# Patient Record
Sex: FEMALE | Race: WHITE | Employment: OTHER | URBAN - METROPOLITAN AREA
[De-identification: names, ages, dates, MRNs, and addresses within clinical notes are randomized per-mention and may not be internally consistent; named-entity substitution may affect disease eponyms.]

---

## 2017-10-10 RX ORDER — AMLODIPINE BESYLATE 10 MG/1
10 TABLET ORAL DAILY
COMMUNITY
End: 2018-03-28 | Stop reason: ALTCHOICE

## 2018-02-09 ENCOUNTER — OFFICE VISIT (OUTPATIENT)
Dept: SURGERY | Age: 68
End: 2018-02-09

## 2018-02-09 ENCOUNTER — DOCUMENTATION ONLY (OUTPATIENT)
Dept: SURGERY | Age: 68
End: 2018-02-09

## 2018-02-09 VITALS
WEIGHT: 232 LBS | SYSTOLIC BLOOD PRESSURE: 145 MMHG | BODY MASS INDEX: 42.69 KG/M2 | DIASTOLIC BLOOD PRESSURE: 53 MMHG | HEIGHT: 62 IN | HEART RATE: 58 BPM

## 2018-02-09 DIAGNOSIS — Z17.0 MALIGNANT NEOPLASM OF RIGHT BREAST IN FEMALE, ESTROGEN RECEPTOR POSITIVE, UNSPECIFIED SITE OF BREAST (HCC): Primary | ICD-10-CM

## 2018-02-09 DIAGNOSIS — C50.911 MALIGNANT NEOPLASM OF RIGHT BREAST IN FEMALE, ESTROGEN RECEPTOR POSITIVE, UNSPECIFIED SITE OF BREAST (HCC): Primary | ICD-10-CM

## 2018-02-09 PROBLEM — E66.01 OBESITY, MORBID (HCC): Status: ACTIVE | Noted: 2018-02-09

## 2018-02-09 RX ORDER — OXYCODONE HYDROCHLORIDE 5 MG/1
5 TABLET ORAL
COMMUNITY
Start: 2013-06-19 | End: 2018-02-09

## 2018-02-09 RX ORDER — LISINOPRIL AND HYDROCHLOROTHIAZIDE 20; 25 MG/1; MG/1
1 TABLET ORAL DAILY
COMMUNITY
End: 2018-06-19

## 2018-02-09 RX ORDER — ESCITALOPRAM OXALATE 10 MG/1
10 TABLET ORAL
COMMUNITY
End: 2018-02-09 | Stop reason: SDUPTHER

## 2018-02-09 RX ORDER — AMOXICILLIN 250 MG
1 CAPSULE ORAL
COMMUNITY
Start: 2013-06-19 | End: 2018-03-01

## 2018-02-09 RX ORDER — AMLODIPINE BESYLATE 10 MG/1
TABLET ORAL
COMMUNITY
End: 2018-02-09 | Stop reason: SDUPTHER

## 2018-02-09 RX ORDER — ATENOLOL 50 MG/1
50 TABLET ORAL
COMMUNITY
End: 2018-02-09 | Stop reason: SDUPTHER

## 2018-02-09 RX ORDER — GLUCOSAMINE SULFATE 1500 MG
1 POWDER IN PACKET (EA) ORAL DAILY
COMMUNITY
End: 2018-03-05

## 2018-02-09 RX ORDER — ETODOLAC 400 MG/1
400 TABLET, FILM COATED ORAL
COMMUNITY
End: 2018-02-09 | Stop reason: SDUPTHER

## 2018-02-09 NOTE — PROGRESS NOTES
Type of Film: [x] CD [] FILMS  Type of Test: [] MRI [x] MAMMO  From: [facility]  Given to: will bring to [facility] on Monday 2/12/18.   LOCATION  To be Downloaded into PACS:  YES

## 2018-02-09 NOTE — PROGRESS NOTES
The patient's BRCA testing was done in the office and Sydnie Pop will drop off at [facility].

## 2018-02-09 NOTE — PATIENT INSTRUCTIONS
Breast Cancer: Care Instructions  Your Care Instructions    Breast cancer occurs when abnormal cells grow out of control in the breast. These cancer cells can spread within the breast, to nearby lymph nodes and other tissues, and to other parts of the body. Being treated for cancer can weaken your body, and you may feel very tired. Get the rest your body needs so you can feel better. Finding out that you have cancer is scary. You may feel many emotions and may need some help coping. Seek out family, friends, and counselors for support. You also can do things at home to make yourself feel better while you go through treatment. Call the [employer] (7-920.656.6562) or visit its website at [employer] for more information. Follow-up care is a key part of your treatment and safety. Be sure to make and go to all appointments, and call your doctor if you are having problems. It's also a good idea to know your test results and keep a list of the medicines you take. How can you care for yourself at home? · Take your medicines exactly as prescribed. Call your doctor if you think you are having a problem with your medicine. You may get medicine for nausea and vomiting if you have these side effects. · Follow your doctor's instructions to relieve pain. Pain from cancer and surgery can almost always be controlled. Use pain medicine when you first notice pain, before it becomes severe. · Eat healthy food. If you do not feel like eating, try to eat food that has protein and extra calories to keep up your strength and prevent weight loss. Drink liquid meal replacements for extra calories and protein. Try to eat your main meal early. · Get some physical activity every day, but do not get too tired. Keep doing the hobbies you enjoy as your energy allows. · Do not smoke. Smoking can make your cancer worse. If you need help quitting, talk to your doctor about stop-smoking programs and medicines.  These can increase your chances of quitting for good. · Take steps to control your stress and workload. Learn relaxation techniques. ¨ Share your feelings. Stress and tension affect our emotions. By expressing your feelings to others, you may be able to understand and cope with them. ¨ Consider joining a support group. Talking about a problem with your spouse, a good friend, or other people with similar problems is a good way to reduce tension and stress. ¨ Express yourself through art. Try writing, crafts, dance, or art to relieve stress. Some dance, writing, or art groups may be available just for people who have cancer. ¨ Be kind to your body and mind. Getting enough sleep, eating a healthy diet, and taking time to do things you enjoy can contribute to an overall feeling of balance in your life and can help reduce stress. ¨ Get help if you need it. Discuss your concerns with your doctor or counselor. · If you are vomiting or have diarrhea:  ¨ Drink plenty of fluids (enough so that your urine is light yellow or clear like water) to prevent dehydration. Choose water and other caffeine-free clear liquids. If you have kidney, heart, or liver disease and have to limit fluids, talk with your doctor before you increase the amount of fluids you drink. ¨ When you are able to eat, try clear soups, mild foods, and liquids until all symptoms are gone for 12 to 48 hours. Other good choices include dry toast, crackers, cooked cereal, and gelatin dessert, such as Jell-O.  · If you have not already done so, prepare a list of advance directives. Advance directives are instructions to your doctor and family members about what kind of care you want if you become unable to speak or express yourself. When should you call for help? Call 911 anytime you think you may need emergency care. For example, call if:  ? · You passed out (lost consciousness). ?Call your doctor now or seek immediate medical care if:  ? · You have a fever. ? · You have abnormal bleeding. ? · You think you have an infection. ? · You have new or worse pain. ? · You have new symptoms, such as a cough, belly pain, vomiting, diarrhea, or a rash. ? Watch closely for changes in your health, and be sure to contact your doctor if:  ? · You are much more tired than usual.   ? · You have swollen glands in your armpits, groin, or neck. ? · You do not get better as expected. Where can you learn more? Go to http://sowmya-colin.info/. Enter V321 in the search box to learn more about \"Breast Cancer: Care Instructions. \"  Current as of: May 12, 2017  Content Version: 11.4  © 4554-0336 [employer]. Care instructions adapted under license by [employer] (which disclaims liability or warranty for this information). If you have questions about a medical condition or this instruction, always ask your healthcare professional. [address] any warranty or liability for your use of this information.

## 2018-02-09 NOTE — PROGRESS NOTES
HISTORY OF PRESENT ILLNESS  Nam Serrano is a 79 y.o. female. HPI NEW patient referral for consultation by Dr.P Radha Carlisle for a new RIGHT breast cancer diagnosis. The patient is here to discuss her breast surgical options. Her breast cancer was seen on an annual mammogram that led to a bilateral biopsy on both the RIGHT and LEFT breast. The LEFT breast was benign and the RIGHT breast is positive for IDC. The patient denies any nipple inversion or discharge. Obstetric History     No data available      Obstetric Comments   Menarche:  12. LMP: . # of Children:  3. Age at Delivery of First Child:  34.   Hysterectomy/oophorectomy: no/no. Breast Bx: no.  Hx of Breast Feeding:  no. BCP:  [address]. Hormone therapy:  yes    Family history - Mother - breast cancer unsure of age and survived. Maternal grandmother - liver/ovarian cancer age unknown and is . 5 Maternal Aunts with breast cancer, unsure of ages and all survived the breast cancer. 2 Maternal Aunts that had breast cancer also had ovarian and colon cancer  2 Paternal aunts had breast cancer, unsure of age and survived   Mammogram 3 D and ultrasound - BI RADS 5      That led to a bilateral breast biopsy. LEFT breast benign and RIGHT breast invasive ductal.  Pathology - Invasive ductal carcinoma RIGHT breast 1.2 cm ER/NH + HER 2 -   Past Medical History:   Diagnosis Date    Arthritis     Hypercholesterolemia     Hypertension     Other ill-defined conditions(799.89)     dog bite dec 2012 needeing blood transfusion    Psychiatric disorder     depression     Past Surgical History:   Procedure Laterality Date    HX ORTHOPAEDIC  2011    RIGHT TOTAL KNEE ARTHROPLASTY     Social History     Social History    Marital status:      Spouse name: N/A    Number of children: N/A    Years of education: N/A     Occupational History    Not on file.      Social History Main Topics    Smoking status: Former Smoker     Packs/day: 0.25     Years: 2.00 Quit date: 6/4/1972    Smokeless tobacco: Never Used    Alcohol use No    Drug use: No    Sexual activity: Not on file     Other Topics Concern    Not on file     Social History Narrative     OB History     Obstetric Comments    Menarche:  12. LMP: 1990. # of Children:  3. Age at Delivery of First Child:  34.   Hysterectomy/oophorectomy: no/no. Breast Bx: no.  Hx of Breast Feeding:  no. BCP:  [address]. Hormone therapy:  yes           Current Outpatient Prescriptions:     cholecalciferol (VITAMIN D3) 1,000 unit cap, Take 1 Tab by mouth., Disp: , Rfl:     lisinopril-hydroCHLOROthiazide (PRINZIDE, ZESTORETIC) 20-25 mg per tablet, Take 1 Tab by mouth., Disp: , Rfl:     senna-docusate (PERICOLACE) 8.6-50 mg per tablet, Take 1 Tab by mouth., Disp: , Rfl:     amLODIPine (NORVASC) 10 mg tablet, Take  by mouth daily. , Disp: , Rfl:     calcium-cholecalciferol, d3, (CALCIUM 600 + D) 600-125 mg-unit Tab, Take 1 Tab by mouth two (2) times a day., Disp: , Rfl:     glucosamine-chondroit-vit c-mn (GLUCOSAMINE 1500 COMPLEX) 500-400 mg capsule, Take 1 Cap by mouth two (2) times a day., Disp: , Rfl:     etodolac (LODINE) 400 mg tablet, Take 400 mg by mouth two (2) times a day., Disp: , Rfl:     Cholecalciferol, Vitamin D3, (VITAMIN D3) 1,000 unit cap, Take 1 Tab by mouth daily. , Disp: , Rfl:     escitalopram (LEXAPRO) 10 mg tablet, Take 10 mg by mouth daily. , Disp: , Rfl:     atenolol (TENORMIN) 50 mg tablet, Take 50 mg by mouth daily. , Disp: , Rfl:   Allergies   Allergen Reactions    Nasacort [Triamcinolone Acetonide] Other (comments)     headache    Sulfa (Sulfonamide Antibiotics) Rash     Review of Systems   Constitutional: Negative. HENT: Negative. Eyes: Negative. Respiratory: Negative. Cardiovascular: Positive for claudication. Gastrointestinal: Negative. Genitourinary: Positive for frequency. Musculoskeletal: Positive for joint pain and myalgias. Skin: Negative.     Neurological: Negative. Endo/Heme/Allergies: Negative. Psychiatric/Behavioral: Negative. Physical Exam   Constitutional: She is oriented to person, place, and time. She appears well-developed and well-nourished. HENT:   Head: Normocephalic. Eyes: EOM are normal.   Neck: Neck supple. No thyromegaly present. Cardiovascular: Normal rate, regular rhythm, normal heart sounds and intact distal pulses. Pulmonary/Chest: Effort normal and breath sounds normal. Right breast exhibits no inverted nipple, no mass, no nipple discharge, no skin change and no tenderness. Left breast exhibits no inverted nipple, no mass, no nipple discharge, no skin change and no tenderness. Breasts are symmetrical.   Right breast us 12:00 shows mass and clip    Abdominal: Soft. Bowel sounds are normal.   Musculoskeletal: Normal range of motion. Lymphadenopathy:     She has no cervical adenopathy. She has no axillary adenopathy. Neurological: She is alert and oriented to person, place, and time. Skin: Skin is warm and dry. Psychiatric: She has a normal mood and affect. Nursing note and vitals reviewed. ASSESSMENT and PLAN    ICD-10-CM ICD-9-CM    1. Malignant neoplasm of right breast in female, estrogen receptor positive, unspecified site of breast ([facility] C50.911 174.9 BRAC-ANALYSIS    Z17.0 V86.0      78 yo female with right breast T1 (1.7 cm) N0 IDC grade 2 Er/Pr+ her 2 vicki negative. She is here with her . I have reviewed the imaging and pathology with her and she was given copies of these reports. 90 minutes were spent face-to-face with the patient during this encounter and 90% of that time was spent on counseling and coordination of care. 1. Discussed lumpectomy and radiation vs mastectomy. Discussed reconstruction. Patient claustrophobic will not do mri. 2. Discussed sentinel lymph node biopsy. 3. Discussed external beam radiation. 4. Discussed hormone therapy.   5. Discussed the possibility of chemotherapy. She is a good candidate for right breast us guided lumpectomy, sln biopsy. The procedure and risks were discussed. Risks include bleeding, bruising, scar, infection, need for more surgery and lymphedema. She understood and wished to proceed . Will also send gene panel today.

## 2018-02-09 NOTE — COMMUNICATION BODY
HISTORY OF PRESENT ILLNESS  Polina Valentino is a 79 y.o. female. HPI NEW patient referral for consultation by Dr.P Len Rosado for a new RIGHT breast cancer diagnosis. The patient is here to discuss her breast surgical options. Her breast cancer was seen on an annual mammogram that led to a bilateral biopsy on both the RIGHT and LEFT breast. The LEFT breast was benign and the RIGHT breast is positive for IDC. The patient denies any nipple inversion or discharge. Obstetric History     No data available      Obstetric Comments   Menarche:  12. LMP: . # of Children:  3. Age at Delivery of First Child:  34.   Hysterectomy/oophorectomy: no/no. Breast Bx: no.  Hx of Breast Feeding:  no. BCP:  [address]. Hormone therapy:  yes    Family history - Mother - breast cancer unsure of age and survived. Maternal grandmother - liver/ovarian cancer age unknown and is . 5 Maternal Aunts with breast cancer, unsure of ages and all survived the breast cancer. 2 Maternal Aunts that had breast cancer also had ovarian and colon cancer  2 Paternal aunts had breast cancer, unsure of age and survived   Mammogram 3 D and ultrasound - BI RADS 5      That led to a bilateral breast biopsy. LEFT breast benign and RIGHT breast invasive ductal.  Pathology - Invasive ductal carcinoma RIGHT breast 1.2 cm ER/IA + HER 2 -   Past Medical History:   Diagnosis Date    Arthritis     Hypercholesterolemia     Hypertension     Other ill-defined conditions(799.89)     dog bite dec 2012 needeing blood transfusion    Psychiatric disorder     depression     Past Surgical History:   Procedure Laterality Date    HX ORTHOPAEDIC  2011    RIGHT TOTAL KNEE ARTHROPLASTY     Social History     Social History    Marital status:      Spouse name: N/A    Number of children: N/A    Years of education: N/A     Occupational History    Not on file.      Social History Main Topics    Smoking status: Former Smoker     Packs/day: 0.25     Years: 2.00 Quit date: 6/4/1972    Smokeless tobacco: Never Used    Alcohol use No    Drug use: No    Sexual activity: Not on file     Other Topics Concern    Not on file     Social History Narrative     OB History     Obstetric Comments    Menarche:  12. LMP: 1990. # of Children:  3. Age at Delivery of First Child:  34.   Hysterectomy/oophorectomy: no/no. Breast Bx: no.  Hx of Breast Feeding:  no. BCP:  [address]. Hormone therapy:  yes           Current Outpatient Prescriptions:     cholecalciferol (VITAMIN D3) 1,000 unit cap, Take 1 Tab by mouth., Disp: , Rfl:     lisinopril-hydroCHLOROthiazide (PRINZIDE, ZESTORETIC) 20-25 mg per tablet, Take 1 Tab by mouth., Disp: , Rfl:     senna-docusate (PERICOLACE) 8.6-50 mg per tablet, Take 1 Tab by mouth., Disp: , Rfl:     amLODIPine (NORVASC) 10 mg tablet, Take  by mouth daily. , Disp: , Rfl:     calcium-cholecalciferol, d3, (CALCIUM 600 + D) 600-125 mg-unit Tab, Take 1 Tab by mouth two (2) times a day., Disp: , Rfl:     glucosamine-chondroit-vit c-mn (GLUCOSAMINE 1500 COMPLEX) 500-400 mg capsule, Take 1 Cap by mouth two (2) times a day., Disp: , Rfl:     etodolac (LODINE) 400 mg tablet, Take 400 mg by mouth two (2) times a day., Disp: , Rfl:     Cholecalciferol, Vitamin D3, (VITAMIN D3) 1,000 unit cap, Take 1 Tab by mouth daily. , Disp: , Rfl:     escitalopram (LEXAPRO) 10 mg tablet, Take 10 mg by mouth daily. , Disp: , Rfl:     atenolol (TENORMIN) 50 mg tablet, Take 50 mg by mouth daily. , Disp: , Rfl:   Allergies   Allergen Reactions    Nasacort [Triamcinolone Acetonide] Other (comments)     headache    Sulfa (Sulfonamide Antibiotics) Rash     Review of Systems   Constitutional: Negative. HENT: Negative. Eyes: Negative. Respiratory: Negative. Cardiovascular: Positive for claudication. Gastrointestinal: Negative. Genitourinary: Positive for frequency. Musculoskeletal: Positive for joint pain and myalgias. Skin: Negative.     Neurological: Negative. Endo/Heme/Allergies: Negative. Psychiatric/Behavioral: Negative. Physical Exam   Constitutional: She is oriented to person, place, and time. She appears well-developed and well-nourished. HENT:   Head: Normocephalic. Eyes: EOM are normal.   Neck: Neck supple. No thyromegaly present. Cardiovascular: Normal rate, regular rhythm, normal heart sounds and intact distal pulses. Pulmonary/Chest: Effort normal and breath sounds normal. Right breast exhibits no inverted nipple, no mass, no nipple discharge, no skin change and no tenderness. Left breast exhibits no inverted nipple, no mass, no nipple discharge, no skin change and no tenderness. Breasts are symmetrical.   Right breast us 12:00 shows mass and clip    Abdominal: Soft. Bowel sounds are normal.   Musculoskeletal: Normal range of motion. Lymphadenopathy:     She has no cervical adenopathy. She has no axillary adenopathy. Neurological: She is alert and oriented to person, place, and time. Skin: Skin is warm and dry. Psychiatric: She has a normal mood and affect. Nursing note and vitals reviewed. ASSESSMENT and PLAN    ICD-10-CM ICD-9-CM    1. Malignant neoplasm of right breast in female, estrogen receptor positive, unspecified site of breast ([facility] C50.911 174.9 BRAC-ANALYSIS    Z17.0 V86.0      80 yo female with right breast T1 (1.7 cm) N0 IDC grade 2 Er/Pr+ her 2 vicki negative. She is here with her . I have reviewed the imaging and pathology with her and she was given copies of these reports. 90 minutes were spent face-to-face with the patient during this encounter and 90% of that time was spent on counseling and coordination of care. 1. Discussed lumpectomy and radiation vs mastectomy. Discussed reconstruction. Patient claustrophobic will not do mri. 2. Discussed sentinel lymph node biopsy. 3. Discussed external beam radiation. 4. Discussed hormone therapy.   5. Discussed the possibility of chemotherapy. She is a good candidate for right breast us guided lumpectomy, sln biopsy. The procedure and risks were discussed. Risks include bleeding, bruising, scar, infection, need for more surgery and lymphedema. She understood and wished to proceed . Will also send gene panel today.

## 2018-02-09 NOTE — MR AVS SNAPSHOT
Jessica Pelayo Riaz [address] [facility] [address] [facility] 
[phone] Patient: Shae Vegas MRN: [id number] WWB:[id number] Visit Information Date & Time Provider Department Dept. Phone Encounter #  
 2/9/2018  1:00  Natalio Purvis  [facility] [id number] Upcoming Health Maintenance Date Due Hepatitis C Screening 1950 DTaP/Tdap/Td series (1 - Tdap) 2/5/1973 FOBT Q 1 YEAR AGE 50-75 2/5/2002 ZOSTER VACCINE AGE 60> 12/5/2011 GLAUCOMA SCREENING Q2Y 2/5/2017 OSTEOPOROSIS SCREENING (DEXA) 2/5/2017 Pneumococcal 65+ Low/Medium Risk (1 of 2 - PCV13) 2/5/2017 MEDICARE YEARLY EXAM 2/5/2017 Influenza Age 5 to Adult 8/1/2017 BREAST CANCER SCRN MAMMOGRAM 1/25/2020 Allergies as of 2/9/2018  Review Complete On: 2/9/2018 By: Silvio Dill RN Severity Noted Reaction Type Reactions Nasacort [Triamcinolone Acetonide]  06/04/2013    Other (comments)  
 headache  
 Sulfa (Sulfonamide Antibiotics)  06/04/2013    Rash Current Immunizations  Reviewed on 6/17/2013 No immunizations on file. Not reviewed this visit Vitals BP Pulse Height(growth percentile) Weight(growth percentile) BMI OB Status 145/53 (!) 58 5' 2\" (1.575 m) 232 lb (105.2 kg) 42.43 kg/m2 Postmenopausal  
 Smoking Status Former Smoker Vitals History BMI and BSA Data Body Mass Index Body Surface Area  
 42.43 kg/m 2 2.15 m 2 Your Updated Medication List  
  
   
This list is accurate as of: 2/9/18  1:45 PM.  Always use your most recent med list. amLODIPine 10 mg tablet Commonly known as:  Bianca Prow Take  by mouth daily. atenolol 50 mg tablet Commonly known as:  TENORMIN Take 50 mg by mouth daily. CALCIUM 600 + D 600-125 mg-unit Tab Generic drug:  calcium-cholecalciferol (d3) Take 1 Tab by mouth two (2) times a day. etodolac 400 mg tablet Commonly known as:  LODINE Take 400 mg by mouth two (2) times a day. GLUCOSAMINE 1500 COMPLEX 500-400 mg capsule Generic drug:  glucosamine-chondroit-vit c-mn Take 1 Cap by mouth two (2) times a day. LEXAPRO 10 mg tablet Generic drug:  escitalopram oxalate Take 10 mg by mouth daily. lisinopril-hydroCHLOROthiazide 20-25 mg per tablet Commonly known as:  Gurney Rival Take 1 Tab by mouth. senna-docusate 8.6-50 mg per tablet Commonly known as:  Sharon Lyn Take 1 Tab by mouth. * VITAMIN D3 1,000 unit Cap Generic drug:  cholecalciferol Take 1 Tab by mouth daily. * cholecalciferol 1,000 unit Cap Commonly known as:  VITAMIN D3 Take 1 Tab by mouth. * Notice: This list has 2 medication(s) that are the same as other medications prescribed for you. Read the directions carefully, and ask your doctor or other care provider to review them with you. Patient Instructions Breast Cancer: Care Instructions Your Care Instructions Breast cancer occurs when abnormal cells grow out of control in the breast. These cancer cells can spread within the breast, to nearby lymph nodes and other tissues, and to other parts of the body. Being treated for cancer can weaken your body, and you may feel very tired. Get the rest your body needs so you can feel better. Finding out that you have cancer is scary. You may feel many emotions and may need some help coping. Seek out family, friends, and counselors for support. You also can do things at home to make yourself feel better while you go through treatment. Call the [employer] (0-100.807.2571) or visit its website at [employer] [employer]. org for more information. Follow-up care is a key part of your treatment and safety.  Be sure to make and go to all appointments, and call your doctor if you are having problems. It's also a good idea to know your test results and keep a list of the medicines you take. How can you care for yourself at home? · Take your medicines exactly as prescribed. Call your doctor if you think you are having a problem with your medicine. You may get medicine for nausea and vomiting if you have these side effects. · Follow your doctor's instructions to relieve pain. Pain from cancer and surgery can almost always be controlled. Use pain medicine when you first notice pain, before it becomes severe. · Eat healthy food. If you do not feel like eating, try to eat food that has protein and extra calories to keep up your strength and prevent weight loss. Drink liquid meal replacements for extra calories and protein. Try to eat your main meal early. · Get some physical activity every day, but do not get too tired. Keep doing the hobbies you enjoy as your energy allows. · Do not smoke. Smoking can make your cancer worse. If you need help quitting, talk to your doctor about stop-smoking programs and medicines. These can increase your chances of quitting for good. · Take steps to control your stress and workload. Learn relaxation techniques. ¨ Share your feelings. Stress and tension affect our emotions. By expressing your feelings to others, you may be able to understand and cope with them. ¨ Consider joining a support group. Talking about a problem with your spouse, a good friend, or other people with similar problems is a good way to reduce tension and stress. ¨ Express yourself through art. Try writing, crafts, dance, or art to relieve stress. Some dance, writing, or art groups may be available just for people who have cancer. ¨ Be kind to your body and mind. Getting enough sleep, eating a healthy diet, and taking time to do things you enjoy can contribute to an overall feeling of balance in your life and can help reduce stress. ¨ Get help if you need it. Discuss your concerns with your doctor or counselor. · If you are vomiting or have diarrhea: ¨ Drink plenty of fluids (enough so that your urine is light yellow or clear like water) to prevent dehydration. Choose water and other caffeine-free clear liquids. If you have kidney, heart, or liver disease and have to limit fluids, talk with your doctor before you increase the amount of fluids you drink. ¨ When you are able to eat, try clear soups, mild foods, and liquids until all symptoms are gone for 12 to 48 hours. Other good choices include dry toast, crackers, cooked cereal, and gelatin dessert, such as Jell-O. · If you have not already done so, prepare a list of advance directives. Advance directives are instructions to your doctor and family members about what kind of care you want if you become unable to speak or express yourself. When should you call for help? Call 911 anytime you think you may need emergency care. For example, call if: 
? · You passed out (lost consciousness). ?Call your doctor now or seek immediate medical care if: 
? · You have a fever. ? · You have abnormal bleeding. ? · You think you have an infection. ? · You have new or worse pain. ? · You have new symptoms, such as a cough, belly pain, vomiting, diarrhea, or a rash. ? Watch closely for changes in your health, and be sure to contact your doctor if: 
? · You are much more tired than usual.  
? · You have swollen glands in your armpits, groin, or neck. ? · You do not get better as expected. Where can you learn more? Go to http://sowmya-colin.info/. Enter V321 in the search box to learn more about \"Breast Cancer: Care Instructions. \" Current as of: May 12, 2017 Content Version: 11.4 © 6555-3736 Healthwise, [employer].  Care instructions adapted under license by [employer] (which disclaims liability or warranty for this information). If you have questions about a medical condition or this instruction, always ask your healthcare professional. [address] any warranty or liability for your use of this information. Introducing [facility] SERVICES! [employer] introduces [employer] patient portal. Now you can access parts of your medical record, email your doctor's office, and request medication refills online. 1. In your internet browser, go to [email or web address] 2. Click on the First Time User? Click Here link in the Sign In box. You will see the New Member Sign Up page. 3. Enter your [employer] Access Code exactly as it appears below. You will not need to use this code after youve completed the sign-up process. If you do not sign up before the expiration date, you must request a new code. · [employer] Access Code: [email or web address] Expires: 5/10/2018  1:04 PM 
 
4. Enter the last four digits of your Social Security Number (xxxx) and Date of Birth (mm/dd/yyyy) as indicated and click Submit. You will be taken to the next sign-up page. 5. Create a [employer] ID. This will be your [employer] login ID and cannot be changed, so think of one that is secure and easy to remember. 6. Create a [employer] password. You can change your password at any time. 7. Enter your Password Reset Question and Answer. This can be used at a later time if you forget your password. 8. Enter your e-mail address. You will receive e-mail notification when new information is available in [address] 9. Click Sign Up. You can now view and download portions of your medical record. 10. Click the Download Summary menu link to download a portable copy of your medical information. If you have questions, please visit the Frequently Asked Questions section of the [employer] website. Remember, [employer] is NOT to be used for urgent needs. For medical emergencies, dial 911. Now available from your iPhone and Android! Please provide this summary of care documentation to your next provider. Your primary care clinician is listed as Eric Castillo. If you have any questions after today's visit, please call [phone].

## 2018-02-12 ENCOUNTER — PATIENT OUTREACH (OUTPATIENT)
Dept: ONCOLOGY | Age: 68
End: 2018-02-12

## 2018-02-16 ENCOUNTER — TELEPHONE (OUTPATIENT)
Dept: SURGERY | Age: 68
End: 2018-02-16

## 2018-02-16 DIAGNOSIS — C50.911 MALIGNANT NEOPLASM OF RIGHT FEMALE BREAST, UNSPECIFIED ESTROGEN RECEPTOR STATUS, UNSPECIFIED SITE OF BREAST (HCC): Primary | ICD-10-CM

## 2018-02-16 NOTE — TELEPHONE ENCOUNTER
Placed order for breast MRI, okay per Dr. Danice Sandhoff. Sent this to Gabriela Dean. Called patient and left her a message letting her know that we have ordered this and she should be getting a call next week to schedule it. Also, I wanted to ask her which pharmacy she uses so that I could call in xanax for her. Patient requesting xanax for breast MRI. Per Dr. Danice Sandhoff, okay to call in Xanax 0.5 mg per tablet, take 1 tablet PO Q8hrs prn anxiety, fill #60 pills, no refills.

## 2018-02-19 ENCOUNTER — TELEPHONE (OUTPATIENT)
Dept: SURGERY | Age: 68
End: 2018-02-19

## 2018-02-19 NOTE — PROGRESS NOTES
Appointments for this Order   2/23/2018 0830 - 45 min [facility] MRI 2 (Resource) [facility] [employer]

## 2018-02-19 NOTE — TELEPHONE ENCOUNTER
Patient returned my call from Friday. Per Dr. Robert Hood, I called Xanax in to her [facility] pharamcy at [facility]. Xanax 0.5 mg per tablet, take 1 tablet PO Q8hrs prn anxiety, fill #60 pills, no refills. Patient's breast mri is scheduled for 2/23/18.

## 2018-02-21 DIAGNOSIS — C50.911 MALIGNANT NEOPLASM OF RIGHT BREAST IN FEMALE, ESTROGEN RECEPTOR POSITIVE, UNSPECIFIED SITE OF BREAST (HCC): Primary | ICD-10-CM

## 2018-02-21 DIAGNOSIS — Z17.0 MALIGNANT NEOPLASM OF RIGHT BREAST IN FEMALE, ESTROGEN RECEPTOR POSITIVE, UNSPECIFIED SITE OF BREAST (HCC): Primary | ICD-10-CM

## 2018-02-22 ENCOUNTER — TELEPHONE (OUTPATIENT)
Dept: SURGERY | Age: 68
End: 2018-02-22

## 2018-02-22 NOTE — TELEPHONE ENCOUNTER
Returned pt's call. She has questions about xanax, to take in preparation for breast mri. She picked up Rx the other day and took 1/2 pill Monday night - says it did nothing. Forgot to take it Tuesday night. Wednesday night, she took a full 1 pill - says it did nothing. I told her to take 2 pills tonight. Her breast mri is tomorrow and she is afraid that the xanax will not work. I told instructed her to take 2 pills 1 hour prior to procedure. Her  is driving her. I instructed her to bring the bottle of xanax to mri with her. She understands and will do this.

## 2018-02-23 ENCOUNTER — HOSPITAL ENCOUNTER (OUTPATIENT)
Dept: MRI IMAGING | Age: 68
Discharge: HOME | End: 2018-02-23
Attending: SURGERY
Payer: MEDICARE

## 2018-02-23 DIAGNOSIS — C50.911 MALIGNANT NEOPLASM OF RIGHT FEMALE BREAST, UNSPECIFIED ESTROGEN RECEPTOR STATUS, UNSPECIFIED SITE OF BREAST (HCC): ICD-10-CM

## 2018-02-23 PROCEDURE — 77059 MRI BREAST BI W WO CONT: CPT

## 2018-02-23 PROCEDURE — 74011250636 HC RX REV CODE- 250/636: Performed by: SURGERY

## 2018-02-23 PROCEDURE — A9585 GADOBUTROL INJECTION: HCPCS | Performed by: SURGERY

## 2018-02-23 RX ADMIN — GADOBUTROL 10 ML: 604.72 INJECTION INTRAVENOUS at 09:04

## 2018-02-26 ENCOUNTER — DOCUMENTATION ONLY (OUTPATIENT)
Dept: SURGERY | Age: 68
End: 2018-02-26

## 2018-02-26 NOTE — PROGRESS NOTES
Outside breast imaging CDs [facility]) have been uploaded into pacs and will be placed into shredder.

## 2018-02-27 ENCOUNTER — TELEPHONE (OUTPATIENT)
Dept: MAMMOGRAPHY | Age: 68
End: 2018-02-27

## 2018-02-27 NOTE — TELEPHONE ENCOUNTER
Arranged MRI Breast Biopsy ordered by Dr. Davis Alexis for Thursday, 3/1/18 @ 1 pm, arriving at [phone]. I spoke w/Ms. Kaykay Kendrick to confirm arrival time, expectations of MRI biopsy, and discharge care - Ms. Kaykay Kendrick asked questions and has my contact number if any questions or concerns arise.

## 2018-03-01 ENCOUNTER — HOSPITAL ENCOUNTER (OUTPATIENT)
Dept: MAMMOGRAPHY | Age: 68
Discharge: HOME | End: 2018-03-01
Attending: SURGERY
Payer: MEDICARE

## 2018-03-01 ENCOUNTER — HOSPITAL ENCOUNTER (OUTPATIENT)
Dept: MRI IMAGING | Age: 68
Discharge: HOME | End: 2018-03-01
Attending: SURGERY
Payer: MEDICARE

## 2018-03-01 VITALS
DIASTOLIC BLOOD PRESSURE: 59 MMHG | WEIGHT: 230 LBS | BODY MASS INDEX: 42.33 KG/M2 | RESPIRATION RATE: 16 BRPM | TEMPERATURE: 97.7 F | SYSTOLIC BLOOD PRESSURE: 122 MMHG | HEIGHT: 62 IN | HEART RATE: 50 BPM | OXYGEN SATURATION: 100 %

## 2018-03-01 DIAGNOSIS — N63.10 BREAST MASS, RIGHT: ICD-10-CM

## 2018-03-01 DIAGNOSIS — R92.8 ABNORMAL MAMMOGRAM OF RIGHT BREAST: ICD-10-CM

## 2018-03-01 PROCEDURE — 88305 TISSUE EXAM BY PATHOLOGIST: CPT | Performed by: RADIOLOGY

## 2018-03-01 PROCEDURE — 74011250636 HC RX REV CODE- 250/636: Performed by: SURGERY

## 2018-03-01 PROCEDURE — 19085 BX BREAST 1ST LESION MR IMAG: CPT

## 2018-03-01 PROCEDURE — A9585 GADOBUTROL INJECTION: HCPCS | Performed by: SURGERY

## 2018-03-01 PROCEDURE — 77065 DX MAMMO INCL CAD UNI: CPT

## 2018-03-01 RX ADMIN — GADOBUTROL 10 ML: 604.72 INJECTION INTRAVENOUS at 15:00

## 2018-03-01 NOTE — PROGRESS NOTES
Pt ambulated to MRI without difficulty accomp. By nurse. Pt states she has taken 2 pills for the procedure per her doctor. Pt awake, alert and oriented x 3. Pt without c/o pain at this time.

## 2018-03-01 NOTE — PROGRESS NOTES
Right breast biopsy sample to lab. Pt left prior to signing discharge instructions. Pt given verbal discharge instructions via nurse prior to discharge and procedure and pt. Voicing she understood (I have had 2 others of these per pt). I have reviewed discharge instructions with the patient. The patient verbalized understanding. Dressing right lower, outer breast noted clean/dry/intact with ice pack applied to site at this time. Pt. Dressed self and ambulated to waiting room for discharge to home via private vehicle with her  driving her home. Pt accomp. By nurse to waiting room. Pt without c/o pain at this time.

## 2018-03-02 NOTE — PROGRESS NOTES
Path report faxed due to lab and connect care computers not transferring between. Dr. Maria Luisa Torrez was unable to view pathology report either - I will follow up on Monday.

## 2018-03-05 ENCOUNTER — TELEPHONE (OUTPATIENT)
Dept: MAMMOGRAPHY | Age: 68
End: 2018-03-05

## 2018-03-05 RX ORDER — LANOLIN ALCOHOL/MO/W.PET/CERES
325 CREAM (GRAM) TOPICAL
Status: ON HOLD | COMMUNITY
End: 2019-07-27

## 2018-03-05 RX ORDER — ALPRAZOLAM 0.5 MG/1
0.5 TABLET ORAL
COMMUNITY
End: 2018-03-08

## 2018-03-05 NOTE — TELEPHONE ENCOUNTER
Dr. Althea Birmingham reviewed pathology report. Routed Addendum report to Dr. Aguirre Sea Bright through [address] [facility]. The Dr. Aguirre Sea Bright will notifying patient of results and advise of treatment plan.

## 2018-03-05 NOTE — PERIOP NOTES
[facility]  Ambulatory Surgery Unit  Pre-operative Instructions    Surgery/Procedure Date  3/8            Tentative Arrival Time 730am      1. On the day of your surgery/procedure, please report to the Ambulatory Surgery Unit Registration Desk and sign in at your designated time. The Ambulatory Surgery Unit is located in [facility] on the [facility] side of the [facility] across from the [address] [place] [address]. Please have all of your health insurance cards and a photo ID. 2. You must have someone with you to drive you home, as you should not drive a car for 24 hours following anesthesia. Please make arrangements for a responsible adult friend or family member to stay with you for at least the first 24 hours after your surgery. 3. Do not have anything to eat or drink (including water, gum, mints, coffee, juice) after midnight   3/7. This may not apply to medications prescribed by your physician. (Please note below the special instructions with medications to take the morning of surgery, if applicable.)    4. We recommend you do not drink any alcoholic beverages for 24 hours before and after your surgery. 5. Contact your surgeons office for instructions on the following medications: non-steroidal anti-inflammatory drugs (i.e. Advil, Aleve), vitamins, and supplements. (Some surgeons will want you to stop these medications prior to surgery and others may allow you to take them)   **If you are currently taking Plavix, Coumadin, Aspirin and/or other blood-thinning agents, contact your surgeon for instructions. ** Your surgeon will partner with the physician prescribing these medications to determine if it is safe to stop or if you need to continue taking. Please do not stop taking these medications without instructions from your surgeon.     6. In an effort to help prevent surgical site infection, we ask that you shower with an anti-bacterial soap (i.e. Dial or Safeguard) for 3 days prior to and on the morning of surgery, using a fresh towel after each shower. (Please begin this process with fresh bed linens.) Do not apply any lotions, powders, or deodorants after the shower on the day of your procedure. If applicable, please do not shave the operative site for 48 hours prior to surgery. 7. Wear comfortable clothes. Wear glasses instead of contacts. Do not bring any jewelry or money (other than copays or fees as instructed). Do not wear make-up, particularly mascara, the morning of your surgery. Do not wear nail polish, particularly if you are having foot /hand surgery. Wear your hair loose or down, no ponytails, buns, corina pins or clips. All body piercings must be removed. 8. You should understand that if you do not follow these instructions your surgery may be cancelled. If your physical condition changes (i.e. fever, cold or flu) please contact your surgeon as soon as possible. 9. It is important that you be on time. If a situation occurs where you may be late, or if you have any questions or problems, please call [phone].    10. Your surgery time may be subject to change. You will receive a phone call the day prior to surgery to confirm your arrival time. Special Instructions: Take all medications and inhalers, as prescribed, on the morning of surgery with a sip of water EXCEPT: none      I understand a pre-operative phone call will be made to verify my surgery time. In the event that I am not available, I give permission for a message to be left on my answering service and/or with another person?       yes         ___________________      ___________________      ________________  (Signature of Patient)          (Witness)                   (Date and Time)

## 2018-03-05 NOTE — PERIOP NOTES
Call to Cici Keith in Dr. China Portillo office. Pt reports that she is unsure if her procedure will change, as she is awaiting results of additional testing. Pt is on NSAID, Lodine. LM on  for Cici Keith.

## 2018-03-06 ENCOUNTER — HOSPITAL ENCOUNTER (OUTPATIENT)
Dept: SURGERY | Age: 68
Setting detail: OUTPATIENT SURGERY
Discharge: HOME | End: 2018-03-06
Payer: MEDICARE

## 2018-03-06 ENCOUNTER — DOCUMENTATION ONLY (OUTPATIENT)
Dept: SURGERY | Age: 68
End: 2018-03-06

## 2018-03-06 VITALS
HEART RATE: 51 BPM | DIASTOLIC BLOOD PRESSURE: 70 MMHG | TEMPERATURE: 98.5 F | OXYGEN SATURATION: 98 % | RESPIRATION RATE: 18 BRPM | SYSTOLIC BLOOD PRESSURE: 158 MMHG

## 2018-03-06 PROCEDURE — 93005 ELECTROCARDIOGRAM TRACING: CPT

## 2018-03-06 NOTE — PROGRESS NOTES
CARRILLO SANTIAGO RN FROM [facility] CALLED AND STATED PATIENT WAS WITH HER AND PATIENT HAD LAST TAKEN LODINE TODAY AND SURGERY IS 3-8-18 AND NEEDED TO KNOW HOW TO INSTRUCT. LUCIO VILLALOBOS RN STATED IT WAS OK, DO NOT TAKE ANYMORE DOSES BEFORE SURGERY.

## 2018-03-06 NOTE — PERIOP NOTES
Spoke with Messi Almonte in Dr. Melanie Amador office. Notified that pt states that she has not been instructed on Lodine. Messi Almonte spoke with nurse, pt to not take further lodine. Pt vebalized understanding.

## 2018-03-07 ENCOUNTER — ANESTHESIA EVENT (OUTPATIENT)
Dept: SURGERY | Age: 68
End: 2018-03-07
Payer: MEDICARE

## 2018-03-07 DIAGNOSIS — Z17.0 MALIGNANT NEOPLASM OF RIGHT BREAST IN FEMALE, ESTROGEN RECEPTOR POSITIVE, UNSPECIFIED SITE OF BREAST (HCC): Primary | ICD-10-CM

## 2018-03-07 DIAGNOSIS — C50.911 MALIGNANT NEOPLASM OF RIGHT BREAST IN FEMALE, ESTROGEN RECEPTOR POSITIVE, UNSPECIFIED SITE OF BREAST (HCC): Primary | ICD-10-CM

## 2018-03-07 LAB
ATRIAL RATE: 51 BPM
CALCULATED P AXIS, ECG09: 17 DEGREES
CALCULATED R AXIS, ECG10: 26 DEGREES
CALCULATED T AXIS, ECG11: 7 DEGREES
DIAGNOSIS, 93000: NORMAL
P-R INTERVAL, ECG05: 128 MS
Q-T INTERVAL, ECG07: 438 MS
QRS DURATION, ECG06: 92 MS
QTC CALCULATION (BEZET), ECG08: 403 MS
VENTRICULAR RATE, ECG03: 51 BPM

## 2018-03-08 ENCOUNTER — HOSPITAL ENCOUNTER (OUTPATIENT)
Age: 68
Setting detail: OUTPATIENT SURGERY
Discharge: HOME | End: 2018-03-08
Attending: SURGERY | Admitting: SURGERY
Payer: MEDICARE

## 2018-03-08 ENCOUNTER — ANESTHESIA (OUTPATIENT)
Dept: SURGERY | Age: 68
End: 2018-03-08
Payer: MEDICARE

## 2018-03-08 ENCOUNTER — HOSPITAL ENCOUNTER (OUTPATIENT)
Dept: NUCLEAR MEDICINE | Age: 68
Discharge: HOME | End: 2018-03-08
Attending: SURGERY
Payer: MEDICARE

## 2018-03-08 VITALS
OXYGEN SATURATION: 93 % | RESPIRATION RATE: 16 BRPM | SYSTOLIC BLOOD PRESSURE: 160 MMHG | TEMPERATURE: 98.2 F | BODY MASS INDEX: 41.96 KG/M2 | HEIGHT: 62 IN | HEART RATE: 68 BPM | DIASTOLIC BLOOD PRESSURE: 74 MMHG | WEIGHT: 228 LBS

## 2018-03-08 DIAGNOSIS — Z17.0 MALIGNANT NEOPLASM OF RIGHT BREAST IN FEMALE, ESTROGEN RECEPTOR POSITIVE, UNSPECIFIED SITE OF BREAST (HCC): ICD-10-CM

## 2018-03-08 DIAGNOSIS — C50.911 MALIGNANT NEOPLASM OF RIGHT BREAST IN FEMALE, ESTROGEN RECEPTOR POSITIVE, UNSPECIFIED SITE OF BREAST (HCC): ICD-10-CM

## 2018-03-08 DIAGNOSIS — C50.911 BREAST CANCER, RIGHT (HCC): ICD-10-CM

## 2018-03-08 PROCEDURE — 74011000250 HC RX REV CODE- 250: Performed by: SURGERY

## 2018-03-08 PROCEDURE — 77030011267 HC ELECTRD BLD COVD -A: Performed by: SURGERY

## 2018-03-08 PROCEDURE — 77030010507 HC ADH SKN DERMBND J&J -B: Performed by: SURGERY

## 2018-03-08 PROCEDURE — 88342 IMHCHEM/IMCYTCHM 1ST ANTB: CPT | Performed by: SURGERY

## 2018-03-08 PROCEDURE — 77030002933 HC SUT MCRYL J&J -A: Performed by: SURGERY

## 2018-03-08 PROCEDURE — A9541 TC99M SULFUR COLLOID: HCPCS

## 2018-03-08 PROCEDURE — 76030000001 HC AMB SURG OR TIME 1 TO 1.5: Performed by: SURGERY

## 2018-03-08 PROCEDURE — 77030034626 HC LIGASURE SM JAW SEAL OPN SURG COVD -E: Performed by: SURGERY

## 2018-03-08 PROCEDURE — 74011250636 HC RX REV CODE- 250/636: Performed by: ANESTHESIOLOGY

## 2018-03-08 PROCEDURE — 77030002996 HC SUT SLK J&J -A: Performed by: SURGERY

## 2018-03-08 PROCEDURE — 76060000062 HC AMB SURG ANES 1 TO 1.5 HR: Performed by: SURGERY

## 2018-03-08 PROCEDURE — 88307 TISSUE EXAM BY PATHOLOGIST: CPT | Performed by: SURGERY

## 2018-03-08 PROCEDURE — 77030013079 HC BLNKT BAIR HGGR 3M -A: Performed by: NURSE ANESTHETIST, CERTIFIED REGISTERED

## 2018-03-08 PROCEDURE — 77030011640 HC PAD GRND REM COVD -A: Performed by: SURGERY

## 2018-03-08 PROCEDURE — 74011000250 HC RX REV CODE- 250

## 2018-03-08 PROCEDURE — 76210000035 HC AMBSU PH I REC 1 TO 1.5 HR: Performed by: SURGERY

## 2018-03-08 PROCEDURE — 77030031139 HC SUT VCRL2 J&J -A: Performed by: SURGERY

## 2018-03-08 PROCEDURE — 77030034556 HC PRB MARGN DETECT LUMPECTMY DISP DUNE -G: Performed by: SURGERY

## 2018-03-08 PROCEDURE — 76210000046 HC AMBSU PH II REC FIRST 0.5 HR: Performed by: SURGERY

## 2018-03-08 PROCEDURE — 74011250636 HC RX REV CODE- 250/636

## 2018-03-08 PROCEDURE — 74011250637 HC RX REV CODE- 250/637: Performed by: SURGERY

## 2018-03-08 PROCEDURE — 77030010509 HC AIRWY LMA MSK TELE -A: Performed by: NURSE ANESTHETIST, CERTIFIED REGISTERED

## 2018-03-08 RX ORDER — SODIUM CHLORIDE 0.9 % (FLUSH) 0.9 %
5-10 SYRINGE (ML) INJECTION AS NEEDED
Status: DISCONTINUED | OUTPATIENT
Start: 2018-03-08 | End: 2018-03-08 | Stop reason: HOSPADM

## 2018-03-08 RX ORDER — LIDOCAINE HYDROCHLORIDE 20 MG/ML
INJECTION, SOLUTION EPIDURAL; INFILTRATION; INTRACAUDAL; PERINEURAL AS NEEDED
Status: DISCONTINUED | OUTPATIENT
Start: 2018-03-08 | End: 2018-03-08 | Stop reason: HOSPADM

## 2018-03-08 RX ORDER — OXYCODONE HYDROCHLORIDE 5 MG/1
5 TABLET ORAL
Status: COMPLETED | OUTPATIENT
Start: 2018-03-08 | End: 2018-03-08

## 2018-03-08 RX ORDER — MIDAZOLAM HYDROCHLORIDE 1 MG/ML
INJECTION, SOLUTION INTRAMUSCULAR; INTRAVENOUS AS NEEDED
Status: DISCONTINUED | OUTPATIENT
Start: 2018-03-08 | End: 2018-03-08 | Stop reason: HOSPADM

## 2018-03-08 RX ORDER — OXYCODONE AND ACETAMINOPHEN 5; 325 MG/1; MG/1
1 TABLET ORAL
Qty: 40 TAB | Refills: 0 | Status: ON HOLD | OUTPATIENT
Start: 2018-03-08 | End: 2019-07-27

## 2018-03-08 RX ORDER — SODIUM CHLORIDE, SODIUM LACTATE, POTASSIUM CHLORIDE, CALCIUM CHLORIDE 600; 310; 30; 20 MG/100ML; MG/100ML; MG/100ML; MG/100ML
25 INJECTION, SOLUTION INTRAVENOUS CONTINUOUS
Status: DISCONTINUED | OUTPATIENT
Start: 2018-03-08 | End: 2018-03-08 | Stop reason: HOSPADM

## 2018-03-08 RX ORDER — PROPOFOL 10 MG/ML
INJECTION, EMULSION INTRAVENOUS AS NEEDED
Status: DISCONTINUED | OUTPATIENT
Start: 2018-03-08 | End: 2018-03-08 | Stop reason: HOSPADM

## 2018-03-08 RX ORDER — CEFAZOLIN SODIUM/WATER 2 G/20 ML
SYRINGE (ML) INTRAVENOUS
Status: DISCONTINUED
Start: 2018-03-08 | End: 2018-03-08 | Stop reason: HOSPADM

## 2018-03-08 RX ORDER — LIDOCAINE HYDROCHLORIDE AND EPINEPHRINE 10; 10 MG/ML; UG/ML
20 INJECTION, SOLUTION INFILTRATION; PERINEURAL ONCE
Status: COMPLETED | OUTPATIENT
Start: 2018-03-08 | End: 2018-03-08

## 2018-03-08 RX ORDER — BUPIVACAINE HYDROCHLORIDE 2.5 MG/ML
20 INJECTION, SOLUTION EPIDURAL; INFILTRATION; INTRACAUDAL ONCE
Status: COMPLETED | OUTPATIENT
Start: 2018-03-08 | End: 2018-03-08

## 2018-03-08 RX ORDER — ACETAMINOPHEN 10 MG/ML
INJECTION, SOLUTION INTRAVENOUS AS NEEDED
Status: DISCONTINUED | OUTPATIENT
Start: 2018-03-08 | End: 2018-03-08 | Stop reason: HOSPADM

## 2018-03-08 RX ORDER — OXYCODONE AND ACETAMINOPHEN 5; 325 MG/1; MG/1
1 TABLET ORAL
Status: DISCONTINUED | OUTPATIENT
Start: 2018-03-08 | End: 2018-03-08 | Stop reason: HOSPADM

## 2018-03-08 RX ORDER — DEXAMETHASONE SODIUM PHOSPHATE 4 MG/ML
INJECTION, SOLUTION INTRA-ARTICULAR; INTRALESIONAL; INTRAMUSCULAR; INTRAVENOUS; SOFT TISSUE AS NEEDED
Status: DISCONTINUED | OUTPATIENT
Start: 2018-03-08 | End: 2018-03-08 | Stop reason: HOSPADM

## 2018-03-08 RX ORDER — ONDANSETRON 2 MG/ML
INJECTION INTRAMUSCULAR; INTRAVENOUS AS NEEDED
Status: DISCONTINUED | OUTPATIENT
Start: 2018-03-08 | End: 2018-03-08 | Stop reason: HOSPADM

## 2018-03-08 RX ORDER — MORPHINE SULFATE 10 MG/ML
2 INJECTION, SOLUTION INTRAMUSCULAR; INTRAVENOUS
Status: DISCONTINUED | OUTPATIENT
Start: 2018-03-08 | End: 2018-03-08 | Stop reason: HOSPADM

## 2018-03-08 RX ORDER — HYDROMORPHONE HYDROCHLORIDE 1 MG/ML
.2-.5 INJECTION, SOLUTION INTRAMUSCULAR; INTRAVENOUS; SUBCUTANEOUS ONCE
Status: DISCONTINUED | OUTPATIENT
Start: 2018-03-08 | End: 2018-03-08 | Stop reason: HOSPADM

## 2018-03-08 RX ORDER — EPHEDRINE SULFATE 50 MG/ML
INJECTION, SOLUTION INTRAVENOUS AS NEEDED
Status: DISCONTINUED | OUTPATIENT
Start: 2018-03-08 | End: 2018-03-08 | Stop reason: HOSPADM

## 2018-03-08 RX ORDER — DIPHENHYDRAMINE HYDROCHLORIDE 50 MG/ML
12.5 INJECTION, SOLUTION INTRAMUSCULAR; INTRAVENOUS AS NEEDED
Status: DISCONTINUED | OUTPATIENT
Start: 2018-03-08 | End: 2018-03-08 | Stop reason: HOSPADM

## 2018-03-08 RX ORDER — CEFAZOLIN SODIUM 1 G/3ML
INJECTION, POWDER, FOR SOLUTION INTRAMUSCULAR; INTRAVENOUS AS NEEDED
Status: DISCONTINUED | OUTPATIENT
Start: 2018-03-08 | End: 2018-03-08 | Stop reason: HOSPADM

## 2018-03-08 RX ORDER — SODIUM CHLORIDE 0.9 % (FLUSH) 0.9 %
5-10 SYRINGE (ML) INJECTION EVERY 8 HOURS
Status: DISCONTINUED | OUTPATIENT
Start: 2018-03-08 | End: 2018-03-08 | Stop reason: HOSPADM

## 2018-03-08 RX ORDER — LIDOCAINE HYDROCHLORIDE 10 MG/ML
0.1 INJECTION, SOLUTION EPIDURAL; INFILTRATION; INTRACAUDAL; PERINEURAL AS NEEDED
Status: DISCONTINUED | OUTPATIENT
Start: 2018-03-08 | End: 2018-03-08 | Stop reason: HOSPADM

## 2018-03-08 RX ORDER — FENTANYL CITRATE 50 UG/ML
25 INJECTION, SOLUTION INTRAMUSCULAR; INTRAVENOUS
Status: DISCONTINUED | OUTPATIENT
Start: 2018-03-08 | End: 2018-03-08 | Stop reason: HOSPADM

## 2018-03-08 RX ORDER — FENTANYL CITRATE 50 UG/ML
INJECTION, SOLUTION INTRAMUSCULAR; INTRAVENOUS AS NEEDED
Status: DISCONTINUED | OUTPATIENT
Start: 2018-03-08 | End: 2018-03-08 | Stop reason: HOSPADM

## 2018-03-08 RX ORDER — ONDANSETRON 4 MG/1
4 TABLET, ORALLY DISINTEGRATING ORAL
Qty: 5 TAB | Refills: 1 | Status: SHIPPED | OUTPATIENT
Start: 2018-03-08 | End: 2018-06-19

## 2018-03-08 RX ADMIN — EPHEDRINE SULFATE 10 MG: 50 INJECTION, SOLUTION INTRAVENOUS at 11:52

## 2018-03-08 RX ADMIN — LIDOCAINE HYDROCHLORIDE 80 MG: 20 INJECTION, SOLUTION EPIDURAL; INFILTRATION; INTRACAUDAL; PERINEURAL at 11:24

## 2018-03-08 RX ADMIN — DEXAMETHASONE SODIUM PHOSPHATE 8 MG: 4 INJECTION, SOLUTION INTRA-ARTICULAR; INTRALESIONAL; INTRAMUSCULAR; INTRAVENOUS; SOFT TISSUE at 11:56

## 2018-03-08 RX ADMIN — PROPOFOL 50 MG: 10 INJECTION, EMULSION INTRAVENOUS at 12:02

## 2018-03-08 RX ADMIN — EPHEDRINE SULFATE 5 MG: 50 INJECTION, SOLUTION INTRAVENOUS at 11:37

## 2018-03-08 RX ADMIN — PROPOFOL 160 MG: 10 INJECTION, EMULSION INTRAVENOUS at 11:24

## 2018-03-08 RX ADMIN — OXYCODONE HYDROCHLORIDE 5 MG: 5 TABLET ORAL at 14:14

## 2018-03-08 RX ADMIN — ONDANSETRON 4 MG: 2 INJECTION INTRAMUSCULAR; INTRAVENOUS at 12:12

## 2018-03-08 RX ADMIN — CEFAZOLIN SODIUM 2 G: 1 INJECTION, POWDER, FOR SOLUTION INTRAMUSCULAR; INTRAVENOUS at 11:38

## 2018-03-08 RX ADMIN — MIDAZOLAM HYDROCHLORIDE 2 MG: 1 INJECTION, SOLUTION INTRAMUSCULAR; INTRAVENOUS at 11:15

## 2018-03-08 RX ADMIN — ACETAMINOPHEN 1000 MG: 10 INJECTION, SOLUTION INTRAVENOUS at 11:47

## 2018-03-08 RX ADMIN — FENTANYL CITRATE 50 MCG: 50 INJECTION, SOLUTION INTRAMUSCULAR; INTRAVENOUS at 11:24

## 2018-03-08 RX ADMIN — FENTANYL CITRATE 50 MCG: 50 INJECTION, SOLUTION INTRAMUSCULAR; INTRAVENOUS at 12:02

## 2018-03-08 RX ADMIN — SODIUM CHLORIDE, SODIUM LACTATE, POTASSIUM CHLORIDE, AND CALCIUM CHLORIDE 25 ML/HR: 600; 310; 30; 20 INJECTION, SOLUTION INTRAVENOUS at 09:16

## 2018-03-08 RX ADMIN — PROPOFOL 40 MG: 10 INJECTION, EMULSION INTRAVENOUS at 11:26

## 2018-03-08 NOTE — ANESTHESIA PREPROCEDURE EVALUATION
Anesthetic History   No history of anesthetic complications            Review of Systems / Medical History  Patient summary reviewed, nursing notes reviewed and pertinent labs reviewed    Pulmonary  Within defined limits                 Neuro/Psych         Psychiatric history (depression)     Cardiovascular    Hypertension          Hyperlipidemia    Exercise tolerance: >4 METS     GI/Hepatic/Renal     GERD (occasionally)           Endo/Other        Morbid obesity, arthritis and cancer (right breast)     Other Findings            Physical Exam    Airway  Mallampati: II  TM Distance: < 4 cm  Neck ROM: normal range of motion   Mouth opening: Normal     Cardiovascular    Rhythm: regular  Rate: normal      Pertinent negatives: No murmur  Comments: [facility]  No notable dental hx       Pulmonary  Breath sounds clear to auscultation               Abdominal  GI exam deferred       Other Findings            Anesthetic Plan    ASA: 2  Anesthesia type: general    Monitoring Plan: BIS      Induction: Intravenous  Anesthetic plan and risks discussed with: Patient and Family      Took BB yesterday

## 2018-03-08 NOTE — ANESTHESIA POSTPROCEDURE EVALUATION
Post-Anesthesia Evaluation and Assessment    Patient: Kimberly Youssef MRN: [id number]  SSN: [id number]    YOB: 1950  Age: 79 y.o. Sex: female       Cardiovascular Function/Vital Signs  Visit Vitals    /66    Pulse 64    Temp 36.8 °C (98.2 °F)    Resp 15    Ht 5' 2\" (1.575 m)    Wt 103.4 kg (228 lb)    SpO2 95%    BMI 41.7 kg/m2       Patient is status post general anesthesia for Procedure(s):  RIGHT BREAST LUMPECTOMY WITH ULTRASOUND  RIGHT BREAST SENTINEL NODE BIOPSY. Nausea/Vomiting: None    Postoperative hydration reviewed and adequate. Pain:  Pain Scale 1: Numeric (0 - 10) (03/08/18 1330)  Pain Intensity 1: 0 (03/08/18 1300)   Managed    Neurological Status:   Neuro (WDL): Exceptions to WDL (03/08/18 1238)  Neuro  Neurologic State: Drowsy; Eyes open to voice (03/08/18 1238)  LUE Motor Response: Spontaneous  (03/08/18 1238)  LLE Motor Response: Spontaneous  (03/08/18 1238)  RUE Motor Response: Spontaneous  (03/08/18 1238)  RLE Motor Response: Spontaneous  (03/08/18 1238)   At baseline    Mental Status and Level of Consciousness: Arousable    Pulmonary Status:   O2 Device: Room air (03/08/18 1330)   Adequate oxygenation and airway patent    Complications related to anesthesia: None    Post-anesthesia assessment completed.  No concerns    Signed By: Elenita Angela MD     March 8, 2018

## 2018-03-08 NOTE — PERIOP NOTES
Dex [employer]ees  1950  [id number]    Situation:  Verbal report given from: JAMES Anand RN and Holley Potter CRNA  Procedure: Procedure(s):  RIGHT BREAST LUMPECTOMY WITH ULTRASOUND  RIGHT BREAST SENTINEL NODE BIOPSY    Background:    Preoperative diagnosis: BREAST CANCER    Postoperative diagnosis: BREAST CANCER    :  Dr. Helena Woodard    Assistant(s): Circ-1: Jose Dior RN  Scrub Tech-1: Zohra Fernandez  Surg Asst-1: Quoc Blanton    Specimens:   ID Type Source Tests Collected by Time Destination   1 : RIGHT AXILLARY SENTINEL NODE #1 Frozen Section Axillary  Eliza Lesches, MD 3/8/2018 1148 Pathology   2 : RIGHT BREAST LUMPECTOMY Fresh Breast  Eliza Lesches, MD 3/8/2018 1205 Pathology       Assessment:  Intra-procedure medications         Anesthesia gave intra-procedure sedation and medications, see anesthesia flow sheet     Intravenous fluids: LR@ KVO     Vital signs stable. Pt denies pain or chill.

## 2018-03-08 NOTE — DISCHARGE INSTRUCTIONS
Discharge Instructions from Dr. Mechelle Garza    >>>You received an IV form of Tylenol 1000mg during your surgery, you may take tylenol (or pain medication containing Tylenol or Acetaminophen) in 6 hours at 05:45pm    · I will call you with the pathology results, typically within 1 week from today. · You may shower, but no hot tubs, swimming pools, or baths until your incision is healed. · No heavy lifting with the affected extremity (nothing greater than 5 pounds), and limit its use for the next 4-5 days. · You may use an ice pack for comfort for the next couple of days, but do not place ice directly on the skin. Rather, use a towel or clothing to serve as a barrier between skin and ice to prevent injury. · If I placed a drain, follow the drain instructions provided, especially as you keep a record of the drain output. · Follow medication instructions carefully. No aspirin, ibuprofen or aleve. · Wear surgical bra and dressing for 24 hours, then remove. Wear supportive bra at all times. · You will have bruising and swelling  · Watch for signs of infection as listed below. · Redness  · Swelling  · Drainage from the incision or from your nipple that appears infected  · Fever over 101.5 degrees for consecutive readings, or over 99.5 if you are currently undergoing chemotherapy. · Call our office (number is below) for a follow-up appointment. If you have any problems, our phone number is [phone] [facility] THROMBOSIS AND PULMONARY EMBOLUS    SURGICAL PATIENTS  Surgical patients are the #1 risk for DVT and PE. WHAT IS DVT? WHAT IS PE?  DVT is a serious condition where blood clots develop deep in the veins of the legs. PE occurs when a blood clot breaks loose from the wall of a vein and travels to the lungs blocking the pulmonary artery or one of its branches impairing blood flow from the heart, which could result in death.   RISK FACTORS   Surgery lasting longer than 45 minutes   History of inflammatory bowel disease   Oral contraceptive or hormone replacement therapy   Immobilization   Varicose veins / swollen legs   Smoking    CHF / Acute MI / Irregular heart beat   Family history of thrombosis   General anesthesia greater than 2 hours   Obesity   Infection of less than one month   Less than 1 month postpartum   COPD / Pneumonia   Arthroscopic surgery   Malignancy / cancer   Spine surgery   Blood abnormalities   Stroke / Paralysis / Coma    SIGNS AND SYMPTOMS OF DEEP VEIN TROMBOSIS  Usually occurs in one leg, above or below the knee   Swelling - one calf or thigh may be larger than the other   Feeling increased warmth in the area of the leg that is swollen or painful   Leg pain, which may increase when standing or walking   Swelling along the vein of the leg   When swollen areas is pressed with a finger, a depression may remain   Tenderness of the leg that may be confined to one area   Change in leg color (bluish or red)    SIGNS AND SYMPTOMS OF PULMONARY EMBOLUS   Chest pain that gets worse with deep breath, coughing or chest movement   Coughing up blood   Sweating   Shortness of breath or difficulty breathing   Rapid heart beat   Lightheadedness    HOW TO REDUCE THE POSSIBLE RISK OF DVT   Exercise - simple activities as rotating ankles and wrists, wiggling toes and fingers, tightening and relaxing muscles in calves and thighs promotes circulation while recovering from surgery. Please do these exercises every hour during waking hours      Take mediation as prescribed by your physician (Lovenox, Coumadin, Aspirin)   Resume your normal activities as soon as your doctor advises you to do so.  Remember, when traveling, to [place] your legs frequently.         PATIENTS WHO BELIEVE THEY MAY BE EXPERIENCING SIGNS AND SYMPTOMS OF DVT OR PE SHOULD SEEK MEDICAL HELP IMMEDIATELY    DO NOT TAKE TYLENOL/ACETAMINOPHEN WITH PERCOCET, LORTAB, NORCO OR VICODEN. TAKE NARCOTIC PAIN MEDICATIONS WITH FOOD     If given 2 pain narcotics do NOT take together! Narcotics tend to be constipating, we suggest taking a stool softener such as Colace or Miralax (follow package instructions). DO NOT DRIVE WHILE TAKING NARCOTIC PAIN MEDICATIONS. DO NOT TAKE SLEEPING MEDICATIONS OR ANTIANXIETY MEDICATIONS WHILE TAKING NARCOTIC PAIN MEDICATIONS,  ESPECIALLY THE NIGHT OF ANESTHESIA. CPAP PATIENTS BE SURE TO WEAR MACHINE WHENEVER NAPPING OR SLEEPING. DISCHARGE SUMMARY from Nurse    The following personal items collected during your admission are returned to you:   Dental Appliance: Dental Appliances: None  Vision: Visual Aid: Glasses, At home  Hearing Aid:    Jewelry: Jewelry: None  Clothing: Clothing: With patient  Other Valuables: Other Valuables: None  Valuables sent to safe:        PATIENT INSTRUCTIONS:    After General Anesthesia or Intravenous Sedation, for 24 hours or while taking prescription Narcotics:        Someone should be with you for the next 24 hours. For your own safety, a responsible adult must drive you home. · Limit your activities  · Recommended activity: Rest today, up with assistance today. Do not climb stairs or shower unattended for the next 24 hours. · Please start with a soft bland diet and advance as tolerated (no nausea) to regular diet. · If you have a sore throat you should try the following: fluids, warm salt water gargles, or throat lozenges. If it does not improve after several days please follow up with your primary physician. · Do not drive and operate hazardous machinery  · Do not make important personal or business decisions  · Do  not drink alcoholic beverages  · If you have not urinated within 8 hours after discharge, please contact your surgeon on call.     Report the following to your surgeon:  · Excessive pain, swelling, redness or odor of or around the surgical area  · Temperature over 100.5  · Nausea and vomiting lasting longer than 4 hours or if unable to take medications  · Any signs of decreased circulation or nerve impairment to extremity: change in color, persistent  numbness, tingling, coldness or increase pain      · You will receive a Post Operative Call from one of the Recovery Room Nurses on the day after your surgery to check on you. It is very important for us to know how you are recovering after your surgery. If you have an issue or need to speak with someone, please call your surgeon, do not wait for the post operative call. · You may receive an e-mail or letter in the mail from [employer] regarding your experience with us in the Ambulatory Surgery Unit. Your feedback is valuable to us and we appreciate your participation in the survey. · If the above instructions are not adequate, please contact Addie Atkinson RN, [facility] anesthesia Nurse Manager or our Anesthesiologist, at [phone]. If you are having problems after your surgery, call the physician at their office number. · We wish you a speedy recovery ? What to do at Home:      *  Please give a list of your current medications to your Primary Care Provider. *  Please update this list whenever your medications are discontinued, doses are      changed, or new medications (including over-the-counter products) are added. *  Please carry medication information at all times in case of emergency situations. These are general instructions for a healthy lifestyle:    No smoking/ No tobacco products/ Avoid exposure to second hand smoke    Surgeon General's Warning:  Quitting smoking now greatly reduces serious risk to your health.     Obesity, smoking, and sedentary lifestyle greatly increases your risk for illness    A healthy diet, regular physical exercise & weight monitoring are important for maintaining a healthy lifestyle    You may be retaining fluid if you have a history of heart failure or if you experience any of the following symptoms:  Weight gain of 3 pounds or more overnight or 5 pounds in a week, increased swelling in our hands or feet or shortness of breath while lying flat in bed. Please call your doctor as soon as you notice any of these symptoms; do not wait until your next office visit. Recognize signs and symptoms of STROKE:    B - Balance  E - Eyes    F-  Face looks uneven    A-  Arms unable to move or move even    S-  Speech slurred or non-existent    T-  Time-call 911 as soon as signs and symptoms begin-DO NOT go       Back to bed or wait to see if you get better-TIME IS BRAIN. If you have not received your influenza and/or pneumococcal vaccine, please follow up with your primary care physician. The discharge information has been reviewed with the patient and caregiver. The patient and caregiver verbalized understanding.

## 2018-03-08 NOTE — PERIOP NOTES
Patient: Ramesh Dhaliwal MRN: [id number]  SSN: [id number]   YOB: 1950  Age: 79 y.o. Sex: female     Patient is status post Procedure(s):  RIGHT BREAST LUMPECTOMY WITH ULTRASOUND  RIGHT BREAST SENTINEL NODE BIOPSY. Surgeon(s) and Role:     * Venkat Mayo MD - Primary    Local/Dose/Irrigation:  SEE MAR                  Peripheral IV 03/08/18 Left Antecubital (Active)   Site Assessment Clean, dry, & intact 3/8/2018  9:14 AM   Phlebitis Assessment 0 3/8/2018  9:14 AM   Infiltration Assessment 0 3/8/2018  9:14 AM   Dressing Status New 3/8/2018  9:14 AM   Dressing Type Transparent;Tape 3/8/2018  9:14 AM   Hub Color/Line Status Pink; Infusing 3/8/2018  9:14 AM            Airway - Endotracheal Tube 03/08/18 Oral (Active)                   Dressing/Packing:  Wound Breast Right-DRESSING TYPE: 4 x 4;Bra;Fluffs; Topical skin adhesive/glue (03/08/18 1100)  Wound Axilla Right-DRESSING TYPE: 4 x 4;Fluffs; Topical skin adhesive/glue (03/08/18 1100)

## 2018-03-08 NOTE — H&P (VIEW-ONLY)
HISTORY OF PRESENT ILLNESS  Jenny Tan is a 79 y.o. female. HPI NEW patient referral for consultation by Dr.P Cresencio Carmen for a new RIGHT breast cancer diagnosis. The patient is here to discuss her breast surgical options. Her breast cancer was seen on an annual mammogram that led to a bilateral biopsy on both the RIGHT and LEFT breast. The LEFT breast was benign and the RIGHT breast is positive for IDC. The patient denies any nipple inversion or discharge. Obstetric History     No data available      Obstetric Comments   Menarche:  12. LMP: . # of Children:  3. Age at Delivery of First Child:  34.   Hysterectomy/oophorectomy: no/no. Breast Bx: no.  Hx of Breast Feeding:  no. BCP:  [address]. Hormone therapy:  yes    Family history - Mother - breast cancer unsure of age and survived. Maternal grandmother - liver/ovarian cancer age unknown and is . 5 Maternal Aunts with breast cancer, unsure of ages and all survived the breast cancer. 2 Maternal Aunts that had breast cancer also had ovarian and colon cancer  2 Paternal aunts had breast cancer, unsure of age and survived   Mammogram 3 D and ultrasound - BI RADS 5      That led to a bilateral breast biopsy. LEFT breast benign and RIGHT breast invasive ductal.  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      Social History Main Topics    Smoking status: Former Smoker     Packs/day: 0.25     Years: 2.00 Quit date: 6/4/1972    Smokeless tobacco: Never Used    Alcohol use No    Drug use: No    Sexual activity: Not on file     Other Topics Concern    Not on file     Social History Narrative     OB History     Obstetric Comments    Menarche:  12. LMP: 1990. # of Children:  3. Age at Delivery of First Child:  34.   Hysterectomy/oophorectomy: no/no. Breast Bx: no.  Hx of Breast Feeding:  no. BCP:  [address]. Hormone therapy:  yes           Current Outpatient Prescriptions:     cholecalciferol (VITAMIN D3) 1,000 unit cap, Take 1 Tab by mouth., Disp: , Rfl:     lisinopril-hydroCHLOROthiazide (PRINZIDE, ZESTORETIC) 20-25 mg per tablet, Take 1 Tab by mouth., Disp: , Rfl:     senna-docusate (PERICOLACE) 8.6-50 mg per tablet, Take 1 Tab by mouth., Disp: , Rfl:     amLODIPine (NORVASC) 10 mg tablet, Take  by mouth daily. , Disp: , Rfl:     calcium-cholecalciferol, d3, (CALCIUM 600 + D) 600-125 mg-unit Tab, Take 1 Tab by mouth two (2) times a day., Disp: , Rfl:     glucosamine-chondroit-vit c-mn (GLUCOSAMINE 1500 COMPLEX) 500-400 mg capsule, Take 1 Cap by mouth two (2) times a day., Disp: , Rfl:     etodolac (LODINE) 400 mg tablet, Take 400 mg by mouth two (2) times a day., Disp: , Rfl:     Cholecalciferol, Vitamin D3, (VITAMIN D3) 1,000 unit cap, Take 1 Tab by mouth daily. , Disp: , Rfl:     escitalopram (LEXAPRO) 10 mg tablet, Take 10 mg by mouth daily. , Disp: , Rfl:     atenolol (TENORMIN) 50 mg tablet, Take 50 mg by mouth daily. , Disp: , Rfl:   Allergies   Allergen Reactions    Nasacort [Triamcinolone Acetonide] Other (comments)     headache    Sulfa (Sulfonamide Antibiotics) Rash     Review of Systems   Constitutional: Negative. HENT: Negative. Eyes: Negative. Respiratory: Negative. Cardiovascular: Positive for claudication. Gastrointestinal: Negative. Genitourinary: Positive for frequency. Musculoskeletal: Positive for joint pain and myalgias. Skin: Negative.     Neurological: Negative. Endo/Heme/Allergies: Negative. Psychiatric/Behavioral: Negative. Physical Exam   Constitutional: She is oriented to person, place, and time. She appears well-developed and well-nourished. HENT:   Head: Normocephalic. Eyes: EOM are normal.   Neck: Neck supple. No thyromegaly present. Cardiovascular: Normal rate, regular rhythm, normal heart sounds and intact distal pulses. Pulmonary/Chest: Effort normal and breath sounds normal. Right breast exhibits no inverted nipple, no mass, no nipple discharge, no skin change and no tenderness. Left breast exhibits no inverted nipple, no mass, no nipple discharge, no skin change and no tenderness. Breasts are symmetrical.   Right breast us 12:00 shows mass and clip    Abdominal: Soft. Bowel sounds are normal.   Musculoskeletal: Normal range of motion. Lymphadenopathy:     She has no cervical adenopathy. She has no axillary adenopathy. Neurological: She is alert and oriented to person, place, and time. Skin: Skin is warm and dry. Psychiatric: She has a normal mood and affect. Nursing note and vitals reviewed. ASSESSMENT and PLAN    ICD-10-CM ICD-9-CM    1. Malignant neoplasm of right breast in female, estrogen receptor positive, unspecified site of breast ([facility] C50.911 174.9 BRAC-ANALYSIS    Z17.0 V86.0      80 yo female with right breast T1 (1.7 cm) N0 IDC grade 2 Er/Pr+ her 2 vicki negative. She is here with her . I have reviewed the imaging and pathology with her and she was given copies of these reports. 90 minutes were spent face-to-face with the patient during this encounter and 90% of that time was spent on counseling and coordination of care. 1. Discussed lumpectomy and radiation vs mastectomy. Discussed reconstruction. Patient claustrophobic will not do mri. 2. Discussed sentinel lymph node biopsy. 3. Discussed external beam radiation. 4. Discussed hormone therapy.   5. Discussed the possibility of chemotherapy. She is a good candidate for right breast us guided lumpectomy, sln biopsy. The procedure and risks were discussed. Risks include bleeding, bruising, scar, infection, need for more surgery and lymphedema. She understood and wished to proceed . Will also send gene panel today.

## 2018-03-08 NOTE — IP AVS SNAPSHOT
[address] [facility] 
[phone] Patient: Carolann Becker MRN: [id number] DRX:5/0/3850 About your hospitalization You were admitted on:  March 8, 2018 You last received care in the:  [facility] PACU You were discharged on:  March 8, 2018 Why you were hospitalized Your primary diagnosis was:  Not on File Follow-up Information Follow up With Details Comments Contact Info Barron Mulligan MD   [facility] [address] [facility] 
[phone] Your Scheduled Appointments Wednesday March 28, 2018 10:20 AM EDT [facility] [address] [place] [address] [address] [facility]  
[phone] Discharge Orders None A check joo indicates which time of day the medication should be taken. My Medications START taking these medications Instructions Each Dose to Equal  
 Morning Noon Evening Bedtime  
 ondansetron 4 mg disintegrating tablet Commonly known as:  ZOFRAN ODT Your last dose was: Your next dose is: Take 1 Tab by mouth every six (6) hours as needed for Nausea. 4 mg  
    
   
   
   
  
 oxyCODONE-acetaminophen 5-325 mg per tablet Commonly known as:  PERCOCET Your last dose was: Your next dose is: Take 1 Tab by mouth every four (4) hours as needed for Pain. Max Daily Amount: 6 Tabs. 1 Tab CONTINUE taking these medications Instructions Each Dose to Equal  
 Morning Noon Evening Bedtime  
 amLODIPine 10 mg tablet Commonly known as:  Meghann Greeley Your last dose was: Your next dose is: Take 10 mg by mouth daily. Indications: hypertension 10 mg  
    
   
   
   
  
 atenolol 50 mg tablet Commonly known as:  TENORMIN Your last dose was: Your next dose is: Take 50 mg by mouth daily. 50 mg CALCIUM 600 + D 600-125 mg-unit Tab Generic drug:  calcium-cholecalciferol (d3) Your last dose was: Your next dose is: Take 1 Tab by mouth two (2) times a day. 1 Tab  
    
   
   
   
  
 etodolac 400 mg tablet Commonly known as:  LODINE Your last dose was: Your next dose is: Take 400 mg by mouth daily. 400 mg GLUCOSAMINE 1500 COMPLEX 500-400 mg capsule Generic drug:  glucosamine-chondroit-vit c-mn Your last dose was: Your next dose is: Take 1 Cap by mouth two (2) times a day. 1 Cap Iron 325 mg (65 mg iron) tablet Generic drug:  ferrous sulfate Your last dose was: Your next dose is: Take 325 mg by mouth Daily (before breakfast). 325 mg  
    
   
   
   
  
 lisinopril-hydroCHLOROthiazide 20-25 mg per tablet Commonly known as:  Rigo Duenas Your last dose was: Your next dose is: Take 1 Tab by mouth daily. Indications: hypertension 1 Tab VITAMIN D3 1,000 unit Cap Generic drug:  cholecalciferol Your last dose was: Your next dose is: Take 1 Tab by mouth daily. 1 Tab STOP taking these medications XANAX 0.5 mg tablet Generic drug:  ALPRAZolam  
   
  
  
  
Where to Get Your Medications Information on where to get these meds will be given to you by the nurse or doctor. ! Ask your nurse or doctor about these medications  
  ondansetron 4 mg disintegrating tablet  
 oxyCODONE-acetaminophen 5-325 mg per tablet Discharge Instructions Discharge Instructions from Dr. Pauline Davis 
 
>>>You received an IV form of Tylenol 1000mg during your surgery, you may take tylenol (or pain medication containing Tylenol or Acetaminophen) in 6 hours at 05:45pm 
 
· I will call you with the pathology results, typically within 1 week from today. · You may shower, but no hot tubs, swimming pools, or baths until your incision is healed. · No heavy lifting with the affected extremity (nothing greater than 5 pounds), and limit its use for the next 4-5 days. · You may use an ice pack for comfort for the next couple of days, but do not place ice directly on the skin. Rather, use a towel or clothing to serve as a barrier between skin and ice to prevent injury. · If I placed a drain, follow the drain instructions provided, especially as you keep a record of the drain output. · Follow medication instructions carefully. No aspirin, ibuprofen or aleve. · Wear surgical bra and dressing for 24 hours, then remove. Wear supportive bra at all times. · You will have bruising and swelling · Watch for signs of infection as listed below. · Redness · Swelling · Drainage from the incision or from your nipple that appears infected · Fever over 101.5 degrees for consecutive readings, or over 99.5 if you are currently undergoing chemotherapy. · Call our office (number is below) for a follow-up appointment. If you have any problems, our phone number is [phone] [place] [address]. THROMBOSIS AND PULMONARY EMBOLUS 
 
SURGICAL PATIENTS Surgical patients are the #1 risk for DVT and PE. WHAT IS DVT? WHAT IS PE? 
DVT is a serious condition where blood clots develop deep in the veins of the legs. PE occurs when a blood clot breaks loose from the wall of a vein and travels to the lungs blocking the pulmonary artery or one of its branches impairing blood flow from the heart, which could result in death. RISK FACTORS 
? Surgery lasting longer than 45 minutes ? History of inflammatory bowel disease 
? Oral contraceptive or hormone replacement therapy ? Immobilization ? Varicose veins / swollen legs ? Smoking  
? CHF / Acute MI / Irregular heart beat ? Family history of thrombosis ? General anesthesia greater than 2 hours ? Obesity ? Infection of less than one month ? Less than 1 month postpartum 
? COPD / Pneumonia ? Arthroscopic surgery ? Malignancy / cancer ? Spine surgery ? Blood abnormalities ? Stroke / Paralysis / Coma SIGNS AND SYMPTOMS OF DEEP VEIN TROMBOSIS Usually occurs in one leg, above or below the knee ? Swelling  one calf or thigh may be larger than the other ? Feeling increased warmth in the area of the leg that is swollen or painful ? Leg pain, which may increase when standing or walking ? Swelling along the vein of the leg ? When swollen areas is pressed with a finger, a depression may remain ? Tenderness of the leg that may be confined to one area ? Change in leg color (bluish or red) SIGNS AND SYMPTOMS OF PULMONARY EMBOLUS 
? Chest pain that gets worse with deep breath, coughing or chest movement ? Coughing up blood ? Sweating ? Shortness of breath or difficulty breathing ? Rapid heart beat ? Lightheadedness HOW TO REDUCE THE POSSIBLE RISK OF DVT ? Exercise  simple activities as rotating ankles and wrists, wiggling toes and fingers, tightening and relaxing muscles in calves and thighs promotes circulation while recovering from surgery. Please do these exercises every hour during waking hours ? ? Take mediation as prescribed by your physician (Lovenox, Coumadin, Aspirin) ? Resume your normal activities as soon as your doctor advises you to do so. 
? Remember, when traveling, to [place] your legs frequently. PATIENTS WHO BELIEVE THEY MAY BE EXPERIENCING SIGNS AND SYMPTOMS OF DVT OR PE SHOULD SEEK MEDICAL HELP IMMEDIATELY 
 
DO NOT TAKE TYLENOL/ACETAMINOPHEN WITH PERCOCET, [address], Beau Fraction OR VICODEN. TAKE NARCOTIC PAIN MEDICATIONS WITH FOOD If given 2 pain narcotics do NOT take together! Narcotics tend to be constipating, we suggest taking a stool softener such as Colace or Miralax (follow package instructions). DO NOT DRIVE WHILE TAKING NARCOTIC PAIN MEDICATIONS. DO NOT TAKE SLEEPING MEDICATIONS OR ANTIANXIETY MEDICATIONS WHILE TAKING NARCOTIC PAIN MEDICATIONS,  ESPECIALLY THE NIGHT OF ANESTHESIA. CPAP PATIENTS BE SURE TO WEAR MACHINE WHENEVER NAPPING OR SLEEPING. DISCHARGE SUMMARY from Nurse The following personal items collected during your admission are returned to you:  
Dental Appliance: Dental Appliances: None Vision: Visual Aid: Glasses, At home Hearing Aid:   
Jewelry: Jewelry: None Clothing: Clothing: With patient Other Valuables: Other Valuables: None Valuables sent to safe:   
 
 
PATIENT INSTRUCTIONS: 
 
 
B - Balance E - Eyes F-  Face looks uneven A-  Arms unable to move or move even S-  Speech slurred or non-existent T-  Time-call 911 as soon as signs and symptoms begin-DO NOT go Back to bed or wait to see if you get better-TIME IS BRAIN. If you have not received your influenza and/or pneumococcal vaccine, please follow up with your primary care physician. The discharge information has been reviewed with the patient and caregiver. The patient and caregiver verbalized understanding. Introducing [facility]! Eitan Servin introduces [employer] patient portal. Now you can access parts of your medical record, email your doctor's office, and request medication refills online. 1. In your internet browser, go to [email or web address]. [email or web address] 2. Click on the First Time User? Click Here link in the Sign In box. You will see the New Member Sign Up page. 3. Enter your [employer] Access Code exactly as it appears below. You will not need to use this code after youve completed the sign-up process. If you do not sign up before the expiration date, you must request a new code. · [employer] Access Code: [email or web address] Expires: 5/10/2018  1:04 PM 
 
4. Enter the last four digits of your Social Security Number (xxxx) and Date of Birth (mm/dd/yyyy) as indicated and click Submit. You will be taken to the next sign-up page. 5. Create a [employer] ID. This will be your [employer] login ID and cannot be changed, so think of one that is secure and easy to remember. 6. Create a [employer] password. You can change your password at any time. 7. Enter your Password Reset Question and Answer. This can be used at a later time if you forget your password. 8. Enter your e-mail address. You will receive e-mail notification when new information is available in [address] 9. Click Sign Up. You can now view and download portions of your medical record. 10. Click the Download Summary menu link to download a portable copy of your medical information. If you have questions, please visit the Frequently Asked Questions section of the [employer] website. Remember, [employer] is NOT to be used for urgent needs. For medical emergencies, dial 911. Now available from your iPhone and Android! Providers Seen During Your Hospitalization Provider Specialty Primary office phone Mabel Gamez MD Breast Surgery [phone] Your Primary Care Physician (PCP) Primary Care Physician Office Phone Office Fax Niles Danyel [phone] You are allergic to the following Allergen Reactions Nasacort (Triamcinolone Acetonide) Other (comments)  
 headache  
    
 Sulfa (Sulfonamide Antibiotics) Rash Recent Documentation Height Weight BMI OB Status Smoking Status 1.575 m 103.4 kg 41.7 kg/m2 Postmenopausal Former Smoker Emergency Contacts Name Discharge Info Relation Home Work Mobile [place],E.G DISCHARGE CAREGIVER [3] Spouse [3] [phone] Patient Belongings The following personal items are in your possession at time of discharge: 
  Dental Appliances: None  Visual Aid: Glasses, At home      Home Medications: None   Jewelry: None  Clothing: With patient    Other Valuables: None Discharge Instructions Attachments/References MEFS - ONDANSETRON (ZOFRAN, ZOFRAN ODT, ZUPLENZ) - (BY MOUTH, INTO THE MOUTH) (ENGLISH) MEFS - OXYCODONE/ACETAMINOPHEN (PERCOCET, ROXICET) - (BY MOUTH) (ENGLISH) Patient Handouts Ondansetron (Zofran, Zofran ODT, Zuplenz) - (By mouth, Into the mouth) Why this medicine is used:  
Prevents nausea and vomiting. Contact a nurse or doctor right away if you have: 
· Fast, pounding, or uneven heartbeat · Lightheadedness or fainting · Trouble breathing Common side effects: 
· Headache, tiredness · Constipation, diarrhea © 2017 [facility] Information is for End User's use only and may not be sold, redistributed or otherwise used for commercial purposes. Oxycodone/Acetaminophen (Percocet, Roxicet) - (By mouth) Why this medicine is used:  
Treats pain. This medicine contains a narcotic pain reliever. Contact a nurse or doctor right away if you have: 
· Extreme weakness, shallow breathing, slow heartbeat · Sweating or cold, clammy skin · Skin blisters, rash, or peeling Common side effects: 
· Constipation · Nausea, vomiting · Tiredness © 2017 [facility] Information is for End User's use only and may not be sold, redistributed or otherwise used for commercial purposes. Please provide this summary of care documentation to your next provider. Signatures-by signing, you are acknowledging that this After Visit Summary has been reviewed with you and you have received a copy. Patient Signature:  ____________________________________________________________ Date:  ____________________________________________________________  
  
John Payor Provider Signature:  ____________________________________________________________ Date:  ____________________________________________________________

## 2018-03-08 NOTE — INTERVAL H&P NOTE
H&P Update:  Kaleb Martin was seen and examined. History and physical has been reviewed. The patient has been examined.  There have been no significant clinical changes since the completion of the originally dated History and Physical.    Signed By: Dariusz Khan MD     March 8, 2018 11:18 AM

## 2018-03-08 NOTE — OP NOTES
[facility] OP NOTE    Teena Liu  MR#: [id number]  : 1950  ACCOUNT #: [de-identified]   DATE OF SERVICE: 2018    AMENDED REPORT:  Revised CSN 2017/bjs    PREOPERATIVE DIAGNOSIS:  Right breast cancer, upper outer quadrant. POSTOPERATIVE DIAGNOSIS:  Right breast cancer, upper outer quadrant. PROCEDURES PERFORMED:  Right breast ultrasound-guided lumpectomy, right breast sentinel lymph node biopsy, intraoperative margin assessment using RF spectroscopy. SURGEON:  Bong Caldwell MD.    ASSISTANTGabe Kia    ANESTHESIA:  General.    ESTIMATED BLOOD LOSS:  10 mL. SPECIMENS REMOVED:  1. Right axillary sentinel node. 2.  Right breast lumpectomy. FINDINGS:  [place] lymph nodes negative. COMPLICATIONS:  None. IMPLANTS:  None. INDICATIONS FOR PROCEDURE:  This is a 19-year-old female with a right breast mass. She had a core needle biopsy that was shown to be invasive ductal carcinoma. She had a breast MRI with a second area of enhancement. She had a biopsy of this and it was shown to be benign. Therefore, she was scheduled for a lumpectomy and sentinel lymph node biopsy. PROCEDURE IN DETAIL:  The patient initially went to nuclear medicine where technetium 99 was injected in the right breast.  She tolerated this well. She came to the preop holding area where surgical site was marked by surgeon, and informed consent was obtained. She was taken to the operating room and laid in supine position, where LMA anesthesia was induced. Right breast was prepped and draped in the usual fashion. A timeout was performed. Attention was turned to the right axilla. An inferior axillary hairline incision was made with a 10 blade. Bovie cautery was used to dissect down through the axillary fascia. The sentinel lymph node was identified using a Neoprobe device and excised using a LigaSure.   This was sent for frozen pathology and found to be negative. The cavity was irrigated with normal saline. Twenty mL of local anesthetic was injected at the axillary incision site. The axillary fascia was closed with interrupted 3-0 Vicryl and the skin with 4-0 subcuticular Monocryl. Attention was turned to the right breast at 12 o'clock, and the lesion and clip were identified with ultrasound guidance. An incision was made with a 10 blade. Bovie cautery was used to dissect down and around the lesion all the way to the chest wall. This was excised and marked short superior and long stitch lateral.  The margin probe device was plugged in and calibrated. All 6 margins were assessed. No additional margins needed to be taken. Total intraoperative time was 2 minutes. The cavity was irrigated. Twenty mL of local anesthetic was injected in the tissues. Deeper tissues were closed with running 2-0 Vicryl, the skin with running 3-0 Vicryl and 4-0 subcuticular Monocryl. Dermabond was placed on both incisions as well as surgical dressings and a bra. All sponge and instrument counts were correct. The patient went to recovery in stable condition.       MD EVELIA Cee / [place]  D: 03/08/2018 12:32     T: 03/08/2018 12:54  JOB #: [id number]

## 2018-03-08 NOTE — PERIOP NOTES
Pt still very sleepy-stated  to hear discharge instructions and Ok'd me to bring back for instructions. 1330 Pt still very groggy-sent  to RX to  RXs. 1345 Pt responsive-when checking incisions complaining of pain in underarm incision-ice pack applied and snack for oxycodone PO. 1357 Complaining of sore throat-Dr. Fela Holbrook at bedside and explained about ET Tube. 1400 Waiting on pharmacy to get Oxycodone 5 mg out of [facility]IXIS-drawer malfunction. 1417- has written instructions that given Oxycodone 5 mg at 2:15pm and may have Percocet at 6:15pm Pt. Alert. Denies pain or chill. Discharge instructions reviewed with caregiver and patient. Allowed and answered questions. Tolerating PO fluids. Both state ready for discharge.  1438 Discharged to car without incident

## 2018-03-08 NOTE — BRIEF OP NOTE
BRIEF OPERATIVE NOTE    Date of Procedure: 3/8/2018   Preoperative Diagnosis: BREAST CANCER  Postoperative Diagnosis: BREAST CANCER    Procedure(s):  RIGHT BREAST LUMPECTOMY WITH ULTRASOUND  RIGHT BREAST SENTINEL NODE BIOPSY  INTRAOP MARGIN ASSESSMENT USING RF SPECTROSCOPY  Surgeon(s) and Role:     * Leonora Hester MD - Primary         Assistant Staff: None      Surgical Staff:  Circ-1: Dian Rdz, RN  Scrub Tech-1: Gucci Dredge  Surg Asst-1: Benjamen Done  Event Time In   Incision Start 1141   Incision Close 1227     Anesthesia: General   Estimated Blood Loss: 10 ML  Specimens:   ID Type Source Tests Collected by Time Destination   1 : RIGHT AXILLARY SENTINEL NODE #1 Frozen Section Axillary  Rut Forman MD 3/8/2018 1148 Pathology   2 : RIGHT BREAST LUMPECTOMY Fresh Breast  Rut Forman MD 3/8/2018 1205 Pathology      Findings: sln negative  Complications: none  Implants: * No implants in log *    Dictated [id number]

## 2018-03-09 ENCOUNTER — TELEPHONE (OUTPATIENT)
Dept: SURGERY | Age: 68
End: 2018-03-09

## 2018-03-09 NOTE — TELEPHONE ENCOUNTER
Called patient to see how she is doing after surgery. No answer. LM with office call back number if she has questions.

## 2018-03-09 NOTE — OP NOTES
[address] [facility] OP NOTE    Rodger Espinoza  MR#: [id number]  : 1950  ACCOUNT #: [de-identified]   DATE OF SERVICE: 2018    AMENDED REPORT:  Revised CSN 2017/bjs    PREOPERATIVE DIAGNOSIS:  Right breast cancer, upper outer quadrant. POSTOPERATIVE DIAGNOSIS:  Right breast cancer, upper outer quadrant. PROCEDURES PERFORMED:  Right breast ultrasound-guided lumpectomy, right breast sentinel lymph node biopsy, intraoperative margin assessment using RF spectroscopy. SURGEON:  Aristides Gaston MD.    ASSISTANTEinariel Toure    ANESTHESIA:  General.    ESTIMATED BLOOD LOSS:  10 mL. SPECIMENS REMOVED:  1. Right axillary sentinel node. 2.  Right breast lumpectomy. FINDINGS:  [place] lymph nodes negative. COMPLICATIONS:  None. IMPLANTS:  None. INDICATIONS FOR PROCEDURE:  This is a 16-year-old female with a right breast mass. She had a core needle biopsy that was shown to be invasive ductal carcinoma. She had a breast MRI with a second area of enhancement. She had a biopsy of this and it was shown to be benign. Therefore, she was scheduled for a lumpectomy and sentinel lymph node biopsy. PROCEDURE IN DETAIL:  The patient initially went to nuclear medicine where technetium 99 was injected in the right breast.  She tolerated this well. She came to the preop holding area where surgical site was marked by surgeon, and informed consent was obtained. She was taken to the operating room and laid in supine position, where LMA anesthesia was induced. Right breast was prepped and draped in the usual fashion. A timeout was performed. Attention was turned to the right axilla. An inferior axillary hairline incision was made with a 10 blade. Bovie cautery was used to dissect down through the axillary fascia. The sentinel lymph node was identified using a Neoprobe device and excised using a LigaSure.   This was sent for frozen pathology and found to be negative. The cavity was irrigated with normal saline. Twenty mL of local anesthetic was injected at the axillary incision site. The axillary fascia was closed with interrupted 3-0 Vicryl and the skin with 4-0 subcuticular Monocryl. Attention was turned to the right breast at 12 o'clock, and the lesion and clip were identified with ultrasound guidance. An incision was made with a 10 blade. Bovie cautery was used to dissect down and around the lesion all the way to the chest wall. This was excised and marked short superior and long stitch lateral.  The margin probe device was plugged in and calibrated. All 6 margins were assessed. No additional margins needed to be taken. Total intraoperative time was 2 minutes. The cavity was irrigated. Twenty mL of local anesthetic was injected in the tissues. Deeper tissues were closed with running 2-0 Vicryl, the skin with running 3-0 Vicryl and 4-0 subcuticular Monocryl. Dermabond was placed on both incisions as well as surgical dressings and a bra. All sponge and instrument counts were correct. The patient went to recovery in stable condition.       MD EVELIA Acevedo / [place]  D: 03/08/2018 12:32     T: 03/08/2018 12:54  JOB #: [id number]

## 2018-03-28 ENCOUNTER — OFFICE VISIT (OUTPATIENT)
Dept: SURGERY | Age: 68
End: 2018-03-28

## 2018-03-28 VITALS
HEART RATE: 52 BPM | HEIGHT: 62 IN | DIASTOLIC BLOOD PRESSURE: 64 MMHG | WEIGHT: 228 LBS | SYSTOLIC BLOOD PRESSURE: 132 MMHG | BODY MASS INDEX: 41.96 KG/M2

## 2018-03-28 DIAGNOSIS — Z17.0 MALIGNANT NEOPLASM OF UPPER-OUTER QUADRANT OF RIGHT BREAST IN FEMALE, ESTROGEN RECEPTOR POSITIVE (HCC): Primary | ICD-10-CM

## 2018-03-28 DIAGNOSIS — Z98.890 S/P LUMPECTOMY, RIGHT BREAST: ICD-10-CM

## 2018-03-28 DIAGNOSIS — C50.411 MALIGNANT NEOPLASM OF UPPER-OUTER QUADRANT OF RIGHT BREAST IN FEMALE, ESTROGEN RECEPTOR POSITIVE (HCC): Primary | ICD-10-CM

## 2018-03-28 NOTE — PROGRESS NOTES
HISTORY OF PRESENT ILLNESS  Eva Haney is a 79 y.o. female. HPI  ESTABLISHED patient here for post op follow RIGHT breast cancer, s/p lumpectomy and SNBx. Patient is having some pain, a 3/10 to her breast and axilla. Has some swelling to the incision. 80 yo female with right breast T1 (3.8  cm) N0 IDC grade 2 Er/Pr+ her 2 vicki negative. 3/8/18, RIGHT breast lumpectomy, SNBx   VUS- BARD1  High risk mammaprint - luminal B    Review of Systems   All other systems reviewed and are negative. Physical Exam   Pulmonary/Chest:       Right breast and axillary incision c/d/i. Nursing note and vitals reviewed. ASSESSMENT and PLAN    ICD-10-CM ICD-9-CM    1. Malignant neoplasm of upper-outer quadrant of right breast in female, estrogen receptor positive (HCC) C50.411 174.4     Z17.0 V86.0    2. S/P lumpectomy, right breast Z98.890 V36.80      80 yo female with right breast T1 (3.8 cm) N0 IDC grade 2 Er/Pr+ her 2 vicki negative. 3/8/18, RIGHT breast lumpectomy, SNBx   VUS- BARD1  High risk mammaprint - luminal B    -refer to med onc, rad onc  - f/u in 3 months. If chemo then place port.

## 2018-03-28 NOTE — MR AVS SNAPSHOT
Jessica Pelayo [address] [facility] 83. 
[phone] Patient: Trinity Rouse MRN: [id number] Visit Information Date & Time Provider Department Dept. Phone Encounter #  
 3/28/2018 10:20 AM Re Trevino  [address]  [id number] Upcoming Health Maintenance Date Due Hepatitis C Screening 1950 DTaP/Tdap/Td series (1 - Tdap) 2/5/1973 FOBT Q 1 YEAR AGE 50-75 2/5/2002 ZOSTER VACCINE AGE 60> 12/5/2011 GLAUCOMA SCREENING Q2Y 2/5/2017 Bone Densitometry (Dexa) Screening 2/5/2017 Pneumococcal 65+ High/Highest Risk (1 of 2 - PCV13) 2/5/2017 Influenza Age 5 to Adult 8/1/2017 MEDICARE YEARLY EXAM 3/14/2018 BREAST CANCER SCRN MAMMOGRAM 1/25/2020 Allergies as of 3/28/2018  Review Complete On: 3/28/2018 By: Re Trevino MD  
  
 Severity Noted Reaction Type Reactions Nasacort [Triamcinolone Acetonide]  06/04/2013    Other (comments)  
 headache  
 Sulfa (Sulfonamide Antibiotics)  06/04/2013    Rash Current Immunizations  Reviewed on 6/17/2013 No immunizations on file. Not reviewed this visit Vitals BP Pulse Height(growth percentile) Weight(growth percentile) BMI OB Status 132/64 (!) 52 5' 2\" (1.575 m) 228 lb (103.4 kg) 41.7 kg/m2 Postmenopausal  
 Smoking Status Former Smoker Vitals History BMI and BSA Data Body Mass Index Body Surface Area 41.7 kg/m 2 2.13 m 2 Preferred Pharmacy Pharmacy Name Phone [facility] [place] [address], [address] [place] [address] Avenue [phone] Your Updated Medication List  
  
   
This list is accurate as of 3/28/18  3:50 PM.  Always use your most recent med list.  
  
  
  
  
 atenolol 50 mg tablet Commonly known as:  TENORMIN Take 50 mg by mouth daily. CALCIUM 600 + D 600-125 mg-unit Tab Generic drug:  calcium-cholecalciferol (d3) Take 1 Tab by mouth two (2) times a day. etodolac 400 mg tablet Commonly known as:  LODINE Take 400 mg by mouth daily. garlic 1 mg Cap Take  by mouth daily. Iron 325 mg (65 mg iron) tablet Generic drug:  ferrous sulfate Take 325 mg by mouth Daily (before breakfast). lisinopril-hydroCHLOROthiazide 20-25 mg per tablet Commonly known as:  Brendon Moulder Take 1 Tab by mouth daily. Indications: hypertension  
  
 ondansetron 4 mg disintegrating tablet Commonly known as:  ZOFRAN ODT Take 1 Tab by mouth every six (6) hours as needed for Nausea. oxyCODONE-acetaminophen 5-325 mg per tablet Commonly known as:  PERCOCET Take 1 Tab by mouth every four (4) hours as needed for Pain. Max Daily Amount: 6 Tabs. VITAMIN D3 1,000 unit Cap Generic drug:  cholecalciferol Take 1 Tab by mouth daily. Patient Instructions Breast Cancer: Care Instructions Your Care Instructions Breast cancer occurs when abnormal cells grow out of control in the breast. These cancer cells can spread within the breast, to nearby lymph nodes and other tissues, and to other parts of the body. Being treated for cancer can weaken your body, and you may feel very tired. Get the rest your body needs so you can feel better. Finding out that you have cancer is scary. You may feel many emotions and may need some help coping. Seek out family, friends, and counselors for support. You also can do things at home to make yourself feel better while you go through treatment. Call the [address] [place] [address] [employer] (5-126.185.2312) or visit its website at [address]. SportStylist for more information. Follow-up care is a key part of your treatment and safety. Be sure to make and go to all appointments, and call your doctor if you are having problems. It's also a good idea to know your test results and keep a list of the medicines you take. How can you care for yourself at home? · Take your medicines exactly as prescribed. Call your doctor if you think you are having a problem with your medicine. You may get medicine for nausea and vomiting if you have these side effects. · Follow your doctor's instructions to relieve pain. Pain from cancer and surgery can almost always be controlled. Use pain medicine when you first notice pain, before it becomes severe. · Eat healthy food. If you do not feel like eating, try to eat food that has protein and extra calories to keep up your strength and prevent weight loss. Drink liquid meal replacements for extra calories and protein. Try to eat your main meal early. · Get some physical activity every day, but do not get too tired. Keep doing the hobbies you enjoy as your energy allows. · Do not smoke. Smoking can make your cancer worse. If you need help quitting, talk to your doctor about stop-smoking programs and medicines. These can increase your chances of quitting for good. · Take steps to control your stress and workload. Learn relaxation techniques. ¨ Share your feelings. Stress and tension affect our emotions. By expressing your feelings to others, you may be able to understand and cope with them. ¨ Consider joining a support group. Talking about a problem with your spouse, a good friend, or other people with similar problems is a good way to reduce tension and stress. ¨ Express yourself through art. Try writing, crafts, dance, or art to relieve stress. Some dance, writing, or art groups may be available just for people who have cancer. ¨ Be kind to your body and mind. Getting enough sleep, eating a healthy diet, and taking time to do things you enjoy can contribute to an overall feeling of balance in your life and can help reduce stress. ¨ Get help if you need it. Discuss your concerns with your doctor or counselor. · If you are vomiting or have diarrhea: ¨ Drink plenty of fluids (enough so that your urine is light yellow or clear like water) to prevent dehydration. Choose water and other caffeine-free clear liquids. If you have kidney, heart, or liver disease and have to limit fluids, talk with your doctor before you increase the amount of fluids you drink. ¨ When you are able to eat, try clear soups, mild foods, and liquids until all symptoms are gone for 12 to 48 hours. Other good choices include dry toast, crackers, cooked cereal, and gelatin dessert, such as Jell-O. · If you have not already done so, prepare a list of advance directives. Advance directives are instructions to your doctor and family members about what kind of care you want if you become unable to speak or express yourself. When should you call for help? Call 911 anytime you think you may need emergency care. For example, call if: 
? · You passed out (lost consciousness). ?Call your doctor now or seek immediate medical care if: 
? · You have a fever. ? · You have abnormal bleeding. ? · You think you have an infection. ? · You have new or worse pain. ? · You have new symptoms, such as a cough, belly pain, vomiting, diarrhea, or a rash. ? Watch closely for changes in your health, and be sure to contact your doctor if: 
? · You are much more tired than usual.  
? · You have swollen glands in your armpits, groin, or neck. ? · You do not get better as expected. Where can you learn more? Go to http://sowmya-colin.info/. Enter V321 in the search box to learn more about \"Breast Cancer: Care Instructions. \" Current as of: May 12, 2017 Content Version: 11.4 © 6861-2162 M.dot. Care instructions adapted under license by [employer] (which disclaims liability or warranty for this information).  If you have questions about a medical condition or this instruction, always ask your healthcare professional. [employer] disclaims any warranty or liability for your use of this information. Introducing [facility]! Brooke Raya introduces Jobzippers patient portal. Now you can access parts of your medical record, email your doctor's office, and request medication refills online. 1. In your internet browser, go to [email or web address] 2. Click on the First Time User? Click Here link in the Sign In box. You will see the New Member Sign Up page. 3. Enter your Jobzippers Access Code exactly as it appears below. You will not need to use this code after youve completed the sign-up process. If you do not sign up before the expiration date, you must request a new code. · Jobzippers Access Code: [email or web address] Expires: 5/10/2018  2:04 PM 
 
4. Enter the last four digits of your Social Security Number (xxxx) and Date of Birth (mm/dd/yyyy) as indicated and click Submit. You will be taken to the next sign-up page. 5. Create a Jobzippers ID. This will be your Jobzippers login ID and cannot be changed, so think of one that is secure and easy to remember. 6. Create a Jobzippers password. You can change your password at any time. 7. Enter your Password Reset Question and Answer. This can be used at a later time if you forget your password. 8. Enter your e-mail address. You will receive e-mail notification when new information is available in [address] 9. Click Sign Up. You can now view and download portions of your medical record. 10. Click the Download Summary menu link to download a portable copy of your medical information. If you have questions, please visit the Frequently Asked Questions section of the Jobzippers website. Remember, Jobzippers is NOT to be used for urgent needs. For medical emergencies, dial 911. Now available from your iPhone and Android! Please provide this summary of care documentation to your next provider. Your primary care clinician is listed as Noam Woody. If you have any questions after today's visit, please call [phone].

## 2018-03-28 NOTE — PATIENT INSTRUCTIONS
Breast Cancer: Care Instructions  Your Care Instructions    Breast cancer occurs when abnormal cells grow out of control in the breast. These cancer cells can spread within the breast, to nearby lymph nodes and other tissues, and to other parts of the body. Being treated for cancer can weaken your body, and you may feel very tired. Get the rest your body needs so you can feel better. Finding out that you have cancer is scary. You may feel many emotions and may need some help coping. Seek out family, friends, and counselors for support. You also can do things at home to make yourself feel better while you go through treatment. Call the [employer] (1-559.681.8557) or visit its website at Freshplum for more information. Follow-up care is a key part of your treatment and safety. Be sure to make and go to all appointments, and call your doctor if you are having problems. It's also a good idea to know your test results and keep a list of the medicines you take. How can you care for yourself at home? · Take your medicines exactly as prescribed. Call your doctor if you think you are having a problem with your medicine. You may get medicine for nausea and vomiting if you have these side effects. · Follow your doctor's instructions to relieve pain. Pain from cancer and surgery can almost always be controlled. Use pain medicine when you first notice pain, before it becomes severe. · Eat healthy food. If you do not feel like eating, try to eat food that has protein and extra calories to keep up your strength and prevent weight loss. Drink liquid meal replacements for extra calories and protein. Try to eat your main meal early. · Get some physical activity every day, but do not get too tired. Keep doing the hobbies you enjoy as your energy allows. · Do not smoke. Smoking can make your cancer worse. If you need help quitting, talk to your doctor about stop-smoking programs and medicines.  These can increase your chances of quitting for good. · Take steps to control your stress and workload. Learn relaxation techniques. ¨ Share your feelings. Stress and tension affect our emotions. By expressing your feelings to others, you may be able to understand and cope with them. ¨ Consider joining a support group. Talking about a problem with your spouse, a good friend, or other people with similar problems is a good way to reduce tension and stress. ¨ Express yourself through art. Try writing, crafts, dance, or art to relieve stress. Some dance, writing, or art groups may be available just for people who have cancer. ¨ Be kind to your body and mind. Getting enough sleep, eating a healthy diet, and taking time to do things you enjoy can contribute to an overall feeling of balance in your life and can help reduce stress. ¨ Get help if you need it. Discuss your concerns with your doctor or counselor. · If you are vomiting or have diarrhea:  ¨ Drink plenty of fluids (enough so that your urine is light yellow or clear like water) to prevent dehydration. Choose water and other caffeine-free clear liquids. If you have kidney, heart, or liver disease and have to limit fluids, talk with your doctor before you increase the amount of fluids you drink. ¨ When you are able to eat, try clear soups, mild foods, and liquids until all symptoms are gone for 12 to 48 hours. Other good choices include dry toast, crackers, cooked cereal, and gelatin dessert, such as Jell-O.  · If you have not already done so, prepare a list of advance directives. Advance directives are instructions to your doctor and family members about what kind of care you want if you become unable to speak or express yourself. When should you call for help? Call 911 anytime you think you may need emergency care. For example, call if:  ? · You passed out (lost consciousness). ?Call your doctor now or seek immediate medical care if:  ? · You have a fever. ? · You have abnormal bleeding. ? · You think you have an infection. ? · You have new or worse pain. ? · You have new symptoms, such as a cough, belly pain, vomiting, diarrhea, or a rash. ? Watch closely for changes in your health, and be sure to contact your doctor if:  ? · You are much more tired than usual.   ? · You have swollen glands in your armpits, groin, or neck. ? · You do not get better as expected. Where can you learn more? Go to http://sowmya-colin.info/. Enter V321 in the search box to learn more about \"Breast Cancer: Care Instructions. \"  Current as of: May 12, 2017  Content Version: 11.4  © 6902-8843 [address]. Care instructions adapted under license by [employer] (which disclaims liability or warranty for this information). If you have questions about a medical condition or this instruction, always ask your healthcare professional. [address] any warranty or liability for your use of this information.

## 2018-04-03 ENCOUNTER — TELEPHONE (OUTPATIENT)
Dept: SURGERY | Age: 68
End: 2018-04-03

## 2018-04-03 NOTE — TELEPHONE ENCOUNTER
Patient L/M with our answering service that she had some questions. I called the patient back, but she was not available. I L/M for her to call the office back at her convenience.

## 2018-04-03 NOTE — TELEPHONE ENCOUNTER
Returned patient's second call. She had questions about whether or not she could have something for her arthritis at the same time she has treatment for her breast cancer. Does not know yet if she will have chemotherapy. Sees Dr. Marcin Gale on 4/20/18. The medication that she wants to get is depomedrol, an injection into the knee joint since she can't have a knee replacement right now with her recent diagnosis of breast cancer. I told her that I did not think there was any contraindication to having a depomedrol injection when having radiation. I told her she could check with Dr. Marcin Gale when she sees him to make sure that it is okay for her to get the depomedrol shot if she has to have chemotherapy. She asked about how long the chemotherapy would be, and I told her it would probably be from 8 to 16 weeks. I let her know that Dr. Marcin Gale would go over everything pertaining to chemotherapy when she sees him later this month. She was very appreciative of the return phone call and answers to her questions.

## 2018-04-04 DIAGNOSIS — C50.411 MALIGNANT NEOPLASM OF UPPER-OUTER QUADRANT OF RIGHT FEMALE BREAST, UNSPECIFIED ESTROGEN RECEPTOR STATUS (HCC): Primary | ICD-10-CM

## 2018-04-20 ENCOUNTER — OFFICE VISIT (OUTPATIENT)
Dept: ONCOLOGY | Age: 68
End: 2018-04-20

## 2018-04-20 VITALS
HEART RATE: 60 BPM | WEIGHT: 232.2 LBS | RESPIRATION RATE: 18 BRPM | SYSTOLIC BLOOD PRESSURE: 114 MMHG | BODY MASS INDEX: 42.73 KG/M2 | DIASTOLIC BLOOD PRESSURE: 58 MMHG | TEMPERATURE: 98.1 F | HEIGHT: 62 IN | OXYGEN SATURATION: 97 %

## 2018-04-20 DIAGNOSIS — C50.411 MALIGNANT NEOPLASM OF UPPER-OUTER QUADRANT OF RIGHT BREAST IN FEMALE, ESTROGEN RECEPTOR POSITIVE (HCC): Primary | ICD-10-CM

## 2018-04-20 DIAGNOSIS — R11.2 CINV (CHEMOTHERAPY-INDUCED NAUSEA AND VOMITING): ICD-10-CM

## 2018-04-20 DIAGNOSIS — T45.1X5A CINV (CHEMOTHERAPY-INDUCED NAUSEA AND VOMITING): ICD-10-CM

## 2018-04-20 DIAGNOSIS — E66.01 OBESITY, MORBID (HCC): ICD-10-CM

## 2018-04-20 DIAGNOSIS — E55.9 VITAMIN D DEFICIENCY: ICD-10-CM

## 2018-04-20 DIAGNOSIS — Z17.0 MALIGNANT NEOPLASM OF UPPER-OUTER QUADRANT OF RIGHT BREAST IN FEMALE, ESTROGEN RECEPTOR POSITIVE (HCC): Primary | ICD-10-CM

## 2018-04-20 RX ORDER — PROCHLORPERAZINE MALEATE 10 MG
10 TABLET ORAL
Qty: 40 TAB | Refills: 1 | Status: SHIPPED | OUTPATIENT
Start: 2018-04-20 | End: 2018-05-30

## 2018-04-20 RX ORDER — LIDOCAINE AND PRILOCAINE 25; 25 MG/G; MG/G
CREAM TOPICAL AS NEEDED
Qty: 30 G | Refills: 0 | Status: ON HOLD | OUTPATIENT
Start: 2018-04-20 | End: 2019-07-27

## 2018-04-20 RX ORDER — ONDANSETRON 4 MG/1
4 TABLET, ORALLY DISINTEGRATING ORAL
Qty: 40 TAB | Refills: 1 | Status: SHIPPED | OUTPATIENT
Start: 2018-04-20 | End: 2018-06-19

## 2018-04-20 RX ORDER — DEXAMETHASONE 4 MG/1
8 TABLET ORAL DAILY
Qty: 50 TAB | Refills: 0 | Status: SHIPPED | OUTPATIENT
Start: 2018-04-20 | End: 2018-06-19

## 2018-04-20 NOTE — PROGRESS NOTES
Addis Parr is a 79 y.o. female    Chief Complaint   Patient presents with    Breast Cancer     right breast - S/P lumpectomy and SLNBX. ER/ [facility]+ Anayeli neg. High risk mammo print     1. Have you been to the ER, urgent care clinic since your last visit? Hospitalized since your last visit? No    2. Have you seen or consulted any other health care providers outside of the [facility] since your last visit? Include any pap smears or colon screening.  No    Health Maintenance Due   Topic Date Due    Hepatitis C Screening  1950    DTaP/Tdap/Td series (1 - Tdap) 02/05/1973    FOBT Q 1 YEAR AGE 50-75  02/05/2002    ZOSTER VACCINE AGE 60>  12/05/2011    GLAUCOMA SCREENING Q2Y  02/05/2017    Bone Densitometry (Dexa) Screening  02/05/2017    Pneumococcal 65+ High/Highest Risk (1 of 2 - PCV13) 02/05/2017    Influenza Age 5 to Adult  08/01/2017    MEDICARE YEARLY EXAM  03/14/2018     Visit Vitals    /58 (BP 1 Location: Right arm, BP Patient Position: Sitting)    Pulse 60    Temp 98.1 °F (36.7 °C) (Oral)    Resp 18    Ht 5' 2\" (1.575 m)    Wt 232 lb 3.2 oz (105.3 kg)    SpO2 97%    BMI 42.47 kg/m2     States no complains of D,V,N or pain  Former Smoker

## 2018-04-20 NOTE — PATIENT INSTRUCTIONS
You have invasive ductal carcinoma. Estrogen receptor positive and progesterone receptor positive. HER 2 unamplified. Grade 2. There are three Modalities for treatment of Breast Cancer    1. Surgery - you have already had a surgery      2. Chemotherapy and/or hormonal therapy-  A) Due to your mammoprint score, you will benefit from chemotherapy    B) You would benefit from hormonal therapy because your cancer was fueled by estrogen produced in the body. Even though you are post menopausal estrogen is still produced in the body. It is made by adrenal glands and under the skin (fat). Side Effects of the hormonal therapy are: fatigue, bone, joint aches and hot flashes      3. Radiation Therapy - this will be done after your chemotherapy and surgery are completed if needed    You will be receiving Taxotere + Cytoxan -     You will receive treatment one day every 3 weeks for 4 treatments. Side effects: fatigue, hair loss, lowering of the blood counts. You will need:    >port - Dr. Micha Lomeli  >labs    If you have a fever of 100.5 or above while receiving chemotherapy please call our office immediately. If it is after hours please call our on call MD at [phone]. Dexamethasone has been sent to your pharmacy. Dexamethasone has been sent to your pharmacy. Please take 8mg by mouth the day before chemo, the day of chemo, and the day after chemo. (You will need to make sure you read the bottle to know how many tablets to take,depending on what the pharmacy dispenses.)    You will do this every chemotherapy cycle. You will do this every cycle. (Please make sure you read the bottle to know how many tablets to take)    The day after your chemotherapy you will receive an injection to boost your white blood cells. This injection is known to cause bone aches/pains. We recommend you start ibuprofen(if not contraindicated) 400mg every 6 hours the morning of your injection and continue for 3 days.  You may also try OTC claritin 3 days prior to your injection and 3 days after your injection. Two prescriptions of anti-nausea medications will be sent to your pharmacy. If for any reason any of the prescriptions are extremely expensive please notify our office. You will be given long acting anti-nausea medications through your port prior to your treatments. We prescribe at home medications incase you are to experience nausea. Please call our office if not effective. Please make sure you have imodium OTC at home prior to starting treatments. This is incase you are to experience diarrhea. If this is not effective please call our office. [phone]. On the day of your chemo you will report to the infusion center, [address], the nurses will access your port and draw labs, they will then send you over to our office, [address]. We will look at the labs, discuss any symptoms/ potential side effects, and answer any questions. We will then send you back to the infusion center to complete your infusion. Please take all of your medications as prescribed and eat breakfast prior to your arrival.    Tessie Montana is not provided, but there is limited snacks/drinks available for the patient only. Please be mindful that space is limited therefore please allow 1 person to accompany you to your treatments. You will need a  for your infusions, however, it is not mandatory someone stays with your during your treatment. You have invasive ductal carcinoma. Estrogen receptor positive and progesterone receptor positive. HER 2 unamplified. Grade 2.

## 2018-04-20 NOTE — MR AVS SNAPSHOT
[address] [facility] [address] [place] [address] 
[phone] Patient: Oanh Meyers MRN: [id number] Visit Information Date & Time Provider Department Dept. Phone Encounter #  
 4/20/2018  2:00 PM Estrellita Devlin MD [address] [facility] at [facility] [phone] [id number] Upcoming Health Maintenance Date Due Hepatitis C Screening 1950 DTaP/Tdap/Td series (1 - Tdap) 2/5/1973 FOBT Q 1 YEAR AGE 50-75 2/5/2002 ZOSTER VACCINE AGE 60> 12/5/2011 GLAUCOMA SCREENING Q2Y 2/5/2017 Bone Densitometry (Dexa) Screening 2/5/2017 Pneumococcal 65+ High/Highest Risk (1 of 2 - PCV13) 2/5/2017 Influenza Age 5 to Adult 8/1/2017 MEDICARE YEARLY EXAM 3/14/2018 BREAST CANCER SCRN MAMMOGRAM 1/25/2020 Allergies as of 4/20/2018  Review Complete On: 4/20/2018 By: Chago Enriquez NP Severity Noted Reaction Type Reactions Nasacort [Triamcinolone Acetonide]  06/04/2013    Other (comments)  
 headache  
 Sulfa (Sulfonamide Antibiotics)  06/04/2013    Rash Current Immunizations  Reviewed on 6/17/2013 No immunizations on file. Not reviewed this visit You Were Diagnosed With   
  
 Codes Comments Malignant neoplasm of upper-outer quadrant of right breast in female, estrogen receptor positive ([facility] [address]    -  Primary ICD-10-CM: C50.411, Z17.0 ICD-9-CM: 174.4, V86.0   
 CINV (chemotherapy-induced nausea and vomiting)     ICD-10-CM: R11.2, T45.1X5A 
ICD-9-CM: 787.01, E933.1 Vitamin D deficiency     ICD-10-CM: E55.9 ICD-9-CM: 268.9 Vitals BP Pulse Temp Resp Height(growth percentile) Weight(growth percentile) 114/58 (BP 1 Location: Right arm, BP Patient Position: Sitting) 60 98.1 °F (36.7 °C) (Oral) 18 5' 2\" (1.575 m) 232 lb 3.2 oz (105.3 kg) SpO2 BMI OB Status Smoking Status 97% 42.47 kg/m2 Postmenopausal Former Smoker BMI and BSA Data Body Mass Index Body Surface Area  
 42.47 kg/m 2 2.15 m 2 Preferred Pharmacy Pharmacy Name Phone [facility] [address], [address] [place] [address] Avenue [phone] Your Updated Medication List  
  
   
This list is accurate as of 4/20/18  2:51 PM.  Always use your most recent med list.  
  
  
  
  
 atenolol 50 mg tablet Commonly known as:  TENORMIN Take 50 mg by mouth daily. CALCIUM 600 + D 600-125 mg-unit Tab Generic drug:  calcium-cholecalciferol (d3) Take 1 Tab by mouth two (2) times a day. etodolac 400 mg tablet Commonly known as:  LODINE Take 400 mg by mouth daily. garlic 1 mg Cap Take  by mouth daily. Iron 325 mg (65 mg iron) tablet Generic drug:  ferrous sulfate Take 325 mg by mouth Daily (before breakfast). lisinopril-hydroCHLOROthiazide 20-25 mg per tablet Commonly known as:  Buddy Roman Take 1 Tab by mouth daily. Indications: hypertension * ondansetron 4 mg disintegrating tablet Commonly known as:  ZOFRAN ODT Take 1 Tab by mouth every six (6) hours as needed for Nausea. * ondansetron 4 mg disintegrating tablet Commonly known as:  ZOFRAN ODT Take 1 Tab by mouth every eight (8) hours as needed for Nausea. oxyCODONE-acetaminophen 5-325 mg per tablet Commonly known as:  PERCOCET Take 1 Tab by mouth every four (4) hours as needed for Pain. Max Daily Amount: 6 Tabs. prochlorperazine 10 mg tablet Commonly known as:  COMPAZINE Take 1 Tab by mouth every six (6) hours as needed for up to 40 days. VITAMIN D3 1,000 unit Cap Generic drug:  cholecalciferol Take 1 Tab by mouth daily. * Notice: This list has 2 medication(s) that are the same as other medications prescribed for you. Read the directions carefully, and ask your doctor or other care provider to review them with you. Prescriptions Sent to Pharmacy Refills ondansetron (ZOFRAN ODT) 4 mg disintegrating tablet 1 Sig: Take 1 Tab by mouth every eight (8) hours as needed for Nausea. Class: Normal  
 Pharmacy: [address] Ph #: [phone] Route: Oral  
 prochlorperazine (COMPAZINE) 10 mg tablet 1 Sig: Take 1 Tab by mouth every six (6) hours as needed for up to 40 days. Class: Normal  
 Pharmacy: [address] Ph #: [phone] Route: Oral  
  
We Performed the Following CBC WITH AUTOMATED DIFF [19970 CPT(R)] METABOLIC PANEL, COMPREHENSIVE [25043 CPT(R)] VITAMIN D, 25 HYDROXY [id number] CPT(R)] Patient Instructions You have invasive ductal carcinoma. Estrogen receptor positive and progesterone receptor positive. HER 2 unamplified. Grade 2. There are three Modalities for treatment of Breast Cancer 1. Surgery - you have already had a surgery 2. Chemotherapy and/or hormonal therapy- 
A) Due to your mammoprint score, you will benefit from chemotherapy B) You would benefit from hormonal therapy because your cancer was fueled by estrogen produced in the body. Even though you are post menopausal estrogen is still produced in the body. It is made by adrenal glands and under the skin (fat). Side Effects of the hormonal therapy are: fatigue, bone, joint aches and hot flashes 3. Radiation Therapy - this will be done after your chemotherapy and surgery are completed if needed You will be receiving Taxotere + Cytoxan - You will receive treatment one day every 3 weeks for 4 treatments. Side effects: fatigue, hair loss, lowering of the blood counts. You will need: 
 
>uzma - Dr. Lulú López 
>labs If you have a fever of 100.5 or above while receiving chemotherapy please call our office immediately. If it is after hours please call our on call MD at [phone]. Dexamethasone has been sent to your pharmacy. Dexamethasone has been sent to your pharmacy. Please take 8mg by mouth the day before chemo, the day of chemo, and the day after chemo. (You will need to make sure you read the bottle to know how many tablets to take,depending on what the pharmacy dispenses.) You will do this every chemotherapy cycle. You will do this every cycle. (Please make sure you read the bottle to know how many tablets to take) The day after your chemotherapy you will receive an injection to boost your white blood cells. This injection is known to cause bone aches/pains. We recommend you start ibuprofen(if not contraindicated) 400mg every 6 hours the morning of your injection and continue for 3 days. You may also try OTC claritin 3 days prior to your injection and 3 days after your injection. Two prescriptions of anti-nausea medications will be sent to your pharmacy. If for any reason any of the prescriptions are extremely expensive please notify our office. You will be given long acting anti-nausea medications through your port prior to your treatments. We prescribe at home medications incase you are to experience nausea. Please call our office if not effective. Please make sure you have imodium OTC at home prior to starting treatments. This is incase you are to experience diarrhea. If this is not effective please call our office. [phone]. On the day of your chemo you will report to the infusion center, [address], the nurses will access your port and draw labs, they will then send you over to our office, [address]. We will look at the labs, discuss any symptoms/ potential side effects, and answer any questions. We will then send you back to the infusion center to complete your infusion. Please take all of your medications as prescribed and eat breakfast prior to your arrival. 
 
Rosslyn Nip is not provided, but there is limited snacks/drinks available for the patient only. Please be mindful that space is limited therefore please allow 1 person to accompany you to your treatments. You will need a  for your infusions, however, it is not mandatory someone stays with your during your treatment. You have invasive ductal carcinoma. Estrogen receptor positive and progesterone receptor positive. HER 2 unamplified. Grade 2. Introducing [facility]! [employer] introduces [employer] patient portal. Now you can access parts of your medical record, email your doctor's office, and request medication refills online. 1. In your internet browser, go to [email or web address] 2. Click on the First Time User? Click Here link in the Sign In box. You will see the New Member Sign Up page. 3. Enter your [employer] Access Code exactly as it appears below. You will not need to use this code after youve completed the sign-up process. If you do not sign up before the expiration date, you must request a new code. · [employer] Access Code: [email or web address] Expires: 5/10/2018  2:04 PM 
 
4. Enter the last four digits of your Social Security Number (xxxx) and Date of Birth (mm/dd/yyyy) as indicated and click Submit. You will be taken to the next sign-up page. 5. Create a [employer] ID. This will be your [employer] login ID and cannot be changed, so think of one that is secure and easy to remember. 6. Create a [employer] password. You can change your password at any time. 7. Enter your Password Reset Question and Answer. This can be used at a later time if you forget your password. 8. Enter your e-mail address. You will receive e-mail notification when new information is available in [address] 9. Click Sign Up. You can now view and download portions of your medical record. 10. Click the Download Summary menu link to download a portable copy of your medical information.  
 
If you have questions, please visit the Frequently Asked Questions section of the [employer]. Remember, [employer] is NOT to be used for urgent needs. For medical emergencies, dial 911. Now available from your iPhone and Android! Please provide this summary of care documentation to your next provider. Your primary care clinician is listed as Diego Cazares. If you have any questions after today's visit, please call [phone].

## 2018-04-23 NOTE — PROGRESS NOTES
Oncology Consultation Note        Patient: Arnulfo Ulrich MRN: [id number]  SSN: [id number]    YOB: 1950  Age: 79 y.o. Sex: female      Subjective:      Arnulfo Ulrich is a 79 y.o. female who I am seeing in consultation for right sided breast cancer. Her breast cancer was seen on an annual mammogram that led to a bilateral biopsy on both the RIGHT and LEFT breast. The LEFT breast was benign and the RIGHT breast is positive for IDC. The patient denies any nipple inversion or discharge. She was seen by Dr. Micha Lomeli. She underwent right sided lumpectomy on 03/08/2018. The tumor is 3.8 cm in size, LN -ve, %, HI 95%, Her 2 -ve. Genomic profile by mammaprint reveals a high risk luminal B gene signature in the tumor. She is feeling well and comes in to discuss the next steps. Review of Systems:    Constitutional: negative  Eyes: negative  Ears, Nose, Mouth, Throat, and Face: negative  Respiratory: negative  Cardiovascular: negative  Gastrointestinal: negative  Genitourinary:negative  Integument/Breast: negative  Hematologic/Lymphatic: negative  Musculoskeletal:negative  Neurological: negative        Past Medical History:   Diagnosis Date    Arthritis     Breast cancer, right breast ([facility] [address]     Depression     GERD (gastroesophageal reflux disease)     Hypercholesterolemia     Hypertension     Other ill-defined conditions(799.89) 2012    dog bite dec 2012 needing blood transfusion     Past Surgical History:   Procedure Laterality Date    HX BREAST LUMPECTOMY Right 3/8/2018    RIGHT BREAST LUMPECTOMY WITH ULTRASOUND performed by Suha Nj MD at \[facility]\"" AMBULATORY OR    HX COLONOSCOPY      HX KNEE REPLACEMENT Right 06/2013    HX TONSILLECTOMY        Family History   Problem Relation Age of Onset    Adopted:  Yes    Breast Cancer Mother     Stroke Mother     Hypertension Mother     Cancer Mother      skin    Cancer Father      skin    Pulmonary Fibrosis Father     Cancer Sister 79 colon    Breast Cancer Maternal Aunt     Breast Cancer Paternal Aunt     Breast Cancer Maternal Aunt      ovarian and colon cancer    Breast Cancer Maternal Aunt      ovarian and colon cancer    Breast Cancer Maternal Aunt     Breast Cancer Paternal Aunt     Breast Cancer Maternal Aunt     Cancer Maternal Grandmother      liver/ovarian     Social History   Substance Use Topics    Smoking status: Former Smoker     Packs/day: 0.25     Years: 2.00     Quit date: 6/4/1972    Smokeless tobacco: Never Used    Alcohol use No      Prior to Admission medications    Medication Sig Start Date End Date Taking? Authorizing Provider   ondansetron (ZOFRAN ODT) 4 mg disintegrating tablet Take 1 Tab by mouth every eight (8) hours as needed for Nausea. 4/20/18  Yes Erasto Hennessy NP   prochlorperazine (COMPAZINE) 10 mg tablet Take 1 Tab by mouth every six (6) hours as needed for up to 40 days. 4/20/18 5/30/18 Yes Erasto Hennessy NP   lidocaine-prilocaine (EMLA) topical cream Apply  to affected area as needed for Pain. 4/20/18  Yes Erasto Hennessy NP   dexamethasone (DECADRON) 4 mg tablet Take 2 Tabs by mouth daily. The day before, the day of and the day after chemotherapy 4/20/18  Yes Erasto Hennessy NP   garlic 1 mg cap Take  by mouth daily. Yes Historical Provider   ferrous sulfate (IRON) 325 mg (65 mg iron) tablet Take 325 mg by mouth Daily (before breakfast). Yes Historical Provider   lisinopril-hydroCHLOROthiazide (PRINZIDE, ZESTORETIC) 20-25 mg per tablet Take 1 Tab by mouth daily. Indications: hypertension   Yes Historical Provider   calcium-cholecalciferol, d3, (CALCIUM 600 + D) 600-125 mg-unit Tab Take 1 Tab by mouth two (2) times a day. Yes Historical Provider   etodolac (LODINE) 400 mg tablet Take 400 mg by mouth daily. Yes Historical Provider   Cholecalciferol, Vitamin D3, (VITAMIN D3) 1,000 unit cap Take 1 Tab by mouth daily.    Yes Historical Provider   atenolol (TENORMIN) 50 mg tablet Take 50 mg by mouth daily. Yes Historical Provider   oxyCODONE-acetaminophen (PERCOCET) 5-325 mg per tablet Take 1 Tab by mouth every four (4) hours as needed for Pain. Max Daily Amount: 6 Tabs. 3/8/18   Leonora Jeffrey MD   ondansetron (ZOFRAN ODT) 4 mg disintegrating tablet Take 1 Tab by mouth every six (6) hours as needed for Nausea. 3/8/18   Leonora Jeffrey MD              Allergies   Allergen Reactions    Nasacort [Triamcinolone Acetonide] Other (comments)     headache    Sulfa (Sulfonamide Antibiotics) Rash           Objective:     Vitals:    04/20/18 1405   BP: 114/58   Pulse: 60   Resp: 18   Temp: 98.1 °F (36.7 °C)   TempSrc: Oral   SpO2: 97%   Weight: 232 lb 3.2 oz (105.3 kg)   Height: 5' 2\" (1.575 m)            Physical Exam:    GENERAL: alert, cooperative, no distress, appears stated age  EYE: negative  LYMPHATIC: Cervical, supraclavicular, and axillary nodes normal.   THROAT & NECK: normal and no erythema or exudates noted. LUNG: clear to auscultation bilaterally  HEART: regular rate and rhythm  ABDOMEN: soft, non-tender  EXTREMITIES:  no edema  SKIN: Normal.  NEUROLOGIC: negative            Assessment:     1. Right breast carcinoma:  T2 N0 M0 (Stage IIA) infiltrating ductal carcinoma, Tumor size 3.8 cm, LN -ve, grade 2, %, NM 95%, Her 2 -ve. Mammaprint shows a luminal B gene signature. ECOG PS 0  Intent of Treatment - curative       S/P right breast lumpectomy and sentinel LN excision. Patient sent for consideration of adjuvant therapy. I spent significant time in explaining the rationale of adjuvant therapy is to reduce the risk of recurrence of the cancer and thus cure the disease. Since the Mammaprint shows a high risk luminal B genetic signature, I would recommend adjuvant chemotherapy to reduce the risk of recurrence. I discussed various treatment options for adjuvant chemotherapy including anthracycline and non-anthracycline containing regimens.   I believe a combination of Taxotere and Cyclophosphamide (TC) is one of the best options to use for adjuvant chemotherapy. TC beat AC in Briseno study [facility], ASCO 2007). I have offered her standard adjuvant chemotherapy    Taxotere 60 mg/m2 q2w X 4  Cyclophosphamide 600 mg/m2 q2w X 4    I counseled the patient regarding the chemotherapy. Discussions included side-effect, toxicity, benefit and risks of chemotherapy. She understood the expected side-effect which includes alopecia, nausea, peripheral neuropathy, neutropenic fever, anemia, decreased in the LVEF, need for transfusion among other things. After weighing the benefit and risks, she agreed to proceed with chemotherapy. Patient sent for consideration of adjuvant therapy. I spent significant time in explaining the rationale of adjuvant therapy is to reduce the risk of recurrence and improve the chances of cure. The risk of recurrence in the next 5 years is around 10-15%. Since the tumor is small, LN -ve and highly ER+ve, she would not benefit from adjuvant chemotherapy and thus I did not offer her this therapy. She will however benefit from adjuvant hormonal therapy. Adjuvant Aromatase inhibitor would reduce the risk of recurrence by 50% on an average. I counseled the patient regarding the hormonal therapy. Discussions included side-effect and benefit of hormonal therapy. She understood the expected side-effect which includes hot flashes, myalgias/arthralgias, osteopenia/osteoporosis with aromatase inhibitor. She agrees to take Letrozole. She understands the alternative to this is observation alone. I spent 90 minutes with the patient in a face-to-face encounter. I explained her the stage of the disease, pathophysiology of the disease and the treatment approaches. I answered all her questions. More than 50% of the time was utilized in education, counseling and co-ordination of care.      The patient's emotional well being was addressed during this office visit and patient seems to be coping well with the diagnosis and the treatment. After completion of adjuvant chemotherapy, she will receive adjuvant radiation to the right breast and axilla. Subsequently I will treat her with adjuvant hormonal therapy. Aromatase inhibitor will potentially reduce the risk of recurrence by 30-40%. I have recommended Letrozole as the AI of choice. I counseled the patient regarding the hormonal therapy. Discussions included side-effect and benefit of hormonal therapy. She understood the expected side-effect which includes hot flashes, myalgias/arthralgias, osteopenia/osteoporosis with aromatase inhibitor. She agrees to take Letrozole. She understands the alternative to this is observation alone. Patient will be meeting with navigation services to discuss any financial barriers to care/estimated cost of care. 2. Obesity    Managed by PCP. Plan:       > Port-a-cath placement - by Dr. Radha Alexis  > Fax a prescription of Compazine and Dexamethasone (pre-med) to pharmacy. > Refer the patient to [facility] to start chemotherapy.  > Start TC chemotherapy tentatively in 2 weeks for a total of 4 cycles. > Vit D level, CBC with diff and CMP      Signed By: Rajinder Goodman MD     April 22, 2018         CC. Paola Rodriguez MD  CC. Krishna Abraham MD  CC.  Sharyn Clay MD

## 2018-04-27 DIAGNOSIS — C50.411 MALIGNANT NEOPLASM OF UPPER-OUTER QUADRANT OF RIGHT FEMALE BREAST, UNSPECIFIED ESTROGEN RECEPTOR STATUS (HCC): Primary | ICD-10-CM

## 2018-05-08 ENCOUNTER — HOSPITAL ENCOUNTER (OUTPATIENT)
Age: 68
Setting detail: OUTPATIENT SURGERY
Discharge: HOME | End: 2018-05-08
Attending: SURGERY | Admitting: SURGERY

## 2018-05-08 DIAGNOSIS — Z17.0 MALIGNANT NEOPLASM OF UPPER-OUTER QUADRANT OF RIGHT BREAST IN FEMALE, ESTROGEN RECEPTOR POSITIVE (HCC): Primary | ICD-10-CM

## 2018-05-08 DIAGNOSIS — C50.411 MALIGNANT NEOPLASM OF UPPER-OUTER QUADRANT OF RIGHT BREAST IN FEMALE, ESTROGEN RECEPTOR POSITIVE (HCC): Primary | ICD-10-CM

## 2018-05-08 RX ORDER — SODIUM CHLORIDE 0.9 % (FLUSH) 0.9 %
5-10 SYRINGE (ML) INJECTION EVERY 8 HOURS
Status: CANCELLED | OUTPATIENT
Start: 2018-05-08

## 2018-05-08 RX ORDER — FENTANYL CITRATE 50 UG/ML
25 INJECTION, SOLUTION INTRAMUSCULAR; INTRAVENOUS
Status: CANCELLED | OUTPATIENT
Start: 2018-05-08

## 2018-05-08 RX ORDER — SODIUM CHLORIDE, SODIUM LACTATE, POTASSIUM CHLORIDE, CALCIUM CHLORIDE 600; 310; 30; 20 MG/100ML; MG/100ML; MG/100ML; MG/100ML
125 INJECTION, SOLUTION INTRAVENOUS CONTINUOUS
Status: CANCELLED | OUTPATIENT
Start: 2018-05-08

## 2018-05-08 RX ORDER — HYDROMORPHONE HYDROCHLORIDE 1 MG/ML
0.2 INJECTION, SOLUTION INTRAMUSCULAR; INTRAVENOUS; SUBCUTANEOUS
Status: CANCELLED | OUTPATIENT
Start: 2018-05-08

## 2018-05-08 RX ORDER — MORPHINE SULFATE 10 MG/ML
2 INJECTION, SOLUTION INTRAMUSCULAR; INTRAVENOUS
Status: CANCELLED | OUTPATIENT
Start: 2018-05-08

## 2018-05-08 RX ORDER — FENTANYL CITRATE 50 UG/ML
50 INJECTION, SOLUTION INTRAMUSCULAR; INTRAVENOUS AS NEEDED
Status: CANCELLED | OUTPATIENT
Start: 2018-05-08

## 2018-05-08 RX ORDER — OXYCODONE AND ACETAMINOPHEN 5; 325 MG/1; MG/1
1 TABLET ORAL AS NEEDED
Status: CANCELLED | OUTPATIENT
Start: 2018-05-08

## 2018-05-08 RX ORDER — SODIUM CHLORIDE, SODIUM LACTATE, POTASSIUM CHLORIDE, CALCIUM CHLORIDE 600; 310; 30; 20 MG/100ML; MG/100ML; MG/100ML; MG/100ML
125 INJECTION, SOLUTION INTRAVENOUS CONTINUOUS
Status: CANCELLED | OUTPATIENT
Start: 2018-05-08 | End: 2018-05-09

## 2018-05-08 RX ORDER — MIDAZOLAM HYDROCHLORIDE 1 MG/ML
0.5 INJECTION, SOLUTION INTRAMUSCULAR; INTRAVENOUS
Status: CANCELLED | OUTPATIENT
Start: 2018-05-08

## 2018-05-08 RX ORDER — LIDOCAINE HYDROCHLORIDE 10 MG/ML
0.1 INJECTION, SOLUTION EPIDURAL; INFILTRATION; INTRACAUDAL; PERINEURAL AS NEEDED
Status: CANCELLED | OUTPATIENT
Start: 2018-05-08

## 2018-05-08 RX ORDER — ONDANSETRON 2 MG/ML
4 INJECTION INTRAMUSCULAR; INTRAVENOUS AS NEEDED
Status: CANCELLED | OUTPATIENT
Start: 2018-05-08

## 2018-05-08 RX ORDER — SODIUM CHLORIDE 0.9 % (FLUSH) 0.9 %
5-10 SYRINGE (ML) INJECTION AS NEEDED
Status: CANCELLED | OUTPATIENT
Start: 2018-05-08

## 2018-05-08 RX ORDER — MIDAZOLAM HYDROCHLORIDE 1 MG/ML
1 INJECTION, SOLUTION INTRAMUSCULAR; INTRAVENOUS AS NEEDED
Status: CANCELLED | OUTPATIENT
Start: 2018-05-08

## 2018-05-08 RX ORDER — SODIUM CHLORIDE 9 MG/ML
25 INJECTION, SOLUTION INTRAVENOUS CONTINUOUS
Status: CANCELLED | OUTPATIENT
Start: 2018-05-08 | End: 2018-05-09

## 2018-05-08 RX ORDER — DIPHENHYDRAMINE HYDROCHLORIDE 50 MG/ML
12.5 INJECTION, SOLUTION INTRAMUSCULAR; INTRAVENOUS AS NEEDED
Status: CANCELLED | OUTPATIENT
Start: 2018-05-08 | End: 2018-05-08

## 2018-05-08 NOTE — H&P
HISTORY OF PRESENT ILLNESS  Merlin Cox is a 79 y.o. female. HPI  ESTABLISHED patient here for post op follow RIGHT breast cancer, s/p lumpectomy and SNBx. Patient is having some pain, a 3/10 to her breast and axilla. Has some swelling to the incision.      80 yo female with right breast T1 (3.8  cm) N0 IDC grade 2 Er/Pr+ her 2 vicki negative. 3/8/18, RIGHT breast lumpectomy, SNBx   VUS- BARD1  High risk mammaprint - luminal B     Review of Systems   All other systems reviewed and are negative.           Physical Exam   Pulmonary/Chest:       Right breast and axillary incision c/d/i. Nursing note and vitals reviewed.        ASSESSMENT and PLAN      ICD-10-CM ICD-9-CM     1. Malignant neoplasm of upper-outer quadrant of right breast in female, estrogen receptor positive ([facility] C50.411 174. 4       Z17.0 V86.0     2. S/P lumpectomy, right breast Z98.890 V36.80        80 yo female with right breast T1 (3.8 cm) N0 IDC grade 2 Er/Pr+ her 2 vicki negative. 3/8/18, RIGHT breast lumpectomy, SNBx   VUS- BARD1  High risk mammaprint - luminal B     -refer to med onc, rad onc  - f/u in 3 months. If chemo then place port.

## 2018-05-09 ENCOUNTER — HOSPITAL ENCOUNTER (OUTPATIENT)
Dept: INTERVENTIONAL RADIOLOGY/VASCULAR | Age: 68
Discharge: HOME | End: 2018-05-09
Attending: INTERNAL MEDICINE | Admitting: INTERNAL MEDICINE
Payer: MEDICARE

## 2018-05-09 VITALS
TEMPERATURE: 98.8 F | WEIGHT: 230 LBS | DIASTOLIC BLOOD PRESSURE: 66 MMHG | HEART RATE: 63 BPM | SYSTOLIC BLOOD PRESSURE: 100 MMHG | RESPIRATION RATE: 17 BRPM | BODY MASS INDEX: 42.33 KG/M2 | OXYGEN SATURATION: 100 % | HEIGHT: 62 IN

## 2018-05-09 DIAGNOSIS — Z17.0 MALIGNANT NEOPLASM OF UPPER-OUTER QUADRANT OF RIGHT BREAST IN FEMALE, ESTROGEN RECEPTOR POSITIVE (HCC): ICD-10-CM

## 2018-05-09 DIAGNOSIS — C50.411 MALIGNANT NEOPLASM OF UPPER-OUTER QUADRANT OF RIGHT BREAST IN FEMALE, ESTROGEN RECEPTOR POSITIVE (HCC): ICD-10-CM

## 2018-05-09 PROCEDURE — 99153 MOD SED SAME PHYS/QHP EA: CPT

## 2018-05-09 PROCEDURE — 74011000250 HC RX REV CODE- 250

## 2018-05-09 PROCEDURE — 77030031139 HC SUT VCRL2 J&J -A

## 2018-05-09 PROCEDURE — 99152 MOD SED SAME PHYS/QHP 5/>YRS: CPT

## 2018-05-09 PROCEDURE — 77030011893 HC TY CUT DN TRIS -B

## 2018-05-09 PROCEDURE — C1788 PORT, INDWELLING, IMP: HCPCS

## 2018-05-09 PROCEDURE — 77030039266 HC ADH SKN EXOFIN S2SG -A

## 2018-05-09 PROCEDURE — C1892 INTRO/SHEATH,FIXED,PEEL-AWAY: HCPCS

## 2018-05-09 PROCEDURE — 74011250636 HC RX REV CODE- 250/636: Performed by: RADIOLOGY

## 2018-05-09 PROCEDURE — 74011000250 HC RX REV CODE- 250: Performed by: RADIOLOGY

## 2018-05-09 PROCEDURE — 36561 INSERT TUNNELED CV CATH: CPT

## 2018-05-09 PROCEDURE — 74011250636 HC RX REV CODE- 250/636

## 2018-05-09 RX ORDER — SODIUM CHLORIDE 9 MG/ML
25 INJECTION, SOLUTION INTRAVENOUS CONTINUOUS
Status: DISPENSED | OUTPATIENT
Start: 2018-05-10 | End: 2018-05-10

## 2018-05-09 RX ORDER — SODIUM BICARBONATE 42 MG/ML
INJECTION, SOLUTION INTRAVENOUS
Status: COMPLETED
Start: 2018-05-09 | End: 2018-05-09

## 2018-05-09 RX ORDER — MIDAZOLAM HYDROCHLORIDE 1 MG/ML
5 INJECTION, SOLUTION INTRAMUSCULAR; INTRAVENOUS
Status: DISCONTINUED | OUTPATIENT
Start: 2018-05-09 | End: 2018-05-09 | Stop reason: HOSPADM

## 2018-05-09 RX ORDER — LIDOCAINE HYDROCHLORIDE AND EPINEPHRINE 10; 10 MG/ML; UG/ML
1.5 INJECTION, SOLUTION INFILTRATION; PERINEURAL ONCE
Status: COMPLETED | OUTPATIENT
Start: 2018-05-09 | End: 2018-05-09

## 2018-05-09 RX ORDER — SODIUM CHLORIDE 9 MG/ML
500 INJECTION, SOLUTION INTRAVENOUS CONTINUOUS
Status: DISCONTINUED | OUTPATIENT
Start: 2018-05-09 | End: 2018-05-09 | Stop reason: HOSPADM

## 2018-05-09 RX ORDER — LIDOCAINE HYDROCHLORIDE 10 MG/ML
10 INJECTION INFILTRATION; PERINEURAL
Status: COMPLETED | OUTPATIENT
Start: 2018-05-09 | End: 2018-05-09

## 2018-05-09 RX ORDER — LIDOCAINE HYDROCHLORIDE 20 MG/ML
5 INJECTION, SOLUTION INFILTRATION; PERINEURAL ONCE
Status: DISCONTINUED | OUTPATIENT
Start: 2018-05-09 | End: 2018-05-09

## 2018-05-09 RX ORDER — PALONOSETRON 0.05 MG/ML
0.25 INJECTION, SOLUTION INTRAVENOUS ONCE
Status: COMPLETED | OUTPATIENT
Start: 2018-05-10 | End: 2018-05-10

## 2018-05-09 RX ORDER — SODIUM CHLORIDE 0.9 % (FLUSH) 0.9 %
10 SYRINGE (ML) INJECTION AS NEEDED
Status: DISCONTINUED | OUTPATIENT
Start: 2018-05-09 | End: 2018-05-09 | Stop reason: HOSPADM

## 2018-05-09 RX ORDER — FENTANYL CITRATE 50 UG/ML
200 INJECTION, SOLUTION INTRAMUSCULAR; INTRAVENOUS
Status: DISCONTINUED | OUTPATIENT
Start: 2018-05-09 | End: 2018-05-09 | Stop reason: HOSPADM

## 2018-05-09 RX ORDER — LIDOCAINE HYDROCHLORIDE 10 MG/ML
10 INJECTION INFILTRATION; PERINEURAL
Status: DISCONTINUED | OUTPATIENT
Start: 2018-05-09 | End: 2018-05-09 | Stop reason: HOSPADM

## 2018-05-09 RX ORDER — CEFAZOLIN SODIUM/WATER 2 G/20 ML
2 SYRINGE (ML) INTRAVENOUS ONCE
Status: COMPLETED | OUTPATIENT
Start: 2018-05-09 | End: 2018-05-09

## 2018-05-09 RX ORDER — HEPARIN SODIUM (PORCINE) LOCK FLUSH IV SOLN 100 UNIT/ML 100 UNIT/ML
500 SOLUTION INTRAVENOUS
Status: DISCONTINUED | OUTPATIENT
Start: 2018-05-09 | End: 2018-05-09 | Stop reason: HOSPADM

## 2018-05-09 RX ORDER — HEPARIN 100 UNIT/ML
SYRINGE INTRAVENOUS
Status: COMPLETED
Start: 2018-05-09 | End: 2018-05-09

## 2018-05-09 RX ADMIN — MIDAZOLAM HYDROCHLORIDE 1 MG: 1 INJECTION, SOLUTION INTRAMUSCULAR; INTRAVENOUS at 09:47

## 2018-05-09 RX ADMIN — SODIUM BICARBONATE 1 ML: 42 INJECTION, SOLUTION INTRAVENOUS at 09:28

## 2018-05-09 RX ADMIN — MIDAZOLAM HYDROCHLORIDE 1 MG: 1 INJECTION, SOLUTION INTRAMUSCULAR; INTRAVENOUS at 09:20

## 2018-05-09 RX ADMIN — Medication 10 ML: at 09:43

## 2018-05-09 RX ADMIN — MIDAZOLAM HYDROCHLORIDE 1 MG: 1 INJECTION, SOLUTION INTRAMUSCULAR; INTRAVENOUS at 09:28

## 2018-05-09 RX ADMIN — FENTANYL CITRATE 50 MCG: 50 INJECTION, SOLUTION INTRAMUSCULAR; INTRAVENOUS at 09:19

## 2018-05-09 RX ADMIN — FENTANYL CITRATE 25 MCG: 50 INJECTION, SOLUTION INTRAMUSCULAR; INTRAVENOUS at 09:40

## 2018-05-09 RX ADMIN — Medication 2 G: at 08:42

## 2018-05-09 RX ADMIN — FENTANYL CITRATE 25 MCG: 50 INJECTION, SOLUTION INTRAMUSCULAR; INTRAVENOUS at 09:55

## 2018-05-09 RX ADMIN — FENTANYL CITRATE 50 MCG: 50 INJECTION, SOLUTION INTRAMUSCULAR; INTRAVENOUS at 09:26

## 2018-05-09 RX ADMIN — LIDOCAINE HYDROCHLORIDE 10 ML: 10 INJECTION, SOLUTION INFILTRATION; PERINEURAL at 09:47

## 2018-05-09 RX ADMIN — FENTANYL CITRATE 25 MCG: 50 INJECTION, SOLUTION INTRAMUSCULAR; INTRAVENOUS at 09:46

## 2018-05-09 RX ADMIN — HEPARIN 500 UNITS: 100 SYRINGE at 09:28

## 2018-05-09 RX ADMIN — LIDOCAINE HYDROCHLORIDE,EPINEPHRINE BITARTRATE 15 MG: 10; .01 INJECTION, SOLUTION INFILTRATION; PERINEURAL at 09:42

## 2018-05-09 RX ADMIN — MIDAZOLAM HYDROCHLORIDE 1 MG: 1 INJECTION, SOLUTION INTRAMUSCULAR; INTRAVENOUS at 09:38

## 2018-05-09 RX ADMIN — SODIUM CHLORIDE 500 ML: 900 INJECTION, SOLUTION INTRAVENOUS at 08:43

## 2018-05-09 RX ADMIN — MIDAZOLAM HYDROCHLORIDE 1 MG: 1 INJECTION, SOLUTION INTRAMUSCULAR; INTRAVENOUS at 08:48

## 2018-05-09 NOTE — H&P
Interventional Radiology History and Physical (Outpatient)    5/9/2018    Patient: Silverio Alexis 79 y.o. female     Referring Physician:  Rodney Marcus MD    Chief Complaint: need port    History of Present Illness: breast CA    History:  Past Medical History:   Diagnosis Date    Arthritis     Breast cancer, right breast ([address]     Depression     GERD (gastroesophageal reflux disease)     Hypercholesterolemia     Hypertension     Other ill-defined conditions(799.89) 2012    dog bite dec 2012 needing blood transfusion     Family History   Problem Relation Age of Onset    Adopted: Yes    Breast Cancer Mother     Stroke Mother     Hypertension Mother     Cancer Mother      skin    Cancer Father      skin    Pulmonary Fibrosis Father     Cancer Sister 79     colon    Breast Cancer Maternal Aunt     Breast Cancer Paternal Aunt     Breast Cancer Maternal Aunt      ovarian and colon cancer    Breast Cancer Maternal Aunt      ovarian and colon cancer    Breast Cancer Maternal Aunt     Breast Cancer Paternal Aunt     Breast Cancer Maternal Aunt     Cancer Maternal Grandmother      liver/ovarian     Social History     Social History    Marital status:      Spouse name: N/A    Number of children: N/A    Years of education: N/A     Occupational History    Not on file. Social History Main Topics    Smoking status: Former Smoker     Packs/day: 0.25     Years: 2.00     Quit date: 6/4/1972    Smokeless tobacco: Never Used    Alcohol use No    Drug use: No    Sexual activity: Not on file     Other Topics Concern    Not on file     Social History Narrative       Allergies: Allergies   Allergen Reactions    Nasacort [Triamcinolone Acetonide] Other (comments)     headache    Sulfa (Sulfonamide Antibiotics) Rash       Prior to Admission Medications:  Prior to Admission medications    Medication Sig Start Date End Date Taking? Authorizing Provider   garlic 1 mg cap Take  by mouth daily. Yes Historical Provider   ferrous sulfate (IRON) 325 mg (65 mg iron) tablet Take 325 mg by mouth Daily (before breakfast). Yes Historical Provider   lisinopril-hydroCHLOROthiazide (PRINZIDE, ZESTORETIC) 20-25 mg per tablet Take 1 Tab by mouth daily. Indications: hypertension   Yes Historical Provider   calcium-cholecalciferol, d3, (CALCIUM 600 + D) 600-125 mg-unit Tab Take 1 Tab by mouth two (2) times a day. Yes Historical Provider   Cholecalciferol, Vitamin D3, (VITAMIN D3) 1,000 unit cap Take 1 Tab by mouth daily. Yes Historical Provider   atenolol (TENORMIN) 50 mg tablet Take 50 mg by mouth daily. Yes Historical Provider   ondansetron (ZOFRAN ODT) 4 mg disintegrating tablet Take 1 Tab by mouth every eight (8) hours as needed for Nausea. 4/20/18   Pranay Alex NP   prochlorperazine (COMPAZINE) 10 mg tablet Take 1 Tab by mouth every six (6) hours as needed for up to 40 days. 4/20/18 5/30/18  Pranay Alex NP   lidocaine-prilocaine (EMLA) topical cream Apply  to affected area as needed for Pain. 4/20/18   Pranay Alex NP   dexamethasone (DECADRON) 4 mg tablet Take 2 Tabs by mouth daily. The day before, the day of and the day after chemotherapy 4/20/18   Pranay Alex NP   oxyCODONE-acetaminophen (PERCOCET) 5-325 mg per tablet Take 1 Tab by mouth every four (4) hours as needed for Pain. Max Daily Amount: 6 Tabs. 3/8/18   Misti Monico Cogan, MD   ondansetron (ZOFRAN ODT) 4 mg disintegrating tablet Take 1 Tab by mouth every six (6) hours as needed for Nausea. 3/8/18   Misti Monico Cogan, MD   etodolac (LODINE) 400 mg tablet Take 400 mg by mouth daily. Historical Provider       Physical Exam:    Blood pressure 129/45, pulse (!) 52, resp. rate 10, height 5' 2\" (1.575 m), weight 104.3 kg (230 lb), SpO2 99 %, not currently breastfeeding.   General: alert, cooperative, no distress, appears stated age  Heart: rrr  Lungs: clear to auscultation bilaterally  Abdomen: soft  Neuro: grossly intact  Extremities: extremities wnl    Plan of Care/Planned Procedure:  Risks, benefits, and alternatives reviewed with patient and she agrees to proceed with the procedure.      Shital Gerardo MD

## 2018-05-09 NOTE — DISCHARGE INSTRUCTIONS
[address] [facility]  Department of Interventional Radiology  [facility]    [facility] Discharge Instructions      General Instructions:   A port is like an implanted IV. They are usually ordered for patients who will be getting chemotherapy, but can also be used as an IV for long term antibiotics, large amounts of fluids, and/or blood products. Your blood can be drawn from your port for labs also. Those patients who do not have good veins find the ports convenient as they can get the IV they need with one stick. The port can be used long term, and the care is easy. The device is under the skin, and once the skin heals, care is minimal. All that is required is the nurse who accesses the port will need to flush it with heparinized saline after each use. Ports are usually placed in the chest wall, usually on the right side. But they can be place in the arms and in the abdomen. Home Care Instructions:    Do not allow bra straps or any clothing to rub the skin over the port. Do not bathe or swim until the skin has healed and if the port is accessed. Once it is healed, and when the port is not accessed, it is okay to bathe and swim. Restrict yourself to light activity for the first 5 days after getting the port put in, after that, resume normal activity slowly. You may resume your normal diet and medications. Follow-Up Instructions: Please see your oncologist, or whatever physician ordered the port as he/she has requested of you. Call If: You should call your Physician and/or the Radiology Nurse if you notice redness, pus, swelling, or pain from the area of your incision. Call if you should develop a fever. The nurses who access your port will know to call your doctor if the port does not seem to be working properly. You need to tell the nurses who use the port if you should have any pain or swelling at the site during an infusion.     To Reach Us:   Side effects of sedation medications and other medications used today have been reviewed. Notify us of nausea, itching, hives, dizziness, or anything else out of the ordinary. Should you experience any of these significant changes, please call [phone] between the hours of 7:30 am and 10 pm or [phone] after hours.  After hours, ask the  to page the [address] Technologist, and describe the problem to the technologist.           Patient Signature:  Date: 5/9/2018  Discharging Nurse: Ling Loza RN

## 2018-05-10 ENCOUNTER — OFFICE VISIT (OUTPATIENT)
Dept: ONCOLOGY | Age: 68
End: 2018-05-10

## 2018-05-10 ENCOUNTER — HOSPITAL ENCOUNTER (OUTPATIENT)
Dept: INFUSION THERAPY | Age: 68
Discharge: HOME | End: 2018-05-10
Payer: MEDICARE

## 2018-05-10 VITALS
SYSTOLIC BLOOD PRESSURE: 135 MMHG | HEART RATE: 43 BPM | HEIGHT: 62 IN | OXYGEN SATURATION: 96 % | RESPIRATION RATE: 18 BRPM | WEIGHT: 231.8 LBS | TEMPERATURE: 98.6 F | BODY MASS INDEX: 42.65 KG/M2 | DIASTOLIC BLOOD PRESSURE: 63 MMHG

## 2018-05-10 VITALS
BODY MASS INDEX: 42.51 KG/M2 | RESPIRATION RATE: 18 BRPM | HEART RATE: 46 BPM | DIASTOLIC BLOOD PRESSURE: 85 MMHG | WEIGHT: 231 LBS | TEMPERATURE: 98.7 F | HEIGHT: 62 IN | SYSTOLIC BLOOD PRESSURE: 177 MMHG

## 2018-05-10 DIAGNOSIS — C50.411 MALIGNANT NEOPLASM OF UPPER-OUTER QUADRANT OF RIGHT BREAST IN FEMALE, ESTROGEN RECEPTOR POSITIVE (HCC): Primary | ICD-10-CM

## 2018-05-10 DIAGNOSIS — Z17.0 MALIGNANT NEOPLASM OF UPPER-OUTER QUADRANT OF RIGHT BREAST IN FEMALE, ESTROGEN RECEPTOR POSITIVE (HCC): Primary | ICD-10-CM

## 2018-05-10 LAB
ALBUMIN SERPL-MCNC: 3.4 G/DL (ref 3.5–5)
ALBUMIN/GLOB SERPL: 0.8 {RATIO} (ref 1.1–2.2)
ALP SERPL-CCNC: 91 U/L (ref 45–117)
ALT SERPL-CCNC: 21 U/L (ref 12–78)
ANION GAP SERPL CALC-SCNC: 8 MMOL/L (ref 5–15)
AST SERPL-CCNC: 8 U/L (ref 15–37)
BASOPHILS # BLD: 0 K/UL (ref 0–0.1)
BASOPHILS NFR BLD: 0 % (ref 0–1)
BILIRUB SERPL-MCNC: 0.6 MG/DL (ref 0.2–1)
BUN SERPL-MCNC: 29 MG/DL (ref 6–20)
BUN/CREAT SERPL: 28 (ref 12–20)
CALCIUM SERPL-MCNC: 9.7 MG/DL (ref 8.5–10.1)
CHLORIDE SERPL-SCNC: 107 MMOL/L (ref 97–108)
CO2 SERPL-SCNC: 24 MMOL/L (ref 21–32)
CREAT SERPL-MCNC: 1.04 MG/DL (ref 0.55–1.02)
DIFFERENTIAL METHOD BLD: ABNORMAL
EOSINOPHIL # BLD: 0 K/UL (ref 0–0.4)
EOSINOPHIL NFR BLD: 0 % (ref 0–7)
ERYTHROCYTE [DISTWIDTH] IN BLOOD BY AUTOMATED COUNT: 14.4 % (ref 11.5–14.5)
GLOBULIN SER CALC-MCNC: 4.5 G/DL (ref 2–4)
GLUCOSE SERPL-MCNC: 133 MG/DL (ref 65–100)
HCT VFR BLD AUTO: 35.5 % (ref 35–47)
HGB BLD-MCNC: 11.3 G/DL (ref 11.5–16)
IMM GRANULOCYTES # BLD: 0.1 K/UL (ref 0–0.04)
IMM GRANULOCYTES NFR BLD AUTO: 1 % (ref 0–0.5)
LYMPHOCYTES # BLD: 0.8 K/UL (ref 0.8–3.5)
LYMPHOCYTES NFR BLD: 8 % (ref 12–49)
MCH RBC QN AUTO: 28.8 PG (ref 26–34)
MCHC RBC AUTO-ENTMCNC: 31.8 G/DL (ref 30–36.5)
MCV RBC AUTO: 90.6 FL (ref 80–99)
MONOCYTES # BLD: 0.3 K/UL (ref 0–1)
MONOCYTES NFR BLD: 3 % (ref 5–13)
NEUTS SEG # BLD: 8.9 K/UL (ref 1.8–8)
NEUTS SEG NFR BLD: 88 % (ref 32–75)
NRBC # BLD: 0 K/UL (ref 0–0.01)
NRBC BLD-RTO: 0 PER 100 WBC
PLATELET # BLD AUTO: 227 K/UL (ref 150–400)
PMV BLD AUTO: 11.1 FL (ref 8.9–12.9)
POTASSIUM SERPL-SCNC: 4 MMOL/L (ref 3.5–5.1)
PROT SERPL-MCNC: 7.9 G/DL (ref 6.4–8.2)
RBC # BLD AUTO: 3.92 M/UL (ref 3.8–5.2)
SODIUM SERPL-SCNC: 139 MMOL/L (ref 136–145)
WBC # BLD AUTO: 10.1 K/UL (ref 3.6–11)

## 2018-05-10 PROCEDURE — 74011250636 HC RX REV CODE- 250/636: Performed by: INTERNAL MEDICINE

## 2018-05-10 PROCEDURE — 77030012965 HC NDL HUBR BBMI -A

## 2018-05-10 PROCEDURE — 74011250636 HC RX REV CODE- 250/636

## 2018-05-10 PROCEDURE — 96413 CHEMO IV INFUSION 1 HR: CPT

## 2018-05-10 PROCEDURE — 96375 TX/PRO/DX INJ NEW DRUG ADDON: CPT

## 2018-05-10 PROCEDURE — 80053 COMPREHEN METABOLIC PANEL: CPT | Performed by: INTERNAL MEDICINE

## 2018-05-10 PROCEDURE — 36415 COLL VENOUS BLD VENIPUNCTURE: CPT | Performed by: INTERNAL MEDICINE

## 2018-05-10 PROCEDURE — 85025 COMPLETE CBC W/AUTO DIFF WBC: CPT | Performed by: INTERNAL MEDICINE

## 2018-05-10 PROCEDURE — 96417 CHEMO IV INFUS EACH ADDL SEQ: CPT

## 2018-05-10 PROCEDURE — 74011000258 HC RX REV CODE- 258: Performed by: INTERNAL MEDICINE

## 2018-05-10 RX ORDER — HEPARIN 100 UNIT/ML
500 SYRINGE INTRAVENOUS AS NEEDED
Status: ACTIVE | OUTPATIENT
Start: 2018-05-10 | End: 2018-05-11

## 2018-05-10 RX ORDER — SODIUM CHLORIDE 0.9 % (FLUSH) 0.9 %
10-40 SYRINGE (ML) INJECTION AS NEEDED
Status: ACTIVE | OUTPATIENT
Start: 2018-05-10 | End: 2018-05-11

## 2018-05-10 RX ADMIN — Medication 500 UNITS: at 12:43

## 2018-05-10 RX ADMIN — SODIUM CHLORIDE 25 ML/HR: 900 INJECTION, SOLUTION INTRAVENOUS at 09:15

## 2018-05-10 RX ADMIN — PALONOSETRON 0.25 MG: 0.05 INJECTION, SOLUTION INTRAVENOUS at 09:26

## 2018-05-10 RX ADMIN — DOCETAXEL 160 MG: 10 INJECTION, SOLUTION INTRAVENOUS at 10:55

## 2018-05-10 RX ADMIN — Medication 20 ML: at 08:15

## 2018-05-10 RX ADMIN — Medication 10 ML: at 12:43

## 2018-05-10 RX ADMIN — DEXAMETHASONE SODIUM PHOSPHATE 12 MG: 4 INJECTION, SOLUTION INTRAMUSCULAR; INTRAVENOUS at 09:30

## 2018-05-10 RX ADMIN — CYCLOPHOSPHAMIDE 1290 MG: 1 INJECTION, POWDER, FOR SOLUTION INTRAVENOUS; ORAL at 12:00

## 2018-05-10 NOTE — PROGRESS NOTES
Pt arrived to [facility] ambulatory in no acute distress at 0800. Pt accompanied by . Unremarkable assessment. Chest port accessed with ricketts, blood return noted, labs drawn and sent. Flushed without issue.  Reviewed signs and symptoms of chemotherapy regiment to report to RN. Visit Vitals    /67 (BP 1 Location: Left arm, BP Patient Position: At rest)    Pulse (!) 43    Temp 98.7 °F (37.1 °C)    Resp 18    Ht 5' 2\" (1.575 m)    Wt 104.8 kg (231 lb)    BMI 42.25 kg/m2           0915 labs resulted. Pharmacy aware. The following medications administered:  Aloxi 0.25 mg IV given  Decadron 12 mg IV given  Docetaxel 180 mg IV given over 1 hour  Cytoxan 1290 mg IV given over 30 min. Visit Vitals    /85    Pulse (!) 46    Temp 98.7 °F (37.1 °C)    Resp 18    Ht 5' 2\" (1.575 m)    Wt 104.8 kg (231 lb)    BMI 42.25 kg/m2       1300 Port flushed with saline and heparin without difficulty. Pt tolerated treatment well. No reaction noted. [place] removed, 2x2 and tape placed. Discharge instructions reviewed and questions answered.   Pt discharged ambulatory in no acute distress accompanied by .   Next appointment 5/.11/18

## 2018-05-10 NOTE — PROGRESS NOTES
Follow-up Note        Patient: Richard Wang MRN: [id number]  SSN: [id number]    YOB: 1950  Age: 79 y.o. Sex: female        Diagnosis:     1. Right breast carcinoma:  T2 N0 M0 (Stage IIA) infiltrating ductal carcinoma, Tumor size 3.8 cm, LN -ve, grade 2, %, UT 95%, Her 2 -ve. Treatment:     1. Adjuvant chemotherapy   Taxotere, Cyclophosphamide - Cycle 1 Day 1  2. Right sided lumpectomy on 03/08/2018. Subjective:      Richard Wang is a 79 y.o. female with a diagnosis of right sided breast cancer. Her breast cancer was seen on an annual mammogram that led to a bilateral biopsy on both the RIGHT and LEFT breast. The LEFT breast was benign and the RIGHT breast is positive for IDC. The patient denies any nipple inversion or discharge. She was seen by Dr. Josias Avalos. She underwent right sided lumpectomy on 03/08/2018. The tumor is 3.8 cm in size, LN -ve, %, UT 95%, Her 2 -ve. Genomic profile by mammaprint reveals a high risk luminal B gene signature in the tumor. She is starting adjuvant chemotherapy today.       Review of Systems:    Constitutional: negative  Eyes: negative  Ears, Nose, Mouth, Throat, and Face: negative  Respiratory: negative  Cardiovascular: negative  Gastrointestinal: negative  Genitourinary:negative  Integument/Breast: negative  Hematologic/Lymphatic: negative  Musculoskeletal:negative  Neurological: negative        Past Medical History:   Diagnosis Date    Arthritis     Breast cancer, right breast ([address]     Depression     GERD (gastroesophageal reflux disease)     Hypercholesterolemia     Hypertension     Other ill-defined conditions(799.89) 2012    dog bite dec 2012 needing blood transfusion     Past Surgical History:   Procedure Laterality Date    HX BREAST LUMPECTOMY Right 3/8/2018    RIGHT BREAST LUMPECTOMY WITH ULTRASOUND performed by Maricarmen Santiago MD [facility]    HX COLONOSCOPY      HX KNEE REPLACEMENT Right 06/2013    HX TONSILLECTOMY Family History   Problem Relation Age of Onset    Adopted: Yes    Breast Cancer Mother     Stroke Mother     Hypertension Mother     Cancer Mother      skin    Cancer Father      skin    Pulmonary Fibrosis Father     Cancer Sister 79     colon    Breast Cancer Maternal Aunt     Breast Cancer Paternal Aunt     Breast Cancer Maternal Aunt      ovarian and colon cancer    Breast Cancer Maternal Aunt      ovarian and colon cancer    Breast Cancer Maternal Aunt     Breast Cancer Paternal Aunt     Breast Cancer Maternal Aunt     Cancer Maternal Grandmother      liver/ovarian     Social History   Substance Use Topics    Smoking status: Former Smoker     Packs/day: 0.25     Years: 2.00     Quit date: 6/4/1972    Smokeless tobacco: Never Used    Alcohol use No      Prior to Admission medications    Medication Sig Start Date End Date Taking? Authorizing Provider   ondansetron (ZOFRAN ODT) 4 mg disintegrating tablet Take 1 Tab by mouth every eight (8) hours as needed for Nausea. 4/20/18  Yes Pranay Alex NP   prochlorperazine (COMPAZINE) 10 mg tablet Take 1 Tab by mouth every six (6) hours as needed for up to 40 days. 4/20/18 5/30/18 Yes Pranay Alex NP   lidocaine-prilocaine (EMLA) topical cream Apply  to affected area as needed for Pain. 4/20/18  Yes Pranay Alex NP   dexamethasone (DECADRON) 4 mg tablet Take 2 Tabs by mouth daily. The day before, the day of and the day after chemotherapy 4/20/18  Yes Pranay Alex NP   garlic 1 mg cap Take  by mouth daily. Yes Historical Provider   ondansetron (ZOFRAN ODT) 4 mg disintegrating tablet Take 1 Tab by mouth every six (6) hours as needed for Nausea. 3/8/18  Yes Misti Monico Cogan, MD   ferrous sulfate (IRON) 325 mg (65 mg iron) tablet Take 325 mg by mouth Daily (before breakfast). Yes Historical Provider   lisinopril-hydroCHLOROthiazide (PRINZIDE, ZESTORETIC) 20-25 mg per tablet Take 1 Tab by mouth daily.  Indications: hypertension   Yes Historical Provider   calcium-cholecalciferol, d3, (CALCIUM 600 + D) 600-125 mg-unit Tab Take 1 Tab by mouth two (2) times a day. Yes Historical Provider   Cholecalciferol, Vitamin D3, (VITAMIN D3) 1,000 unit cap Take 1 Tab by mouth daily. Yes Historical Provider   atenolol (TENORMIN) 50 mg tablet Take 50 mg by mouth daily. Yes Historical Provider   oxyCODONE-acetaminophen (PERCOCET) 5-325 mg per tablet Take 1 Tab by mouth every four (4) hours as needed for Pain. Max Daily Amount: 6 Tabs. 3/8/18   Leonora Knapp MD   etodolac (LODINE) 400 mg tablet Take 400 mg by mouth daily. Historical Provider              Allergies   Allergen Reactions    Nasacort [Triamcinolone Acetonide] Other (comments)     headache    Sulfa (Sulfonamide Antibiotics) Rash           Objective:     Vitals:    05/10/18 1316   BP: 135/63   Pulse: (!) 43   Resp: 18   Temp: 98.6 °F (37 °C)   TempSrc: Oral   SpO2: 96%   Weight: 231 lb 12.8 oz (105.1 kg)   Height: 5' 2\" (1.575 m)            Physical Exam:    GENERAL: alert, cooperative, no distress, appears stated age  EYE: negative  LYMPHATIC: Cervical, supraclavicular, and axillary nodes normal.   THROAT & NECK: normal and no erythema or exudates noted.    LUNG: clear to auscultation bilaterally  HEART: regular rate and rhythm  ABDOMEN: soft, non-tender  EXTREMITIES:  no edema  SKIN: Normal.  NEUROLOGIC: negative        Lab Results   Component Value Date/Time    WBC 10.1 05/10/2018 08:17 AM    HGB 11.3 (L) 05/10/2018 08:17 AM    HCT 35.5 05/10/2018 08:17 AM    PLATELET 299 36/58/7561 08:17 AM    MCV 90.6 05/10/2018 08:17 AM         Lab Results   Component Value Date/Time    Sodium 139 05/10/2018 08:17 AM    Potassium 4.0 05/10/2018 08:17 AM    Chloride 107 05/10/2018 08:17 AM    CO2 24 05/10/2018 08:17 AM    Anion gap 8 05/10/2018 08:17 AM    Glucose 133 (H) 05/10/2018 08:17 AM    BUN 29 (H) 05/10/2018 08:17 AM    Creatinine 1.04 (H) 05/10/2018 08:17 AM    BUN/Creatinine ratio 28 (H) 05/10/2018 08:17 AM    GFR est AA >60 05/10/2018 08:17 AM    GFR est non-AA 53 (L) 05/10/2018 08:17 AM    Calcium 9.7 05/10/2018 08:17 AM    Bilirubin, total 0.6 05/10/2018 08:17 AM    AST (SGOT) 8 (L) 05/10/2018 08:17 AM    Alk. phosphatase 91 05/10/2018 08:17 AM    Protein, total 7.9 05/10/2018 08:17 AM    Albumin 3.4 (L) 05/10/2018 08:17 AM    Globulin 4.5 (H) 05/10/2018 08:17 AM    A-G Ratio 0.8 (L) 05/10/2018 08:17 AM    ALT (SGPT) 21 05/10/2018 08:17 AM           Assessment:     1. Right breast carcinoma:  T2 N0 M0 (Stage IIA) infiltrating ductal carcinoma, Tumor size 3.8 cm, LN -ve, grade 2, %, ID 95%, Her 2 -ve. Mammaprint shows a luminal B gene signature. ECOG PS 0  Intent of Treatment - curative       S/P right breast lumpectomy and sentinel LN excision. Mammaprint shows a high risk luminal B genetic signature, therefore I would recommend adjuvant chemotherapy to reduce the risk of recurrence. Receiving adjuvant chemotherapy   Taxotere, Cyclophosphamide - Cycle 1 Day 1    Reviewed the expected side-effects which includes alopecia, nausea, peripheral neuropathy, neutropenic fever, anemia, decreased in the LVEF, need for transfusion among other things. Blood counts acceptable. Results reviewed with patien    This chemotherapy regimen has a high risk (>20%)  for febrile neutropenia. Therefore, we will administer Pegfilgrastim 24-72 hours post chemotherapy to prevent chemotherapy-induced neutropenic complications. Symptom management form reviewed with patient. 2. Obesity    Managed by PCP. Plan:       > Start adjuvant chemotherapy with TC  > Neulasta on Day 2  > Follow-up in 2 weeks        Signed by: Lauren Goel MD                     May 10, 2018        CC. Angel West MD  CC. Florencia Miller MD  CC.  Jax Willson MD

## 2018-05-10 NOTE — PROGRESS NOTES
Chema Irene is a 79 y.o. female here today for right sided breast cancer f/u. Port placed 5/9. Starting chemo- T&C x 4; cycle 1 day 1. Patient accompanied by her spouse to today's appointment. Very low pulse noted; other VS stable. Patient denies pain. Good appetite. Patient denies N/V/D and constipation. Patient denies numbness and tingling. Patient denies mouth ulcers. Patient denies cough. Patient denies SOB. Patient denies falls. Visit Vitals    /63 (BP 1 Location: Left arm, BP Patient Position: Sitting)    Pulse (!) 43    Temp 98.6 °F (37 °C) (Oral)    Resp 18    Ht 5' 2\" (1.575 m)    Wt 231 lb 12.8 oz (105.1 kg)    SpO2 96%    BMI 42.4 kg/m2     Health Maintenance Review: Patient reminded of \"due or due soon\" health maintenance. I have asked the patient to contact his/her primary care provider (PCP) for follow-up on his/her health maintenance.

## 2018-05-11 ENCOUNTER — HOSPITAL ENCOUNTER (OUTPATIENT)
Dept: INFUSION THERAPY | Age: 68
Discharge: HOME | End: 2018-05-11
Payer: MEDICARE

## 2018-05-11 VITALS
TEMPERATURE: 98.6 F | OXYGEN SATURATION: 99 % | HEART RATE: 63 BPM | SYSTOLIC BLOOD PRESSURE: 134 MMHG | DIASTOLIC BLOOD PRESSURE: 65 MMHG | RESPIRATION RATE: 18 BRPM

## 2018-05-11 PROCEDURE — 74011250636 HC RX REV CODE- 250/636: Performed by: INTERNAL MEDICINE

## 2018-05-11 PROCEDURE — 96372 THER/PROPH/DIAG INJ SC/IM: CPT

## 2018-05-11 RX ADMIN — PEGFILGRASTIM 6 MG: 6 INJECTION SUBCUTANEOUS at 16:05

## 2018-05-11 NOTE — PROGRESS NOTES
[facility] Stay Note:    1600- Arrived for Neulasta    Assessment- unchanged. Pt denies any N/V/D or pain. Visit Vitals    /65 (BP 1 Location: Left arm, BP Patient Position: Sitting)    Pulse 63    Temp 98.6 °F (37 °C)    Resp 18    SpO2 99%       Neulasta 6 mg SQ injection given slowly in L arm    1610- Tolerated well. No reaction noted. Pt denies any acute problems/changes. Discharged from [facility] ambulatory. No distress.  Next appt: 5/31 at 8 AM.

## 2018-05-11 NOTE — PROGRESS NOTES
Problem: Patient Education:  Go to Education Activity  Goal: Patient/Family Education  Outcome: Progressing Towards Goal  Pt educated on medication and possible side effects.

## 2018-05-28 RX ORDER — SODIUM CHLORIDE 9 MG/ML
25 INJECTION, SOLUTION INTRAVENOUS CONTINUOUS
Status: DISPENSED | OUTPATIENT
Start: 2018-05-31 | End: 2018-05-31

## 2018-05-28 RX ORDER — PALONOSETRON 0.05 MG/ML
0.25 INJECTION, SOLUTION INTRAVENOUS ONCE
Status: COMPLETED | OUTPATIENT
Start: 2018-05-31 | End: 2018-05-31

## 2018-05-31 ENCOUNTER — OFFICE VISIT (OUTPATIENT)
Dept: ONCOLOGY | Age: 68
End: 2018-05-31

## 2018-05-31 ENCOUNTER — HOSPITAL ENCOUNTER (OUTPATIENT)
Dept: INFUSION THERAPY | Age: 68
Discharge: HOME | End: 2018-05-31
Payer: MEDICARE

## 2018-05-31 VITALS
OXYGEN SATURATION: 96 % | HEART RATE: 49 BPM | WEIGHT: 226.2 LBS | HEIGHT: 62 IN | RESPIRATION RATE: 18 BRPM | SYSTOLIC BLOOD PRESSURE: 114 MMHG | TEMPERATURE: 98.5 F | DIASTOLIC BLOOD PRESSURE: 60 MMHG | BODY MASS INDEX: 41.62 KG/M2

## 2018-05-31 VITALS
HEIGHT: 62 IN | BODY MASS INDEX: 41.96 KG/M2 | DIASTOLIC BLOOD PRESSURE: 69 MMHG | HEART RATE: 56 BPM | RESPIRATION RATE: 16 BRPM | TEMPERATURE: 98.6 F | WEIGHT: 228 LBS | SYSTOLIC BLOOD PRESSURE: 133 MMHG | OXYGEN SATURATION: 96 %

## 2018-05-31 DIAGNOSIS — C50.411 MALIGNANT NEOPLASM OF UPPER-OUTER QUADRANT OF RIGHT BREAST IN FEMALE, ESTROGEN RECEPTOR POSITIVE (HCC): Primary | ICD-10-CM

## 2018-05-31 DIAGNOSIS — Z17.0 MALIGNANT NEOPLASM OF UPPER-OUTER QUADRANT OF RIGHT BREAST IN FEMALE, ESTROGEN RECEPTOR POSITIVE (HCC): Primary | ICD-10-CM

## 2018-05-31 LAB
ALBUMIN SERPL-MCNC: 2.8 G/DL (ref 3.5–5)
ALBUMIN/GLOB SERPL: 0.6 {RATIO} (ref 1.1–2.2)
ALP SERPL-CCNC: 64 U/L (ref 45–117)
ALT SERPL-CCNC: 16 U/L (ref 12–78)
ANION GAP SERPL CALC-SCNC: 10 MMOL/L (ref 5–15)
AST SERPL-CCNC: 10 U/L (ref 15–37)
BASO+EOS+MONOS # BLD AUTO: 0.4 K/UL (ref 0.2–1.2)
BASO+EOS+MONOS # BLD AUTO: 3 % (ref 3.2–16.9)
BILIRUB SERPL-MCNC: 0.4 MG/DL (ref 0.2–1)
BUN SERPL-MCNC: 18 MG/DL (ref 6–20)
BUN/CREAT SERPL: 19 (ref 12–20)
CALCIUM SERPL-MCNC: 9.5 MG/DL (ref 8.5–10.1)
CHLORIDE SERPL-SCNC: 106 MMOL/L (ref 97–108)
CO2 SERPL-SCNC: 25 MMOL/L (ref 21–32)
CREAT SERPL-MCNC: 0.93 MG/DL (ref 0.55–1.02)
DIFFERENTIAL METHOD BLD: ABNORMAL
ERYTHROCYTE [DISTWIDTH] IN BLOOD BY AUTOMATED COUNT: 14.5 % (ref 11.8–15.8)
GLOBULIN SER CALC-MCNC: 4.5 G/DL (ref 2–4)
GLUCOSE SERPL-MCNC: 125 MG/DL (ref 65–100)
HCT VFR BLD AUTO: 30.6 % (ref 35–47)
HGB BLD-MCNC: 9.9 G/DL (ref 11.5–16)
LYMPHOCYTES # BLD: 1.2 K/UL (ref 0.8–3.5)
LYMPHOCYTES NFR BLD: 9 % (ref 12–49)
MCH RBC QN AUTO: 29.1 PG (ref 26–34)
MCHC RBC AUTO-ENTMCNC: 32.4 G/DL (ref 30–36.5)
MCV RBC AUTO: 90 FL (ref 80–99)
NEUTS SEG # BLD: 11.2 K/UL (ref 1.8–8)
NEUTS SEG NFR BLD: 88 % (ref 32–75)
PLATELET # BLD AUTO: 469 K/UL (ref 150–400)
POTASSIUM SERPL-SCNC: 3.8 MMOL/L (ref 3.5–5.1)
PROT SERPL-MCNC: 7.3 G/DL (ref 6.4–8.2)
RBC # BLD AUTO: 3.4 M/UL (ref 3.8–5.2)
SODIUM SERPL-SCNC: 141 MMOL/L (ref 136–145)
WBC # BLD AUTO: 12.8 K/UL (ref 3.6–11)

## 2018-05-31 PROCEDURE — 74011250636 HC RX REV CODE- 250/636

## 2018-05-31 PROCEDURE — 96375 TX/PRO/DX INJ NEW DRUG ADDON: CPT

## 2018-05-31 PROCEDURE — 77030012965 HC NDL HUBR BBMI -A

## 2018-05-31 PROCEDURE — 74011250636 HC RX REV CODE- 250/636: Performed by: INTERNAL MEDICINE

## 2018-05-31 PROCEDURE — 96417 CHEMO IV INFUS EACH ADDL SEQ: CPT

## 2018-05-31 PROCEDURE — 36415 COLL VENOUS BLD VENIPUNCTURE: CPT | Performed by: INTERNAL MEDICINE

## 2018-05-31 PROCEDURE — 85025 COMPLETE CBC W/AUTO DIFF WBC: CPT | Performed by: INTERNAL MEDICINE

## 2018-05-31 PROCEDURE — 80053 COMPREHEN METABOLIC PANEL: CPT | Performed by: INTERNAL MEDICINE

## 2018-05-31 PROCEDURE — 74011000250 HC RX REV CODE- 250: Performed by: INTERNAL MEDICINE

## 2018-05-31 PROCEDURE — 96413 CHEMO IV INFUSION 1 HR: CPT

## 2018-05-31 RX ORDER — SODIUM CHLORIDE 0.9 % (FLUSH) 0.9 %
10-40 SYRINGE (ML) INJECTION AS NEEDED
Status: ACTIVE | OUTPATIENT
Start: 2018-05-31 | End: 2018-06-01

## 2018-05-31 RX ORDER — SODIUM CHLORIDE 9 MG/ML
10 INJECTION INTRAMUSCULAR; INTRAVENOUS; SUBCUTANEOUS AS NEEDED
Status: ACTIVE | OUTPATIENT
Start: 2018-05-31 | End: 2018-06-01

## 2018-05-31 RX ORDER — IBUPROFEN 200 MG
TABLET ORAL
COMMUNITY
End: 2018-06-19

## 2018-05-31 RX ORDER — PALONOSETRON 0.05 MG/ML
INJECTION, SOLUTION INTRAVENOUS
Status: COMPLETED
Start: 2018-05-31 | End: 2018-05-31

## 2018-05-31 RX ORDER — HEPARIN 100 UNIT/ML
500 SYRINGE INTRAVENOUS AS NEEDED
Status: ACTIVE | OUTPATIENT
Start: 2018-05-31 | End: 2018-06-01

## 2018-05-31 RX ADMIN — SODIUM CHLORIDE 10 ML: 9 INJECTION INTRAMUSCULAR; INTRAVENOUS; SUBCUTANEOUS at 08:20

## 2018-05-31 RX ADMIN — Medication 500 UNITS: at 11:30

## 2018-05-31 RX ADMIN — PALONOSETRON 0.25 MG: 0.05 INJECTION, SOLUTION INTRAVENOUS at 08:51

## 2018-05-31 RX ADMIN — SODIUM CHLORIDE 25 ML/HR: 900 INJECTION, SOLUTION INTRAVENOUS at 08:51

## 2018-05-31 RX ADMIN — CYCLOPHOSPHAMIDE 1290 MG: 500 INJECTION, POWDER, FOR SOLUTION INTRAVENOUS; ORAL at 10:55

## 2018-05-31 RX ADMIN — SODIUM CHLORIDE 160 MG: 900 INJECTION, SOLUTION INTRAVENOUS at 09:50

## 2018-05-31 RX ADMIN — Medication 10 ML: at 11:30

## 2018-05-31 RX ADMIN — Medication 10 ML: at 08:20

## 2018-05-31 NOTE — PROGRESS NOTES
Liana Leonard is a 79 y.o. female here today for right sided breast cancer f/u. ER/LA positive, HER2 negative. S/P lumpectomy. Mamaprint high risk. T/C; cycle 2. Decreased appetite. Low pulse noted; pt stated her Atenolol was reduced to 25 mg d/t low pulse; other VS stable. Patient denies pain. Good appetite. Patient denies N/V/D and constipation; pt states she's taking OTC medication for the diarrhea and constipation; medication effective; pt states she normally have diarrhea after chemo. Patient denies numbness and tingling. Patient denies mouth ulcers. Patient denies cough. Patient denies SOB. Patient denies falls. Visit Vitals    /69 (BP 1 Location: Left arm, BP Patient Position: Sitting)    Pulse (!) 56    Temp 98.6 °F (37 °C) (Oral)    Resp 16    Ht 5' 2\" (1.575 m)    Wt 228 lb (103.4 kg)    SpO2 96%    BMI 41.7 kg/m2     Health Maintenance Review: Patient reminded of \"due or due soon\" health maintenance. I have asked the patient to contact his/her primary care provider (PCP) for follow-up on his/her health maintenance.

## 2018-05-31 NOTE — PROGRESS NOTES
[facility] - Chemotherapy Progress Note    9628- Pt admit to [facility] for C2 ambulatory in stable condition. Assessment completed. No new concerns voiced other than fatigue. R chest port accessed with positive blood return. Labs drawn per order and sent. Line flushed, clamped, Curos Cap applied to end clave. Pt reports taking prescribed dose of Decadron prior to coming to appt today. Visit Vitals    /63 (BP 1 Location: Left arm, BP Patient Position: Sitting)    Pulse (!) 55    Temp 98.5 °F (36.9 °C)    Resp 18    Ht 5' 2\" (1.575 m)    Wt 102.6 kg (226 lb 3.2 oz)    SpO2 96%    BMI 41.37 kg/m2     Recent Results (from the past 12 hour(s))   CBC WITH 3 PART DIFF    Collection Time: 05/31/18  8:10 AM   Result Value Ref Range    WBC 12.8 (H) 3.6 - 11.0 K/uL    RBC 3.40 (L) 3.80 - 5.20 M/uL    HGB 9.9 (L) 11.5 - 16.0 g/dL    HCT 30.6 (L) 35.0 - 47.0 %    MCV 90.0 80.0 - 99.0 FL    MCH 29.1 26.0 - 34.0 PG    MCHC 32.4 30.0 - 36.5 g/dL    RDW 14.5 11.8 - 15.8 %    PLATELET 770 (H) 921 - 400 K/uL    NEUTROPHILS 88 (H) 32 - 75 %    MIXED CELLS 3 (L) 3.2 - 16.9 %    LYMPHOCYTES 9 (L) 12 - 49 %    ABS. NEUTROPHILS 11.2 (H) 1.8 - 8.0 K/UL    ABS. MIXED CELLS 0.4 0.2 - 1.2 K/uL    ABS. LYMPHOCYTES 1.2 0.8 - 3.5 K/UL    DF AUTOMATED     METABOLIC PANEL, COMPREHENSIVE    Collection Time: 05/31/18  8:10 AM   Result Value Ref Range    Sodium 141 136 - 145 mmol/L    Potassium 3.8 3.5 - 5.1 mmol/L    Chloride 106 97 - 108 mmol/L    CO2 25 21 - 32 mmol/L    Anion gap 10 5 - 15 mmol/L    Glucose 125 (H) 65 - 100 mg/dL    BUN 18 6 - 20 MG/DL    Creatinine 0.93 0.55 - 1.02 MG/DL    BUN/Creatinine ratio 19 12 - 20      GFR est AA >60 >60 ml/min/1.73m2    GFR est non-AA >60 >60 ml/min/1.73m2    Calcium 9.5 8.5 - 10.1 MG/DL    Bilirubin, total 0.4 0.2 - 1.0 MG/DL    ALT (SGPT) 16 12 - 78 U/L    AST (SGOT) 10 (L) 15 - 37 U/L    Alk.  phosphatase 64 45 - 117 U/L    Protein, total 7.3 6.4 - 8.2 g/dL    Albumin 2.8 (L) 3.5 - 5.0 g/dL    Globulin 4.5 (H) 2.0 - 4.0 g/dL    A-G Ratio 0.6 (L) 1.1 - 2.2       Medications:  NS KVO  Aloxi IVP  Taxotere IV  Cytoxan IV    Visit Vitals    /60    Pulse (!) 49    Temp 98.5 °F (36.9 °C)    Resp 18    Ht 5' 2\" (1.575 m)    Wt 102.6 kg (226 lb 3.2 oz)    SpO2 96%    BMI 41.37 kg/m2     1130- Pt tolerated treatment well. Port maintained positive blood return throughout treatment, flushed with positive blood return at conclusion, and de-accessed. D/c home ambulatory in no distress. Pt aware of next [facility] appointment scheduled for 6/1 at 4 PM for Neulasta.

## 2018-05-31 NOTE — PROGRESS NOTES
Follow-up Note        Patient: Annette Martinez MRN: [id number]  SSN: [id number]    YOB: 1950  Age: 79 y.o. Sex: female        Diagnosis:     1. Right breast carcinoma:  T2 N0 M0 (Stage IIA) infiltrating ductal carcinoma, Tumor size 3.8 cm, LN -ve, grade 2, %, HI 95%, Her 2 -ve. Treatment:     1. Adjuvant chemotherapy   Taxotere, Cyclophosphamide - Cycle 2 Day 1  2. Right sided lumpectomy on 03/08/2018. Subjective:      Annette Martinez is a 79 y.o. female with a diagnosis of right sided breast cancer. Her breast cancer was seen on an annual mammogram that led to a bilateral biopsy on both the RIGHT and LEFT breast. The LEFT breast was benign and the RIGHT breast is positive for IDC. The patient denies any nipple inversion or discharge. She was seen by Dr. Noman Gonzalez. She underwent right sided lumpectomy on 03/08/2018. The tumor is 3.8 cm in size, LN -ve, %, HI 95%, Her 2 -ve. Genomic profile by mammaprint reveals a high risk luminal B gene signature in the tumor. She is receiving adjuvant chemotherapy and tolerating it well. She notes some diarrhea following treatment that is managed with medication.       Review of Systems:    Constitutional: negative  Eyes: negative  Ears, Nose, Mouth, Throat, and Face: negative  Respiratory: negative  Cardiovascular: negative  Gastrointestinal: diarrhea  Genitourinary:negative  Integument/Breast: negative  Hematologic/Lymphatic: negative  Musculoskeletal:negative  Neurological: negative        Past Medical History:   Diagnosis Date    Arthritis     Breast cancer, right breast ([facility] [address]     Depression     GERD (gastroesophageal reflux disease)     Hypercholesterolemia     Hypertension     Other ill-defined conditions(799.89) 2012    dog bite dec 2012 needing blood transfusion     Past Surgical History:   Procedure Laterality Date    HX BREAST LUMPECTOMY Right 3/8/2018    RIGHT BREAST LUMPECTOMY WITH ULTRASOUND performed by Yair Reddy MD at [facility] OR    HX COLONOSCOPY      HX KNEE REPLACEMENT Right 06/2013    HX TONSILLECTOMY        Family History   Problem Relation Age of Onset    Adopted: Yes    Breast Cancer Mother     Stroke Mother     Hypertension Mother     Cancer Mother      skin    Cancer Father      skin    Pulmonary Fibrosis Father     Cancer Sister 79     colon    Breast Cancer Maternal Aunt     Breast Cancer Paternal Aunt     Breast Cancer Maternal Aunt      ovarian and colon cancer    Breast Cancer Maternal Aunt      ovarian and colon cancer    Breast Cancer Maternal Aunt     Breast Cancer Paternal Aunt     Breast Cancer Maternal Aunt     Cancer Maternal Grandmother      liver/ovarian     Social History   Substance Use Topics    Smoking status: Former Smoker     Packs/day: 0.25     Years: 2.00     Quit date: 6/4/1972    Smokeless tobacco: Never Used    Alcohol use No      Prior to Admission medications    Medication Sig Start Date End Date Taking? Authorizing Provider   ibuprofen (MOTRIN) 200 mg tablet Take  by mouth. Yes Historical Provider   lidocaine-prilocaine (EMLA) topical cream Apply  to affected area as needed for Pain. 4/20/18  Yes Aylin Patient, NP   dexamethasone (DECADRON) 4 mg tablet Take 2 Tabs by mouth daily. The day before, the day of and the day after chemotherapy 4/20/18  Yes Aylin Patient, NP   garlic 1 mg cap Take  by mouth daily. Yes Historical Provider   ferrous sulfate (IRON) 325 mg (65 mg iron) tablet Take 325 mg by mouth Daily (before breakfast). Yes Historical Provider   lisinopril-hydroCHLOROthiazide (PRINZIDE, ZESTORETIC) 20-25 mg per tablet Take 1 Tab by mouth daily. Indications: hypertension   Yes Historical Provider   calcium-cholecalciferol, d3, (CALCIUM 600 + D) 600-125 mg-unit Tab Take 1 Tab by mouth two (2) times a day. Yes Historical Provider   etodolac (LODINE) 400 mg tablet Take 400 mg by mouth daily.    Yes Historical Provider   Cholecalciferol, Vitamin D3, (VITAMIN D3) 1,000 unit cap Take 1 Tab by mouth daily. Yes Historical Provider   atenolol (TENORMIN) 50 mg tablet Take 25 mg by mouth daily. Yes Historical Provider   ondansetron (ZOFRAN ODT) 4 mg disintegrating tablet Take 1 Tab by mouth every eight (8) hours as needed for Nausea. 4/20/18   Mark Hobbs NP   prochlorperazine (COMPAZINE) 10 mg tablet Take 1 Tab by mouth every six (6) hours as needed for up to 40 days. 4/20/18 5/30/18  Mark Hobbs NP   oxyCODONE-acetaminophen (PERCOCET) 5-325 mg per tablet Take 1 Tab by mouth every four (4) hours as needed for Pain. Max Daily Amount: 6 Tabs. 3/8/18   Misti Darylene Coss, MD   ondansetron (ZOFRAN ODT) 4 mg disintegrating tablet Take 1 Tab by mouth every six (6) hours as needed for Nausea. 3/8/18   Misti Darylene Coss, MD              Allergies   Allergen Reactions    Nasacort [Triamcinolone Acetonide] Other (comments)     headache    Sulfa (Sulfonamide Antibiotics) Rash           Objective:     Vitals:    05/31/18 1328   BP: 133/69   Pulse: (!) 56   Resp: 16   Temp: 98.6 °F (37 °C)   TempSrc: Oral   SpO2: 96%   Weight: 228 lb (103.4 kg)   Height: 5' 2\" (1.575 m)            Physical Exam:    GENERAL: alert, cooperative, no distress, appears stated age  EYE: negative  LYMPHATIC: Cervical, supraclavicular, and axillary nodes normal.   THROAT & NECK: normal and no erythema or exudates noted.    LUNG: clear to auscultation bilaterally  HEART: regular rate and rhythm  ABDOMEN: soft, non-tender  EXTREMITIES:  no edema  SKIN: Normal.  NEUROLOGIC: negative        Lab Results   Component Value Date/Time    WBC 12.8 (H) 05/31/2018 08:10 AM    HGB 9.9 (L) 05/31/2018 08:10 AM    HCT 30.6 (L) 05/31/2018 08:10 AM    PLATELET 306 (H) 69/69/0519 08:10 AM    MCV 90.0 05/31/2018 08:10 AM         Lab Results   Component Value Date/Time    Sodium 141 05/31/2018 08:10 AM    Potassium 3.8 05/31/2018 08:10 AM    Chloride 106 05/31/2018 08:10 AM    CO2 25 05/31/2018 08:10 AM    Anion gap 10 05/31/2018 08:10 AM    Glucose 125 (H) 05/31/2018 08:10 AM    BUN 18 05/31/2018 08:10 AM    Creatinine 0.93 05/31/2018 08:10 AM    BUN/Creatinine ratio 19 05/31/2018 08:10 AM    GFR est AA >60 05/31/2018 08:10 AM    GFR est non-AA >60 05/31/2018 08:10 AM    Calcium 9.5 05/31/2018 08:10 AM    Bilirubin, total 0.4 05/31/2018 08:10 AM    AST (SGOT) 10 (L) 05/31/2018 08:10 AM    Alk. phosphatase 64 05/31/2018 08:10 AM    Protein, total 7.3 05/31/2018 08:10 AM    Albumin 2.8 (L) 05/31/2018 08:10 AM    Globulin 4.5 (H) 05/31/2018 08:10 AM    A-G Ratio 0.6 (L) 05/31/2018 08:10 AM    ALT (SGPT) 16 05/31/2018 08:10 AM           Assessment:     1. Right breast carcinoma:  T2 N0 M0 (Stage IIA) infiltrating ductal carcinoma, Tumor size 3.8 cm, LN -ve, grade 2, %, MI 95%, Her 2 -ve. Mammaprint shows a luminal B gene signature. ECOG PS 0  Intent of Treatment - curative       S/P right breast lumpectomy and sentinel LN excision. Mammaprint shows a high risk luminal B genetic signature, therefore I would recommend adjuvant chemotherapy to reduce the risk of recurrence. Receiving adjuvant chemotherapy   Taxotere, Cyclophosphamide - Cycle 2 Day 1    Experienced fatigue, diarrhea for approximately 5 days then symptoms improved  She feels well today  A detailed system by system evaluation of side effect was performed to assess chemotherapy related toxicity. Blood counts are acceptable. Results reviewed with the patient. Symptom management form reviewed with patient. 2. Obesity    Managed by PCP. Plan:       > Continue adjuvant chemotherapy with TC  > Neulasta on Day 2  > Follow-up in 2 weeks      Signed by: Elder Le MD                     May 31, 2018        CC. Stefani Sheets MD  CC. Deborah Gorman MD  CC.  Mendel Mais, MD

## 2018-06-01 ENCOUNTER — HOSPITAL ENCOUNTER (OUTPATIENT)
Dept: INFUSION THERAPY | Age: 68
Discharge: HOME | End: 2018-06-01
Payer: MEDICARE

## 2018-06-01 ENCOUNTER — DOCUMENTATION ONLY (OUTPATIENT)
Dept: ONCOLOGY | Age: 68
End: 2018-06-01

## 2018-06-01 ENCOUNTER — TELEPHONE (OUTPATIENT)
Dept: ONCOLOGY | Age: 68
End: 2018-06-01

## 2018-06-01 VITALS
TEMPERATURE: 99 F | SYSTOLIC BLOOD PRESSURE: 114 MMHG | HEART RATE: 67 BPM | DIASTOLIC BLOOD PRESSURE: 67 MMHG | RESPIRATION RATE: 18 BRPM | OXYGEN SATURATION: 97 %

## 2018-06-01 DIAGNOSIS — C50.411 MALIGNANT NEOPLASM OF UPPER-OUTER QUADRANT OF RIGHT BREAST IN FEMALE, ESTROGEN RECEPTOR POSITIVE (HCC): Primary | ICD-10-CM

## 2018-06-01 DIAGNOSIS — Z17.0 MALIGNANT NEOPLASM OF UPPER-OUTER QUADRANT OF RIGHT BREAST IN FEMALE, ESTROGEN RECEPTOR POSITIVE (HCC): Primary | ICD-10-CM

## 2018-06-01 DIAGNOSIS — L08.9 SKIN INFECTION: ICD-10-CM

## 2018-06-01 PROCEDURE — 74011250636 HC RX REV CODE- 250/636: Performed by: INTERNAL MEDICINE

## 2018-06-01 PROCEDURE — 96372 THER/PROPH/DIAG INJ SC/IM: CPT

## 2018-06-01 RX ORDER — AMOXICILLIN AND CLAVULANATE POTASSIUM 500; 125 MG/1; MG/1
1 TABLET, FILM COATED ORAL EVERY 12 HOURS
Qty: 20 TAB | Refills: 0 | OUTPATIENT
Start: 2018-06-01 | End: 2018-06-19

## 2018-06-01 RX ADMIN — PEGFILGRASTIM 6 MG: 6 INJECTION SUBCUTANEOUS at 16:14

## 2018-06-01 NOTE — TELEPHONE ENCOUNTER
[facility] at [facility]  [phone]    Patient's  called. They went to [facility], but they didn't have record of the abx prescription that was sent in earlier. I called the pharmacy and gave a verbal order for the prescription.

## 2018-06-01 NOTE — PROGRESS NOTES
Examined patient in [facility] after nursing called to report patient's port site was red, warm, and tender. She received her first infusion of Taxotere and Cyclophosphamide yesterday. At that time, port was without erythema or tenderness. Ms. Nissa Quinn stated that it started to hurt last night while she was sleeping. On examination, site is warm, erythematous, and tender to touch. Called IR and spoke with Hope. Patient will be seen in IR on Monday at 2:00 pm. Prescription for Augmentin was called in to pharmacy and patient will start taking. She was instructed to call the on-call doctor if port site worsened or she became febrile. Patient verbalized understanding.    Belkis Peralta NP

## 2018-06-07 ENCOUNTER — TELEPHONE (OUTPATIENT)
Dept: ONCOLOGY | Age: 68
End: 2018-06-07

## 2018-06-07 NOTE — TELEPHONE ENCOUNTER
Spoke to Pt's , per Candance Luria NP, stop antibiotics, continue BRAT diet, continue imodium. Will call and check on Pt tomorrow.

## 2018-06-07 NOTE — TELEPHONE ENCOUNTER
Patients  called and stated that they can not get the patients diarrhea under control. Patients  reported that this has been happening since Saturday, and imodium only helps for short periods of time.  # [phone]

## 2018-06-08 ENCOUNTER — APPOINTMENT (OUTPATIENT)
Dept: CT IMAGING | Age: 68
DRG: 871 | End: 2018-06-08
Attending: EMERGENCY MEDICINE
Payer: MEDICARE

## 2018-06-08 ENCOUNTER — HOSPITAL ENCOUNTER (INPATIENT)
Age: 68
LOS: 10 days | Discharge: HOME HEALTH | DRG: 871 | End: 2018-06-19
Attending: EMERGENCY MEDICINE | Admitting: INTERNAL MEDICINE
Payer: MEDICARE

## 2018-06-08 ENCOUNTER — TELEPHONE (OUTPATIENT)
Dept: ONCOLOGY | Age: 68
End: 2018-06-08

## 2018-06-08 ENCOUNTER — APPOINTMENT (OUTPATIENT)
Dept: GENERAL RADIOLOGY | Age: 68
DRG: 871 | End: 2018-06-08
Attending: EMERGENCY MEDICINE
Payer: MEDICARE

## 2018-06-08 DIAGNOSIS — T80.219A INFECTION DUE TO PORT-A-CATH, INITIAL ENCOUNTER: ICD-10-CM

## 2018-06-08 DIAGNOSIS — E83.42 HYPOMAGNESEMIA: ICD-10-CM

## 2018-06-08 DIAGNOSIS — R11.2 NON-INTRACTABLE VOMITING WITH NAUSEA, UNSPECIFIED VOMITING TYPE: ICD-10-CM

## 2018-06-08 DIAGNOSIS — A04.72 C. DIFFICILE DIARRHEA: ICD-10-CM

## 2018-06-08 DIAGNOSIS — Z17.0 MALIGNANT NEOPLASM OF UPPER-OUTER QUADRANT OF RIGHT BREAST IN FEMALE, ESTROGEN RECEPTOR POSITIVE (HCC): ICD-10-CM

## 2018-06-08 DIAGNOSIS — R19.7 DIARRHEA, UNSPECIFIED TYPE: ICD-10-CM

## 2018-06-08 DIAGNOSIS — A41.9 SEPTIC SHOCK (HCC): Primary | ICD-10-CM

## 2018-06-08 DIAGNOSIS — E87.6 ACUTE HYPOKALEMIA: ICD-10-CM

## 2018-06-08 DIAGNOSIS — R65.21 SEPTIC SHOCK (HCC): Primary | ICD-10-CM

## 2018-06-08 DIAGNOSIS — I95.9 HYPOTENSION, UNSPECIFIED HYPOTENSION TYPE: ICD-10-CM

## 2018-06-08 DIAGNOSIS — N17.9 ACUTE RENAL FAILURE, UNSPECIFIED ACUTE RENAL FAILURE TYPE (HCC): ICD-10-CM

## 2018-06-08 DIAGNOSIS — E87.20 METABOLIC ACIDOSIS: ICD-10-CM

## 2018-06-08 DIAGNOSIS — K57.20 DIVERTICULITIS OF LARGE INTESTINE WITH PERFORATION AND ABSCESS WITHOUT BLEEDING: ICD-10-CM

## 2018-06-08 DIAGNOSIS — C50.411 MALIGNANT NEOPLASM OF UPPER-OUTER QUADRANT OF RIGHT BREAST IN FEMALE, ESTROGEN RECEPTOR POSITIVE (HCC): ICD-10-CM

## 2018-06-08 DIAGNOSIS — R57.1 HYPOVOLEMIC SHOCK (HCC): ICD-10-CM

## 2018-06-08 DIAGNOSIS — R19.7 DIARRHEA OF PRESUMED INFECTIOUS ORIGIN: ICD-10-CM

## 2018-06-08 LAB
ALBUMIN SERPL-MCNC: 2.1 G/DL (ref 3.5–5)
ALBUMIN/GLOB SERPL: 0.4 {RATIO} (ref 1.1–2.2)
ALP SERPL-CCNC: 79 U/L (ref 45–117)
ALT SERPL-CCNC: 15 U/L (ref 12–78)
ANION GAP SERPL CALC-SCNC: 19 MMOL/L (ref 5–15)
AST SERPL-CCNC: 12 U/L (ref 15–37)
BASOPHILS # BLD: 0 K/UL (ref 0–0.1)
BASOPHILS NFR BLD: 0 % (ref 0–1)
BILIRUB SERPL-MCNC: 0.5 MG/DL (ref 0.2–1)
BUN SERPL-MCNC: 71 MG/DL (ref 6–20)
BUN/CREAT SERPL: 12 (ref 12–20)
CALCIUM SERPL-MCNC: 8.9 MG/DL (ref 8.5–10.1)
CHLORIDE SERPL-SCNC: 94 MMOL/L (ref 97–108)
CO2 SERPL-SCNC: 18 MMOL/L (ref 21–32)
CREAT SERPL-MCNC: 5.69 MG/DL (ref 0.55–1.02)
DIFFERENTIAL METHOD BLD: ABNORMAL
EOSINOPHIL # BLD: 0 K/UL (ref 0–0.4)
EOSINOPHIL NFR BLD: 0 % (ref 0–7)
ERYTHROCYTE [DISTWIDTH] IN BLOOD BY AUTOMATED COUNT: 14.1 % (ref 11.5–14.5)
GLOBULIN SER CALC-MCNC: 4.8 G/DL (ref 2–4)
GLUCOSE SERPL-MCNC: 126 MG/DL (ref 65–100)
HCT VFR BLD AUTO: 26.8 % (ref 35–47)
HGB BLD-MCNC: 8.8 G/DL (ref 11.5–16)
IMM GRANULOCYTES # BLD: 0 K/UL (ref 0–0.04)
IMM GRANULOCYTES NFR BLD AUTO: 0 % (ref 0–0.5)
LACTATE SERPL-SCNC: 1.5 MMOL/L (ref 0.4–2)
LYMPHOCYTES # BLD: 1 K/UL (ref 0.8–3.5)
LYMPHOCYTES NFR BLD: 8 % (ref 12–49)
MCH RBC QN AUTO: 28.5 PG (ref 26–34)
MCHC RBC AUTO-ENTMCNC: 32.8 G/DL (ref 30–36.5)
MCV RBC AUTO: 86.7 FL (ref 80–99)
METAMYELOCYTES NFR BLD MANUAL: 5 %
MONOCYTES # BLD: 0.2 K/UL (ref 0–1)
MONOCYTES NFR BLD: 2 % (ref 5–13)
NEUTS BAND NFR BLD MANUAL: 7 %
NEUTS SEG # BLD: 10.2 K/UL (ref 1.8–8)
NEUTS SEG NFR BLD: 78 % (ref 32–75)
NRBC # BLD: 0.04 K/UL (ref 0–0.01)
NRBC BLD-RTO: 0.3 PER 100 WBC
PLATELET # BLD AUTO: 293 K/UL (ref 150–400)
PLATELET COMMENTS,PCOM: ABNORMAL
PMV BLD AUTO: 11.4 FL (ref 8.9–12.9)
POTASSIUM SERPL-SCNC: 2.8 MMOL/L (ref 3.5–5.1)
PROT SERPL-MCNC: 6.9 G/DL (ref 6.4–8.2)
RBC # BLD AUTO: 3.09 M/UL (ref 3.8–5.2)
RBC MORPH BLD: ABNORMAL
SODIUM SERPL-SCNC: 131 MMOL/L (ref 136–145)
TROPONIN I SERPL-MCNC: <0.04 NG/ML
WBC # BLD AUTO: 12 K/UL (ref 3.6–11)
WBC MORPH BLD: ABNORMAL

## 2018-06-08 PROCEDURE — 84484 ASSAY OF TROPONIN QUANT: CPT | Performed by: EMERGENCY MEDICINE

## 2018-06-08 PROCEDURE — 96361 HYDRATE IV INFUSION ADD-ON: CPT

## 2018-06-08 PROCEDURE — 85025 COMPLETE CBC W/AUTO DIFF WBC: CPT | Performed by: EMERGENCY MEDICINE

## 2018-06-08 PROCEDURE — 87493 C DIFF AMPLIFIED PROBE: CPT | Performed by: EMERGENCY MEDICINE

## 2018-06-08 PROCEDURE — 87040 BLOOD CULTURE FOR BACTERIA: CPT | Performed by: EMERGENCY MEDICINE

## 2018-06-08 PROCEDURE — 83735 ASSAY OF MAGNESIUM: CPT | Performed by: EMERGENCY MEDICINE

## 2018-06-08 PROCEDURE — 70450 CT HEAD/BRAIN W/O DYE: CPT

## 2018-06-08 PROCEDURE — C1751 CATH, INF, PER/CENT/MIDLINE: HCPCS

## 2018-06-08 PROCEDURE — 96375 TX/PRO/DX INJ NEW DRUG ADDON: CPT

## 2018-06-08 PROCEDURE — 71045 X-RAY EXAM CHEST 1 VIEW: CPT

## 2018-06-08 PROCEDURE — 74011250636 HC RX REV CODE- 250/636: Performed by: EMERGENCY MEDICINE

## 2018-06-08 PROCEDURE — 80053 COMPREHEN METABOLIC PANEL: CPT | Performed by: EMERGENCY MEDICINE

## 2018-06-08 PROCEDURE — 96367 TX/PROPH/DG ADDL SEQ IV INF: CPT

## 2018-06-08 PROCEDURE — 96365 THER/PROPH/DIAG IV INF INIT: CPT

## 2018-06-08 PROCEDURE — 87045 FECES CULTURE AEROBIC BACT: CPT | Performed by: EMERGENCY MEDICINE

## 2018-06-08 PROCEDURE — 75810000137 HC PLCMT CENT VENOUS CATH

## 2018-06-08 PROCEDURE — 89055 LEUKOCYTE ASSESSMENT FECAL: CPT | Performed by: INTERNAL MEDICINE

## 2018-06-08 PROCEDURE — 93005 ELECTROCARDIOGRAM TRACING: CPT

## 2018-06-08 PROCEDURE — 36415 COLL VENOUS BLD VENIPUNCTURE: CPT | Performed by: EMERGENCY MEDICINE

## 2018-06-08 PROCEDURE — 99285 EMERGENCY DEPT VISIT HI MDM: CPT

## 2018-06-08 PROCEDURE — 83605 ASSAY OF LACTIC ACID: CPT | Performed by: EMERGENCY MEDICINE

## 2018-06-08 PROCEDURE — 74011000258 HC RX REV CODE- 258: Performed by: EMERGENCY MEDICINE

## 2018-06-08 RX ORDER — LORAZEPAM 2 MG/ML
1 INJECTION INTRAMUSCULAR ONCE
Status: COMPLETED | OUTPATIENT
Start: 2018-06-08 | End: 2018-06-08

## 2018-06-08 RX ORDER — PROCHLORPERAZINE MALEATE 10 MG
10 TABLET ORAL
Status: ON HOLD | COMMUNITY
End: 2019-07-27

## 2018-06-08 RX ORDER — SODIUM CHLORIDE 0.9 % (FLUSH) 0.9 %
5-10 SYRINGE (ML) INJECTION AS NEEDED
Status: DISCONTINUED | OUTPATIENT
Start: 2018-06-08 | End: 2018-06-19 | Stop reason: HOSPADM

## 2018-06-08 RX ORDER — NOREPINEPHRINE BITARTRATE/D5W 8 MG/250ML
2-16 PLASTIC BAG, INJECTION (ML) INTRAVENOUS
Status: DISCONTINUED | OUTPATIENT
Start: 2018-06-08 | End: 2018-06-10

## 2018-06-08 RX ORDER — VANCOMYCIN HYDROCHLORIDE
1250
Status: DISCONTINUED | OUTPATIENT
Start: 2018-06-09 | End: 2018-06-09

## 2018-06-08 RX ORDER — LEVOFLOXACIN 5 MG/ML
750 INJECTION, SOLUTION INTRAVENOUS
Status: DISCONTINUED | OUTPATIENT
Start: 2018-06-08 | End: 2018-06-09

## 2018-06-08 RX ADMIN — PIPERACILLIN SODIUM,TAZOBACTAM SODIUM 3.38 G: 3; .375 INJECTION, POWDER, FOR SOLUTION INTRAVENOUS at 22:16

## 2018-06-08 RX ADMIN — SODIUM CHLORIDE 994 ML: 900 INJECTION, SOLUTION INTRAVENOUS at 23:26

## 2018-06-08 RX ADMIN — SODIUM CHLORIDE 1000 ML: 900 INJECTION, SOLUTION INTRAVENOUS at 21:24

## 2018-06-08 RX ADMIN — SODIUM CHLORIDE 1000 ML: 900 INJECTION, SOLUTION INTRAVENOUS at 21:25

## 2018-06-08 RX ADMIN — LEVOFLOXACIN 750 MG: 5 INJECTION, SOLUTION INTRAVENOUS at 23:27

## 2018-06-08 RX ADMIN — LORAZEPAM 1 MG: 2 INJECTION INTRAMUSCULAR; INTRAVENOUS at 22:54

## 2018-06-08 NOTE — TELEPHONE ENCOUNTER
Pt's  called to report his wife is feeling improved, diarrhea is better, eating ok, no nausea. She does report a sore throat that started today, no fever. Pt's  instructed to try OTC allergy med, and if no improvement  Call PCP to be seen. Also instructed to keep an eye on temp and if runs a fever to call the office back.

## 2018-06-08 NOTE — IP AVS SNAPSHOT
[address] 
[phone] Patient: Neftali Santiago MRN: [id number] RYB:[id number] About your hospitalization You were admitted on:  June 9, 2018 You last received care in the:  [facility] CARDIOPULMONARY CARE You were discharged on:  June 19, 2018 Why you were hospitalized Your primary diagnosis was:  Not on File Your diagnoses also included:  Shock (Hcc), Breast Cancer Of Upper-Outer Quadrant Of Right Female Breast (Hcc), Htn (Hypertension) Follow-up Information Follow up With Details Comments Contact Info Maame Hayes MD Go on 6/26/2018 Hospital follow-up scheduled at 2:00pm( If you have questions or need to reschedule please call [phone]) blood work to recheck your electrolytes and hemoglobin [address] 
[phone] [address] [place] [address]  This is your post-acute home healthcare provider. If you do not hear from them within 24-48 hours, please give them a call. [address] [facility] [address] 
[phone] Waleska Iraheta MD  As needed [address] [facility] [address] 
[phone] Gavin Meza MD Go on 7/5/2018 10:00AM; ID follow-up for you symptoms [address] 
[phone] Your Scheduled Appointments Thursday June 21, 2018  8:00 AM EDT INFUSION 180 RI with CHAIR 2 YAO [place] [address] ([place] [address]) [address]  
[phone] Go to [facility], MOB 3, [address] [facility], [address] Thursday June 21, 2018  1:15 PM EDT  
ESTABLISHED PATIENT with Maris Jamison NP [address] Oncology at [facility]) [address] [facility] [address]  
[phone] Friday June 22, 2018  4:00 PM EDT INFUSION 15 RI with YAO INFUSION NURSE 1  
[place] ([place] [address]) [address]  
[phone] Go to [facility], MOB 3, [address] [facility], [address] Thursday July 12, 2018  8:00 AM EDT INFUSION 180 RI with CHAIR 2 YAO [place] [address] ([place]) [address]  
[phone] Go to [facility], MOB 3, [address] [facility] [place], [address] Friday July 13, 2018  4:00 PM EDT INFUSION 15 RI with YAO INFUSION NURSE 1  
[place] ([place] [address]) [address]  
[phone] Go to [facility], MOB 3, [address] [facility] [place], [address] Discharge Orders None A check joo indicates which time of day the medication should be taken. My Medications START taking these medications Instructions Each Dose to Equal  
 Morning Noon Evening Bedtime  
 apixaban 5 mg (74 tabs) starter pack Commonly known as:  Emmanuel Gentleman Your next dose is: Today Take 10 mg (two 5 mg tablets) by mouth twice a day for 7 days  Followed by 5 mg (one 5 mg tablet) by mouth twice a day  
     
   
   
  
   
  
 ciprofloxacin HCl 250 mg tablet Commonly known as:  CIPRO Your next dose is: Today Take 2 Tabs by mouth two (2) times a day for 4 days. 500 mg  
    
   
   
  
   
  
 metroNIDAZOLE 500 mg tablet Commonly known as:  FLAGYL Your next dose is: Today Take 1 Tab by mouth three (3) times daily for 4 days. 500 mg  
    
   
   
  
   
  
  
 phosphorus 250 mg tablet Commonly known as:  K PHOS NEUTRAL Your next dose is: Today Take 1 Tab by mouth two (2) times a day for 14 days. 1 Tab vancomycin 50 mg/mL oral solution (compounded) Take 125mg po QID for 12 doses, then take 125mg po TID for 9 doses, then take 125mg po BID for 16 doses CHANGE how you take these medications Instructions Each Dose to Equal  
 Morning Noon Evening Bedtime  
 ondansetron 4 mg disintegrating tablet Commonly known as:  ZOFRAN ODT What changed:  Another medication with the same name was removed. Continue taking this medication, and follow the directions you see here. Take 1 Tab by mouth every eight (8) hours as needed for Nausea. 4 mg CONTINUE taking these medications Instructions Each Dose to Equal  
 Morning Noon Evening Bedtime  
 atenolol 50 mg tablet Commonly known as:  TENORMIN Your next dose is:  Tomorrow Take 25 mg by mouth daily. 25 mg  
    
  
   
   
   
  
 garlic 1 mg Cap Your next dose is: Today Take  by mouth daily. Iron 325 mg (65 mg iron) tablet Generic drug:  ferrous sulfate Your next dose is:  Tomorrow Take 325 mg by mouth Daily (before breakfast). 325 mg  
    
  
   
   
   
  
 lidocaine-prilocaine topical cream  
Commonly known as:  EMLA Apply  to affected area as needed for Pain. oxyCODONE-acetaminophen 5-325 mg per tablet Commonly known as:  PERCOCET Take 1 Tab by mouth every four (4) hours as needed for Pain. Max Daily Amount: 6 Tabs. 1 Tab  
    
   
   
   
  
 prochlorperazine 10 mg tablet Commonly known as:  COMPAZINE Take 10 mg by mouth every six (6) hours as needed. 10 mg  
    
   
   
   
  
 VITAMIN D3 1,000 unit Cap Generic drug:  cholecalciferol Your next dose is: Today Take 1 Tab by mouth daily. 1 Tab ZANTAC 150 mg tablet Generic drug:  raNITIdine Your next dose is: Today Take 150 mg by mouth two (2) times a day. Indications: Heartburn, PREVENTION OF STRESS ULCER  
 150 mg  
    
   
   
  
   
  
  
STOP taking these medications   
 amoxicillin-clavulanate 500-125 mg per tablet Commonly known as:  AUGMENTIN  
   
  
 CALCIUM 600 + D 600-125 mg-unit Tab Generic drug:  calcium-cholecalciferol (d3)  
   
  
 dexamethasone 4 mg tablet Commonly known as:  DECADRON  
   
  
 etodolac 400 mg tablet Commonly known as:  LODINE  
   
  
 ibuprofen 200 mg tablet Commonly known as:  MOTRIN  
   
  
 lisinopril-hydroCHLOROthiazide 20-25 mg per tablet Commonly known as:  Allyson Combs Where to Get Your Medications Information on where to get these meds will be given to you by the nurse or doctor. ! Ask your nurse or doctor about these medications  
  apixaban 5 mg (74 tabs) starter pack  
 ciprofloxacin HCl 250 mg tablet  
 metroNIDAZOLE 500 mg tablet  
 ondansetron 4 mg disintegrating tablet  
 phosphorus 250 mg tablet  
 vancomycin 50 mg/mL oral solution (compounded) Opioid Education Prescription Opioids: What You Need to Know: 
 
 
NAME: Brenna Pyle :  1950 MRN:  [id number] Date/Time:  2018 1:18 PM 
 
ADMIT DATE: 2018 DISCHARGE DATE: 2018 Attending Physician: Fabricio Pérez MD 
 
DISCHARGE DIAGNOSIS: 
Acute diverticulitis with contained perforation C. difficile colitis Acute kidney injury, resolved Low potassium, low magnesium Hypertension Breast cancer Anemia of chronic disease MEDICATIONS: 
See above · It is important that you take the medication exactly as they are prescribed. · Keep your medication in the bottles provided by the pharmacist and keep a list of the medication names, dosages, and times to be taken in your wallet. · Do not take other medications without consulting your doctor. Pain Management: per above medications What to do at [facility] Recommended diet:  [place] diet for the next week, then slowly resume your usual diet. Maintain good fluid intake to prevent dehydration. Recommended activity: Activity as tolerated If you have questions regarding the hospital related prescriptions or hospital related issues please call [facility] at . You can always direct your questions to your primary care doctor if you are unable to reach your hospital physician; your PCP works as an extension of your hospital doctor just like your hospital doctor is an extension of your PCP for your time at [facility]. If you experience any of the following symptoms then please call your primary care physician or return to the emergency room if you cannot get hold of your doctor: 
Fever, chills, nausea, vomiting, diarrhea, change in mentation, falling, bleeding, shortness of breath Additional Instructions: 
 
I recommend that you take an over-the-counter probiotic (such as Align, Culturelle, or store generic) while you are on antibiotics. Bring these papers with you to your follow up appointments. The papers will help your doctors be sure to continue the care plan from the hospital. 
 
 
 
 
 
 
Information obtained by : 
I understand that if any problems occur once I am at home I am to contact my physician. I understand and acknowledge receipt of the instructions indicated above. Physician's or R.N.'s Signature                                                                  Date/Time Patient or Representative Signature                                                          Date/Time [place] Diet: Care Instructions Your Care Instructions A bland diet is used when you need food that is easy to chew, swallow, and digest. You will need to choose soft foods that are low in spices and seasonings. You will need to avoid high-fat foods, as well as caffeine and alcohol. Your doctor or dietitian can help you plan a bland diet based on your health and what you prefer to eat. Ask your doctor how long you should stay on this diet. As you get better, you will probably be able to go back to a regular diet. Talk with your doctor or dietitian before you make changes in your diet. Follow-up care is a key part of your treatment and safety. Be sure to make and go to all appointments, and call your doctor if you are having problems. It's also a good idea to know your test results and keep a list of the medicines you take. How can you care for yourself at home? · Choose foods that are easy to chew and swallow. Good choices are mashed potatoes, soft breads and rolls, cream soups, oatmeal, and Cream of Wheat. · Choose soft, well-cooked vegetables and soft or canned fruits. Good choices are applesauce, ripe bananas, and non-citrus fruit juice. · Try milk, yogurt, or other milk products, if you can digest dairy without too many problems. Your doctor may limit milk and milk products for a while. If so, he or she may recommend a calcium and vitamin D supplement. · Choose soft protein foods such as eggs, tofu, steamed fish, chicken, and turkey. Slow-cooking methods, such as stewing, will help soften meat. Chopping meat in a  or  also will make it easier to eat. · Avoid nuts, raw vegetables, hard crackers, tough meats, and prunes and prune juice. · Avoid foods that are very spicy, such as foods seasoned with black pepper, chili peppers, horseradish, or hot sauce. · Avoid highly acidic foods such as citrus fruits, citrus fruit juices, and tomato-based foods. · Avoid high-fat foods such as fried meat, chips, and rich desserts. · Check with your doctor before you drink alcohol or beverages that have caffeine, such as coffee, tea, and cola beverages. Where can you learn more? Go to http://sowmya-colin.info/. Enter N284 in the search box to learn more about \"Soft-Textured, [place] Diet: Care Instructions. \" Current as of: May 12, 2017 Content Version: 11.4 © 8976-0015 [employer]. Care instructions adapted under license by [employer] (which disclaims liability or warranty for this information). If you have questions about a medical condition or this instruction, always ask your healthcare professional. [place] [address] any warranty or liability for your use of this information. Clostridium Difficile Colitis: Care Instructions Your Care Instructions Clostridium difficile (also called C. difficile) are bacteria that can cause swelling and irritation of the large intestine, or colon. This inflammation is also called colitis. It can cause diarrhea, fever, and belly cramps. You may get C. difficile colitis if you take antibiotics. The infection is most common in people who are taking antibiotics while in the hospital. It is also common in older people in hospitals and nursing homes. Severe disease could cause the colon to swell to many times its normal size (toxic megacolon). This can cause death and needs emergency treatment. You may have a swollen belly that is painful or tender, a rapid heartbeat, and a fever. Follow-up care is a key part of your treatment and safety.  Be sure to make and go to all appointments, and call your doctor if you are having problems. It's also a good idea to know your test results and keep a list of the medicines you take. How can you care for yourself at home? · Your doctor may give you antibiotics to treat C. difficile colitis. If your doctor prescribes an antibiotic, he or she will give you a different antibiotic than the one that caused your infection. Take your antibiotics as directed. Do not stop taking them just because you feel better. You need to take the full course of antibiotics. · To prevent dehydration, drink plenty of fluids, enough so that your urine is light yellow or clear like water. Choose water and other caffeine-free clear liquids until you feel better. If you have kidney, heart, or liver disease and have to limit fluids, talk with your doctor before you increase the amount of fluids you drink. · Begin eating small amounts of mild foods, if you feel like it. Try yogurt that has live cultures of lactobacillus (check the label). ¨ Avoid spicy foods, fruits, alcohol, and caffeine until 48 hours after all symptoms go away. ¨ Avoid chewing gum that contains sorbitol. ¨ Avoid dairy products (except for yogurt with lactobacillus) while you have diarrhea and for 3 days after symptoms go away. · To prevent the spread of C. difficile, practice good hygiene. Keep your hands clean by washing them well and often with soap and clean, running water. Alcohol-based hand sanitizers do not kill C. difficile. When should you call for help? Call 911 if: 
? · You passed out (lost consciousness). ?Call your doctor now or seek immediate medical care if: 
? · You have a fever over 101°F or shaking chills. ? · You feel lightheaded or have a fast heart rate. ? · You pass stools that are almost always bloody. ? · You have signs of needing more fluids. You have sunken eyes and a dry mouth, and you pass only a little dark urine. ? · You have severe belly pain with or without bloating. ? · You have severe vomiting and cannot keep down liquids. ? · You are not passing any stools or gas. ? Watch closely for changes in your health, and be sure to contact your doctor if: 
? · You do not get better as expected. Where can you learn more? Go to http://sowmya-colin.info/. Enter [phone] in the search box to learn more about \"Clostridium Difficile Colitis: Care Instructions. \" Current as of: March 3, 2017 Content Version: 11.4 © 8946-0926 [employer]. Care instructions adapted under license by [employer] (which disclaims liability or warranty for this information). If you have questions about a medical condition or this instruction, always ask your healthcare professional. [address] any warranty or liability for your use of this information. Remote Assistant Announcement We are excited to announce that we are making your provider's discharge notes available to you in Remote Assistant. You will see these notes when they are completed and signed by the physician that discharged you from your recent hospital stay. If you have any questions or concerns about any information you see in Remote Assistant, please call the Health Information Department where you were seen or reach out to your Primary Care Provider for more information about your plan of care. Introducing [facility]! [facility] introduces Remote Assistant patient portal. Now you can access parts of your medical record, email your doctor's office, and request medication refills online. 1. In your internet browser, go to [email or web address] 2. Click on the First Time User? Click Here link in the Sign In box. You will see the New Member Sign Up page. 3. Enter your Remote Assistant Access Code exactly as it appears below. You will not need to use this code after youve completed the sign-up process.  If you do not sign up before the expiration date, you must request a new code. · [employer] Access Code: [email or web address] Expires: 8/30/2018  3:50 PM 
 
4. Enter the last four digits of your Social Security Number (xxxx) and Date of Birth (mm/dd/yyyy) as indicated and click Submit. You will be taken to the next sign-up page. 5. Create a [employer] ID. This will be your [employer] login ID and cannot be changed, so think of one that is secure and easy to remember. 6. Create a [employer] password. You can change your password at any time. 7. Enter your Password Reset Question and Answer. This can be used at a later time if you forget your password. 8. Enter your e-mail address. You will receive e-mail notification when new information is available in [address] 9. Click Sign Up. You can now view and download portions of your medical record. 10. Click the Download Summary menu link to download a portable copy of your medical information. If you have questions, please visit the Frequently Asked Questions section of the [employer] website. Remember, [employer] is NOT to be used for urgent needs. For medical emergencies, dial 911. Now available from your iPhone and Android! Introducing Gregory Weldon As a [facility] patient, I wanted to make you aware of our electronic visit tool called Gregory Weldon. [facility] 24/7 allows you to connect within minutes with a medical provider 24 hours a day, seven days a week via a mobile device or tablet or logging into a secure website from your computer. You can access Gregory Weldon from anywhere in the [place].  
 
A virtual visit might be right for you when you have a simple condition and feel like you just dont want to get out of bed, or cant get away from work for an appointment, when your regular [facility] provider is not available (evenings, weekends or holidays), or when youre out of town and need minor care. Electronic visits cost only $49 and if the [facility] 24/7 provider determines a prescription is needed to treat your condition, one can be electronically transmitted to a nearby pharmacy*. Please take a moment to enroll today if you have not already done so. The enrollment process is free and takes just a few minutes. To enroll, please download the [email or web address].[email or web address] ashely to your tablet or phone, or visit [email or web address] to enroll on your computer. And, as an [address] [place] [address] patient with a [employer] account, the results of your visits will be scanned into your electronic medical record and your primary care provider will be able to view the scanned results. We urge you to continue to see your regular Cardio control provider for your ongoing medical care. And while your primary care provider may not be the one available when you seek a [employer] virtual visit, the peace of mind you get from getting a real diagnosis real time can be priceless. For more information on [employer], view our Frequently Asked Questions (FAQs) at [email or web address]. Sincerely, 
 
Priscilla Ray MD 
Chief Medical Officer [facility] Reyna Alessandro *:  certain medications cannot be prescribed via [employer] Unresulted tests-please follow up with your PCP on these results Procedure/Test Authorizing Provider C.  DIFFICILE (DNA) Demetria Melton MD  
 CBC WITH AUTOMATED DIFF Dione Donaldson MD  
 CBC WITH AUTOMATED DIFF Angela Bosch MD  
 CBC WITH AUTOMATED DIFF Angela Bosch MD  
 CBC WITH AUTOMATED DIFF Jarocho Perez MD  
 CBC WITH AUTOMATED DIFF Maria Luisa Sr MD  
 CBC WITH AUTOMATED DIFF Alisha James MD  
 CBC WITH AUTOMATED DIFF Mesfin Anna MD  
 CT ABD Rizwan Hou MD  
 CT ABD PELV WO CONT Pati Mckay MD  
 CT HEAD WO CONT Mesfin Anna MD  
 CULTURE, BLOOD Mesfin Anna MD  
 CULTURE, BLOOD Mesfin Anna MD  
 CULTURE, BLOOD, PERIPHERAL Angela Chew MD  
 CULTURE, Kody Garcia MD  
 DUPLEX UPPER EXT VENOUS LEFT Stew Cotto MD  
 EKG, 12 LEAD, Felipe Garduno MD  
 LACTIC ACID MD Freya Hutchison MD  
 MAGNESIUM MD Leo Solorio MD Rodrick Crawley, MD Berdie Schwalbe, MD  
 R MD Elizabeth Patterson MD  
 METABOLIC PANEL, Raghu Burr MD  
 METABOLIC PANEL, BASIC Phyllis Michael MD  
 METABOLIC PANEL, BASIC Phyllis Michael MD  
 METABOLIC PANEL, Aydee Sosa MD  
 METABOLIC PANEL, Aydee Sosa MD  
 METABOLIC PANEL, COMPREHENSIVE Martin Avila MD  
 METABOLIC PANEL, COMPREHENSIVE Phyllis Michael MD  
 METABOLIC PANEL, COMPREHENSIVE Phyllis Michael MD  
 METABOLIC PANEL, Lora Yin MD  
 METABOLIC PANEL, Abdoulaye Fairchild MD  
 PATHOLOGIST REVIEW MD Piedad Agrawal MD Katy Leep, MD Namon Liverpool, MD Lennette Level, MD Rowan Hancock, MD  
 PTT MD Johnny Agrawal MD Loa Jaffe, MD Jeanelle Bun, MD  
 VANCOMYCIN, Shelby Chan MD  
 WBC, Enrike Mcintosh MD  
 XR Albaro Rice MD  
 XR Tony Flores MD  
  
Providers Seen During Your Hospitalization Provider Specialty Primary office phone Mesfin Anna MD Emergency Medicine [phone] Pati Mckay MD Internal Medicine [phone] Charanjit Dang MD Internal Medicine [phone] Velma Khan MD Internal Medicine [phone] Your Primary Care Physician (PCP) Primary Care Physician Office Phone Office Fax Ez Childers [phone] You are allergic to the following Allergen Reactions Nasacort (Triamcinolone Acetonide) Other (comments)  
 headache  
    
 Sulfa (Sulfonamide Antibiotics) Rash Recent Documentation Height Weight BMI OB Status Smoking Status 1.575 m 104 kg 41.94 kg/m2 Postmenopausal Former Smoker Emergency Contacts Name Discharge Info Relation Home Work Mobile [place],E.G DISCHARGE CAREGIVER [3] Spouse [3] [phone] [address]  Spouse [3] [phone] Patient Belongings The following personal items are in your possession at time of discharge: 
  Dental Appliances: None  Visual Aid: None      Home Medications: Kept at bedside   Jewelry: Watch (on Left wrist. metal with a white face  stretchy band)  Clothing: Hat, Dress, [employer]    Other Valuables: None Please provide this summary of care documentation to your next provider. Signatures-by signing, you are acknowledging that this After Visit Summary has been reviewed with you and you have received a copy. Patient Signature:  ____________________________________________________________ Date:  ____________________________________________________________  
  
Leila Chambers Provider Signature:  ____________________________________________________________ Date:  ____________________________________________________________

## 2018-06-09 ENCOUNTER — APPOINTMENT (OUTPATIENT)
Dept: CT IMAGING | Age: 68
DRG: 871 | End: 2018-06-09
Attending: INTERNAL MEDICINE
Payer: MEDICARE

## 2018-06-09 PROBLEM — R57.9 SHOCK (HCC): Status: ACTIVE | Noted: 2018-06-09

## 2018-06-09 PROBLEM — I10 HTN (HYPERTENSION): Status: ACTIVE | Noted: 2018-06-09

## 2018-06-09 LAB
ALBUMIN SERPL-MCNC: 1.7 G/DL (ref 3.5–5)
ALBUMIN SERPL-MCNC: 1.8 G/DL (ref 3.5–5)
ALBUMIN/GLOB SERPL: 0.4 {RATIO} (ref 1.1–2.2)
ALBUMIN/GLOB SERPL: 0.4 {RATIO} (ref 1.1–2.2)
ALP SERPL-CCNC: 76 U/L (ref 45–117)
ALP SERPL-CCNC: 81 U/L (ref 45–117)
ALT SERPL-CCNC: 11 U/L (ref 12–78)
ALT SERPL-CCNC: 13 U/L (ref 12–78)
ANION GAP SERPL CALC-SCNC: 12 MMOL/L (ref 5–15)
ANION GAP SERPL CALC-SCNC: 16 MMOL/L (ref 5–15)
APPEARANCE UR: ABNORMAL
AST SERPL-CCNC: 13 U/L (ref 15–37)
AST SERPL-CCNC: 15 U/L (ref 15–37)
ATRIAL RATE: 66 BPM
BASOPHILS # BLD: 0 K/UL (ref 0–0.1)
BASOPHILS # BLD: 0 K/UL (ref 0–0.1)
BASOPHILS NFR BLD: 0 % (ref 0–1)
BASOPHILS NFR BLD: 0 % (ref 0–1)
BILIRUB SERPL-MCNC: 0.4 MG/DL (ref 0.2–1)
BILIRUB SERPL-MCNC: 0.4 MG/DL (ref 0.2–1)
BILIRUB UR QL: NEGATIVE
BUN SERPL-MCNC: 61 MG/DL (ref 6–20)
BUN SERPL-MCNC: 65 MG/DL (ref 6–20)
BUN/CREAT SERPL: 13 (ref 12–20)
BUN/CREAT SERPL: 15 (ref 12–20)
C DIFF TOX GENS STL QL NAA+PROBE: POSITIVE
CALCIUM SERPL-MCNC: 8.1 MG/DL (ref 8.5–10.1)
CALCIUM SERPL-MCNC: 8.2 MG/DL (ref 8.5–10.1)
CALCULATED P AXIS, ECG09: 85 DEGREES
CALCULATED R AXIS, ECG10: 22 DEGREES
CALCULATED T AXIS, ECG11: 25 DEGREES
CHLORIDE SERPL-SCNC: 103 MMOL/L (ref 97–108)
CHLORIDE SERPL-SCNC: 105 MMOL/L (ref 97–108)
CO2 SERPL-SCNC: 15 MMOL/L (ref 21–32)
CO2 SERPL-SCNC: 22 MMOL/L (ref 21–32)
COLOR UR: ABNORMAL
CREAT SERPL-MCNC: 4.04 MG/DL (ref 0.55–1.02)
CREAT SERPL-MCNC: 4.83 MG/DL (ref 0.55–1.02)
DIAGNOSIS, 93000: NORMAL
DIFFERENTIAL METHOD BLD: ABNORMAL
EOSINOPHIL # BLD: 0 K/UL (ref 0–0.4)
EOSINOPHIL # BLD: 0.6 K/UL (ref 0–0.4)
EOSINOPHIL NFR BLD: 0 % (ref 0–7)
EOSINOPHIL NFR BLD: 3 % (ref 0–7)
ERYTHROCYTE [DISTWIDTH] IN BLOOD BY AUTOMATED COUNT: 14.2 % (ref 11.5–14.5)
ERYTHROCYTE [DISTWIDTH] IN BLOOD BY AUTOMATED COUNT: 14.2 % (ref 11.5–14.5)
GLOBULIN SER CALC-MCNC: 4.1 G/DL (ref 2–4)
GLOBULIN SER CALC-MCNC: 4.5 G/DL (ref 2–4)
GLUCOSE SERPL-MCNC: 118 MG/DL (ref 65–100)
GLUCOSE SERPL-MCNC: 151 MG/DL (ref 65–100)
GLUCOSE UR STRIP.AUTO-MCNC: NEGATIVE MG/DL
HCT VFR BLD AUTO: 24.3 % (ref 35–47)
HCT VFR BLD AUTO: 26 % (ref 35–47)
HGB BLD-MCNC: 8.1 G/DL (ref 11.5–16)
HGB BLD-MCNC: 8.3 G/DL (ref 11.5–16)
HGB UR QL STRIP: NEGATIVE
IMM GRANULOCYTES # BLD: 0 K/UL (ref 0–0.04)
IMM GRANULOCYTES # BLD: 0 K/UL (ref 0–0.04)
IMM GRANULOCYTES NFR BLD AUTO: 0 % (ref 0–0.5)
IMM GRANULOCYTES NFR BLD AUTO: 0 % (ref 0–0.5)
KETONES UR QL STRIP.AUTO: NEGATIVE MG/DL
LEUKOCYTE ESTERASE UR QL STRIP.AUTO: NEGATIVE
LYMPHOCYTES # BLD: 0.7 K/UL (ref 0.8–3.5)
LYMPHOCYTES # BLD: 2.2 K/UL (ref 0.8–3.5)
LYMPHOCYTES NFR BLD: 11 % (ref 12–49)
LYMPHOCYTES NFR BLD: 3 % (ref 12–49)
MAGNESIUM SERPL-MCNC: 1.3 MG/DL (ref 1.6–2.4)
MAGNESIUM SERPL-MCNC: 1.8 MG/DL (ref 1.6–2.4)
MAGNESIUM SERPL-MCNC: 2.1 MG/DL (ref 1.6–2.4)
MCH RBC QN AUTO: 28.5 PG (ref 26–34)
MCH RBC QN AUTO: 28.7 PG (ref 26–34)
MCHC RBC AUTO-ENTMCNC: 31.9 G/DL (ref 30–36.5)
MCHC RBC AUTO-ENTMCNC: 33.3 G/DL (ref 30–36.5)
MCV RBC AUTO: 85.6 FL (ref 80–99)
MCV RBC AUTO: 90 FL (ref 80–99)
METAMYELOCYTES NFR BLD MANUAL: 2 %
METAMYELOCYTES NFR BLD MANUAL: 3 %
MONOCYTES # BLD: 0.9 K/UL (ref 0–1)
MONOCYTES # BLD: 1.2 K/UL (ref 0–1)
MONOCYTES NFR BLD: 4 % (ref 5–13)
MONOCYTES NFR BLD: 6 % (ref 5–13)
MYELOCYTES NFR BLD MANUAL: 2 %
MYELOCYTES NFR BLD MANUAL: 3 %
NEUTS BAND NFR BLD MANUAL: 5 %
NEUTS BAND NFR BLD MANUAL: 9 %
NEUTS SEG # BLD: 15.2 K/UL (ref 1.8–8)
NEUTS SEG # BLD: 19.4 K/UL (ref 1.8–8)
NEUTS SEG NFR BLD: 66 % (ref 32–75)
NEUTS SEG NFR BLD: 83 % (ref 32–75)
NITRITE UR QL STRIP.AUTO: NEGATIVE
NRBC # BLD: 0.03 K/UL (ref 0–0.01)
NRBC # BLD: 0.05 K/UL (ref 0–0.01)
NRBC BLD-RTO: 0.1 PER 100 WBC
NRBC BLD-RTO: 0.2 PER 100 WBC
P-R INTERVAL, ECG05: 122 MS
PH UR STRIP: 5 [PH] (ref 5–8)
PHOSPHATE SERPL-MCNC: 2.9 MG/DL (ref 2.6–4.7)
PLATELET # BLD AUTO: 287 K/UL (ref 150–400)
PLATELET # BLD AUTO: 309 K/UL (ref 150–400)
PMV BLD AUTO: 10.7 FL (ref 8.9–12.9)
PMV BLD AUTO: 11.2 FL (ref 8.9–12.9)
POTASSIUM SERPL-SCNC: 3.2 MMOL/L (ref 3.5–5.1)
POTASSIUM SERPL-SCNC: 3.2 MMOL/L (ref 3.5–5.1)
PROT SERPL-MCNC: 5.8 G/DL (ref 6.4–8.2)
PROT SERPL-MCNC: 6.3 G/DL (ref 6.4–8.2)
PROT UR STRIP-MCNC: NEGATIVE MG/DL
Q-T INTERVAL, ECG07: 436 MS
QRS DURATION, ECG06: 88 MS
QTC CALCULATION (BEZET), ECG08: 457 MS
RBC # BLD AUTO: 2.84 M/UL (ref 3.8–5.2)
RBC # BLD AUTO: 2.89 M/UL (ref 3.8–5.2)
RBC MORPH BLD: ABNORMAL
SODIUM SERPL-SCNC: 134 MMOL/L (ref 136–145)
SODIUM SERPL-SCNC: 139 MMOL/L (ref 136–145)
SP GR UR REFRACTOMETRY: 1.01 (ref 1–1.03)
UROBILINOGEN UR QL STRIP.AUTO: 0.2 EU/DL (ref 0.2–1)
VENTRICULAR RATE, ECG03: 66 BPM
WBC # BLD AUTO: 20.3 K/UL (ref 3.6–11)
WBC # BLD AUTO: 22 K/UL (ref 3.6–11)
WBC #/AREA STL HPF: NORMAL /HPF (ref 0–4)
WBC MORPH BLD: ABNORMAL
WBC MORPH BLD: ABNORMAL

## 2018-06-09 PROCEDURE — 74011000250 HC RX REV CODE- 250: Performed by: EMERGENCY MEDICINE

## 2018-06-09 PROCEDURE — 74011250636 HC RX REV CODE- 250/636: Performed by: INTERNAL MEDICINE

## 2018-06-09 PROCEDURE — 74011000250 HC RX REV CODE- 250: Performed by: INTERNAL MEDICINE

## 2018-06-09 PROCEDURE — 83735 ASSAY OF MAGNESIUM: CPT | Performed by: INTERNAL MEDICINE

## 2018-06-09 PROCEDURE — 51798 US URINE CAPACITY MEASURE: CPT

## 2018-06-09 PROCEDURE — 74011250637 HC RX REV CODE- 250/637

## 2018-06-09 PROCEDURE — 81003 URINALYSIS AUTO W/O SCOPE: CPT | Performed by: INTERNAL MEDICINE

## 2018-06-09 PROCEDURE — 74011250637 HC RX REV CODE- 250/637: Performed by: HOSPITALIST

## 2018-06-09 PROCEDURE — 80053 COMPREHEN METABOLIC PANEL: CPT | Performed by: INTERNAL MEDICINE

## 2018-06-09 PROCEDURE — 85025 COMPLETE CBC W/AUTO DIFF WBC: CPT | Performed by: INTERNAL MEDICINE

## 2018-06-09 PROCEDURE — 36415 COLL VENOUS BLD VENIPUNCTURE: CPT | Performed by: INTERNAL MEDICINE

## 2018-06-09 PROCEDURE — 74011250637 HC RX REV CODE- 250/637: Performed by: EMERGENCY MEDICINE

## 2018-06-09 PROCEDURE — 74011000258 HC RX REV CODE- 258: Performed by: INTERNAL MEDICINE

## 2018-06-09 PROCEDURE — 96367 TX/PROPH/DG ADDL SEQ IV INF: CPT

## 2018-06-09 PROCEDURE — 74011250637 HC RX REV CODE- 250/637: Performed by: INTERNAL MEDICINE

## 2018-06-09 PROCEDURE — 65610000006 HC RM INTENSIVE CARE

## 2018-06-09 PROCEDURE — 84100 ASSAY OF PHOSPHORUS: CPT | Performed by: INTERNAL MEDICINE

## 2018-06-09 PROCEDURE — 74176 CT ABD & PELVIS W/O CONTRAST: CPT

## 2018-06-09 PROCEDURE — 02H633Z INSERTION OF INFUSION DEVICE INTO RIGHT ATRIUM, PERCUTANEOUS APPROACH: ICD-10-PCS | Performed by: EMERGENCY MEDICINE

## 2018-06-09 PROCEDURE — 96365 THER/PROPH/DIAG IV INF INIT: CPT

## 2018-06-09 PROCEDURE — 74011250636 HC RX REV CODE- 250/636: Performed by: EMERGENCY MEDICINE

## 2018-06-09 RX ORDER — SODIUM CHLORIDE 9 MG/ML
75 INJECTION, SOLUTION INTRAVENOUS CONTINUOUS
Status: DISCONTINUED | OUTPATIENT
Start: 2018-06-09 | End: 2018-06-10

## 2018-06-09 RX ORDER — ACETAMINOPHEN 650 MG/1
650 SUPPOSITORY RECTAL
Status: DISCONTINUED | OUTPATIENT
Start: 2018-06-09 | End: 2018-06-19 | Stop reason: HOSPADM

## 2018-06-09 RX ORDER — OXYCODONE AND ACETAMINOPHEN 5; 325 MG/1; MG/1
1 TABLET ORAL
Status: DISCONTINUED | OUTPATIENT
Start: 2018-06-09 | End: 2018-06-09

## 2018-06-09 RX ORDER — SODIUM CHLORIDE 0.9 % (FLUSH) 0.9 %
5-10 SYRINGE (ML) INJECTION AS NEEDED
Status: DISCONTINUED | OUTPATIENT
Start: 2018-06-09 | End: 2018-06-19 | Stop reason: HOSPADM

## 2018-06-09 RX ORDER — POTASSIUM CHLORIDE 20 MEQ/1
20 TABLET, EXTENDED RELEASE ORAL
Status: COMPLETED | OUTPATIENT
Start: 2018-06-09 | End: 2018-06-09

## 2018-06-09 RX ORDER — VANCOMYCIN HYDROCHLORIDE
1250
Status: DISCONTINUED | OUTPATIENT
Start: 2018-06-10 | End: 2018-06-10

## 2018-06-09 RX ORDER — MAGNESIUM SULFATE HEPTAHYDRATE 40 MG/ML
2 INJECTION, SOLUTION INTRAVENOUS ONCE
Status: COMPLETED | OUTPATIENT
Start: 2018-06-09 | End: 2018-06-11

## 2018-06-09 RX ORDER — RANITIDINE 150 MG/1
150 TABLET, FILM COATED ORAL 2 TIMES DAILY
Status: ON HOLD | COMMUNITY
End: 2019-07-27

## 2018-06-09 RX ORDER — ACETAMINOPHEN 325 MG/1
650 TABLET ORAL
Status: DISCONTINUED | OUTPATIENT
Start: 2018-06-09 | End: 2018-06-09

## 2018-06-09 RX ORDER — POTASSIUM CHLORIDE 750 MG/1
40 TABLET, FILM COATED, EXTENDED RELEASE ORAL
Status: COMPLETED | OUTPATIENT
Start: 2018-06-09 | End: 2018-06-09

## 2018-06-09 RX ORDER — POTASSIUM CHLORIDE 750 MG/1
TABLET, FILM COATED, EXTENDED RELEASE ORAL
Status: COMPLETED
Start: 2018-06-09 | End: 2018-06-09

## 2018-06-09 RX ORDER — SODIUM BICARBONATE 1 MEQ/ML
100 SYRINGE (ML) INTRAVENOUS ONCE
Status: COMPLETED | OUTPATIENT
Start: 2018-06-09 | End: 2018-06-09

## 2018-06-09 RX ORDER — ONDANSETRON 2 MG/ML
4 INJECTION INTRAMUSCULAR; INTRAVENOUS
Status: DISCONTINUED | OUTPATIENT
Start: 2018-06-09 | End: 2018-06-19 | Stop reason: HOSPADM

## 2018-06-09 RX ORDER — METRONIDAZOLE 500 MG/100ML
500 INJECTION, SOLUTION INTRAVENOUS EVERY 8 HOURS
Status: DISCONTINUED | OUTPATIENT
Start: 2018-06-09 | End: 2018-06-19

## 2018-06-09 RX ORDER — HYDROMORPHONE HYDROCHLORIDE 2 MG/ML
1 INJECTION, SOLUTION INTRAMUSCULAR; INTRAVENOUS; SUBCUTANEOUS
Status: DISCONTINUED | OUTPATIENT
Start: 2018-06-09 | End: 2018-06-11

## 2018-06-09 RX ORDER — SODIUM CHLORIDE 0.9 % (FLUSH) 0.9 %
5-10 SYRINGE (ML) INJECTION EVERY 8 HOURS
Status: DISCONTINUED | OUTPATIENT
Start: 2018-06-09 | End: 2018-06-19 | Stop reason: HOSPADM

## 2018-06-09 RX ORDER — HEPARIN SODIUM 5000 [USP'U]/ML
5000 INJECTION, SOLUTION INTRAVENOUS; SUBCUTANEOUS EVERY 12 HOURS
Status: DISCONTINUED | OUTPATIENT
Start: 2018-06-09 | End: 2018-06-19

## 2018-06-09 RX ADMIN — HEPARIN SODIUM 5000 UNITS: 5000 INJECTION, SOLUTION INTRAVENOUS; SUBCUTANEOUS at 21:55

## 2018-06-09 RX ADMIN — Medication 0.5 MCG/MIN: at 00:20

## 2018-06-09 RX ADMIN — SODIUM BICARBONATE 100 MEQ: 84 INJECTION INTRAVENOUS at 12:41

## 2018-06-09 RX ADMIN — Medication 10 ML: at 21:55

## 2018-06-09 RX ADMIN — POTASSIUM CHLORIDE 40 MEQ: 750 TABLET, EXTENDED RELEASE ORAL at 01:10

## 2018-06-09 RX ADMIN — VANCOMYCIN HYDROCHLORIDE 250 MG: 1 INJECTION, POWDER, LYOPHILIZED, FOR SOLUTION INTRAVENOUS at 15:12

## 2018-06-09 RX ADMIN — Medication 12 MCG/MIN: at 12:53

## 2018-06-09 RX ADMIN — CEFEPIME HYDROCHLORIDE 2 G: 2 INJECTION, POWDER, FOR SOLUTION INTRAVENOUS at 02:12

## 2018-06-09 RX ADMIN — METRONIDAZOLE 500 MG: 500 INJECTION, SOLUTION INTRAVENOUS at 18:01

## 2018-06-09 RX ADMIN — METRONIDAZOLE 500 MG: 500 INJECTION, SOLUTION INTRAVENOUS at 04:28

## 2018-06-09 RX ADMIN — OXYCODONE HYDROCHLORIDE AND ACETAMINOPHEN 1 TABLET: 5; 325 TABLET ORAL at 07:35

## 2018-06-09 RX ADMIN — OXYCODONE HYDROCHLORIDE AND ACETAMINOPHEN 1 TABLET: 5; 325 TABLET ORAL at 11:15

## 2018-06-09 RX ADMIN — Medication 10 ML: at 13:12

## 2018-06-09 RX ADMIN — Medication 8 MCG/MIN: at 23:04

## 2018-06-09 RX ADMIN — Medication 10 MCG/MIN: at 13:13

## 2018-06-09 RX ADMIN — SODIUM CHLORIDE 125 ML/HR: 900 INJECTION, SOLUTION INTRAVENOUS at 15:10

## 2018-06-09 RX ADMIN — VANCOMYCIN HYDROCHLORIDE 250 MG: 1 INJECTION, POWDER, LYOPHILIZED, FOR SOLUTION INTRAVENOUS at 17:39

## 2018-06-09 RX ADMIN — SODIUM CHLORIDE 125 ML/HR: 900 INJECTION, SOLUTION INTRAVENOUS at 23:02

## 2018-06-09 RX ADMIN — METRONIDAZOLE 500 MG: 500 INJECTION, SOLUTION INTRAVENOUS at 10:23

## 2018-06-09 RX ADMIN — SODIUM CHLORIDE 125 ML/HR: 900 INJECTION, SOLUTION INTRAVENOUS at 02:11

## 2018-06-09 RX ADMIN — MAGNESIUM SULFATE HEPTAHYDRATE 2 G: 40 INJECTION, SOLUTION INTRAVENOUS at 02:10

## 2018-06-09 RX ADMIN — VANCOMYCIN HYDROCHLORIDE 2500 MG: 10 INJECTION, POWDER, LYOPHILIZED, FOR SOLUTION INTRAVENOUS at 03:39

## 2018-06-09 RX ADMIN — POTASSIUM CHLORIDE 40 MEQ: 750 TABLET, EXTENDED RELEASE ORAL at 01:28

## 2018-06-09 RX ADMIN — POTASSIUM CHLORIDE 40 MEQ: 10 TABLET, FILM COATED, EXTENDED RELEASE ORAL at 01:28

## 2018-06-09 RX ADMIN — Medication 16 MCG/MIN: at 11:14

## 2018-06-09 RX ADMIN — SODIUM CHLORIDE 1000 ML: 900 INJECTION, SOLUTION INTRAVENOUS at 12:41

## 2018-06-09 RX ADMIN — POTASSIUM CHLORIDE 20 MEQ: 20 TABLET, EXTENDED RELEASE ORAL at 09:54

## 2018-06-09 RX ADMIN — VANCOMYCIN HYDROCHLORIDE 250 MG: 1 INJECTION, POWDER, LYOPHILIZED, FOR SOLUTION INTRAVENOUS at 23:02

## 2018-06-09 RX ADMIN — Medication 10 ML: at 07:36

## 2018-06-09 RX ADMIN — OXYCODONE HYDROCHLORIDE AND ACETAMINOPHEN 1 TABLET: 5; 325 TABLET ORAL at 15:12

## 2018-06-09 RX ADMIN — Medication 16 MCG/MIN: at 03:40

## 2018-06-09 RX ADMIN — HEPARIN SODIUM 5000 UNITS: 5000 INJECTION, SOLUTION INTRAVENOUS; SUBCUTANEOUS at 09:01

## 2018-06-09 NOTE — PROGRESS NOTES
5576- Patient brought from ED. Awake, alert and spouse with her. Hypotensive, on dariusz otherwise unremarkable.

## 2018-06-09 NOTE — ED NOTES
Pt was at chemo treated for breast cancer last  Thursday and on last Friday noticed her port was swollen. Pt was placed on antibiotics (augmentin). Pt has had n/v/d for about a week and hasnt had an appetite since last week.  Pts family notes alerted mental status and dizziness

## 2018-06-09 NOTE — PROGRESS NOTES
[facility]  Pulmonary, Critical Care, and Sleep Medicine    Name: Chema Irene MRN: [id number]   : 1950 Hospital: [facility]   Date: 2018        Critical Care Initial Patient Consult    IMPRESSION:   · On abdominal CT noted to have as possible rupture colonic diverticula, Will ask Surgery to eval, does not seem to need IR intervention at this point, Will continue with abx. · Severe Sepsis with Shock, on levophed drip. · Has been on 2nd round treatment for her Breast Ca per Dr. Linda Lowry. · Possible C Diff infection, has been having 4-5 loose stools per day. · Metabolic Acidosis  · Possible port infection noted about 1 week ago, now appears better. Was on treatment with Augmentin. · Acute Renal Failure  · At some risk of adrenal suppresion, if hypotension is refractory will add stress steroids. · Critically ill, multiple organ failure, on pressors. 35 min CC, EOP  · Discussed with nurse this am.       RECOMMENDATIONS:   · On Empiric IV VAnc, cefepime, and flagyl  · Discussed with Dr. Kal Rowe, appreciate his assistance. Will follow his recs. · Hold BP meds. · With C Diff, added po vanc. · Appreciate and reviewed recs per Renal.   · Follow up on Stool and blood Cx  · Will obtain UA and Ucx. · Will give additional volume resuscitation  · Will give bicarb, following Cr, K. If worsens will need renal consultation  · Will ask the pts Onc to follow up. · Will follow K and Mag levels. Subjective/History:   Cc: Pt had nausea, vomiting and diarrhea. This patient has been seen and evaluated at the request of Dr. Skip Cobian for above. Patient is a 79 y.o. female    who presents with nausea, vomiting and diarrhea with 4-5 BMs per day. As per patient, pt is on chemotherapy (2nd round, first started about 4-5 weeks ago). One  week ago she noted redness around the port and she was started on augmentin.  The day she started abx, redness around the port got better but pt started to have diarrhea, nausea and vomiting. She claims diarrhea started on the same day of starting abx. Pt reported fever upto 100.4. Pt reports lower abdominal cramps with diarrhea but denies any abdominal pain, chest pain, cough, problems urination. In ED pt noted to be in shock without improvement after being given the IV fluids. Has been on  levophed.     She has some lower back and lower abdominal pain. NO leg pain. No redness now noted over her right upper chest port. NO Headaches. We were asked to admit for work up and evaluation of the above problems. Past Medical History:   Diagnosis Date    Arthritis     Breast cancer, right breast ([address]     Depression     GERD (gastroesophageal reflux disease)     Hypercholesterolemia     Hypertension     Other ill-defined conditions(799.89) 2012    dog bite dec 2012 needing blood transfusion      Past Surgical History:   Procedure Laterality Date    HX BREAST LUMPECTOMY Right 3/8/2018    RIGHT BREAST LUMPECTOMY WITH ULTRASOUND performed by Marva Ball MD at [facility] AMBULATORY OR    HX COLONOSCOPY      HX KNEE REPLACEMENT Right 06/2013    HX TONSILLECTOMY        Prior to Admission medications    Medication Sig Start Date End Date Taking? Authorizing Provider   raNITIdine (ZANTAC) 150 mg tablet Take 150 mg by mouth two (2) times a day. Indications: Heartburn, PREVENTION OF STRESS ULCER   Yes Historical Provider   amoxicillin-clavulanate (AUGMENTIN) 500-125 mg per tablet Take 1 Tab by mouth every twelve (12) hours for 10 days. 6/1/18 6/11/18 Yes Naz Jorge NP   ondansetron (ZOFRAN ODT) 4 mg disintegrating tablet Take 1 Tab by mouth every eight (8) hours as needed for Nausea. 4/20/18  Yes Rl Zavala NP   lidocaine-prilocaine (EMLA) topical cream Apply  to affected area as needed for Pain. 4/20/18  Yes Rl Zavala NP   dexamethasone (DECADRON) 4 mg tablet Take 2 Tabs by mouth daily.  The day before, the day of and the day after chemotherapy 4/20/18  Yes Eric León NP   ondansetron (ZOFRAN ODT) 4 mg disintegrating tablet Take 1 Tab by mouth every six (6) hours as needed for Nausea. 3/8/18  Yes Marcus Katz MD   lisinopril-hydroCHLOROthiazide (PRINZIDE, ZESTORETIC) 20-25 mg per tablet Take 1 Tab by mouth daily. Indications: hypertension   Yes Historical Provider   calcium-cholecalciferol, d3, (CALCIUM 600 + D) 600-125 mg-unit Tab Take 1 Tab by mouth two (2) times a day. Yes Historical Provider   Cholecalciferol, Vitamin D3, (VITAMIN D3) 1,000 unit cap Take 1 Tab by mouth daily. Yes Historical Provider   atenolol (TENORMIN) 50 mg tablet Take 25 mg by mouth daily. Yes Historical Provider   prochlorperazine (COMPAZINE) 10 mg tablet Take 10 mg by mouth every six (6) hours as needed. Shelton Serna MD   ibuprofen (MOTRIN) 200 mg tablet Take  by mouth. Historical Provider   garlic 1 mg cap Take  by mouth daily. Historical Provider   oxyCODONE-acetaminophen (PERCOCET) 5-325 mg per tablet Take 1 Tab by mouth every four (4) hours as needed for Pain. Max Daily Amount: 6 Tabs. 3/8/18   Leonora Bang MD   ferrous sulfate (IRON) 325 mg (65 mg iron) tablet Take 325 mg by mouth Daily (before breakfast). Historical Provider   etodolac (LODINE) 400 mg tablet Take 400 mg by mouth daily.     Historical Provider     Current Facility-Administered Medications   Medication Dose Route Frequency    metroNIDAZOLE (FLAGYL) IVPB premix 500 mg  500 mg IntraVENous Q8H    0.9% sodium chloride infusion  125 mL/hr IntraVENous CONTINUOUS    sodium chloride (NS) flush 5-10 mL  5-10 mL IntraVENous Q8H    heparin (porcine) injection 5,000 Units  5,000 Units SubCUTAneous Q12H    [START ON 6/10/2018] cefepime (MAXIPIME) 1 g in 0.9% sodium chloride (MBP/ADV) 50 mL  1 g IntraVENous Q24H    VANCOMYCIN INFORMATION NOTE   Other Rx Dosing/Monitoring    [START ON 6/10/2018] vancomycin (VANCOCIN) 1250 mg in  ml infusion  1,250 mg IntraVENous Q36H    NOREPINephrine (LEVOPHED) 8 mg in 5% dextrose 250mL infusion  2-16 mcg/min IntraVENous TITRATE     Allergies   Allergen Reactions    Nasacort [Triamcinolone Acetonide] Other (comments)     headache    Sulfa (Sulfonamide Antibiotics) Rash      Social History   Substance Use Topics    Smoking status: Former Smoker     Packs/day: 0.25     Years: 2.00     Quit date: 1972    Smokeless tobacco: Never Used    Alcohol use No      Family History   Problem Relation Age of Onset    Adopted:  Yes    Breast Cancer Mother     Stroke Mother     Hypertension Mother     Cancer Mother      skin    Cancer Father      skin    Pulmonary Fibrosis Father     Cancer Sister 79     colon    Breast Cancer Maternal Aunt     Breast Cancer Paternal Aunt     Breast Cancer Maternal Aunt      ovarian and colon cancer    Breast Cancer Maternal Aunt      ovarian and colon cancer    Breast Cancer Maternal Aunt     Breast Cancer Paternal Aunt     Breast Cancer Maternal Aunt     Cancer Maternal Grandmother      liver/ovarian        Review of Systems:  Constitutional: positive for fevers, chills, sweats, fatigue and anorexia  Eyes: negative  Ears, nose, mouth, throat, and face: negative  Respiratory: negative  Cardiovascular: negative  Gastrointestinal: positive for dyspepsia, reflux symptoms, nausea, vomiting, change in bowel habits, diarrhea and abdominal pain  Genitourinary:negative  Integument/breast: negative  Hematologic/lymphatic: negative  Musculoskeletal:positive for myalgias, arthralgias, stiff joints, back pain and muscle weakness  Neurological: negative  Behavioral/Psych: negative  Endocrine: negative  Allergic/Immunologic: negative    Objective:   Vital Signs:    Visit Vitals    /52    Pulse 63    Temp 97.7 °F (36.5 °C)    Resp 17    Ht 5' 2\" (1.575 m)    Wt 104 kg (229 lb 4.5 oz)    SpO2 99%    BMI 41.94 kg/m2       O2 Device: Room air       Temp (24hrs), Av.6 °F (36.4 °C), Min:97.3 °F (36.3 °C), Max:97.7 °F (36.5 °C)       Intake/Output:   Last shift:      06/09 0701 - 06/09 1900  In: 512.6 [P.O.:200; I.V.:312.6]  Out: -   Last 3 shifts: 06/07 1901 - 06/09 0700  In: 704.6 [I.V.:704.6]  Out: -     Intake/Output Summary (Last 24 hours) at 06/09/18 1214  Last data filed at 06/09/18 0801   Gross per 24 hour   Intake          1217.21 ml   Output                0 ml   Net          1217.21 ml     Hemodynamics:   PAP:   CO:     Wedge:   CI:     CVP:  CVP (mmHg): 7 mmHg (06/09/18 1000) SVR:       PVR:       Ventilator Settings:  Mode Rate Tidal Volume Pressure FiO2 PEEP                    Peak airway pressure:      Minute ventilation:        Physical Exam:    General:  Alert, cooperative, no distress, appears stated age. Head:  Normocephalic, without obvious abnormality, atraumatic. Eyes:  Conjunctivae/corneas clear. PERRL, EOMs intact. Nose: Nares normal. Septum midline. Mucosa normal. No drainage or sinus tenderness. Throat: Lips, mucosa, and tongue normal. Teeth and gums normal.   Neck: Supple, symmetrical, trachea midline, no adenopathy, thyroid: no enlargment/tenderness/nodules, no carotid bruit and no JVD. Back:   Symmetric, no curvature. ROM normal.   Lungs:   Clear to auscultation bilaterally. Chest wall:  No tenderness or deformity. Heart:  Regular rate and rhythm, S1, S2 normal, no murmur, click, rub or gallop. Abdomen:   Soft, non-tender. Bowel sounds normal. No masses,  No organomegaly. Extremities: Extremities normal, atraumatic, no cyanosis or edema. Pulses: 2+ and symmetric all extremities.    Skin: Skin color, texture, turgor normal. No rashes or lesions   Lymph nodes: Cervical, supraclavicular, and axillary nodes normal.   Neurologic: Grossly nonfocal       Data:     Recent Results (from the past 24 hour(s))   EKG, 12 LEAD, INITIAL    Collection Time: 06/08/18  9:15 PM   Result Value Ref Range    Ventricular Rate 66 BPM    Atrial Rate 66 BPM    P-R Interval 122 ms QRS Duration 88 ms    Q-T Interval 436 ms    QTC Calculation (Bezet) 457 ms    Calculated P Axis 85 degrees    Calculated R Axis 22 degrees    Calculated T Axis 25 degrees    Diagnosis       Normal sinus rhythm  ST & T wave abnormality, consider anterior ischemia  Abnormal ECG  When compared with ECG of 06-MAR-2018 14:10,  ST now depressed in Anterior leads  T wave inversion more evident in Anterior leads     CULTURE, BLOOD    Collection Time: 06/08/18  9:22 PM   Result Value Ref Range    Special Requests: NO SPECIAL REQUESTS      Culture result: NO GROWTH AFTER 10 HOURS     LACTIC ACID    Collection Time: 06/08/18  9:22 PM   Result Value Ref Range    Lactic acid 1.5 0.4 - 2.0 MMOL/L   CULTURE, BLOOD    Collection Time: 06/08/18  9:22 PM   Result Value Ref Range    Special Requests: NO SPECIAL REQUESTS      Culture result: NO GROWTH AFTER 10 HOURS     METABOLIC PANEL, COMPREHENSIVE    Collection Time: 06/08/18  9:22 PM   Result Value Ref Range    Sodium 131 (L) 136 - 145 mmol/L    Potassium 2.8 (L) 3.5 - 5.1 mmol/L    Chloride 94 (L) 97 - 108 mmol/L    CO2 18 (L) 21 - 32 mmol/L    Anion gap 19 (H) 5 - 15 mmol/L    Glucose 126 (H) 65 - 100 mg/dL    BUN 71 (H) 6 - 20 MG/DL    Creatinine 5.69 (H) 0.55 - 1.02 MG/DL    BUN/Creatinine ratio 12 12 - 20      GFR est AA 9 (L) >60 ml/min/1.73m2    GFR est non-AA 7 (L) >60 ml/min/1.73m2    Calcium 8.9 8.5 - 10.1 MG/DL    Bilirubin, total 0.5 0.2 - 1.0 MG/DL    ALT (SGPT) 15 12 - 78 U/L    AST (SGOT) 12 (L) 15 - 37 U/L    Alk.  phosphatase 79 45 - 117 U/L    Protein, total 6.9 6.4 - 8.2 g/dL    Albumin 2.1 (L) 3.5 - 5.0 g/dL    Globulin 4.8 (H) 2.0 - 4.0 g/dL    A-G Ratio 0.4 (L) 1.1 - 2.2     CBC WITH AUTOMATED DIFF    Collection Time: 06/08/18  9:22 PM   Result Value Ref Range    WBC 12.0 (H) 3.6 - 11.0 K/uL    RBC 3.09 (L) 3.80 - 5.20 M/uL    HGB 8.8 (L) 11.5 - 16.0 g/dL    HCT 26.8 (L) 35.0 - 47.0 %    MCV 86.7 80.0 - 99.0 FL    MCH 28.5 26.0 - 34.0 PG    MCHC 32.8 30.0 - 36.5 g/dL    RDW 14.1 11.5 - 14.5 %    PLATELET 692 403 - 208 K/uL    MPV 11.4 8.9 - 12.9 FL    NRBC 0.3 (H) 0  WBC    ABSOLUTE NRBC 0.04 (H) 0.00 - 0.01 K/uL    NEUTROPHILS 78 (H) 32 - 75 %    BAND NEUTROPHILS 7 %    LYMPHOCYTES 8 (L) 12 - 49 %    MONOCYTES 2 (L) 5 - 13 %    EOSINOPHILS 0 0 - 7 %    BASOPHILS 0 0 - 1 %    METAMYELOCYTES 5 %    IMMATURE GRANULOCYTES 0 0.0 - 0.5 %    ABS. NEUTROPHILS 10.2 (H) 1.8 - 8.0 K/UL    ABS. LYMPHOCYTES 1.0 0.8 - 3.5 K/UL    ABS. MONOCYTES 0.2 0.0 - 1.0 K/UL    ABS. EOSINOPHILS 0.0 0.0 - 0.4 K/UL    ABS. BASOPHILS 0.0 0.0 - 0.1 K/UL    ABS. IMM. GRANS. 0.0 0.00 - 0.04 K/UL    DF AUTOMATED      PLATELET COMMENTS Giant platelets      RBC COMMENTS NORMOCYTIC, NORMOCHROMIC      WBC COMMENTS TOXIC GRANULATION     TROPONIN I    Collection Time: 06/08/18  9:22 PM   Result Value Ref Range    Troponin-I, Qt. <0.04 <0.05 ng/mL   MAGNESIUM    Collection Time: 06/08/18  9:22 PM   Result Value Ref Range    Magnesium 1.3 (L) 1.6 - 2.4 mg/dL   WBC, STOOL    Collection Time: 06/08/18 10:15 PM   Result Value Ref Range    White blood cells, stool 0 TO 4 0 - 4 /HPF   METABOLIC PANEL, COMPREHENSIVE    Collection Time: 06/09/18  4:38 AM   Result Value Ref Range    Sodium 134 (L) 136 - 145 mmol/L    Potassium 3.2 (L) 3.5 - 5.1 mmol/L    Chloride 103 97 - 108 mmol/L    CO2 15 (LL) 21 - 32 mmol/L    Anion gap 16 (H) 5 - 15 mmol/L    Glucose 118 (H) 65 - 100 mg/dL    BUN 65 (H) 6 - 20 MG/DL    Creatinine 4.83 (H) 0.55 - 1.02 MG/DL    BUN/Creatinine ratio 13 12 - 20      GFR est AA 11 (L) >60 ml/min/1.73m2    GFR est non-AA 9 (L) >60 ml/min/1.73m2    Calcium 8.2 (L) 8.5 - 10.1 MG/DL    Bilirubin, total 0.4 0.2 - 1.0 MG/DL    ALT (SGPT) 13 12 - 78 U/L    AST (SGOT) 15 15 - 37 U/L    Alk.  phosphatase 81 45 - 117 U/L    Protein, total 6.3 (L) 6.4 - 8.2 g/dL    Albumin 1.8 (L) 3.5 - 5.0 g/dL    Globulin 4.5 (H) 2.0 - 4.0 g/dL    A-G Ratio 0.4 (L) 1.1 - 2.2     CBC WITH AUTOMATED DIFF    Collection Time: 06/09/18  4:38 AM   Result Value Ref Range    WBC 20.3 (H) 3.6 - 11.0 K/uL    RBC 2.89 (L) 3.80 - 5.20 M/uL    HGB 8.3 (L) 11.5 - 16.0 g/dL    HCT 26.0 (L) 35.0 - 47.0 %    MCV 90.0 80.0 - 99.0 FL    MCH 28.7 26.0 - 34.0 PG    MCHC 31.9 30.0 - 36.5 g/dL    RDW 14.2 11.5 - 14.5 %    PLATELET 601 308 - 428 K/uL    MPV 11.2 8.9 - 12.9 FL    NRBC 0.2 (H) 0  WBC    ABSOLUTE NRBC 0.05 (H) 0.00 - 0.01 K/uL    NEUTROPHILS 66 32 - 75 %    BAND NEUTROPHILS 9 %    LYMPHOCYTES 11 (L) 12 - 49 %    MONOCYTES 6 5 - 13 %    EOSINOPHILS 3 0 - 7 %    BASOPHILS 0 0 - 1 %    METAMYELOCYTES 3 %    MYELOCYTES 2 %    IMMATURE GRANULOCYTES 0 0.0 - 0.5 %    ABS. NEUTROPHILS 15.2 (H) 1.8 - 8.0 K/UL    ABS. LYMPHOCYTES 2.2 0.8 - 3.5 K/UL    ABS. MONOCYTES 1.2 (H) 0.0 - 1.0 K/UL    ABS. EOSINOPHILS 0.6 (H) 0.0 - 0.4 K/UL    ABS. BASOPHILS 0.0 0.0 - 0.1 K/UL    ABS. IMM. GRANS. 0.0 0.00 - 0.04 K/UL    DF AUTOMATED      RBC COMMENTS NORMOCYTIC, NORMOCHROMIC      WBC COMMENTS TOXIC GRANULATION     MAGNESIUM    Collection Time: 06/09/18  4:38 AM   Result Value Ref Range    Magnesium 2.1 1.6 - 2.4 mg/dL             Telemetry:NSR    Imaging:  I have personally reviewed the patients radiographs and have reviewed the reports:  6-8-18: CXR: INDICATION: Sepsis. Cardiomediastinal silhouette is stable. Central venous port catheter extends to  the mid SVC. Left internal jugular central venous catheter extends to the right  atrium. There is no pneumothorax. Lungs are hypoinflated. There is mild  subsegmental atelectasis within the right midlung. No pleural fluid is  visualized.     IMPRESSION: Right midlung mild subsegmental atelectasis.         Total critical care time exclusive of procedures: 35 minutes  Angelina Ferrer MD

## 2018-06-09 NOTE — ED NOTES
TRANSFER - OUT REPORT:    Verbal report given to NIIK Gutierrez(name) on Oanh Meyers  being transferred to CCU(unit) for routine progression of care       Report consisted of patients Situation, Background, Assessment and   Recommendations(SBAR). Information from the following report(s) SBAR, Kardex, ED Summary, MAR, Recent Results, Med Rec Status and Cardiac Rhythm NSR was reviewed with the receiving nurse. Lines:   Triple Lumen 06/08/18 Left Subclavian (Active)   Central Line Being Utilized Yes 6/9/2018 12:19 AM   Site Assessment Clean, dry, & intact 6/9/2018 12:19 AM   Infiltration Assessment 0 6/9/2018 12:19 AM   Dressing Status Clean, dry, & intact 6/9/2018 12:19 AM   Proximal Hub Color/Line Status White 6/9/2018 12:19 AM   Positive Blood Return (Medial Site) Yes 6/9/2018 12:19 AM   Medial Hub Color/Line Status Blue 6/9/2018 12:19 AM   Positive Blood Return (Lateral Site) Yes 6/9/2018 12:19 AM   Distal Hub Color/Line Status Brown 6/9/2018 12:19 AM   Positive Blood Return (Site #3) Yes 6/9/2018 12:19 AM       Venous Access Device port 05/09/18 (Active)       Peripheral IV 06/08/18 Left Antecubital (Active)   Site Assessment Clean, dry, & intact 6/8/2018  9:35 PM   Phlebitis Assessment 0 6/8/2018  9:35 PM   Infiltration Assessment 0 6/8/2018  9:35 PM   Dressing Status Clean, dry, & intact 6/8/2018  9:35 PM   Dressing Type Transparent;Tape 6/8/2018  9:35 PM   Hub Color/Line Status Pink;Patent; Flushed 6/8/2018  9:35 PM   Action Taken Blood drawn 6/8/2018  9:35 PM       Peripheral IV 06/08/18 Right Antecubital (Active)   Site Assessment Clean, dry, & intact 6/8/2018  9:58 PM   Phlebitis Assessment 0 6/8/2018  9:58 PM   Infiltration Assessment 0 6/8/2018  9:58 PM   Dressing Status Clean, dry, & intact 6/8/2018  9:58 PM   Dressing Type Transparent;Tape 6/8/2018  9:58 PM   Hub Color/Line Status Pink;Patent; Flushed 6/8/2018  9:58 PM   Action Taken Blood drawn 6/8/2018  9:58 PM        Opportunity for questions and clarification was provided.       Patient transported with:   Monitor  Registered Nurse

## 2018-06-09 NOTE — PROGRESS NOTES
Orders received, chart reviewed. Spoke to RN who reports that pt remains on 16 mcg of Levo for BP control and is not appropriate to attempt OOB mobility/participation in PT evaluation. Will defer however f/u tomorrow.  Thank you    Shannon Naidu, PT, DPT

## 2018-06-09 NOTE — ED PROVIDER NOTES
EMERGENCY DEPARTMENT HISTORY AND PHYSICAL EXAM      Date: 6/8/2018  Patient Name: Arnulfo Ulrich    History of Presenting Illness     Chief Complaint   Patient presents with    Altered mental status     started yesterday per . patient is currently on chemo for breast cancer     Diarrhea     started about 1 week ago patient was on antibiotics for possible infection of port on right chest     Dizziness     per family started today        History Provided By: Patient's  and Patient's Daughter    HPI: Arnulfo Ulrich, 79 y.o. female with PMHx significant for breast CA on chemotherapy, HTN, presents  to the ED with cc of intermittent nausea and vomiting with diarrhea every time she passes a BM x 1 week. Pt reports SOB and stinging CP when swallowing food. Per , pt was complaining of dizziness today and was slower to respond that usual. He reports pt's port on right chest has been swollen x 1 week. Pt's daughter reports decreased appetite in pt. Per pt's , pt received her second chemotherapy infusion 8 days ago. He states pt had a follow up the next day at which point her port was found to be swollen. Per pt's , pt was placed on 5 day course of Augmentin. He endorses medication compliance in pt with no improvement of the swelling. Per pt's daughter, port was placed 5 weeks ago. She states pt's chemo treatments are every 3 weeks. She reports that last port access was 8 days ago. Pt's  denies associated fever in pt. He denies pt has PMHx of DM or CHF. There are no other complaints, changes, or physical findings at this time.     PCP: Al Auguste MD    Current Facility-Administered Medications   Medication Dose Route Frequency Provider Last Rate Last Dose    [START ON 6/11/2018] cefepime (MAXIPIME) 2 g in 0.9% sodium chloride (MBP/ADV) 100 mL  2 g IntraVENous Q24H Vinny Lucero MD        vancomycin (VANCOCIN) 1250 mg in  ml infusion  1,250 mg IntraVENous Q18H Mynor FIGUEROA Neda Carrillo  mL/hr at 06/10/18 1355 1,250 mg at 06/10/18 1355    metroNIDAZOLE (FLAGYL) IVPB premix 500 mg  500 mg IntraVENous Farooq Hood  mL/hr at 06/10/18 1113 500 mg at 06/10/18 1113    0.9% sodium chloride infusion  75 mL/hr IntraVENous CONTINUOUS Yoana Snell MD 75 mL/hr at 06/10/18 1325 75 mL/hr at 06/10/18 1325    prochlorperazine (COMPAZINE) with saline injection 10 mg  10 mg IntraVENous Q6H PRN Galina Wilson MD        sodium chloride (NS) flush 5-10 mL  5-10 mL IntraVENous Q8H Galina Wilson MD   10 mL at 06/10/18 1356    sodium chloride (NS) flush 5-10 mL  5-10 mL IntraVENous PRN Galina Wilson MD        ondansetron (ZOFRAN) injection 4 mg  4 mg IntraVENous Q4H PRN Galina Wilson MD        heparin (porcine) injection 5,000 Units  5,000 Units SubCUTAneous Q12H Sathya Reeves MD   5,000 Units at 06/10/18 0943    vancomycin 50 mg/mL oral solution (compounded) 250 mg  250 mg Oral Q6H Yoana Snell MD   250 mg at 06/10/18 1113    acetaminophen (TYLENOL) suppository 650 mg  650 mg Rectal Q4H PRN Merly Dennis MD        HYDROmorphone (DILAUDID) injection 1 mg  1 mg IntraVENous Q3H PRN Merly Dennis MD        sodium chloride (NS) flush 5-10 mL  5-10 mL IntraVENous PRN Anne Marie Hale MD           Past History     Past Medical History:  Past Medical History:   Diagnosis Date    Arthritis     Breast cancer, right breast ([address]     Depression     GERD (gastroesophageal reflux disease)     Hypercholesterolemia     Hypertension     Other ill-defined conditions(799.89) 2012    dog bite dec 2012 needing blood transfusion       Past Surgical History:  Past Surgical History:   Procedure Laterality Date    HX BREAST LUMPECTOMY Right 3/8/2018    RIGHT BREAST LUMPECTOMY WITH ULTRASOUND performed by Danielle Zimmerman MD at [facility] AMBULATORY OR    HX COLONOSCOPY      HX KNEE REPLACEMENT Right 06/2013    HX TONSILLECTOMY         Family History:  Family History   Problem Relation Age of Onset  Adopted: Yes    Breast Cancer Mother     Stroke Mother     Hypertension Mother     Cancer Mother      skin    Cancer Father      skin    Pulmonary Fibrosis Father     Cancer Sister 79     colon    Breast Cancer Maternal Aunt     Breast Cancer Paternal Aunt     Breast Cancer Maternal Aunt      ovarian and colon cancer    Breast Cancer Maternal Aunt      ovarian and colon cancer    Breast Cancer Maternal Aunt     Breast Cancer Paternal Aunt     Breast Cancer Maternal Aunt     Cancer Maternal Grandmother      liver/ovarian       Social History:  Social History   Substance Use Topics    Smoking status: Former Smoker     Packs/day: 0.25     Years: 2.00     Quit date: 6/4/1972    Smokeless tobacco: Never Used    Alcohol use No       Allergies: Allergies   Allergen Reactions    Nasacort [Triamcinolone Acetonide] Other (comments)     headache    Sulfa (Sulfonamide Antibiotics) Rash         Review of Systems   Review of Systems   Constitutional: Positive for appetite change (decrease). Negative for chills and fever. HENT: Negative. Negative for congestion, facial swelling, rhinorrhea, sore throat, trouble swallowing and voice change. Eyes: Negative. Respiratory: Positive for shortness of breath. Negative for apnea, cough, chest tightness and wheezing. Cardiovascular: Positive for chest pain. Negative for palpitations and leg swelling. Gastrointestinal: Positive for diarrhea, nausea and vomiting. Negative for abdominal distention, abdominal pain, blood in stool and constipation. Endocrine: Negative. Negative for cold intolerance, heat intolerance and polyuria. Genitourinary: Negative. Negative for difficulty urinating, dysuria, flank pain, frequency, hematuria and urgency. Musculoskeletal: Negative. Negative for arthralgias, back pain, myalgias, neck pain and neck stiffness. Skin: Negative for color change and rash.         Positive for swollen port to right chest. Neurological: Positive for dizziness. Negative for syncope, facial asymmetry, speech difficulty, weakness, light-headedness, numbness and headaches. Positive for slower to respond. Hematological: Negative. Does not bruise/bleed easily. Psychiatric/Behavioral: Negative. Negative for confusion and self-injury. The patient is not nervous/anxious. Physical Exam   Physical Exam   Constitutional: She is oriented to person, place, and time. She appears well-developed and well-nourished. She appears ill. Moderate distress. HENT:   Head: Normocephalic and atraumatic. Mouth/Throat: Oropharynx is clear and moist. Mucous membranes are dry. No oropharyngeal exudate. Eyes: Conjunctivae and EOM are normal. Pupils are equal, round, and reactive to light. Neck: Normal range of motion. Cardiovascular: Normal rate, regular rhythm and normal heart sounds. Exam reveals no gallop and no friction rub. No murmur heard. Blood pressure was 68/50. Pulmonary/Chest: Effort normal and breath sounds normal. No respiratory distress. She has no wheezes. She has no rales. She exhibits no tenderness. Port on right chest, erythematous, swollen, and tender. Abdominal: Soft. Bowel sounds are normal. She exhibits no distension and no mass. There is no tenderness. There is no rebound and no guarding. Musculoskeletal: Normal range of motion. She exhibits no edema, tenderness or deformity. Neurological: She is alert and oriented to person, place, and time. She displays normal reflexes. No cranial nerve deficit. She exhibits normal muscle tone. Coordination normal.   Skin: Skin is warm. No rash noted. She is not diaphoretic. Psychiatric: She has a normal mood and affect. Nursing note and vitals reviewed.       Diagnostic Study Results     Labs -     Recent Results (from the past 12 hour(s))   CBC WITH AUTOMATED DIFF    Collection Time: 06/10/18  3:53 AM   Result Value Ref Range    WBC 20.3 (H) 3.6 - 11.0 K/uL    RBC 2.79 (L) 3.80 - 5.20 M/uL    HGB 8.0 (L) 11.5 - 16.0 g/dL    HCT 24.9 (L) 35.0 - 47.0 %    MCV 89.2 80.0 - 99.0 FL    MCH 28.7 26.0 - 34.0 PG    MCHC 32.1 30.0 - 36.5 g/dL    RDW 14.4 11.5 - 14.5 %    PLATELET 937 600 - 194 K/uL    MPV 10.7 8.9 - 12.9 FL    NRBC 0.1 (H) 0  WBC    ABSOLUTE NRBC 0.03 (H) 0.00 - 0.01 K/uL    NEUTROPHILS 76 (H) 32 - 75 %    BAND NEUTROPHILS 4 %    LYMPHOCYTES 9 (L) 12 - 49 %    MONOCYTES 2 (L) 5 - 13 %    EOSINOPHILS 0 0 - 7 %    BASOPHILS 0 0 - 1 %    METAMYELOCYTES 6 %    MYELOCYTES 1 %    PROMYELOCYTES 2 %    IMMATURE GRANULOCYTES 0 0.0 - 0.5 %    ABS. NEUTROPHILS 16.2 (H) 1.8 - 8.0 K/UL    ABS. LYMPHOCYTES 1.8 0.8 - 3.5 K/UL    ABS. MONOCYTES 0.4 0.0 - 1.0 K/UL    ABS. EOSINOPHILS 0.0 0.0 - 0.4 K/UL    ABS. BASOPHILS 0.0 0.0 - 0.1 K/UL    ABS. IMM. GRANS. 0.0 0.00 - 0.04 K/UL    DF MANUAL      RBC COMMENTS ANISOCYTOSIS  1+        WBC COMMENTS TOXIC GRANULATION      Pathologist review SMEAR FOR PATHOLOGIST REVIEW     METABOLIC PANEL, COMPREHENSIVE    Collection Time: 06/10/18  3:53 AM   Result Value Ref Range    Sodium 144 136 - 145 mmol/L    Potassium 3.3 (L) 3.5 - 5.1 mmol/L    Chloride 112 (H) 97 - 108 mmol/L    CO2 20 (L) 21 - 32 mmol/L    Anion gap 12 5 - 15 mmol/L    Glucose 109 (H) 65 - 100 mg/dL    BUN 46 (H) 6 - 20 MG/DL    Creatinine 2.31 (H) 0.55 - 1.02 MG/DL    BUN/Creatinine ratio 20 12 - 20      GFR est AA 26 (L) >60 ml/min/1.73m2    GFR est non-AA 21 (L) >60 ml/min/1.73m2    Calcium 8.3 (L) 8.5 - 10.1 MG/DL    Bilirubin, total 0.4 0.2 - 1.0 MG/DL    ALT (SGPT) 12 12 - 78 U/L    AST (SGOT) 15 15 - 37 U/L    Alk.  phosphatase 73 45 - 117 U/L    Protein, total 5.7 (L) 6.4 - 8.2 g/dL    Albumin 1.8 (L) 3.5 - 5.0 g/dL    Globulin 3.9 2.0 - 4.0 g/dL    A-G Ratio 0.5 (L) 1.1 - 2.2     MAGNESIUM    Collection Time: 06/10/18  3:53 AM   Result Value Ref Range    Magnesium 1.7 1.6 - 2.4 mg/dL   PHOSPHORUS    Collection Time: 06/10/18  3:53 AM   Result Value Ref Range    Phosphorus 2.4 (L) 2.6 - 4.7 MG/DL   PROTHROMBIN TIME + INR    Collection Time: 06/10/18  3:53 AM   Result Value Ref Range    INR 1.2 (H) 0.9 - 1.1      Prothrombin time 11.6 (H) 9.0 - 11.1 sec   PTT    Collection Time: 06/10/18  3:53 AM   Result Value Ref Range    aPTT 25.5 22.1 - 32.0 sec    aPTT, therapeutic range     58.0 - 77.0 SECS       Radiologic Studies -   CT Results  (Last 48 hours)               06/09/18 1212  CT ABD PELV WO CONT Final result    Impression:  IMPRESSION:       1. Inflamed diverticula in the transverse colon likely related to acute   diverticulitis. 2. Thickening of the sigmoid colon. Air-fluid collection in the pelvic   cul-de-sac is likely related to a contained perforation from diverticulitis. This is not adjacent to the diverticulum in the transverse colon. Stranding is   seen along the left pelvic sidewall. 3. Cholelithiasis. Narrative:  EXAM:  CT ABD PELV WO CONT       INDICATION: colitis. Nausea vomiting and diarrhea. On chemotherapy for breast   cancer       COMPARISON: None       CONTRAST:  None. TECHNIQUE:    Thin axial images were obtained through the abdomen and pelvis. Coronal and   sagittal reconstructions were generated. Oral contrast was not administered. CT   dose reduction was achieved through use of a standardized protocol tailored for   this examination and automatic exposure control for dose modulation. The absence of intravenous contrast material reduces the sensitivity for   evaluation of the solid parenchymal organs of the abdomen. FINDINGS:    LUNG BASES: Clear. INCIDENTALLY IMAGED HEART AND MEDIASTINUM: Unremarkable. LIVER: There is a 1.4 cm cyst in the left lateral hepatic lobe. No mass or   biliary dilatation. GALLBLADDER: Cholelithiasis is present. No evidence of cholecystitis. SPLEEN: No mass. PANCREAS: No mass or ductal dilatation. ADRENALS: Unremarkable.    KIDNEYS/URETERS: There is a 5.5 cm cyst in the superior pole left kidney. There   is a 2.8 cm cyst in the posterior left kidney. There is a 1.4 cm cyst in the   lower pole left kidney. No mass, calculus, or hydronephrosis. STOMACH: Small hiatal hernia. SMALL BOWEL: There is an incidental small periampullary duodenal diverticulum. No dilatation or wall thickening. COLON: There is a thickened diverticulum in the transverse colon with   surrounding mild edema in the mesentery. This is best seen on coronal image 37   and sagittal image 90. Trace stranding is seen along the left pelvic sidewall. There is thickening of the sigmoid colon. There is an air-fluid collection in the   pelvic cul-de-sac between the rectum and uterus measuring approximately 3.1 x   3.0 cm on series 3 image 81. APPENDIX: Unremarkable. PERITONEUM: No generalized free air. RETROPERITONEUM: No lymphadenopathy or aortic aneurysm. REPRODUCTIVE ORGANS: The uterus is unremarkable. URINARY BLADDER: No mass or calculus. BONES: No destructive bone lesion. ADDITIONAL COMMENTS: N/A           06/08/18 9859  CT HEAD WO CONT Final result    Impression:  IMPRESSION: No acute changes    . Narrative:  EXAM:  CT HEAD WO CONT       INDICATION:   AMS       COMPARISON: None. CONTRAST:  None. TECHNIQUE: Unenhanced CT of the head was performed using 5 mm images. Brain and   bone windows were generated. CT dose reduction was achieved through use of a   standardized protocol tailored for this examination and automatic exposure   control for dose modulation. FINDINGS:   There is no extra-axial fluid collection hemorrhage shift or masses her. CXR Results  (Last 48 hours)               06/10/18 0508  XR CHEST PORT Final result    Impression:   impression: No acute changes. Support devices as above. Narrative:  Clinical indication: Shortness of breath. Portable AP semierect view of the chest is obtained compared to June 8.  Central   line tip is at this time in the expected position of the right atrium. The lung   fields are clear. 06/08/18 2331  XR CHEST PORT Final result    Impression:  IMPRESSION: Right midlung mild subsegmental atelectasis. Narrative:  INDICATION: Sepsis. Portable AP upright view of the chest.       Direct comparison made to prior chest x-ray dated June 4, 2013. Cardiomediastinal silhouette is stable. Central venous port catheter extends to   the mid SVC. Left internal jugular central venous catheter extends to the right   atrium. There is no pneumothorax. Lungs are hypoinflated. There is mild   subsegmental atelectasis within the right midlung. No pleural fluid is   visualized. Medical Decision Making   I am the first provider for this patient. I reviewed the vital signs, available nursing notes, past medical history, past surgical history, family history and social history. Vital Signs-Reviewed the patient's vital signs. Patient Vitals for the past 12 hrs:   Temp Pulse Resp BP SpO2   06/10/18 1400 - 66 26 98/66 93 %   06/10/18 1300 - 74 16 102/74 -   06/10/18 1200 98.3 °F (36.8 °C) 65 16 109/56 97 %   06/10/18 1100 - 63 17 138/65 96 %   06/10/18 1000 - 62 21 122/41 94 %   06/10/18 0900 - 68 15 132/68 -   06/10/18 0800 - 66 17 141/71 97 %   06/10/18 0700 - 64 15 - 96 %   06/10/18 0600 - (!) 58 19 (!) 81/68 -   06/10/18 0500 - 63 18 122/67 95 %   06/10/18 0415 - 64 20 121/65 95 %   06/10/18 0400 98.4 °F (36.9 °C) 63 21 113/80 94 %       EKG interpretation: (Preliminary) 21:15  Rhythm: normal sinus rhythm; and regular . Rate (approx.): 66; Axis: normal; NV interval: 122; QRS interval: 88; ST/T wave: ST and T wave abnormality.   Written by HAL Tran, as dictated by Mainor Pablo MD.    Records Reviewed: Nursing Notes, Old Medical Records, Previous electrocardiograms, Ambulance Run Sheet, Previous Radiology Studies and Previous Laboratory Studies    Provider Notes (Medical Decision Making):   DDx: sepsis, line infection, bacteremia, MIL, shock, hypovolemia, electrolyte disturbance    Pt is 79 y.o. F with PMhx breast CA on chemotherapy being treated for a line infection with Augmentin presenting with hypotension, N/V/D. Will initiate sepsis protocol and will reassess need for IV pressors. Given diarrhea will send C. diff cultures. Exam nonfocal and abdomen non peritoneal at this time. ED Course:   Initial assessment performed. The patients presenting problems have been discussed, and they are in agreement with the care plan formulated and outlined with them. I have encouraged them to ask questions as they arise throughout their visit. 9:11 PM - I suspect that this patient has an active infection. 9:11 PM - The patient met criteria for severe sepsis at this time. PROVIDER SEPSIS PHYSICAL EXAM EVAL  Vital signs reviewed (see nursing documentation for further details):  Vitals:    06/10/18 1100 06/10/18 1200 06/10/18 1300 06/10/18 1400   BP: 138/65 109/56 102/74 98/66   Pulse: 63 65 74 66   Resp: 17 16 16 26   Temp:  98.3 °F (36.8 °C)     SpO2: 96% 97%  93%       Cardiac exam:Regular Rate    Pulmonary exam:Normal    Peripheral pulses:Normal    Capillary refill:Normal    Skin exam:pink    Exam performed by Natividad Barr MD    SEP-1 Core Measure Exclusion Criteria  Pt No exclusion criteria   Has the patient/family refused IV fluids? no     9:15 PM  Per chart review, pt's port was placed May 8 by general surgery. 10:30PM  Notified by RN that patient remains hypotensive with at least two MAP's < 65 despite 30cc/kg bolus per sepsis protocol. Will consent for central line placement and start levophed once line is placed. Will replete K and Mg. C diff culture sent; broad spectrum IV abx started. Pt critically ill. Family updated.     Procedure Note - Central Line Placement:   11:00 PM  Performed by: Natividad Barr MD    Immediately prior to the procedure, the patient was reevaluated and found suitable for the planned procedure and any planned medications. Immediately prior to the procedure a time out was called to verify the correct patient, procedure, equipment, staff, and marking as appropriate. Area was cleansed with Hibiclens and anesthetized with 3mLs of 1% lidocaine and with epinephrine. Prepped and draped in sterile fashion. Landmarks identified. 18 gauge needle with triple lumen catheter was inserted into pt's Left, Internal Jugular Vein with ultrasound guidance. Line sutured in place; sterile dressing applied. Position: Trendelenburg  Number of attempts: 1  Estimated blood loss: less than 1 cc  The procedure took 16-30 minutes, and pt tolerated well. Consult Note:  12:50 AM  Nancy Ritchie MD spoke with Lenise Curling, MD  Specialty: Hospitalist  Discussed pt's hx, disposition, and available diagnostic and imaging results. Dr. Amira Leon will admit pt.     Medications   sodium chloride (NS) flush 5-10 mL (not administered)   metroNIDAZOLE (FLAGYL) IVPB premix 500 mg (500 mg IntraVENous New Bag 6/10/18 1113)   0.9% sodium chloride infusion (75 mL/hr IntraVENous Rate Change 6/10/18 1325)   prochlorperazine (COMPAZINE) with saline injection 10 mg (not administered)   sodium chloride (NS) flush 5-10 mL (10 mL IntraVENous Given 6/10/18 1356)   sodium chloride (NS) flush 5-10 mL (not administered)   ondansetron (ZOFRAN) injection 4 mg (not administered)   heparin (porcine) injection 5,000 Units (5,000 Units SubCUTAneous Given 6/10/18 0943)   vancomycin 50 mg/mL oral solution (compounded) 250 mg (250 mg Oral Given 6/10/18 1113)   acetaminophen (TYLENOL) suppository 650 mg (not administered)   HYDROmorphone (DILAUDID) injection 1 mg (not administered)   cefepime (MAXIPIME) 2 g in 0.9% sodium chloride (MBP/ADV) 100 mL (not administered)   vancomycin (VANCOCIN) 1250 mg in  ml infusion (1,250 mg IntraVENous New Bag 6/10/18 1355)   vancomycin (VANCOCIN) 2,500 mg in 0.9% sodium chloride 500 mL IVPB (2,500 mg IntraVENous Given 6/9/18 0339)   sodium chloride 0.9 % bolus infusion 1,000 mL (0 mL IntraVENous IV Completed 6/9/18 0016)     Followed by   sodium chloride 0.9 % bolus infusion 1,000 mL (0 mL IntraVENous IV Completed 6/9/18 0016)     Followed by   sodium chloride 0.9 % bolus infusion 994 mL (0 mL IntraVENous Stopped 6/9/18 0059)   piperacillin-tazobactam (ZOSYN) 3.375 g in 0.9% sodium chloride (MBP/ADV) 100 mL ADV (3.375 g IntraVENous Given 6/8/18 2216)   ADDaptor (  Given 6/8/18 2159)   LORazepam (ATIVAN) injection 1 mg (1 mg IntraVENous Given 6/8/18 2254)   potassium chloride SR (KLOR-CON 10) tablet 40 mEq (40 mEq Oral Given 6/9/18 0128)   magnesium sulfate 2 g/50 ml IVPB (premix or compounded) (2 g IntraVENous New Bag 6/9/18 0210)   cefepime (MAXIPIME) 2 g in 0.9% sodium chloride (MBP/ADV) 100 mL (2 g IntraVENous New Bag 6/9/18 0212)   potassium chloride (K-DUR, KLOR-CON) SR tablet 20 mEq (20 mEq Oral Given 6/9/18 0954)   sodium chloride 0.9 % bolus infusion 1,000 mL (1,000 mL IntraVENous New Bag 6/9/18 1241)   sodium bicarbonate 8.4 % (1 mEq/mL) injection 100 mEq (100 mEq IntraVENous Given 6/9/18 1241)   potassium chloride 20 mEq in 50 ml IVPB (20 mEq IntraVENous New Bag 6/10/18 0943)   potassium phosphate 15 mmol in 0.9% sodium chloride 250 mL infusion ( IntraVENous Given 6/10/18 0943)       Critical Care Time:   CRITICAL CARE NOTE :  01:00AM    IMPENDING DETERIORATION -Cardiovascular, CNS, Metabolic, Renal, Hepatic and Infectious  ASSOCIATED RISK FACTORS - Hypotension, Shock, Dysrhythmia, Metabolic changes, Dehydration, Vascular Compromise and CNS Decompensation  MANAGEMENT- Bedside Assessment and Supervision of Care  INTERPRETATION -  Xrays, CT Scan, ECG, Blood Pressure and Cardiac Output Measures   INTERVENTIONS - hemodynamic mngmt, vascular control, Neurologic interventions , Metobolic interventions and management of vasopressors for septic shock despite IVF resuscitation  CASE REVIEW - Hospitalist, Nursing, Family and PCP  TREATMENT RESPONSE -Stable  PERFORMED BY - Self    NOTES   :    I have spent 90 minutes of critical care time involved in lab review, consultations with specialist, family decision- making, bedside attention and documentation. During this entire length of time I was immediately available to the patient . Critical Care: The reason for providing this level of medical care for this critically ill patient was due to a critical illness that impaired one or more vital organ systems, such that there was a high probability of imminent or life threatening deterioration in the patient's condition. This care involved high complexity decision making to assess, manipulate, and support vital system functions, to treat this degree of vital organ system failure, and to prevent further life threatening deterioration of the patients condition. Chan Foote MD    Disposition:  Admit Note:  1:00 AM  Pt is being admitted by Dr. Catarina Elizalde. The results of their tests and reason(s) for their admission have been discussed with pt and/or available family. They convey agreement and understanding for the need to be admitted and for admission diagnosis. Diagnosis     Clinical Impression:   1. Septic shock ([address]    2. Infection due to port-a-cath, initial encounter    3. Diarrhea, unspecified type    4. C. difficile diarrhea    5. Diverticulitis of large intestine with perforation and abscess without bleeding    6. Malignant neoplasm of upper-outer quadrant of right breast in female, estrogen receptor positive ([address]    7. Hypotension, unspecified hypotension type    8. Hypovolemic shock ([address]    9. Acute renal failure, unspecified acute renal failure type ([address]    10. Non-intractable vomiting with nausea, unspecified vomiting type    11. Diarrhea of presumed infectious origin    15. Acute hypokalemia    13. Hypomagnesemia    14.  Metabolic acidosis Attestations: This note is prepared by Leia Amezcua, acting as a Scribe for MD Mainor Gonzalez MD: The scribe's documentation has been prepared under my direction and personally reviewed by me in its entirety. I confirm that the notes above accurately reflects all work, treatment, procedures, and medical decision making performed by me. This note will not be viewable in [address]

## 2018-06-09 NOTE — PROGRESS NOTES
Pharmacy Automatic Renal Dosing Protocol - Antimicrobials    Indication for Antimicrobials: bacteremia     Current Regimen of Each Antimicrobial:  Vancomycin 2.5g load, then 1250mg q16h. (Start Date ; Day # 2  Cefepime 2g X1, then 1g q 24 hours (; day #2  Metronidazole 500 mg q 8 hours (; day #1    Previous abx:  Zosyn 3.375g x1 dose  Levaquin 750mg x1 dose    Goal Level: VANCOMYCIN TROUGH GOAL RANGE    Vancomycin Trough: 15 - 20 mcg/mL    Date Dose & Interval Measured (mcg/mL) Extrapolated (mcg/mL)                       Significant Cultures:   : Blood cx pending  : Stool cx: pending   : C. Diff: pending    Radiology / Imaging results: (X-ray, CT scan or MRI): NA  X- ray on : Right midlung mild subsegmental atelectasis. Paralysis, amputations, malnutrition: NA    Labs:  Recent Labs      18   0438  18   2122   CREA  4.83*  5.69*   BUN  65*  71*   WBC  20.3*  12.0*     Temp (24hrs), Av.6 °F (36.4 °C), Min:97.3 °F (36.3 °C), Max:97.7 °F (36.5 °C)    Creatinine Clearance (mL/min) or Dialysis: 13    Impression/Plan:   · Start vancomycin 2.5g once, then 1250mg q36h and this will yield an estimated trough of 18 mcg/ml  · Continue with the current dose of cefepime  · Continue with the current dose of metronidazole (pending c. Diff)   · WBC trending up  · Antimicrobial stop date tbd     Pharmacy will follow daily and adjust medications as appropriate for renal function and/or serum levels. Thank you,  Robert Hutton, PHARMD    Recommended duration of therapy  [email or web address]/[facility]/Pharmacy/Clinical%20Companion/Duration%20of%20ABX%[email or web address]. docx    Renal Dosing  [email or web address]/[facility]/Pharmacy/Clinical%20Companion/Renal%[email or web address]. pdf

## 2018-06-09 NOTE — H&P
Hospitalist Admission Note    NAME: Silverio Alexis   :  1950   MRN:  [id number]     Date/Time:  2018 1:05 AM    Patient PCP: Jorge Luis Bansal MD  ______________________________________________________________________  Given the patient's current clinical presentation, I have a high level of concern for decompensation if discharged from the emergency department. Complex decision making was performed, which includes reviewing the patient's available past medical records, laboratory results, and x-ray films. My assessment of this patient's clinical condition and my plan of care is as follows. Assessment / Plan:  Septic vs Hypovolumic Shock POA  Admit to ICU  IVF  Titrate levophed  If sepsis then source could be line infection vs stool  Hypovolumic can be due to nausea, vomiting and diarrhea  IV vancomycin, cefepime and flagyl for now  F/u stool studies and blood cultures  CVP monitoring  Pt only takes steroids prior and after treatment and not on it chronically so doubt adrenal crisis     Acute renal failure  Suspect dehydration as week of nausea, vomiting and diarrhea.  IVF and monitor lytes  If doesn't improve significantly then consider nephrology consult and checking renal US  On NSAID at home, will avoid    Hypokalemia/Hypomagnesemia  Replete and monitor    Breast cancer of upper-outer quadrant of right female breast   On chemo, suspect side affect been the presentation  Will consult hematology    Nausea, vomiting and diarrhea  Symptoms started right after chemo and starting Augmentin, so less likely infection but considering critically ill patient on Px Flagyl as above and f/u stool studies  Symptomatic management with PRN IV zofran  Avoid immodium (pt was using PTA) until infection rules out    HTN (hypertension)   Holding meds due to shock    Code Status: Full  Surrogate Decision Maker:     DVT Prophylaxis: SQ Heparin    Baseline: functional      Subjective:   CHIEF COMPLAINT: nausea, vomiting and diarrhea    HISTORY OF PRESENT ILLNESS:     Onur Plaza is a 79 y.o.  female who presents with nausea, vomiting and diarrhea. As per patient, pt is on chemotherapy and a week ago she noted redness around the port and she was started on PO abx. The day she started abx, redness around the port got better but pt started to have diarrhea, nausea and vomiting. She claims diarrhea started on the same day of starting abx. Pt reported fever upto 100.4 when there was redness around the port but got better after abx. Pt reported, diarrhea is constant and unable to tell number of episodes a day. Nausea is also constant but had been having 1 episode of vomiting a day. Pt reports lower abdominal cramps with diarrhea but denies any abdominal pain, chest pain, cough, problems urination. In ED pt noted to be in shock and didn't respond to IVF and started on levophed. We were asked to admit for work up and evaluation of the above problems. Past Medical History:   Diagnosis Date    Arthritis     Breast cancer, right breast ([facility] [address]     Depression     GERD (gastroesophageal reflux disease)     Hypercholesterolemia     Hypertension     Other ill-defined conditions(799.89) 2012    dog bite dec 2012 needing blood transfusion        Past Surgical History:   Procedure Laterality Date    HX BREAST LUMPECTOMY Right 3/8/2018    RIGHT BREAST LUMPECTOMY WITH ULTRASOUND performed by Sintia Lema MD at [facility] AMBULATORY OR    HX COLONOSCOPY      HX KNEE REPLACEMENT Right 06/2013    HX TONSILLECTOMY         Social History   Substance Use Topics    Smoking status: Former Smoker     Packs/day: 0.25     Years: 2.00     Quit date: 6/4/1972    Smokeless tobacco: Never Used    Alcohol use No        Family History   Problem Relation Age of Onset    Adopted:  Yes    Breast Cancer Mother     Stroke Mother     Hypertension Mother     Cancer Mother      skin    Cancer Father      skin    Pulmonary Fibrosis Father     Cancer Sister 79     colon    Breast Cancer Maternal Aunt     Breast Cancer Paternal Aunt     Breast Cancer Maternal Aunt      ovarian and colon cancer    Breast Cancer Maternal Aunt      ovarian and colon cancer    Breast Cancer Maternal Aunt     Breast Cancer Paternal Aunt     Breast Cancer Maternal Aunt     Cancer Maternal Grandmother      liver/ovarian     Allergies   Allergen Reactions    Nasacort [Triamcinolone Acetonide] Other (comments)     headache    Sulfa (Sulfonamide Antibiotics) Rash        Prior to Admission medications    Medication Sig Start Date End Date Taking? Authorizing Provider   ondansetron (ZOFRAN ODT) 4 mg disintegrating tablet Take 1 Tab by mouth every eight (8) hours as needed for Nausea. 4/20/18  Yes Bear Pascal NP   lidocaine-prilocaine (EMLA) topical cream Apply  to affected area as needed for Pain. 4/20/18  Yes Bear Pascal NP   dexamethasone (DECADRON) 4 mg tablet Take 2 Tabs by mouth daily. The day before, the day of and the day after chemotherapy 4/20/18  Yes Bear Pascal NP   ondansetron (ZOFRAN ODT) 4 mg disintegrating tablet Take 1 Tab by mouth every six (6) hours as needed for Nausea. 3/8/18  Yes Александр Mckeon MD   lisinopril-hydroCHLOROthiazide (PRINZIDE, ZESTORETIC) 20-25 mg per tablet Take 1 Tab by mouth daily. Indications: hypertension   Yes Historical Provider   calcium-cholecalciferol, d3, (CALCIUM 600 + D) 600-125 mg-unit Tab Take 1 Tab by mouth two (2) times a day. Yes Historical Provider   Cholecalciferol, Vitamin D3, (VITAMIN D3) 1,000 unit cap Take 1 Tab by mouth daily. Yes Historical Provider   atenolol (TENORMIN) 50 mg tablet Take 25 mg by mouth daily. Yes Historical Provider   prochlorperazine (COMPAZINE) 10 mg tablet Take 10 mg by mouth every six (6) hours as needed. Shelton Serna MD   amoxicillin-clavulanate (AUGMENTIN) 500-125 mg per tablet Take 1 Tab by mouth every twelve (12) hours for 10 days.  6/1/18 6/11/18  Tiburcio Roberts NP   ibuprofen (MOTRIN) 200 mg tablet Take  by mouth. Historical Provider   garlic 1 mg cap Take  by mouth daily. Historical Provider   oxyCODONE-acetaminophen (PERCOCET) 5-325 mg per tablet Take 1 Tab by mouth every four (4) hours as needed for Pain. Max Daily Amount: 6 Tabs. 3/8/18   Leonora Fofana MD   ferrous sulfate (IRON) 325 mg (65 mg iron) tablet Take 325 mg by mouth Daily (before breakfast). Historical Provider   etodolac (LODINE) 400 mg tablet Take 400 mg by mouth daily. Historical Provider       REVIEW OF SYSTEMS:     I am not able to complete the review of systems because:    The patient is intubated and sedated    The patient has altered mental status due to his acute medical problems    The patient has baseline aphasia from prior stroke(s)    The patient has baseline dementia and is not reliable historian    The patient is in acute medical distress and unable to provide information           Total of 12 systems reviewed as follows:       POSITIVE= underlined text  Negative = text not underlined  General:  fever, chills, sweats, generalized weakness, weight loss/gain,      loss of appetite   Eyes:    blurred vision, eye pain, loss of vision, double vision  ENT:    rhinorrhea, pharyngitis   Respiratory:   cough, sputum production, SOB, JACOBO, wheezing, pleuritic pain   Cardiology:   chest pain, palpitations, orthopnea, PND, edema, syncope   Gastrointestinal:  abdominal pain , N/V, diarrhea, dysphagia, constipation, bleeding   Genitourinary:  frequency, urgency, dysuria, hematuria, incontinence   Muskuloskeletal :  arthralgia, myalgia, back pain  Hematology:  easy bruising, nose or gum bleeding, lymphadenopathy   Dermatological: rash, ulceration, pruritis, color change / jaundice  Endocrine:   hot flashes or polydipsia   Neurological:  headache, dizziness, confusion, focal weakness, paresthesia,     Speech difficulties, memory loss, gait difficulty  Psychological: Feelings of anxiety, depression, agitation    Objective:   VITALS:    Visit Vitals    /40    Pulse 66    Temp 97.3 °F (36.3 °C)    Resp 21    Ht 5' 2\" (1.575 m)    Wt 99.8 kg (220 lb)    SpO2 96%    BMI 40.24 kg/m2       PHYSICAL EXAM:    General:    Alert, cooperative, no distress, appears stated age. HEENT: Atraumatic, anicteric sclerae, pink conjunctivae     No oral ulcers, mucosa dry, throat clear, dentition fair  Neck:  Supple, symmetrical,  thyroid: non tender  Lungs:   Clear to auscultation bilaterally. No Wheezing or Rhonchi. No rales. Chest wall:  No tenderness  No Accessory muscle use. Heart:   Regular  rhythm,  No  murmur   No edema  Abdomen:   Soft, non-tender. Not distended. Bowel sounds normal  Extremities: No cyanosis. No clubbing,      Skin turgor normal, Capillary refill normal, Radial dial pulse 2+  Skin:     Not pale. Not Jaundiced  No rashes   Psych:  Fair insight. Not depressed. Not anxious or agitated. Neurologic: EOMs intact. No facial asymmetry. No aphasia or slurred speech. Symmetrical strength, Sensation grossly intact.  Alert and oriented X 4.     _______________________________________________________________________  Care Plan discussed with:    Comments   Patient y    Family  y At bedside   RN y    Care Manager                    Consultant:  tayla ED physician   _______________________________________________________________________  Expected  Disposition:   Home with Family    HH/PT/OT/RN    SNF/LTC    LUIS    ________________________________________________________________________  TOTAL TIME: 61 Minutes    Critical Care Provided   61  Minutes non procedure based      Comments    y Reviewed previous records   >50% of visit spent in counseling and coordination of care y Discussion with patient and family and questions answered       ________________________________________________________________________  Signed: Chandler Brooks, MD    Procedures: see electronic medical records for all procedures/Xrays and details which were not copied into this note but were reviewed prior to creation of Plan. LAB DATA REVIEWED:    Recent Results (from the past 24 hour(s))   EKG, 12 LEAD, INITIAL    Collection Time: 06/08/18  9:15 PM   Result Value Ref Range    Ventricular Rate 66 BPM    Atrial Rate 66 BPM    P-R Interval 122 ms    QRS Duration 88 ms    Q-T Interval 436 ms    QTC Calculation (Bezet) 457 ms    Calculated P Axis 85 degrees    Calculated R Axis 22 degrees    Calculated T Axis 25 degrees    Diagnosis       Normal sinus rhythm  ST & T wave abnormality, consider anterior ischemia  Abnormal ECG  When compared with ECG of 06-MAR-2018 14:10,  ST now depressed in Anterior leads  T wave inversion more evident in Anterior leads     LACTIC ACID    Collection Time: 06/08/18  9:22 PM   Result Value Ref Range    Lactic acid 1.5 0.4 - 2.0 MMOL/L   METABOLIC PANEL, COMPREHENSIVE    Collection Time: 06/08/18  9:22 PM   Result Value Ref Range    Sodium 131 (L) 136 - 145 mmol/L    Potassium 2.8 (L) 3.5 - 5.1 mmol/L    Chloride 94 (L) 97 - 108 mmol/L    CO2 18 (L) 21 - 32 mmol/L    Anion gap 19 (H) 5 - 15 mmol/L    Glucose 126 (H) 65 - 100 mg/dL    BUN 71 (H) 6 - 20 MG/DL    Creatinine 5.69 (H) 0.55 - 1.02 MG/DL    BUN/Creatinine ratio 12 12 - 20      GFR est AA 9 (L) >60 ml/min/1.73m2    GFR est non-AA 7 (L) >60 ml/min/1.73m2    Calcium 8.9 8.5 - 10.1 MG/DL    Bilirubin, total 0.5 0.2 - 1.0 MG/DL    ALT (SGPT) 15 12 - 78 U/L    AST (SGOT) 12 (L) 15 - 37 U/L    Alk.  phosphatase 79 45 - 117 U/L    Protein, total 6.9 6.4 - 8.2 g/dL    Albumin 2.1 (L) 3.5 - 5.0 g/dL    Globulin 4.8 (H) 2.0 - 4.0 g/dL    A-G Ratio 0.4 (L) 1.1 - 2.2     CBC WITH AUTOMATED DIFF    Collection Time: 06/08/18  9:22 PM   Result Value Ref Range    WBC 12.0 (H) 3.6 - 11.0 K/uL    RBC 3.09 (L) 3.80 - 5.20 M/uL    HGB 8.8 (L) 11.5 - 16.0 g/dL    HCT 26.8 (L) 35.0 - 47.0 %    MCV 86.7 80.0 - 99.0 FL    MCH 28.5 26.0 - 34.0 PG    MCHC 32.8 30.0 - 36.5 g/dL    RDW 14.1 11.5 - 14.5 %    PLATELET 195 652 - 184 K/uL    MPV 11.4 8.9 - 12.9 FL    NRBC 0.3 (H) 0  WBC    ABSOLUTE NRBC 0.04 (H) 0.00 - 0.01 K/uL    NEUTROPHILS 78 (H) 32 - 75 %    BAND NEUTROPHILS 7 %    LYMPHOCYTES 8 (L) 12 - 49 %    MONOCYTES 2 (L) 5 - 13 %    EOSINOPHILS 0 0 - 7 %    BASOPHILS 0 0 - 1 %    METAMYELOCYTES 5 %    IMMATURE GRANULOCYTES 0 0.0 - 0.5 %    ABS. NEUTROPHILS 10.2 (H) 1.8 - 8.0 K/UL    ABS. LYMPHOCYTES 1.0 0.8 - 3.5 K/UL    ABS. MONOCYTES 0.2 0.0 - 1.0 K/UL    ABS. EOSINOPHILS 0.0 0.0 - 0.4 K/UL    ABS. BASOPHILS 0.0 0.0 - 0.1 K/UL    ABS. IMM.  GRANS. 0.0 0.00 - 0.04 K/UL    DF AUTOMATED      PLATELET COMMENTS Giant platelets      RBC COMMENTS NORMOCYTIC, NORMOCHROMIC      WBC COMMENTS TOXIC GRANULATION     TROPONIN I    Collection Time: 06/08/18  9:22 PM   Result Value Ref Range    Troponin-I, Qt. <0.04 <0.05 ng/mL

## 2018-06-09 NOTE — PROGRESS NOTES
Occupational Therapy Note  Orders received, chart reviewed. Spoke to RN who reports that pt remains on 16 mcg of Levo for BP control and is not appropriate to attempt OOB mobility/ADL assessment. Will defer however f/u Mondat.  Thank you  Jarad Be OTR/L

## 2018-06-09 NOTE — PROGRESS NOTES
Pt seen and examined.   Admitted by my partner earlier in am.  Cont present mgmt  Wean levophed as tolerated

## 2018-06-09 NOTE — CONSULTS
Surgery      Surgical consult requested in this 78 yo female with breast CA on chemo, 2nd course was about 10 days ago, who was recently being treated for a possible port infection with Augmentin who in the past week or so developed diarrhea. Pain she describes was always related to the diarrhea and does not give a history of constant abdominal [pain with fever or chills for several days. Any disease process may be masked or compromised in presentation due to her being immunocompromised from her chemo or from being on the Augmentin. Called now specifically regarding a CT scan performed today and her presentation of sepsis. WBC 22.8  BUN/Cr 61/4.04    Cdiff positive    CT scan: \"IMPRESSION:     1. Inflamed diverticula in the transverse colon likely related to acute  diverticulitis. 2. Thickening of the sigmoid colon. Air-fluid collection in the pelvic  cul-de-sac is likely related to a contained perforation from diverticulitis. This is not adjacent to the diverticulum in the transverse colon. Stranding is  seen along the left pelvic sidewall. \"    PE no evidence of peritonitis    Case discussed briefly with Dr Ml Laura    Case discussed with IR, location quite difficult to approach, size may respond to IV antibx. , suggest repeat CT scan on Monday to assess further    Acute Diverticulitis  Contained air fluid collection  C diff  immunocompromised by Chemo for breast CA    NPO except po vanc for C diff  Antibx regimen as ordered for diverticulitis  Supportive measures  Repeat CT Scan on Monday to assess intraabdominal process. Check coags      Rhoda Li.  Chelsea Ocampo MD, [facility]

## 2018-06-09 NOTE — CONSULTS
NEPHROLOGY CONSULT NOTE     Patient: Yola Bauer MRN: [id number]  PCP: Fox Julian MD   :     1950  Age:   79 y.o. Sex:  female      Referring physician: Clarence Olson MD  Reason for consultation: 79 y.o. female with Shock [facility]) complicated by MIL   Admission Date: 2018  8:52 PM  LOS: 0 days     ASSESSMENT and PLAN :   MIL :  - 2/2 severe pre renal azotemia  From diarrhea, Hypotension, ACE (I), diuretics   - Non oliguric and rapidly improving renal function   - Kidneys looked normal on CT Abdomen   - continue IVF   - Hold Lisinopril for now     Metabolic acidosis :  - 2/2 ARF, GI losses  - improving     Hypokalemia :  - 2/2 GI Losses- better    Septic Shock   - 2/2 C diff , perf diverticulitis   - per PCCM     Breast Ca   Recent port-a-cath infection     HTN : Now in shock        Subjective:   HPI: Yola Bauer is a 79 y.o.   female who has been admitted to the hospital for weakness  She was found to be in septic shock and found to have c diff diarrhea and perf diverticulitis   She has been undergoing chemo for bBreats Ca and had 2nd cycle recently   She had a port infection last week that was treated w/ augmentin and started having diarrhea 3 days into Abx   No prior renal issues  Not taking Ibuprofen that was listed on PTA  Has been taking imodium for diarrhea       Past Medical Hx:   Past Medical History:   Diagnosis Date    Arthritis     Breast cancer, right breast ([address]     Depression     GERD (gastroesophageal reflux disease)     Hypercholesterolemia     Hypertension     Other ill-defined conditions(799.89)     dog bite dec 2012 needing blood transfusion        Past Surgical Hx:     Past Surgical History:   Procedure Laterality Date    HX BREAST LUMPECTOMY Right 3/8/2018    RIGHT BREAST LUMPECTOMY WITH ULTRASOUND performed by [facility] MD at [facility] AMBULAT[facility]    HX COLONOSCOPY      HX KNEE REPLACEMENT Right 2013    HX TONSILLECTOMY         Medications:  Prior to Admission medications    Medication Sig Start Date End Date Taking? Authorizing Provider   raNITIdine (ZANTAC) 150 mg tablet Take 150 mg by mouth two (2) times a day. Indications: Heartburn, PREVENTION OF STRESS ULCER   Yes Historical Provider   amoxicillin-clavulanate (AUGMENTIN) 500-125 mg per tablet Take 1 Tab by mouth every twelve (12) hours for 10 days. 6/1/18 6/11/18 Yes Naz Jorge NP   ondansetron (ZOFRAN ODT) 4 mg disintegrating tablet Take 1 Tab by mouth every eight (8) hours as needed for Nausea. 4/20/18  Yes Rayshawn Ferris NP   lidocaine-prilocaine (EMLA) topical cream Apply  to affected area as needed for Pain. 4/20/18  Yes Rayshawn Ferris NP   dexamethasone (DECADRON) 4 mg tablet Take 2 Tabs by mouth daily. The day before, the day of and the day after chemotherapy 4/20/18  Yes Rayshawn Ferris NP   ondansetron (ZOFRAN ODT) 4 mg disintegrating tablet Take 1 Tab by mouth every six (6) hours as needed for Nausea. 3/8/18  Yes Tressa Fernández MD   lisinopril-hydroCHLOROthiazide (PRINZIDE, ZESTORETIC) 20-25 mg per tablet Take 1 Tab by mouth daily. Indications: hypertension   Yes Historical Provider   calcium-cholecalciferol, d3, (CALCIUM 600 + D) 600-125 mg-unit Tab Take 1 Tab by mouth two (2) times a day. Yes Historical Provider   Cholecalciferol, Vitamin D3, (VITAMIN D3) 1,000 unit cap Take 1 Tab by mouth daily. Yes Historical Provider   atenolol (TENORMIN) 50 mg tablet Take 25 mg by mouth daily. Yes Historical Provider   prochlorperazine (COMPAZINE) 10 mg tablet Take 10 mg by mouth every six (6) hours as needed. Shelton Serna MD   ibuprofen (MOTRIN) 200 mg tablet Take  by mouth. Historical Provider   garlic 1 mg cap Take  by mouth daily. Historical Provider   oxyCODONE-acetaminophen (PERCOCET) 5-325 mg per tablet Take 1 Tab by mouth every four (4) hours as needed for Pain. Max Daily Amount: 6 Tabs.  3/8/18   Leonora Gruber MD   ferrous sulfate (IRON) 325 mg (65 mg iron) tablet Take 325 mg by mouth Daily (before breakfast). Historical Provider   etodolac (LODINE) 400 mg tablet Take 400 mg by mouth daily. Historical Provider       Allergies   Allergen Reactions    Nasacort [Triamcinolone Acetonide] Other (comments)     headache    Sulfa (Sulfonamide Antibiotics) Rash       Social Hx:  reports that she quit smoking about 46 years ago. She has a 0.50 pack-year smoking history. She has never used smokeless tobacco. She reports that she does not drink alcohol or use illicit drugs. Family History   Problem Relation Age of Onset    Adopted: Yes    Breast Cancer Mother     Stroke Mother     Hypertension Mother     Cancer Mother      skin    Cancer Father      skin    Pulmonary Fibrosis Father     Cancer Sister 79     colon    Breast Cancer Maternal Aunt     Breast Cancer Paternal Aunt     Breast Cancer Maternal Aunt      ovarian and colon cancer    Breast Cancer Maternal Aunt      ovarian and colon cancer    Breast Cancer Maternal Aunt     Breast Cancer Paternal Aunt     Breast Cancer Maternal Aunt     Cancer Maternal Grandmother      liver/ovarian       Review of Systems:  A twelve point review of system was performed today. Pertinent positives and negatives are mentioned in the HPI. The reminder of the ROS is negative and noncontributory.      Objective:    Vitals:    Vitals:    06/09/18 0800 06/09/18 0823 06/09/18 1000 06/09/18 1200   BP:  118/58 115/52 144/57   Pulse: 62 65 63    Resp: 18 19 17    Temp:    98 °F (36.7 °C)   SpO2: 98% 97% 99%    Weight:       Height:         I&O's:  06/08 0701 - 06/09 0700  In: 704.6 [I.V.:704.6]  Out: -   Visit Vitals    /57    Pulse 63    Temp 98 °F (36.7 °C)    Resp 17    Ht 5' 2\" (1.575 m)    Wt 104 kg (229 lb 4.5 oz)    SpO2 99%    BMI 41.94 kg/m2       Physical Exam:  General:Alert, No distress, ill looking   HEENT: pale   Neck:Supple,no mass palpable  Lungs : Clears to auscultation Bilaterally, Normal respiratory effort  CVS: RRR, S1 S2 normal, No rub,  no LE edema  Abdomen: Soft, Non tender, No hepatosplenomegaly, bowel sounds present  Extremities: No cyanosis, No clubbing  Skin: No rash or lesions. Lymph nodes: No palpable nodes  MS: No joint swelling, erythema, warmth  Neurologic: non focal, AAO x 3  Psych: normal affect    Laboratory Results:  Recent Labs      06/09/18   1249  06/09/18   0438  06/08/18 2122   NA  139  134*  131*   K  3.2*  3.2*  2.8*   CL  105  103  94*   CO2  22  15*  18*   GLU  151*  118*  126*   BUN  61*  65*  71*   CREA  4.04*  4.83*  5.69*   CA  8.1*  8.2*  8.9   MG  1.8  2.1  1.3*   PHOS  2.9   --    --    ALB  1.7*  1.8*  2.1*   SGOT  13*  15  12*   ALT  11*  13  15     Recent Labs      06/09/18   1249  06/09/18   0438 06/08/18 2122   WBC  22.0*  20.3*  12.0*   HGB  8.1*  8.3*  8.8*   HCT  24.3*  26.0*  26.8*   PLT  287  309  293     Lab Results   Component Value Date/Time    Specimen Description: NARES NARES 06/04/2013 03:43 PM    Specimen Description: [address] 06/04/2013 03:30 PM     Lab Results   Component Value Date/Time    Culture result: NO GROWTH AFTER 10 HOURS 06/08/2018 09:22 PM    Culture result: NO GROWTH AFTER 10 HOURS 06/08/2018 09:22 PM    Culture result:  06/04/2013 03:43 PM     MRSA NOT PRESENT      Screening of patient nares for MRSA is for surveillance purposes and, if positive, to facilitate isolation considerations in high risk settings. It is not intended for automatic decolonization interventions per se as regimens are not sufficiently effective to warrant routine use.      Recent Results (from the past 24 hour(s))   EKG, 12 LEAD, INITIAL    Collection Time: 06/08/18  9:15 PM   Result Value Ref Range    Ventricular Rate 66 BPM    Atrial Rate 66 BPM    P-R Interval 122 ms    QRS Duration 88 ms    Q-T Interval 436 ms    QTC Calculation (Bezet) 457 ms    Calculated P Axis 85 degrees    Calculated R Axis 22 degrees    Calculated T Axis 25 degrees    Diagnosis       Normal sinus rhythm  ST & T wave abnormality, consider anterior ischemia  Abnormal ECG  When compared with ECG of 06-MAR-2018 14:10,  ST now depressed in Anterior leads  T wave inversion more evident in Anterior leads     CULTURE, BLOOD    Collection Time: 06/08/18  9:22 PM   Result Value Ref Range    Special Requests: NO SPECIAL REQUESTS      Culture result: NO GROWTH AFTER 10 HOURS     LACTIC ACID    Collection Time: 06/08/18  9:22 PM   Result Value Ref Range    Lactic acid 1.5 0.4 - 2.0 MMOL/L   CULTURE, BLOOD    Collection Time: 06/08/18  9:22 PM   Result Value Ref Range    Special Requests: NO SPECIAL REQUESTS      Culture result: NO GROWTH AFTER 10 HOURS     METABOLIC PANEL, COMPREHENSIVE    Collection Time: 06/08/18  9:22 PM   Result Value Ref Range    Sodium 131 (L) 136 - 145 mmol/L    Potassium 2.8 (L) 3.5 - 5.1 mmol/L    Chloride 94 (L) 97 - 108 mmol/L    CO2 18 (L) 21 - 32 mmol/L    Anion gap 19 (H) 5 - 15 mmol/L    Glucose 126 (H) 65 - 100 mg/dL    BUN 71 (H) 6 - 20 MG/DL    Creatinine 5.69 (H) 0.55 - 1.02 MG/DL    BUN/Creatinine ratio 12 12 - 20      GFR est AA 9 (L) >60 ml/min/1.73m2    GFR est non-AA 7 (L) >60 ml/min/1.73m2    Calcium 8.9 8.5 - 10.1 MG/DL    Bilirubin, total 0.5 0.2 - 1.0 MG/DL    ALT (SGPT) 15 12 - 78 U/L    AST (SGOT) 12 (L) 15 - 37 U/L    Alk.  phosphatase 79 45 - 117 U/L    Protein, total 6.9 6.4 - 8.2 g/dL    Albumin 2.1 (L) 3.5 - 5.0 g/dL    Globulin 4.8 (H) 2.0 - 4.0 g/dL    A-G Ratio 0.4 (L) 1.1 - 2.2     CBC WITH AUTOMATED DIFF    Collection Time: 06/08/18  9:22 PM   Result Value Ref Range    WBC 12.0 (H) 3.6 - 11.0 K/uL    RBC 3.09 (L) 3.80 - 5.20 M/uL    HGB 8.8 (L) 11.5 - 16.0 g/dL    HCT 26.8 (L) 35.0 - 47.0 %    MCV 86.7 80.0 - 99.0 FL    MCH 28.5 26.0 - 34.0 PG    MCHC 32.8 30.0 - 36.5 g/dL    RDW 14.1 11.5 - 14.5 %    PLATELET 132 221 - 834 K/uL    MPV 11.4 8.9 - 12.9 FL    NRBC 0.3 (H) 0  WBC    ABSOLUTE NRBC 0.04 (H) 0.00 - 0.01 K/uL    NEUTROPHILS 78 (H) 32 - 75 %    BAND NEUTROPHILS 7 %    LYMPHOCYTES 8 (L) 12 - 49 %    MONOCYTES 2 (L) 5 - 13 %    EOSINOPHILS 0 0 - 7 %    BASOPHILS 0 0 - 1 %    METAMYELOCYTES 5 %    IMMATURE GRANULOCYTES 0 0.0 - 0.5 %    ABS. NEUTROPHILS 10.2 (H) 1.8 - 8.0 K/UL    ABS. LYMPHOCYTES 1.0 0.8 - 3.5 K/UL    ABS. MONOCYTES 0.2 0.0 - 1.0 K/UL    ABS. EOSINOPHILS 0.0 0.0 - 0.4 K/UL    ABS. BASOPHILS 0.0 0.0 - 0.1 K/UL    ABS. IMM. GRANS. 0.0 0.00 - 0.04 K/UL    DF AUTOMATED      PLATELET COMMENTS Giant platelets      RBC COMMENTS NORMOCYTIC, NORMOCHROMIC      WBC COMMENTS TOXIC GRANULATION     TROPONIN I    Collection Time: 06/08/18  9:22 PM   Result Value Ref Range    Troponin-I, Qt. <0.04 <0.05 ng/mL   MAGNESIUM    Collection Time: 06/08/18  9:22 PM   Result Value Ref Range    Magnesium 1.3 (L) 1.6 - 2.4 mg/dL   C. DIFFICILE (DNA)    Collection Time: 06/08/18 10:15 PM   Result Value Ref Range    C. difficile (DNA) POSITIVE (A) NEG     WBC, STOOL    Collection Time: 06/08/18 10:15 PM   Result Value Ref Range    White blood cells, stool 0 TO 4 0 - 4 /HPF   METABOLIC PANEL, COMPREHENSIVE    Collection Time: 06/09/18  4:38 AM   Result Value Ref Range    Sodium 134 (L) 136 - 145 mmol/L    Potassium 3.2 (L) 3.5 - 5.1 mmol/L    Chloride 103 97 - 108 mmol/L    CO2 15 (LL) 21 - 32 mmol/L    Anion gap 16 (H) 5 - 15 mmol/L    Glucose 118 (H) 65 - 100 mg/dL    BUN 65 (H) 6 - 20 MG/DL    Creatinine 4.83 (H) 0.55 - 1.02 MG/DL    BUN/Creatinine ratio 13 12 - 20      GFR est AA 11 (L) >60 ml/min/1.73m2    GFR est non-AA 9 (L) >60 ml/min/1.73m2    Calcium 8.2 (L) 8.5 - 10.1 MG/DL    Bilirubin, total 0.4 0.2 - 1.0 MG/DL    ALT (SGPT) 13 12 - 78 U/L    AST (SGOT) 15 15 - 37 U/L    Alk.  phosphatase 81 45 - 117 U/L    Protein, total 6.3 (L) 6.4 - 8.2 g/dL    Albumin 1.8 (L) 3.5 - 5.0 g/dL    Globulin 4.5 (H) 2.0 - 4.0 g/dL    A-G Ratio 0.4 (L) 1.1 - 2.2     CBC WITH AUTOMATED DIFF    Collection Time: 06/09/18  4:38 AM   Result Value Ref Range    WBC 20.3 (H) 3.6 - 11.0 K/uL RBC 2.89 (L) 3.80 - 5.20 M/uL    HGB 8.3 (L) 11.5 - 16.0 g/dL    HCT 26.0 (L) 35.0 - 47.0 %    MCV 90.0 80.0 - 99.0 FL    MCH 28.7 26.0 - 34.0 PG    MCHC 31.9 30.0 - 36.5 g/dL    RDW 14.2 11.5 - 14.5 %    PLATELET 146 458 - 056 K/uL    MPV 11.2 8.9 - 12.9 FL    NRBC 0.2 (H) 0  WBC    ABSOLUTE NRBC 0.05 (H) 0.00 - 0.01 K/uL    NEUTROPHILS 66 32 - 75 %    BAND NEUTROPHILS 9 %    LYMPHOCYTES 11 (L) 12 - 49 %    MONOCYTES 6 5 - 13 %    EOSINOPHILS 3 0 - 7 %    BASOPHILS 0 0 - 1 %    METAMYELOCYTES 3 %    MYELOCYTES 2 %    IMMATURE GRANULOCYTES 0 0.0 - 0.5 %    ABS. NEUTROPHILS 15.2 (H) 1.8 - 8.0 K/UL    ABS. LYMPHOCYTES 2.2 0.8 - 3.5 K/UL    ABS. MONOCYTES 1.2 (H) 0.0 - 1.0 K/UL    ABS. EOSINOPHILS 0.6 (H) 0.0 - 0.4 K/UL    ABS. BASOPHILS 0.0 0.0 - 0.1 K/UL    ABS. IMM.  GRANS. 0.0 0.00 - 0.04 K/UL    DF AUTOMATED      RBC COMMENTS NORMOCYTIC, NORMOCHROMIC      WBC COMMENTS TOXIC GRANULATION     MAGNESIUM    Collection Time: 06/09/18  4:38 AM   Result Value Ref Range    Magnesium 2.1 1.6 - 2.4 mg/dL   URINALYSIS W/ RFLX MICROSCOPIC    Collection Time: 06/09/18 12:30 PM   Result Value Ref Range    Color YELLOW/STRAW      Appearance TURBID (A) CLEAR      Specific gravity 1.013 1.003 - 1.030      pH (UA) 5.0 5.0 - 8.0      Protein NEGATIVE  NEG mg/dL    Glucose NEGATIVE  NEG mg/dL    Ketone NEGATIVE  NEG mg/dL    Bilirubin NEGATIVE  NEG      Blood NEGATIVE  NEG      Urobilinogen 0.2 0.2 - 1.0 EU/dL    Nitrites NEGATIVE  NEG      Leukocyte Esterase NEGATIVE  NEG     CBC WITH AUTOMATED DIFF    Collection Time: 06/09/18 12:49 PM   Result Value Ref Range    WBC 22.0 (H) 3.6 - 11.0 K/uL    RBC 2.84 (L) 3.80 - 5.20 M/uL    HGB 8.1 (L) 11.5 - 16.0 g/dL    HCT 24.3 (L) 35.0 - 47.0 %    MCV 85.6 80.0 - 99.0 FL    MCH 28.5 26.0 - 34.0 PG    MCHC 33.3 30.0 - 36.5 g/dL    RDW 14.2 11.5 - 14.5 %    PLATELET 234 167 - 136 K/uL    MPV 10.7 8.9 - 12.9 FL    NRBC 0.1 (H) 0  WBC    ABSOLUTE NRBC 0.03 (H) 0.00 - 0.01 K/uL DF PENDING     NEUTROPHILS 83 (H) 32 - 75 %    BAND NEUTROPHILS 5 %    LYMPHOCYTES 3 (L) 12 - 49 %    MONOCYTES 4 (L) 5 - 13 %    EOSINOPHILS 0 0 - 7 %    BASOPHILS 0 0 - 1 %    METAMYELOCYTES 2 %    MYELOCYTES 3 %    IMMATURE GRANULOCYTES 0 0.0 - 0.5 %    ABS. NEUTROPHILS 19.4 (H) 1.8 - 8.0 K/UL    ABS. LYMPHOCYTES 0.7 (L) 0.8 - 3.5 K/UL    ABS. MONOCYTES 0.9 0.0 - 1.0 K/UL    ABS. EOSINOPHILS 0.0 0.0 - 0.4 K/UL    ABS. BASOPHILS 0.0 0.0 - 0.1 K/UL    ABS. IMM. GRANS. 0.0 0.00 - 0.04 K/UL    WBC COMMENTS VACUOLATED POLYS     METABOLIC PANEL, COMPREHENSIVE    Collection Time: 06/09/18 12:49 PM   Result Value Ref Range    Sodium 139 136 - 145 mmol/L    Potassium 3.2 (L) 3.5 - 5.1 mmol/L    Chloride 105 97 - 108 mmol/L    CO2 22 21 - 32 mmol/L    Anion gap 12 5 - 15 mmol/L    Glucose 151 (H) 65 - 100 mg/dL    BUN 61 (H) 6 - 20 MG/DL    Creatinine 4.04 (H) 0.55 - 1.02 MG/DL    BUN/Creatinine ratio 15 12 - 20      GFR est AA 13 (L) >60 ml/min/1.73m2    GFR est non-AA 11 (L) >60 ml/min/1.73m2    Calcium 8.1 (L) 8.5 - 10.1 MG/DL    Bilirubin, total 0.4 0.2 - 1.0 MG/DL    ALT (SGPT) 11 (L) 12 - 78 U/L    AST (SGOT) 13 (L) 15 - 37 U/L    Alk.  phosphatase 76 45 - 117 U/L    Protein, total 5.8 (L) 6.4 - 8.2 g/dL    Albumin 1.7 (L) 3.5 - 5.0 g/dL    Globulin 4.1 (H) 2.0 - 4.0 g/dL    A-G Ratio 0.4 (L) 1.1 - 2.2     MAGNESIUM    Collection Time: 06/09/18 12:49 PM   Result Value Ref Range    Magnesium 1.8 1.6 - 2.4 mg/dL   PHOSPHORUS    Collection Time: 06/09/18 12:49 PM   Result Value Ref Range    Phosphorus 2.9 2.6 - 4.7 MG/DL       Urine dipstick:   Lab Results   Component Value Date/Time    Color YELLOW/STRAW 06/09/2018 12:30 PM    Appearance TURBID (A) 06/09/2018 12:30 PM    Specific gravity 1.013 06/09/2018 12:30 PM    pH (UA) 5.0 06/09/2018 12:30 PM    Protein NEGATIVE  06/09/2018 12:30 PM    Glucose NEGATIVE  06/09/2018 12:30 PM    Ketone NEGATIVE  06/09/2018 12:30 PM    Bilirubin NEGATIVE  06/09/2018 12:30 PM Urobilinogen 0.2 06/09/2018 12:30 PM    Nitrites NEGATIVE  06/09/2018 12:30 PM    Leukocyte Esterase NEGATIVE  06/09/2018 12:30 PM    Epithelial cells 5-10 06/04/2013 03:30 PM    Bacteria 2+ (A) 06/04/2013 03:30 PM    WBC 20-50 06/04/2013 03:30 PM    RBC 3-5 06/04/2013 03:30 PM       Medications list Personally Reviewed   [x]      Yes     []               No      Thank you for allowing us to participate in the care of this patient. We will follow patient. Please dont hesitate to call with any questions    Irma Waggoner MD  6/9/2018    [facility] [address] [place] [address] [facility]  Phone - [phone]   Fax - [phone]  [email or web address]

## 2018-06-10 ENCOUNTER — APPOINTMENT (OUTPATIENT)
Dept: GENERAL RADIOLOGY | Age: 68
DRG: 871 | End: 2018-06-10
Attending: INTERNAL MEDICINE
Payer: MEDICARE

## 2018-06-10 LAB
ALBUMIN SERPL-MCNC: 1.8 G/DL (ref 3.5–5)
ALBUMIN/GLOB SERPL: 0.5 {RATIO} (ref 1.1–2.2)
ALP SERPL-CCNC: 73 U/L (ref 45–117)
ALT SERPL-CCNC: 12 U/L (ref 12–78)
ANION GAP SERPL CALC-SCNC: 12 MMOL/L (ref 5–15)
APTT PPP: 25.5 SEC (ref 22.1–32)
AST SERPL-CCNC: 15 U/L (ref 15–37)
BASOPHILS # BLD: 0 K/UL (ref 0–0.1)
BASOPHILS NFR BLD: 0 % (ref 0–1)
BILIRUB SERPL-MCNC: 0.4 MG/DL (ref 0.2–1)
BUN SERPL-MCNC: 46 MG/DL (ref 6–20)
BUN/CREAT SERPL: 20 (ref 12–20)
CALCIUM SERPL-MCNC: 8.3 MG/DL (ref 8.5–10.1)
CHLORIDE SERPL-SCNC: 112 MMOL/L (ref 97–108)
CO2 SERPL-SCNC: 20 MMOL/L (ref 21–32)
CREAT SERPL-MCNC: 2.31 MG/DL (ref 0.55–1.02)
DIFFERENTIAL METHOD BLD: ABNORMAL
EOSINOPHIL # BLD: 0 K/UL (ref 0–0.4)
EOSINOPHIL NFR BLD: 0 % (ref 0–7)
ERYTHROCYTE [DISTWIDTH] IN BLOOD BY AUTOMATED COUNT: 14.4 % (ref 11.5–14.5)
GLOBULIN SER CALC-MCNC: 3.9 G/DL (ref 2–4)
GLUCOSE SERPL-MCNC: 109 MG/DL (ref 65–100)
HCT VFR BLD AUTO: 24.9 % (ref 35–47)
HGB BLD-MCNC: 8 G/DL (ref 11.5–16)
IMM GRANULOCYTES # BLD: 0 K/UL (ref 0–0.04)
IMM GRANULOCYTES NFR BLD AUTO: 0 % (ref 0–0.5)
INR PPP: 1.2 (ref 0.9–1.1)
LYMPHOCYTES # BLD: 1.8 K/UL (ref 0.8–3.5)
LYMPHOCYTES NFR BLD: 9 % (ref 12–49)
MAGNESIUM SERPL-MCNC: 1.7 MG/DL (ref 1.6–2.4)
MCH RBC QN AUTO: 28.7 PG (ref 26–34)
MCHC RBC AUTO-ENTMCNC: 32.1 G/DL (ref 30–36.5)
MCV RBC AUTO: 89.2 FL (ref 80–99)
METAMYELOCYTES NFR BLD MANUAL: 6 %
MONOCYTES # BLD: 0.4 K/UL (ref 0–1)
MONOCYTES NFR BLD: 2 % (ref 5–13)
MYELOCYTES NFR BLD MANUAL: 1 %
NEUTS BAND NFR BLD MANUAL: 4 %
NEUTS SEG # BLD: 16.2 K/UL (ref 1.8–8)
NEUTS SEG NFR BLD: 76 % (ref 32–75)
NRBC # BLD: 0.03 K/UL (ref 0–0.01)
NRBC BLD-RTO: 0.1 PER 100 WBC
PATH REV BLD -IMP: ABNORMAL
PHOSPHATE SERPL-MCNC: 2.4 MG/DL (ref 2.6–4.7)
PLATELET # BLD AUTO: 250 K/UL (ref 150–400)
PMV BLD AUTO: 10.7 FL (ref 8.9–12.9)
POTASSIUM SERPL-SCNC: 3.3 MMOL/L (ref 3.5–5.1)
PROMYELOCYTES NFR BLD MANUAL: 2 %
PROT SERPL-MCNC: 5.7 G/DL (ref 6.4–8.2)
PROTHROMBIN TIME: 11.6 SEC (ref 9–11.1)
RBC # BLD AUTO: 2.79 M/UL (ref 3.8–5.2)
RBC MORPH BLD: ABNORMAL
SODIUM SERPL-SCNC: 144 MMOL/L (ref 136–145)
THERAPEUTIC RANGE,PTTT: NORMAL SECS (ref 58–77)
WBC # BLD AUTO: 20.3 K/UL (ref 3.6–11)
WBC MORPH BLD: ABNORMAL

## 2018-06-10 PROCEDURE — 74011000250 HC RX REV CODE- 250: Performed by: INTERNAL MEDICINE

## 2018-06-10 PROCEDURE — 74011250636 HC RX REV CODE- 250/636: Performed by: INTERNAL MEDICINE

## 2018-06-10 PROCEDURE — 71045 X-RAY EXAM CHEST 1 VIEW: CPT

## 2018-06-10 PROCEDURE — 97162 PT EVAL MOD COMPLEX 30 MIN: CPT

## 2018-06-10 PROCEDURE — 85610 PROTHROMBIN TIME: CPT | Performed by: INTERNAL MEDICINE

## 2018-06-10 PROCEDURE — 85730 THROMBOPLASTIN TIME PARTIAL: CPT | Performed by: INTERNAL MEDICINE

## 2018-06-10 PROCEDURE — 74011000258 HC RX REV CODE- 258: Performed by: INTERNAL MEDICINE

## 2018-06-10 PROCEDURE — 97530 THERAPEUTIC ACTIVITIES: CPT

## 2018-06-10 PROCEDURE — 80053 COMPREHEN METABOLIC PANEL: CPT | Performed by: INTERNAL MEDICINE

## 2018-06-10 PROCEDURE — 84100 ASSAY OF PHOSPHORUS: CPT | Performed by: INTERNAL MEDICINE

## 2018-06-10 PROCEDURE — G8978 MOBILITY CURRENT STATUS: HCPCS

## 2018-06-10 PROCEDURE — 74011250637 HC RX REV CODE- 250/637: Performed by: INTERNAL MEDICINE

## 2018-06-10 PROCEDURE — 85025 COMPLETE CBC W/AUTO DIFF WBC: CPT | Performed by: INTERNAL MEDICINE

## 2018-06-10 PROCEDURE — 83735 ASSAY OF MAGNESIUM: CPT | Performed by: INTERNAL MEDICINE

## 2018-06-10 PROCEDURE — 65610000006 HC RM INTENSIVE CARE

## 2018-06-10 PROCEDURE — 36415 COLL VENOUS BLD VENIPUNCTURE: CPT | Performed by: INTERNAL MEDICINE

## 2018-06-10 PROCEDURE — G8979 MOBILITY GOAL STATUS: HCPCS

## 2018-06-10 RX ORDER — MUPIROCIN 20 MG/G
OINTMENT TOPICAL EVERY 12 HOURS
Status: DISPENSED | OUTPATIENT
Start: 2018-06-11 | End: 2018-06-16

## 2018-06-10 RX ORDER — POTASSIUM CHLORIDE 29.8 MG/ML
20 INJECTION INTRAVENOUS
Status: COMPLETED | OUTPATIENT
Start: 2018-06-10 | End: 2018-06-11

## 2018-06-10 RX ORDER — VANCOMYCIN HYDROCHLORIDE
1250
Status: DISCONTINUED | OUTPATIENT
Start: 2018-06-10 | End: 2018-06-11

## 2018-06-10 RX ORDER — SODIUM CHLORIDE, SODIUM LACTATE, POTASSIUM CHLORIDE, CALCIUM CHLORIDE 600; 310; 30; 20 MG/100ML; MG/100ML; MG/100ML; MG/100ML
100 INJECTION, SOLUTION INTRAVENOUS CONTINUOUS
Status: DISCONTINUED | OUTPATIENT
Start: 2018-06-10 | End: 2018-06-11

## 2018-06-10 RX ADMIN — POTASSIUM CHLORIDE 20 MEQ: 400 INJECTION, SOLUTION INTRAVENOUS at 09:03

## 2018-06-10 RX ADMIN — METRONIDAZOLE 500 MG: 500 INJECTION, SOLUTION INTRAVENOUS at 11:13

## 2018-06-10 RX ADMIN — CEFEPIME HYDROCHLORIDE 1 G: 1 INJECTION, POWDER, FOR SOLUTION INTRAMUSCULAR; INTRAVENOUS at 01:42

## 2018-06-10 RX ADMIN — SODIUM CHLORIDE 125 ML/HR: 900 INJECTION, SOLUTION INTRAVENOUS at 07:15

## 2018-06-10 RX ADMIN — Medication 10 ML: at 13:56

## 2018-06-10 RX ADMIN — METRONIDAZOLE 500 MG: 500 INJECTION, SOLUTION INTRAVENOUS at 18:07

## 2018-06-10 RX ADMIN — SODIUM CHLORIDE, SODIUM LACTATE, POTASSIUM CHLORIDE, AND CALCIUM CHLORIDE 100 ML/HR: 600; 310; 30; 20 INJECTION, SOLUTION INTRAVENOUS at 18:14

## 2018-06-10 RX ADMIN — METRONIDAZOLE 500 MG: 500 INJECTION, SOLUTION INTRAVENOUS at 03:53

## 2018-06-10 RX ADMIN — VANCOMYCIN HYDROCHLORIDE 250 MG: 1 INJECTION, POWDER, LYOPHILIZED, FOR SOLUTION INTRAVENOUS at 11:13

## 2018-06-10 RX ADMIN — HEPARIN SODIUM 5000 UNITS: 5000 INJECTION, SOLUTION INTRAVENOUS; SUBCUTANEOUS at 09:43

## 2018-06-10 RX ADMIN — VANCOMYCIN HYDROCHLORIDE 250 MG: 1 INJECTION, POWDER, LYOPHILIZED, FOR SOLUTION INTRAVENOUS at 18:06

## 2018-06-10 RX ADMIN — Medication 10 ML: at 06:52

## 2018-06-10 RX ADMIN — VANCOMYCIN HYDROCHLORIDE 250 MG: 1 INJECTION, POWDER, LYOPHILIZED, FOR SOLUTION INTRAVENOUS at 06:52

## 2018-06-10 RX ADMIN — HEPARIN SODIUM 5000 UNITS: 5000 INJECTION, SOLUTION INTRAVENOUS; SUBCUTANEOUS at 21:45

## 2018-06-10 RX ADMIN — POTASSIUM PHOSPHATE, MONOBASIC AND POTASSIUM PHOSPHATE, DIBASIC: 224; 236 INJECTION, SOLUTION INTRAVENOUS at 09:43

## 2018-06-10 RX ADMIN — POTASSIUM CHLORIDE 20 MEQ: 400 INJECTION, SOLUTION INTRAVENOUS at 09:43

## 2018-06-10 RX ADMIN — Medication 10 ML: at 21:46

## 2018-06-10 RX ADMIN — VANCOMYCIN HYDROCHLORIDE 1250 MG: 10 INJECTION, POWDER, LYOPHILIZED, FOR SOLUTION INTRAVENOUS at 13:55

## 2018-06-10 NOTE — PROGRESS NOTES
Follow-up Note        Patient: Cassandra Cifuentes MRN: [id number]  SSN: [id number]    YOB: 1950  Age: 79 y.o. Sex: female        Diagnosis:     1. Right breast carcinoma:  T2 N0 M0 (Stage IIA) infiltrating ductal carcinoma, Tumor size 3.8 cm, LN -ve, grade 2, %, DC 95%, Her 2 -ve. Treatment:     1. Adjuvant chemotherapy   Taxotere, Cyclophosphamide - Cycle 2   2. Right sided lumpectomy on 03/08/2018. HPI:      Cassandra Cifuentes is a 79 y.o. female seen today in hospital consult for breast cancer and sepsis / port infection. Pt is known to Dr Truong Gay and is on adjuvant chemo with TC done 2 cycles. Pt has been hypotensive on levophed but hopefully stopping this this am per nursing. Pt is in bed with  at bedside. Denies pain. States she does not want any more chemo. CBC stable except for hgb 8. Hx for record:  Her breast cancer was seen on an annual mammogram that led to a bilateral biopsy on both the RIGHT and LEFT breast. The LEFT breast was benign and the RIGHT breast is positive for IDC. The patient denies any nipple inversion or discharge. She was seen by Dr. Dorene Pan. She underwent right sided lumpectomy on 03/08/2018. The tumor is 3.8 cm in size, LN -ve, %, DC 95%, Her 2 -ve. Genomic profile by mammaprint reveals a high risk luminal B gene signature in the tumor. She is receiving adjuvant chemotherapy and tolerating it well. She notes some diarrhea following treatment that is managed with medication.       Review of Systems:  Per HPI    Constitutional: negative  Eyes: negative  Ears, Nose, Mouth, Throat, and Face: negative  Respiratory: negative  Cardiovascular: negative  Gastrointestinal: diarrhea  Genitourinary:negative  Integument/Breast: negative  Hematologic/Lymphatic: negative  Musculoskeletal:negative  Neurological: negative        Past Medical History:   Diagnosis Date    Arthritis     Breast cancer, right breast ([facility] [address]     Depression     GERD (gastroesophageal reflux disease)     Hypercholesterolemia     Hypertension     Other ill-defined conditions(799.89) 2012    dog bite dec 2012 needing blood transfusion     Past Surgical History:   Procedure Laterality Date    HX BREAST LUMPECTOMY Right 3/8/2018    RIGHT BREAST LUMPECTOMY WITH ULTRASOUND [facility]j [facility] OR    HX COLONOSCOPY      HX KNEE REPLACEMENT Right 06/2013    HX TONSILLECTOMY        Family History   Problem Relation Age of Onset    Adopted: Yes    Breast Cancer Mother     Stroke Mother     Hypertension Mother     Cancer Mother      skin    Cancer Father      skin    Pulmonary Fibrosis Father     Cancer Sister 79     colon    Breast Cancer Maternal Aunt     Breast Cancer Paternal Aunt     Breast Cancer Maternal Aunt      ovarian and colon cancer    Breast Cancer Maternal Aunt      ovarian and colon cancer    Breast Cancer Maternal Aunt     Breast Cancer Paternal Aunt     Breast Cancer Maternal Aunt     Cancer Maternal Grandmother      liver/ovarian     Social History   Substance Use Topics    Smoking status: Former Smoker     Packs/day: 0.25     Years: 2.00     Quit date: 6/4/1972    Smokeless tobacco: Never Used    Alcohol use No      Prior to Admission medications    Medication Sig Start Date End Date Taking? Authorizing Provider   raNITIdine (ZANTAC) 150 mg tablet Take 150 mg by mouth two (2) times a day. Indications: Heartburn, PREVENTION OF STRESS ULCER   Yes Historical Provider   amoxicillin-clavulanate (AUGMENTIN) 500-125 mg per tablet Take 1 Tab by mouth every twelve (12) hours for 10 days. 6/1/18 6/11/18 Yes Naz Jorge NP   ondansetron (ZOFRAN ODT) 4 mg disintegrating tablet Take 1 Tab by mouth every eight (8) hours as needed for Nausea. 4/20/18  Yes Mirta Mike NP   lidocaine-prilocaine (EMLA) topical cream Apply  to affected area as needed for Pain.  4/20/18  Yes Mirta Mike NP   dexamethasone (DECADRON) 4 mg tablet Take 2 Tabs by mouth daily. The day before, the day of and the day after chemotherapy 4/20/18  Yes Rl Zavala NP   ondansetron (ZOFRAN ODT) 4 mg disintegrating tablet Take 1 Tab by mouth every six (6) hours as needed for Nausea. 3/8/18  Yes Marva Ball MD   lisinopril-hydroCHLOROthiazide (PRINZIDE, ZESTORETIC) 20-25 mg per tablet Take 1 Tab by mouth daily. Indications: hypertension   Yes Historical Provider   calcium-cholecalciferol, d3, (CALCIUM 600 + D) 600-125 mg-unit Tab Take 1 Tab by mouth two (2) times a day. Yes Historical Provider   Cholecalciferol, Vitamin D3, (VITAMIN D3) 1,000 unit cap Take 1 Tab by mouth daily. Yes Historical Provider   atenolol (TENORMIN) 50 mg tablet Take 25 mg by mouth daily. Yes Historical Provider   prochlorperazine (COMPAZINE) 10 mg tablet Take 10 mg by mouth every six (6) hours as needed. Shelton Serna MD   ibuprofen (MOTRIN) 200 mg tablet Take  by mouth. Historical Provider   garlic 1 mg cap Take  by mouth daily. Historical Provider   oxyCODONE-acetaminophen (PERCOCET) 5-325 mg per tablet Take 1 Tab by mouth every four (4) hours as needed for Pain. Max Daily Amount: 6 Tabs. 3/8/18   Leonora Estrella MD   ferrous sulfate (IRON) 325 mg (65 mg iron) tablet Take 325 mg by mouth Daily (before breakfast). Historical Provider   etodolac (LODINE) 400 mg tablet Take 400 mg by mouth daily.     Historical Provider              Allergies   Allergen Reactions    Nasacort [Triamcinolone Acetonide] Other (comments)     headache    Sulfa (Sulfonamide Antibiotics) Rash           Objective:     Vitals:    06/10/18 0400 06/10/18 0415 06/10/18 0500 06/10/18 0600   BP: 113/80 121/65 122/67 (!) 81/68   Pulse: 63 64 63 (!) 58   Resp: 21 20 18 19   Temp: 98.4 °F (36.9 °C)      SpO2: 94% 95% 95%    Weight:       Height:                Physical Exam:    GENERAL: alert, cooperative, in ICU  EYE: negative  THROAT & NECK: supple  LUNG: clear to auscultation bilaterally  HEART: regular rate and rhythm  ABDOMEN: soft, non-tender  EXTREMITIES:  no edema  SKIN: Normal.  NEUROLOGIC: nonfocal  Port site with slight redness    Lab Results   Component Value Date/Time    WBC 20.3 (H) 06/10/2018 03:53 AM    HGB 8.0 (L) 06/10/2018 03:53 AM    HCT 24.9 (L) 06/10/2018 03:53 AM    PLATELET 949 79/42/2087 03:53 AM    MCV 89.2 06/10/2018 03:53 AM         Lab Results   Component Value Date/Time    Sodium 144 06/10/2018 03:53 AM    Potassium 3.3 (L) 06/10/2018 03:53 AM    Chloride 112 (H) 06/10/2018 03:53 AM    CO2 20 (L) 06/10/2018 03:53 AM    Anion gap 12 06/10/2018 03:53 AM    Glucose 109 (H) 06/10/2018 03:53 AM    BUN 46 (H) 06/10/2018 03:53 AM    Creatinine 2.31 (H) 06/10/2018 03:53 AM    BUN/Creatinine ratio 20 06/10/2018 03:53 AM    GFR est AA 26 (L) 06/10/2018 03:53 AM    GFR est non-AA 21 (L) 06/10/2018 03:53 AM    Calcium 8.3 (L) 06/10/2018 03:53 AM    Bilirubin, total 0.4 06/10/2018 03:53 AM    AST (SGOT) 15 06/10/2018 03:53 AM    Alk. phosphatase 73 06/10/2018 03:53 AM    Protein, total 5.7 (L) 06/10/2018 03:53 AM    Albumin 1.8 (L) 06/10/2018 03:53 AM    Globulin 3.9 06/10/2018 03:53 AM    A-G Ratio 0.5 (L) 06/10/2018 03:53 AM    ALT (SGPT) 12 06/10/2018 03:53 AM           Assessment:     1. Right breast carcinoma:  T2 N0 M0 (Stage IIA) infiltrating ductal carcinoma, Tumor size 3.8 cm, LN -ve, grade 2, %, VA 95%, Her 2 -ve. Mammaprint shows a luminal B gene signature. ECOG PS 0  Intent of Treatment - curative     S/P right breast lumpectomy and sentinel LN excision. Mammaprint shows a high risk luminal B genetic signature, therefore I would recommend adjuvant chemotherapy to reduce the risk of recurrence. Receiving adjuvant chemotherapy   Taxotere, Cyclophosphamide - Cycle 2    Treatment plan per primary team.  Pt in ICU for sepsis. States she does not want further chemo.  at bedside. Encouraged them to d/w primary team tmw.    Pt clinically doing better today per nursing/ . 2. Septic vs Hypovolumic Shock POA/ per ICU team.   IVF  Titrate levophed  If sepsis then source could be line infection vs stool  Hypovolumic can be due to nausea, vomiting and diarrhea  IV vancomycin, cefepime and flagyl for now  F/u stool studies and blood cultures  CVP monitoring  Pt only takes steroids prior and after treatment and not on it chronically so doubt adrenal crisis      3. Acute renal failure per renal.   Suspect dehydration as week of nausea, vomiting and diarrhea. IVF and monitor lytes  If doesn't improve significantly then consider nephrology consult and checking renal US  On NSAID at home, will avoid     4. Hypokalemia/Hypomagnesemia. Per ICU team.   Loyce [place] and monitor      5. Nausea, vomiting and diarrhea  Symptoms started right after chemo and starting Augmentin, so less likely infection but considering critically ill patient on Px Flagyl as above and f/u stool studies  Symptomatic management with PRN IV zofran  Avoid immodium (pt was using PTA) until infection rules out     6. Cdiff+ on treatment.     Code Status: Full  Surrogate Decision Maker:      Primary team to see pt tmw  Call if questions over weekend. D/w nursing today. Discussion with pt and  at bedside.        Signed by: Silvio Baca DO                     Valentina 10, 2018

## 2018-06-10 NOTE — ACP (ADVANCE CARE PLANNING)
Advance Care Planning Note    Name: Travis Carlisle  YOB: 1950  MRN: [id number]  Admission Date: 6/8/2018  8:52 PM    Date of discussion: 6/10/2018    Active Diagnoses:    Hospital Problems  Date Reviewed: 6/9/2018          Codes Class Noted POA    Shock ([address] ICD-10-CM: R57.9  ICD-9-CM: 785.50  6/9/2018 Unknown        HTN (hypertension) ICD-10-CM: I10  ICD-9-CM: 401.9  6/9/2018 Unknown        Breast cancer of upper-outer quadrant of right female breast [facility]) ICD-10-CM: C50.411  ICD-9-CM: 174.4  3/28/2018 Yes               These active diagnoses are of sufficient risk that focused discussion on advance care planning is indicated in order to allow the patient to thoughtfully consider personal goals of care, and if situations arise that prevent the ability to personally give input, to ensure appropriate representation of their personal desires for different levels and aggressiveness of care. Discussion:     Persons present and participating in discussion: Manjeet Mchugh MD, Pt's brother. Discussion: Addressed code status due to above medical problems and wants to be a full code for now. Time Spent:     Total time spent face-to-face in education and discussion: 16  minutes.      Anjel Cervantes MD  6/10/2018  5:05 PM

## 2018-06-10 NOTE — PROGRESS NOTES
Pharmacy Automatic Renal Dosing Protocol - Antimicrobials    Indication for Antimicrobials: bacteremia     Current Regimen of Each Antimicrobial:  Vancomycin 2.5g load, then 1250mg q16h. (Start Date ; Day # 3  Cefepime 2g X1, then 1g q 24 hours (; day #3  Metronidazole 500 mg q 8 hours (; day #2  Vancomycin 250 mg PO q 6 hours (; day #2    Previous abx:  Zosyn 3.375g x1 dose  Levaquin 750mg x1 dose    Goal Level: VANCOMYCIN TROUGH GOAL RANGE    Vancomycin Trough: 15 - 20 mcg/mL    Significant Cultures:   : Blood cx pending  : Stool cx: pending   : C. Diff: pending    Radiology / Imaging results: (X-ray, CT scan or MRI): NA  X- ray on : Right midlung mild subsegmental atelectasis. Paralysis, amputations, malnutrition: NA    Labs:  Recent Labs      06/10/18   0353  18   1249  18   0438   CREA  2.31*  4.04*  4.83*   BUN  46*  61*  65*   WBC  20.3*  22.0*  20.3*     Temp (24hrs), Av.3 °F (36.8 °C), Min:98 °F (36.7 °C), Max:98.4 °F (36.9 °C)    Creatinine Clearance (mL/min) or Dialysis: 27    Impression/Plan:   · SCr improving  · Start vancomycin 2.5g once, then 1250mg q36h and if we continue with dose the estimated trough will be 6 mcg/ml. Therefore, will adjust the dose to 1250 q 18 hours and this will yield  a trough of 17 mcg/ml  · Will adjust the cefepime to 2g q 24 hours  · Continue with the current dose of metronidazole (pending c. Diff)   · Continue with the current dose of PO Vancomycin  · WBC elevated  · Antimicrobial stop date: pending     Pharmacy will follow daily and adjust medications as appropriate for renal function and/or serum levels. Thank you,  Robert Hutton, DMITRIYD    Recommended duration of therapy  http://[facility]/[place]/[facility]/Pharmacy/Clinical%20Companion/Duration%20of%20ABX%20therapy. docx    Renal Dosing  http://[facility]/[place]/[facility]/Pharmacy/Clinical%20Companion/Renal%[id number]. pdf

## 2018-06-10 NOTE — PROGRESS NOTES
Surgery      Patient resting comfortably    Review of record indicates stable to slightly improved    Discussed with patient;s     Cont present RX    Suggest CT scan tomorrow to assess collection in pelvis    Dr Chasidy Coon will assume surgical evaluation tomorrow AM.      Thu Power.  Pat Zavaleta MD, [facility]

## 2018-06-10 NOTE — PROGRESS NOTES
Problem: Mobility Impaired (Adult and Pediatric)  Goal: *Acute Goals and Plan of Care (Insert Text)  Physical Therapy Goals  Initiated 6/10/2018  1. Patient will move from supine to sit and sit to supine , scoot up and down and roll side to side in bed with independence within 7 day(s). 2.  Patient will transfer from bed to chair and chair to bed with independence using the least restrictive device within 7 day(s). 3.  Patient will perform sit to stand with independence within 7 day(s). 4.  Patient will ambulate with independence for 200 feet with the least restrictive device within 7 day(s). 5.  Patient will ascend/descend 5 stairs with 1 handrail(s) with modified independence within 7 day(s). physical Therapy EVALUATION  Patient: Ez Rodriguez (66 y.o. female)  Date: 6/10/2018  Primary Diagnosis: Shock [facility])        Precautions:  Fall    ASSESSMENT :  Based on the objective data described below, the patient presents with impaired functional mobility secondary to impaired standing balance, impaired gait mechanics, generalized weakness, decreased activity tolerance, and decreased AROM following admission for shock. Pt received in modified chair position in bed and agreeable to PT evaluation. Pt cleared by nursing for mobility. Pt no longer on pressors for BP support and VSS remained stable throughout positional changes on RA during therapy session. She performed bed mobility with CGA, increased time, and HOB elevated. Noted intact sitting balance while sitting on EOB. Sit<>stand and bed>chair transfers performed with min A x 1. She demonstrates good/fair standing balance, needing HHA for bed>chair transfer due to mild instability with steps. VSS post-activity. Pt anxious about ambulating more than to chair, therefore further mobility deferred this date. Recommend gait training with cardiac cart during next therapy session.  Pt was left sitting in bedside chair with all needs met and RN aware following therapy session. Anticipate pt will progress well in acute PT and return home with HHPT and family support. PT will continue to follow to address mobility impairments as noted above. Patient will benefit from skilled intervention to address the above impairments. Patients rehabilitation potential is considered to be Good  Factors which may influence rehabilitation potential include:   []         None noted  []         Mental ability/status  [x]         Medical condition  []         Home/family situation and support systems  []         Safety awareness  []         Pain tolerance/management  []         Other:      PLAN :  Recommendations and Planned Interventions:  [x]           Bed Mobility Training             []    Neuromuscular Re-Education  [x]           Transfer Training                   []    Orthotic/Prosthetic Training  [x]           Gait Training                         []    Modalities  [x]           Therapeutic Exercises           []    Edema Management/Control  [x]           Therapeutic Activities            [x]    Patient and Family Training/Education  []           Other (comment):    Frequency/Duration: Patient will be followed by physical therapy  5 times a week to address goals. Discharge Recommendations: Home Health  Further Equipment Recommendations for Discharge: TBD based on progress in acute PT     SUBJECTIVE:   Patient stated Am I going to walk more now? Devendra Daley    OBJECTIVE DATA SUMMARY:   HISTORY:    Past Medical History:   Diagnosis Date    Arthritis     Breast cancer, right breast ([address]     Depression     GERD (gastroesophageal reflux disease)     Hypercholesterolemia     Hypertension     Other ill-defined conditions(799.89) 2012    dog bite dec 2012 needing blood transfusion     Past Surgical History:   Procedure Laterality Date    HX BREAST LUMPECTOMY Right 3/8/2018    RIGHT BREAST LUMPECTOMY WITH ULTRASOUND [facility] by K[facility]hawn Rider [facility]UL[facility]    HX COLONOSCOPY      HX KNEE REPLACEMENT Right 2013    HX TONSILLECTOMY       Prior Level of Function/Home Situation: Pt is independent for mobility and ADLs at baseline. Lives with . Drives. Retired. Denies fall history. Personal factors and/or comorbidities impacting plan of care: arthritis    Home Situation  Home Environment: Private residence  # Steps to Enter: 4  Rails to Enter: Yes  Hand Rails : Bilateral  Wheelchair Ramp: No  One/Two Story Residence: One story (with basement)  Living Alone: No  Support Systems: Spouse/Significant Other/Partner  Patient Expects to be Discharged to[de-identified] Unknown  Current DME Used/Available at Home: Walker, rolling  Tub or Shower Type: Shower    EXAMINATION/PRESENTATION/DECISION MAKING:   Critical Behavior:  Neurologic State: Confused  Orientation Level: Oriented X4  Cognition: Decreased attention/concentration, Memory loss     Hearing: Auditory  Auditory Impairment: None  Skin:  Intact  Edema: None  Range Of Motion:  AROM: Generally decreased, functional                       Strength:    Strength: Generally decreased, functional                    Tone & Sensation:   Tone: Normal              Sensation: Intact               Coordination:  Coordination: Within functional limits  Vision:      Functional Mobility:  Bed Mobility:     Supine to Sit: Contact guard assistance; Additional time     Scooting: Contact guard assistance; Additional time  Transfers:  Sit to Stand: Minimum assistance;Assist x1  Stand to Sit: Minimum assistance;Assist x1        Bed to Chair: Minimum assistance;Assist x1              Balance:   Sitting: Intact  Standing: Impaired; Without support  Standing - Static: Good  Standing - Dynamic : Fair      Functional Measure:  Barthel Index:    Bathin  Bladder: 10  Bowels: 10  Groomin  Dressin  Feeding: 10  Mobility: 0  Stairs: 0  Toilet Use: 5  Transfer (Bed to Chair and Back): 10  Total: 50       Barthel and G-code impairment scale:  Percentage of impairment CH  0% CI  1-19% CJ  20-39% CK  40-59% CL  60-79% CM  80-99% CN  100%   Barthel Score 0-100 100 99-80 79-60 59-40 20-39 1-19   0   Barthel Score 0-20 20 17-19 13-16 9-12 5-8 1-4 0      The Barthel ADL Index: Guidelines  1. The index should be used as a record of what a patient does, not as a record of what a patient could do. 2. The main aim is to establish degree of independence from any help, physical or verbal, however minor and for whatever reason. 3. The need for supervision renders the patient not independent. 4. A patient's performance should be established using the best available evidence. Asking the patient, friends/relatives and nurses are the usual sources, but direct observation and common sense are also important. However direct testing is not needed. 5. Usually the patient's performance over the preceding 24-48 hours is important, but occasionally longer periods will be relevant. 6. Middle categories imply that the patient supplies over 50 per cent of the effort. 7. Use of aids to be independent is allowed. Dannielle Fagan., Barthel, DSapnaW. ([id number]). Functional evaluation: the Barthel Index. [facility] (14)2. JUAN JOSÉ Espinosa, Edith Wolff, Rashard Aranda., [place], [address] [place] [address] (1999). Measuring the change indisability after inpatient rehabilitation; comparison of the responsiveness of the Barthel Index and Functional [place] Measure. Journal of Neurology, Neurosurgery, and Psychiatry, 66(4), 535-274. Keisha Patience, [place].ALEXI, BRYAN Beltran, & Christian Elise MSapnaA. (2004.) Assessment of post-stroke quality of life in cost-effectiveness studies: The usefulness of the Barthel Index and the EuroQoL-5D. Quality of Life Research, 13, 141-00       G codes: In compliance with CMSs Claims Based Outcome Reporting, the following G-code set was chosen for this patient based on their primary functional limitation being treated:     The outcome measure chosen to determine the severity of the functional limitation was the Barthel index with a score of 50/100 which was correlated with the impairment scale. ? Mobility - Walking and Moving Around:     - CURRENT STATUS: CK - 40%-59% impaired, limited or restricted    - GOAL STATUS: CI - 1%-19% impaired, limited or restricted    - D/C STATUS:  ---------------To be determined---------------      Physical Therapy Evaluation Charge Determination   History Examination Presentation Decision-Making   MEDIUM  Complexity : 1-2 comorbidities / personal factors will impact the outcome/ POC  HIGH Complexity : 4+ Standardized tests and measures addressing body structure, function, activity limitation and / or participation in recreation  MEDIUM Complexity : Evolving with changing characteristics  Other outcome measures Barthel Index  MEDIUM      Based on the above components, the patient evaluation is determined to be of the following complexity level: MEDIUM    Pain:  Pain Scale 1: Numeric (0 - 10)  Pain Intensity 1: 0              Activity Tolerance:   Good/fair - increased anxiety with mobilizing; VSS on RA; no pain complaints  Please refer to the flowsheet for vital signs taken during this treatment. After treatment:   [x]         Patient left in no apparent distress sitting up in chair  []         Patient left in no apparent distress in bed  [x]         Call bell left within reach  [x]         Nursing notified  []         Caregiver present  []         Bed alarm activated    COMMUNICATION/EDUCATION:   The patients plan of care was discussed with: Physical Therapist and Registered Nurse. [x]         Fall prevention education was provided and the patient/caregiver indicated understanding. [x]         Patient/family have participated as able in goal setting and plan of care. [x]         Patient/family agree to work toward stated goals and plan of care.   []         Patient understands intent and goals of therapy, but is neutral about his/her participation. []         Patient is unable to participate in goal setting and plan of care.     Thank you for this referral.  Albert Jarrell, PT, DPT   Time Calculation: 24 mins

## 2018-06-10 NOTE — PROGRESS NOTES
ANDI BUCHANAN [facility]      [address]                [address], [address]         [place], [address]      Phone: [phone]   Fax: [phone]      Phone: [phone]    [email or web address]    Nephrology Progress Note  Pt Name : Cb Barrett  Date of Admission : 6/8/2018    CC: Follow up for MIL      Assessment and Plan   MIL :  - 2/2 severe pre renal azotemia  From diarrhea, Hypotension, ACE (I), diuretics   - Non oliguric and rapidly improving renal function   - Kidneys looked normal on CT Abdomen   - changed IVF to LR      Metabolic acidosis :  - 2/2 ARF, GI losses  - better      Hypokalemia :  - 2/2 GI Losses- better     Septic Shock   - 2/2 C diff , perf diverticulitis   - per PCCM      Breast Ca   Recent port-a-cath infection      For other plans, see orders. Interval History:  Seen and examined   Much improved   Cr better  Good UOP   Diarrhea slightly better     Review of Systems: Pertinent items are noted in HPI.     Current Facility-Administered Medications   Medication Dose Route Frequency    [START ON 6/11/2018] cefepime (MAXIPIME) 2 g in 0.9% sodium chloride (MBP/ADV) 100 mL  2 g IntraVENous Q24H    vancomycin (VANCOCIN) 1250 mg in  ml infusion  1,250 mg IntraVENous Q18H    lactated Ringers infusion  100 mL/hr IntraVENous CONTINUOUS    metroNIDAZOLE (FLAGYL) IVPB premix 500 mg  500 mg IntraVENous Q8H    prochlorperazine (COMPAZINE) with saline injection 10 mg  10 mg IntraVENous Q6H PRN    sodium chloride (NS) flush 5-10 mL  5-10 mL IntraVENous Q8H    sodium chloride (NS) flush 5-10 mL  5-10 mL IntraVENous PRN    ondansetron (ZOFRAN) injection 4 mg  4 mg IntraVENous Q4H PRN    heparin (porcine) injection 5,000 Units  5,000 Units SubCUTAneous Q12H    vancomycin 50 mg/mL oral solution (compounded) 250 mg  250 mg Oral Q6H    acetaminophen (TYLENOL) suppository 650 mg  650 mg Rectal Q4H PRN    HYDROmorphone (DILAUDID) injection 1 mg  1 mg IntraVENous Q3H PRN    sodium chloride (NS) flush 5-10 mL  5-10 mL IntraVENous PRN      Allergies   Allergen Reactions    Nasacort [Triamcinolone Acetonide] Other (comments)     headache    Sulfa (Sulfonamide Antibiotics) Rash       Objective:  Vitals:    Vitals:    06/10/18 1200 06/10/18 1300 06/10/18 1400 06/10/18 1500   BP: 109/56 102/74 98/66 106/58   Pulse: 65 74 66 64   Resp: 16 16 26 20   Temp: 98.3 °F (36.8 °C)      SpO2: 97%  93% 94%   Weight:       Height:         Intake and Output:  06/10 0701 - 06/10 1900  In: 1593.3 [I.V.:1593.3]  Out: 4   06/08 1901 - 06/10 0700  In: 5810.7 [P.O.:200; I.V.:5610.7]  Out: 4426 [Urine:4425]    Physical Examination:    General: Sleeping   Neck:  Supple, no mass  Resp:  Lungs CTA B/L,   CV:  RRR,  no murmur or rub, no LE edema  GI:  Soft, NT, + Bowel sounds, no hepatosplenomegaly  Neurologic:  Non focal  :  Ghotra +/-    []    High complexity decision making was performed  []    Patient is at high-risk of decompensation with multiple organ involvement    Lab Data Personally Reviewed: I have reviewed all the pertinent labs, microbiology data and radiology studies during assessment.     Recent Labs      06/10/18   0353  06/09/18   1249  06/09/18   0438   NA  144  139  134*   K  3.3*  3.2*  3.2*   CL  112*  105  103   CO2  20*  22  15*   GLU  109*  151*  118*   BUN  46*  61*  65*   CREA  2.31*  4.04*  4.83*   CA  8.3*  8.1*  8.2*   MG  1.7  1.8  2.1   PHOS  2.4*  2.9   --    ALB  1.8*  1.7*  1.8*   SGOT  15  13*  15   ALT  12  11*  13   INR  1.2*   --    --      Recent Labs      06/10/18   0353  06/09/18   1249  06/09/18   0438   WBC  20.3*  22.0*  20.3*   HGB  8.0*  8.1*  8.3*   HCT  24.9*  24.3*  26.0*   PLT  250  287  309     Lab Results   Component Value Date/Time    Specimen Description: NARES NARES 06/04/2013 03:43 PM    Specimen Description: CLEAN CATCH 06/04/2013 03:30 PM Lab Results   Component Value Date/Time    Culture result:  06/08/2018 10:15 PM     NO ROUTINE ENTERIC PATHOGENS ISOLATED INCLUDING SALMONELLA, SHIGELLA, YERSINIA, VIBRIO OR SHIGA TOXIN PRODUCING E. COLI SO FAR    Culture result: NO GROWTH 2 DAYS 06/08/2018 09:22 PM    Culture result: NO GROWTH 2 DAYS 06/08/2018 09:22 PM     Recent Results (from the past 24 hour(s))   CBC WITH AUTOMATED DIFF    Collection Time: 06/10/18  3:53 AM   Result Value Ref Range    WBC 20.3 (H) 3.6 - 11.0 K/uL    RBC 2.79 (L) 3.80 - 5.20 M/uL    HGB 8.0 (L) 11.5 - 16.0 g/dL    HCT 24.9 (L) 35.0 - 47.0 %    MCV 89.2 80.0 - 99.0 FL    MCH 28.7 26.0 - 34.0 PG    MCHC 32.1 30.0 - 36.5 g/dL    RDW 14.4 11.5 - 14.5 %    PLATELET 044 560 - 599 K/uL    MPV 10.7 8.9 - 12.9 FL    NRBC 0.1 (H) 0  WBC    ABSOLUTE NRBC 0.03 (H) 0.00 - 0.01 K/uL    NEUTROPHILS 76 (H) 32 - 75 %    BAND NEUTROPHILS 4 %    LYMPHOCYTES 9 (L) 12 - 49 %    MONOCYTES 2 (L) 5 - 13 %    EOSINOPHILS 0 0 - 7 %    BASOPHILS 0 0 - 1 %    METAMYELOCYTES 6 %    MYELOCYTES 1 %    PROMYELOCYTES 2 %    IMMATURE GRANULOCYTES 0 0.0 - 0.5 %    ABS. NEUTROPHILS 16.2 (H) 1.8 - 8.0 K/UL    ABS. LYMPHOCYTES 1.8 0.8 - 3.5 K/UL    ABS. MONOCYTES 0.4 0.0 - 1.0 K/UL    ABS. EOSINOPHILS 0.0 0.0 - 0.4 K/UL    ABS. BASOPHILS 0.0 0.0 - 0.1 K/UL    ABS. IMM.  GRANS. 0.0 0.00 - 0.04 K/UL    DF MANUAL      RBC COMMENTS ANISOCYTOSIS  1+        WBC COMMENTS TOXIC GRANULATION      Pathologist review SMEAR FOR PATHOLOGIST REVIEW     METABOLIC PANEL, COMPREHENSIVE    Collection Time: 06/10/18  3:53 AM   Result Value Ref Range    Sodium 144 136 - 145 mmol/L    Potassium 3.3 (L) 3.5 - 5.1 mmol/L    Chloride 112 (H) 97 - 108 mmol/L    CO2 20 (L) 21 - 32 mmol/L    Anion gap 12 5 - 15 mmol/L    Glucose 109 (H) 65 - 100 mg/dL    BUN 46 (H) 6 - 20 MG/DL    Creatinine 2.31 (H) 0.55 - 1.02 MG/DL    BUN/Creatinine ratio 20 12 - 20      GFR est AA 26 (L) >60 ml/min/1.73m2    GFR est non-AA 21 (L) >60 ml/min/1.73m2    Calcium 8.3 (L) 8.5 - 10.1 MG/DL    Bilirubin, total 0.4 0.2 - 1.0 MG/DL    ALT (SGPT) 12 12 - 78 U/L    AST (SGOT) 15 15 - 37 U/L    Alk. phosphatase 73 45 - 117 U/L    Protein, total 5.7 (L) 6.4 - 8.2 g/dL    Albumin 1.8 (L) 3.5 - 5.0 g/dL    Globulin 3.9 2.0 - 4.0 g/dL    A-G Ratio 0.5 (L) 1.1 - 2.2     MAGNESIUM    Collection Time: 06/10/18  3:53 AM   Result Value Ref Range    Magnesium 1.7 1.6 - 2.4 mg/dL   PHOSPHORUS    Collection Time: 06/10/18  3:53 AM   Result Value Ref Range    Phosphorus 2.4 (L) 2.6 - 4.7 MG/DL   PROTHROMBIN TIME + INR    Collection Time: 06/10/18  3:53 AM   Result Value Ref Range    INR 1.2 (H) 0.9 - 1.1      Prothrombin time 11.6 (H) 9.0 - 11.1 sec   PTT    Collection Time: 06/10/18  3:53 AM   Result Value Ref Range    aPTT 25.5 22.1 - 32.0 sec    aPTT, therapeutic range     58.0 - 77.0 SECS     I have reviewed the flowsheets. Chart reviewed. Pertinent Notes reviewed. Current Medications list reviewed by me. No change in PMH ,family and social history from Consult note.     Weston Paul MD  06/10/18

## 2018-06-10 NOTE — PROGRESS NOTES
Hospitalist Progress Note    NAME: Della Swan   :  1950   MRN:  [id number]       Assessment / Plan:  Septic versus hypovolemic shock  Acute diverticulitis with contained perforation  C. difficile colitis  Possible port infection in about 1 week ago and was treated with Augmentin  Currently on low-dose Levophed and is being weaned off.  Continue vancomycin, cefepime and Flagyl  Continue p.o. vancomycin for C. difficile  General surgery is following for contained perforation and would like to get a CT scan of abdomen in a.m. and reassess  Continue IV fluids  CBC and BMP in a.m. tomorrow    Acute kidney injury likely prerenal  Creatinine peaked at 5.69 and is currently improving  Creatinine is 2.31 today  Continue IV fluids  Nephrology following  Hold lisinopril    Hypokalemia  Hypophosphatemia   Replaced    History of breast cancer  Currently on treatment and follows up with Dr. Kaleb Roberts               Body mass index is 41.94 kg/(m^2). Code status: Full  Prophylaxis: Hep SQ  Recommended Disposition: Home w/Family     Subjective:     Chief Complaint / Reason for Physician Visit  Does not report any abdominal pain. No nausea or vomiting. On low dose of levophed this am     Review of Systems:  Symptom Y/N Comments  Symptom Y/N Comments   Fever/Chills n   Chest Pain n    Poor Appetite    Edema     Cough n   Abdominal Pain     Sputum    Joint Pain     SOB/JACOBO n   Pruritis/Rash     Nausea/vomit    Tolerating PT/OT     Diarrhea    Tolerating Diet     Constipation    Other       Could NOT obtain due to:      Objective:     VITALS:   Last 24hrs VS reviewed since prior progress note.  Most recent are:  Patient Vitals for the past 24 hrs:   Temp Pulse Resp BP SpO2   06/10/18 1400 - 66 26 98/66 93 %   06/10/18 1300 - 74 16 102/74 -   06/10/18 1200 98.3 °F (36.8 °C) 65 16 109/56 97 %   06/10/18 1100 - 63 17 138/65 96 %   06/10/18 1000 - 62 21 122/41 94 %   06/10/18 0900 - 68 15 132/68 -   06/10/18 0800 - 66 17 141/71 97 %   06/10/18 0700 - 64 15 - 96 %   06/10/18 0600 - (!) 58 19 (!) 81/68 -   06/10/18 0500 - 63 18 122/67 95 %   06/10/18 0415 - 64 20 121/65 95 %   06/10/18 0400 98.4 °F (36.9 °C) 63 21 113/80 94 %   06/10/18 0300 - 63 18 120/68 94 %   06/10/18 0200 - 61 18 102/55 91 %   06/10/18 0100 - 66 20 (!) 133/99 95 %   06/10/18 0001 98.4 °F (36.9 °C) 65 20 134/49 95 %   06/09/18 2359 - 65 20 - 94 %   06/09/18 2300 - 73 22 118/70 97 %   06/09/18 2200 - 64 17 133/59 -   06/09/18 2100 - (!) 59 18 124/60 93 %   06/09/18 2000 98.4 °F (36.9 °C) 70 21 117/54 98 %   06/09/18 1900 - 61 20 118/53 95 %   06/09/18 1730 - 67 20 122/61 96 %   06/09/18 1729 - 69 16 125/61 97 %   06/09/18 1702 - 69 16 - 98 %   06/09/18 1700 - 64 21 (!) 71/44 97 %   06/09/18 1600 98.3 °F (36.8 °C) 68 20 114/59 98 %       Intake/Output Summary (Last 24 hours) at 06/10/18 1551  Last data filed at 06/10/18 1418   Gross per 24 hour   Intake          3837.02 ml   Output             2505 ml   Net          1332.02 ml        PHYSICAL EXAM:  General: cooperative, no acute distress    EENT:  EOMI. Anicteric sclerae. MMM  Resp:  CTA bilaterally, no wheezing or rales. No accessory muscle use  CV:  Regular  rhythm,  No edema  GI:  Soft, Non distended, Non tender.  +Bowel sounds  Neurologic:  Alert and awake, normal speech,   Psych:   Not anxious nor agitated  Skin:  No rashes.   No jaundice    Reviewed most current lab test results and cultures  YES  Reviewed most current radiology test results   YES  Review and summation of old records today    NO  Reviewed patient's current orders and MAR    YES  PMH/SH reviewed - no change compared to H&P          Current Facility-Administered Medications:     [START ON 6/11/2018] cefepime (MAXIPIME) 2 g in 0.9% sodium chloride (MBP/ADV) 100 mL, 2 g, IntraVENous, Q24H, Ever Collado MD    vancomycin (VANCOCIN) 1250 mg in  ml infusion, 1,250 mg, IntraVENous, Q18H, Ever Collado MD, Last Rate: 125 mL/hr at 06/10/18 1355, 1,250 mg at 06/10/18 1355    metroNIDAZOLE (FLAGYL) IVPB premix 500 mg, 500 mg, IntraVENous, Q8H, Sathya Reeves MD, Last Rate: 100 mL/hr at 06/10/18 1113, 500 mg at 06/10/18 1113    0.9% sodium chloride infusion, 75 mL/hr, IntraVENous, CONTINUOUS, Angela Bosch MD, Last Rate: 75 mL/hr at 06/10/18 1325, 75 mL/hr at 06/10/18 1325    prochlorperazine (COMPAZINE) with saline injection 10 mg, 10 mg, IntraVENous, Q6H PRN, Alisha James MD    sodium chloride (NS) flush 5-10 mL, 5-10 mL, IntraVENous, Q8H, Sathya Reeves MD, 10 mL at 06/10/18 1356    sodium chloride (NS) flush 5-10 mL, 5-10 mL, IntraVENous, PRN, Alisha James MD    ondansetron (ZOFRAN) injection 4 mg, 4 mg, IntraVENous, Q4H PRN, Alisha James MD    heparin (porcine) injection 5,000 Units, 5,000 Units, SubCUTAneous, Q12H, Sathya Reeves MD, 5,000 Units at 06/10/18 0943    vancomycin 50 mg/mL oral solution (compounded) 250 mg, 250 mg, Oral, Q6H, Angela Bosch MD, 250 mg at 06/10/18 1113    acetaminophen (TYLENOL) suppository 650 mg, 650 mg, Rectal, Q4H PRN, Wally Goldsmith MD    HYDROmorphone (DILAUDID) injection 1 mg, 1 mg, IntraVENous, Q3H PRN, Wally Goldsmith MD    sodium chloride (NS) flush 5-10 mL, 5-10 mL, IntraVENous, PRN, Demetria Melton MD  ________________________________________________________________________  Care Plan discussed with:    Comments   Patient y    Family  y    RN y    Care Manager     Consultant                        Multidiciplinary team rounds were held today with , nursing, pharmacist and clinical coordinator. Patient's plan of care was discussed; medications were reviewed and discharge planning was addressed.      ________________________________________________________________________  Total NON critical care TIME:  35  Minutes    Total CRITICAL CARE TIME Spent:   Minutes non procedure based      Comments   >50% of visit spent in counseling and coordination of care ________________________________________________________________________  Maria Luisa Sr MD     Procedures: see electronic medical records for all procedures/Xrays and details which were not copied into this note but were reviewed prior to creation of Plan. LABS:  I reviewed today's most current labs and imaging studies.   Pertinent labs include:  Recent Labs      06/10/18   0353  06/09/18   1249  06/09/18   0438   WBC  20.3*  22.0*  20.3*   HGB  8.0*  8.1*  8.3*   HCT  24.9*  24.3*  26.0*   PLT  250  287  309     Recent Labs      06/10/18   0353  06/09/18   1249  06/09/18   0438   NA  144  139  134*   K  3.3*  3.2*  3.2*   CL  112*  105  103   CO2  20*  22  15*   GLU  109*  151*  118*   BUN  46*  61*  65*   CREA  2.31*  4.04*  4.83*   CA  8.3*  8.1*  8.2*   MG  1.7  1.8  2.1   PHOS  2.4*  2.9   --    ALB  1.8*  1.7*  1.8*   TBILI  0.4  0.4  0.4   SGOT  15  13*  15   ALT  12  11*  13   INR  1.2*   --    --        Signed: Maria Luisa Sr MD

## 2018-06-10 NOTE — PROGRESS NOTES
[facility]  Pulmonary, Critical Care, and Sleep Medicine    Name: Silverio Alexis MRN: [id number]   : 1950 Hospital: [facility]   Date: 6/10/2018        Critical Care Patient Consult    IMPRESSION:   · On abdominal CT noted to have as possible rupture colonic diverticula, Will ask Surgery to eval, does not seem to need IR intervention at this point, Will continue with abx. · Severe Sepsis with Shock, on levophed drip. Now off. · Has been on 2nd round treatment for her Breast Ca per Dr. Arias Ratliff. · C Diff infection, has been having 4-5 loose stools per day. · Possible port infection noted about 1 week ago, now appears better. Was on treatment with Augmentin. · Acute Renal Failure, Cr from 4 down to 2.   · At some risk of adrenal suppresion, if hypotension is refractory will add stress steroids. · Discussed with nurse this am.       RECOMMENDATIONS:   · On Empiric IV VAnc, cefepime, and flagyl, can de escalated in next 24 hrs. · Discussed with Dr. Matthew Tobias, appreciate his assistance. Will follow his recs. · Hold BP meds. · With C Diff, added po vanc. · Appreciate and reviewed recs per Renal.   · Follow up on Stool and blood Cx  · Will obtain UA and Ucx. · Will give additional volume resuscitation  · Will give bicarb, following Cr, K. If worsens will need renal consultation  · Will follow K and Mag levels. Subjective/History:   Last 24 hrs: Pt is feeling better today. Has ongoing loose stools. NO chest pain, no back pain. Has no abdominal pain. Wants to try to take po intake. Cc: Pt had nausea, vomiting and diarrhea. This patient has been seen and evaluated at the request of Dr. Vasu Cuellar for above. Patient is a 79 y.o. female    who presents with nausea, vomiting and diarrhea with 4-5 BMs per day. As per patient, pt is on chemotherapy (2nd round, first started about 4-5 weeks ago).  One  week ago she noted redness around the port and she was started on augmentin. The day she started abx, redness around the port got better but pt started to have diarrhea, nausea and vomiting. She claims diarrhea started on the same day of starting abx. Pt reported fever upto 100.4. Pt reports lower abdominal cramps with diarrhea but denies any abdominal pain, chest pain, cough, problems urination. In ED pt noted to be in shock without improvement after being given the IV fluids. Has been on  levophed.     She has some lower back and lower abdominal pain. NO leg pain. No redness now noted over her right upper chest port. NO Headaches. We were asked to admit for work up and evaluation of the above problems. Past Medical History:   Diagnosis Date    Arthritis     Breast cancer, right breast ([address]     Depression     GERD (gastroesophageal reflux disease)     Hypercholesterolemia     Hypertension     Other ill-defined conditions(799.89) 2012    dog bite dec 2012 needing blood transfusion      Past Surgical History:   Procedure Laterality Date    HX BREAST LUMPECTOMY Right 3/8/2018    RIGHT BREAST LUMPECTOMY WITH ULTRASOUND performed [facility] Nj [facility]    HX COLONOSCOPY      HX KNEE REPLACEMENT Right 06/2013    HX TONSILLECTOMY        Prior to Admission medications    Medication Sig Start Date End Date Taking? Authorizing Provider   raNITIdine (ZANTAC) 150 mg tablet Take 150 mg by mouth two (2) times a day. Indications: Heartburn, PREVENTION OF STRESS ULCER   Yes Historical Provider   amoxicillin-clavulanate (AUGMENTIN) 500-125 mg per tablet Take 1 Tab by mouth every twelve (12) hours for 10 days. 6/1/18 6/11/18 Yes Naz Jorge NP   ondansetron (ZOFRAN ODT) 4 mg disintegrating tablet Take 1 Tab by mouth every eight (8) hours as needed for Nausea. 4/20/18  Yes Mirta Mike NP   lidocaine-prilocaine (EMLA) topical cream Apply  to affected area as needed for Pain.  4/20/18  Yes Mirta Mike NP   dexamethasone (DECADRON) 4 mg tablet Take 2 Tabs by mouth daily. The day before, the day of and the day after chemotherapy 4/20/18  Yes Honey Hawk NP   ondansetron (ZOFRAN ODT) 4 mg disintegrating tablet Take 1 Tab by mouth every six (6) hours as needed for Nausea. 3/8/18  Yes Bernardo Farrell MD   lisinopril-hydroCHLOROthiazide (PRINZIDE, ZESTORETIC) 20-25 mg per tablet Take 1 Tab by mouth daily. Indications: hypertension   Yes Historical Provider   calcium-cholecalciferol, d3, (CALCIUM 600 + D) 600-125 mg-unit Tab Take 1 Tab by mouth two (2) times a day. Yes Historical Provider   Cholecalciferol, Vitamin D3, (VITAMIN D3) 1,000 unit cap Take 1 Tab by mouth daily. Yes Historical Provider   atenolol (TENORMIN) 50 mg tablet Take 25 mg by mouth daily. Yes Historical Provider   prochlorperazine (COMPAZINE) 10 mg tablet Take 10 mg by mouth every six (6) hours as needed. Shelton Serna MD   ibuprofen (MOTRIN) 200 mg tablet Take  by mouth. Historical Provider   garlic 1 mg cap Take  by mouth daily. Historical Provider   oxyCODONE-acetaminophen (PERCOCET) 5-325 mg per tablet Take 1 Tab by mouth every four (4) hours as needed for Pain. Max Daily Amount: 6 Tabs. 3/8/18   Leonora Franco MD   ferrous sulfate (IRON) 325 mg (65 mg iron) tablet Take 325 mg by mouth Daily (before breakfast). Historical Provider   etodolac (LODINE) 400 mg tablet Take 400 mg by mouth daily.     Historical Provider     Current Facility-Administered Medications   Medication Dose Route Frequency    potassium phosphate 15 mmol in 0.9% sodium chloride 250 mL infusion   IntraVENous ONCE    [START ON 6/11/2018] cefepime (MAXIPIME) 2 g in 0.9% sodium chloride (MBP/ADV) 100 mL  2 g IntraVENous Q24H    vancomycin (VANCOCIN) 1250 mg in  ml infusion  1,250 mg IntraVENous Q18H    metroNIDAZOLE (FLAGYL) IVPB premix 500 mg  500 mg IntraVENous Q8H    0.9% sodium chloride infusion  75 mL/hr IntraVENous CONTINUOUS    sodium chloride (NS) flush 5-10 mL 5-10 mL IntraVENous Q8H    heparin (porcine) injection 5,000 Units  5,000 Units SubCUTAneous Q12H    vancomycin 50 mg/mL oral solution (compounded) 250 mg  250 mg Oral Q6H     Allergies   Allergen Reactions    Nasacort [Triamcinolone Acetonide] Other (comments)     headache    Sulfa (Sulfonamide Antibiotics) Rash      Social History   Substance Use Topics    Smoking status: Former Smoker     Packs/day: 0.25     Years: 2.00     Quit date: 6/4/1972    Smokeless tobacco: Never Used    Alcohol use No      Family History   Problem Relation Age of Onset    Adopted:  Yes    Breast Cancer Mother     Stroke Mother     Hypertension Mother     Cancer Mother      skin    Cancer Father      skin    Pulmonary Fibrosis Father     Cancer Sister 79     colon    Breast Cancer Maternal Aunt     Breast Cancer Paternal Aunt     Breast Cancer Maternal Aunt      ovarian and colon cancer    Breast Cancer Maternal Aunt      ovarian and colon cancer    Breast Cancer Maternal Aunt     Breast Cancer Paternal Aunt     Breast Cancer Maternal Aunt     Cancer Maternal Grandmother      liver/ovarian        Review of Systems:  Constitutional: positive for fevers, chills, sweats, fatigue and anorexia  Eyes: negative  Ears, nose, mouth, throat, and face: negative  Respiratory: negative  Cardiovascular: negative  Gastrointestinal: positive for dyspepsia, reflux symptoms, nausea, vomiting, change in bowel habits, diarrhea and abdominal pain  Genitourinary:negative  Integument/breast: negative  Hematologic/lymphatic: negative  Musculoskeletal:positive for myalgias, arthralgias, stiff joints, back pain and muscle weakness  Neurological: negative  Behavioral/Psych: negative  Endocrine: negative  Allergic/Immunologic: negative    Objective:   Vital Signs:    Visit Vitals    /74    Pulse 74    Temp 98.3 °F (36.8 °C)    Resp 16    Ht 5' 2\" (1.575 m)    Wt 104 kg (229 lb 4.5 oz)    SpO2 97%    BMI 41.94 kg/m2       O2 Device: Room air       Temp (24hrs), Av.4 °F (36.9 °C), Min:98.3 °F (36.8 °C), Max:98.4 °F (36.9 °C)       Intake/Output:   Last shift:      06/10 0701 - 06/10 1900  In: 1100 [I.V.:1100]  Out: -   Last 3 shifts: 1901 - 06/10 07  In: 5810.7 [P.O.:200; I.V.:5610.7]  Out: 4426 [Urine:4425]    Intake/Output Summary (Last 24 hours) at 06/10/18 1325  Last data filed at 06/10/18 1200   Gross per 24 hour   Intake          5693.48 ml   Output             3376 ml   Net          2317.48 ml     Hemodynamics:   PAP:   CO:     Wedge:   CI:     CVP:  CVP (mmHg): 21 mmHg (06/10/18 1300) SVR:       PVR:       Ventilator Settings:  Mode Rate Tidal Volume Pressure FiO2 PEEP                    Peak airway pressure:      Minute ventilation:        Physical Exam:    General:  Alert, cooperative, no distress, appears stated age. Head:  Normocephalic, without obvious abnormality, atraumatic. Eyes:  Conjunctivae/corneas clear. PERRL, EOMs intact. Nose: Nares normal. Septum midline. Mucosa normal. No drainage or sinus tenderness. Throat: Lips, mucosa, and tongue normal. Teeth and gums normal.   Neck: Supple, symmetrical, trachea midline, no adenopathy, thyroid: no enlargment/tenderness/nodules, no carotid bruit and no JVD. Back:   Symmetric, no curvature. ROM normal.   Lungs:   Clear to auscultation bilaterally. Chest wall:  No tenderness or deformity. Heart:  Regular rate and rhythm, S1, S2 normal, no murmur, click, rub or gallop. Abdomen:   Soft, non-tender. Bowel sounds normal. No masses,  No organomegaly. Extremities: Extremities normal, atraumatic, no cyanosis or edema. Pulses: 2+ and symmetric all extremities. Skin: Skin color, texture, turgor normal. No rashes or lesions   Lymph nodes: Cervical, supraclavicular, and axillary nodes normal.   Neurologic: Grossly nonfocal Psych: intact. No depression no anxiety.         Data:     Recent Results (from the past 24 hour(s))   CBC WITH AUTOMATED DIFF Collection Time: 06/10/18  3:53 AM   Result Value Ref Range    WBC 20.3 (H) 3.6 - 11.0 K/uL    RBC 2.79 (L) 3.80 - 5.20 M/uL    HGB 8.0 (L) 11.5 - 16.0 g/dL    HCT 24.9 (L) 35.0 - 47.0 %    MCV 89.2 80.0 - 99.0 FL    MCH 28.7 26.0 - 34.0 PG    MCHC 32.1 30.0 - 36.5 g/dL    RDW 14.4 11.5 - 14.5 %    PLATELET 776 443 - 973 K/uL    MPV 10.7 8.9 - 12.9 FL    NRBC 0.1 (H) 0  WBC    ABSOLUTE NRBC 0.03 (H) 0.00 - 0.01 K/uL    NEUTROPHILS 76 (H) 32 - 75 %    BAND NEUTROPHILS 4 %    LYMPHOCYTES 9 (L) 12 - 49 %    MONOCYTES 2 (L) 5 - 13 %    EOSINOPHILS 0 0 - 7 %    BASOPHILS 0 0 - 1 %    METAMYELOCYTES 6 %    MYELOCYTES 1 %    PROMYELOCYTES 2 %    IMMATURE GRANULOCYTES 0 0.0 - 0.5 %    ABS. NEUTROPHILS 16.2 (H) 1.8 - 8.0 K/UL    ABS. LYMPHOCYTES 1.8 0.8 - 3.5 K/UL    ABS. MONOCYTES 0.4 0.0 - 1.0 K/UL    ABS. EOSINOPHILS 0.0 0.0 - 0.4 K/UL    ABS. BASOPHILS 0.0 0.0 - 0.1 K/UL    ABS. IMM. GRANS. 0.0 0.00 - 0.04 K/UL    DF MANUAL      RBC COMMENTS ANISOCYTOSIS  1+        WBC COMMENTS TOXIC GRANULATION      Pathologist review SMEAR FOR PATHOLOGIST REVIEW     METABOLIC PANEL, COMPREHENSIVE    Collection Time: 06/10/18  3:53 AM   Result Value Ref Range    Sodium 144 136 - 145 mmol/L    Potassium 3.3 (L) 3.5 - 5.1 mmol/L    Chloride 112 (H) 97 - 108 mmol/L    CO2 20 (L) 21 - 32 mmol/L    Anion gap 12 5 - 15 mmol/L    Glucose 109 (H) 65 - 100 mg/dL    BUN 46 (H) 6 - 20 MG/DL    Creatinine 2.31 (H) 0.55 - 1.02 MG/DL    BUN/Creatinine ratio 20 12 - 20      GFR est AA 26 (L) >60 ml/min/1.73m2    GFR est non-AA 21 (L) >60 ml/min/1.73m2    Calcium 8.3 (L) 8.5 - 10.1 MG/DL    Bilirubin, total 0.4 0.2 - 1.0 MG/DL    ALT (SGPT) 12 12 - 78 U/L    AST (SGOT) 15 15 - 37 U/L    Alk.  phosphatase 73 45 - 117 U/L    Protein, total 5.7 (L) 6.4 - 8.2 g/dL    Albumin 1.8 (L) 3.5 - 5.0 g/dL    Globulin 3.9 2.0 - 4.0 g/dL    A-G Ratio 0.5 (L) 1.1 - 2.2     MAGNESIUM    Collection Time: 06/10/18  3:53 AM   Result Value Ref Range    Magnesium 1.7 1.6 - 2.4 mg/dL   PHOSPHORUS    Collection Time: 06/10/18  3:53 AM   Result Value Ref Range    Phosphorus 2.4 (L) 2.6 - 4.7 MG/DL   PROTHROMBIN TIME + INR    Collection Time: 06/10/18  3:53 AM   Result Value Ref Range    INR 1.2 (H) 0.9 - 1.1      Prothrombin time 11.6 (H) 9.0 - 11.1 sec   PTT    Collection Time: 06/10/18  3:53 AM   Result Value Ref Range    aPTT 25.5 22.1 - 32.0 sec    aPTT, therapeutic range     58.0 - 77.0 SECS             Telemetry:NSR    Imaging:  I have personally reviewed the patients radiographs and have reviewed the reports:  6-8-18: CXR: INDICATION: Sepsis. Cardiomediastinal silhouette is stable. Central venous port catheter extends to  the mid SVC. Left internal jugular central venous catheter extends to the right  atrium. There is no pneumothorax. Lungs are hypoinflated. There is mild  subsegmental atelectasis within the right midlung. No pleural fluid is  visualized.     IMPRESSION: Right midlung mild subsegmental atelectasis.         Total critical care time exclusive of procedures: 35 minutes  Sylvain He MD

## 2018-06-10 NOTE — PROGRESS NOTES
Had a relatively good night. Levo at 2mcg. Has hallucinated some, seeing something with the ceiling tiles though. No acute distress. Spouse remains at bedside. Supportive, jovial with one another. Report given. Patient and chart visited.

## 2018-06-11 ENCOUNTER — APPOINTMENT (OUTPATIENT)
Dept: CT IMAGING | Age: 68
DRG: 871 | End: 2018-06-11
Attending: SURGERY
Payer: MEDICARE

## 2018-06-11 LAB
ANION GAP SERPL CALC-SCNC: 9 MMOL/L (ref 5–15)
BACTERIA SPEC CULT: NORMAL
BASOPHILS # BLD: 0 K/UL (ref 0–0.1)
BASOPHILS NFR BLD: 0 % (ref 0–1)
BUN SERPL-MCNC: 27 MG/DL (ref 6–20)
BUN/CREAT SERPL: 24 (ref 12–20)
C JEJUNI+C COLI AG STL QL: NEGATIVE
CALCIUM SERPL-MCNC: 8.4 MG/DL (ref 8.5–10.1)
CHLORIDE SERPL-SCNC: 115 MMOL/L (ref 97–108)
CO2 SERPL-SCNC: 21 MMOL/L (ref 21–32)
CREAT SERPL-MCNC: 1.11 MG/DL (ref 0.55–1.02)
DIFFERENTIAL METHOD BLD: ABNORMAL
E COLI SXT1+2 STL IA: NEGATIVE
EOSINOPHIL # BLD: 0 K/UL (ref 0–0.4)
EOSINOPHIL NFR BLD: 0 % (ref 0–7)
ERYTHROCYTE [DISTWIDTH] IN BLOOD BY AUTOMATED COUNT: 14.7 % (ref 11.5–14.5)
GLUCOSE SERPL-MCNC: 82 MG/DL (ref 65–100)
HCT VFR BLD AUTO: 23.1 % (ref 35–47)
HGB BLD-MCNC: 7.2 G/DL (ref 11.5–16)
IMM GRANULOCYTES # BLD: 0 K/UL (ref 0–0.04)
IMM GRANULOCYTES NFR BLD AUTO: 0 % (ref 0–0.5)
LYMPHOCYTES # BLD: 1.1 K/UL (ref 0.8–3.5)
LYMPHOCYTES NFR BLD: 4 % (ref 12–49)
MAGNESIUM SERPL-MCNC: 1.5 MG/DL (ref 1.6–2.4)
MCH RBC QN AUTO: 28.6 PG (ref 26–34)
MCHC RBC AUTO-ENTMCNC: 31.2 G/DL (ref 30–36.5)
MCV RBC AUTO: 91.7 FL (ref 80–99)
MONOCYTES # BLD: 1.1 K/UL (ref 0–1)
MONOCYTES NFR BLD: 4 % (ref 5–13)
NEUTS BAND NFR BLD MANUAL: 6 %
NEUTS SEG # BLD: 24 K/UL (ref 1.8–8)
NEUTS SEG NFR BLD: 85 % (ref 32–75)
NRBC # BLD: 0.06 K/UL (ref 0–0.01)
NRBC BLD-RTO: 0.2 PER 100 WBC
OTHER CELLS NFR BLD MANUAL: 1 %
PATH REV BLD -IMP: NORMAL
PHOSPHATE SERPL-MCNC: 2.4 MG/DL (ref 2.6–4.7)
PLATELET # BLD AUTO: 213 K/UL (ref 150–400)
PMV BLD AUTO: 10.6 FL (ref 8.9–12.9)
POTASSIUM SERPL-SCNC: 3.2 MMOL/L (ref 3.5–5.1)
RBC # BLD AUTO: 2.52 M/UL (ref 3.8–5.2)
RBC MORPH BLD: ABNORMAL
SERVICE CMNT-IMP: NORMAL
SODIUM SERPL-SCNC: 145 MMOL/L (ref 136–145)
WBC # BLD AUTO: 26.4 K/UL (ref 3.6–11)
WBC MORPH BLD: ABNORMAL

## 2018-06-11 PROCEDURE — 74011000258 HC RX REV CODE- 258: Performed by: INTERNAL MEDICINE

## 2018-06-11 PROCEDURE — 74011250636 HC RX REV CODE- 250/636: Performed by: INTERNAL MEDICINE

## 2018-06-11 PROCEDURE — 74011250637 HC RX REV CODE- 250/637: Performed by: INTERNAL MEDICINE

## 2018-06-11 PROCEDURE — 74011636320 HC RX REV CODE- 636/320: Performed by: INTERNAL MEDICINE

## 2018-06-11 PROCEDURE — 97530 THERAPEUTIC ACTIVITIES: CPT

## 2018-06-11 PROCEDURE — 77030020291 HC FLEXSEAL FMS BMS -C

## 2018-06-11 PROCEDURE — 80048 BASIC METABOLIC PNL TOTAL CA: CPT | Performed by: INTERNAL MEDICINE

## 2018-06-11 PROCEDURE — G8987 SELF CARE CURRENT STATUS: HCPCS | Performed by: OCCUPATIONAL THERAPIST

## 2018-06-11 PROCEDURE — 97535 SELF CARE MNGMENT TRAINING: CPT | Performed by: OCCUPATIONAL THERAPIST

## 2018-06-11 PROCEDURE — 74011000250 HC RX REV CODE- 250: Performed by: INTERNAL MEDICINE

## 2018-06-11 PROCEDURE — 97116 GAIT TRAINING THERAPY: CPT

## 2018-06-11 PROCEDURE — G8988 SELF CARE GOAL STATUS: HCPCS | Performed by: OCCUPATIONAL THERAPIST

## 2018-06-11 PROCEDURE — 36415 COLL VENOUS BLD VENIPUNCTURE: CPT | Performed by: INTERNAL MEDICINE

## 2018-06-11 PROCEDURE — 84100 ASSAY OF PHOSPHORUS: CPT | Performed by: INTERNAL MEDICINE

## 2018-06-11 PROCEDURE — 65660000000 HC RM CCU STEPDOWN

## 2018-06-11 PROCEDURE — 97165 OT EVAL LOW COMPLEX 30 MIN: CPT | Performed by: OCCUPATIONAL THERAPIST

## 2018-06-11 PROCEDURE — 74177 CT ABD & PELVIS W/CONTRAST: CPT

## 2018-06-11 PROCEDURE — 83735 ASSAY OF MAGNESIUM: CPT | Performed by: INTERNAL MEDICINE

## 2018-06-11 PROCEDURE — 74011000255 HC RX REV CODE- 255: Performed by: INTERNAL MEDICINE

## 2018-06-11 PROCEDURE — 85025 COMPLETE CBC W/AUTO DIFF WBC: CPT | Performed by: INTERNAL MEDICINE

## 2018-06-11 RX ORDER — SODIUM CHLORIDE 9 MG/ML
50 INJECTION, SOLUTION INTRAVENOUS
Status: COMPLETED | OUTPATIENT
Start: 2018-06-11 | End: 2018-06-11

## 2018-06-11 RX ORDER — SODIUM CHLORIDE 0.9 % (FLUSH) 0.9 %
10 SYRINGE (ML) INJECTION
Status: COMPLETED | OUTPATIENT
Start: 2018-06-11 | End: 2018-06-11

## 2018-06-11 RX ORDER — SODIUM CHLORIDE 450 MG/100ML
75 INJECTION, SOLUTION INTRAVENOUS CONTINUOUS
Status: DISCONTINUED | OUTPATIENT
Start: 2018-06-11 | End: 2018-06-16

## 2018-06-11 RX ORDER — POTASSIUM CHLORIDE 7.45 MG/ML
10 INJECTION INTRAVENOUS
Status: COMPLETED | OUTPATIENT
Start: 2018-06-11 | End: 2018-06-11

## 2018-06-11 RX ORDER — BARIUM SULFATE 20 MG/ML
900 SUSPENSION ORAL
Status: COMPLETED | OUTPATIENT
Start: 2018-06-11 | End: 2018-06-11

## 2018-06-11 RX ADMIN — Medication 10 ML: at 14:14

## 2018-06-11 RX ADMIN — VANCOMYCIN HYDROCHLORIDE 500 MG: 1 INJECTION, POWDER, LYOPHILIZED, FOR SOLUTION INTRAVENOUS at 11:50

## 2018-06-11 RX ADMIN — IOPAMIDOL 100 ML: 755 INJECTION, SOLUTION INTRAVENOUS at 13:22

## 2018-06-11 RX ADMIN — SODIUM CHLORIDE 75 ML/HR: 450 INJECTION, SOLUTION INTRAVENOUS at 09:24

## 2018-06-11 RX ADMIN — METRONIDAZOLE 500 MG: 500 INJECTION, SOLUTION INTRAVENOUS at 02:53

## 2018-06-11 RX ADMIN — HEPARIN SODIUM 5000 UNITS: 5000 INJECTION, SOLUTION INTRAVENOUS; SUBCUTANEOUS at 20:44

## 2018-06-11 RX ADMIN — SODIUM CHLORIDE 75 ML/HR: 450 INJECTION, SOLUTION INTRAVENOUS at 18:43

## 2018-06-11 RX ADMIN — VANCOMYCIN HYDROCHLORIDE 250 MG: 1 INJECTION, POWDER, LYOPHILIZED, FOR SOLUTION INTRAVENOUS at 05:02

## 2018-06-11 RX ADMIN — VANCOMYCIN HYDROCHLORIDE 250 MG: 1 INJECTION, POWDER, LYOPHILIZED, FOR SOLUTION INTRAVENOUS at 00:38

## 2018-06-11 RX ADMIN — Medication 10 ML: at 05:03

## 2018-06-11 RX ADMIN — SODIUM CHLORIDE, SODIUM LACTATE, POTASSIUM CHLORIDE, AND CALCIUM CHLORIDE 100 ML/HR: 600; 310; 30; 20 INJECTION, SOLUTION INTRAVENOUS at 02:55

## 2018-06-11 RX ADMIN — Medication 10 ML: at 20:45

## 2018-06-11 RX ADMIN — POTASSIUM CHLORIDE 10 MEQ: 10 INJECTION, SOLUTION INTRAVENOUS at 14:13

## 2018-06-11 RX ADMIN — Medication 10 ML: at 13:22

## 2018-06-11 RX ADMIN — MUPIROCIN: 20 OINTMENT TOPICAL at 08:34

## 2018-06-11 RX ADMIN — CEFEPIME HYDROCHLORIDE 2 G: 2 INJECTION, POWDER, FOR SOLUTION INTRAVENOUS at 12:52

## 2018-06-11 RX ADMIN — BARIUM SULFATE 900 ML: 20 SUSPENSION ORAL at 09:24

## 2018-06-11 RX ADMIN — VANCOMYCIN HYDROCHLORIDE 1250 MG: 10 INJECTION, POWDER, LYOPHILIZED, FOR SOLUTION INTRAVENOUS at 08:34

## 2018-06-11 RX ADMIN — POTASSIUM CHLORIDE 10 MEQ: 10 INJECTION, SOLUTION INTRAVENOUS at 09:25

## 2018-06-11 RX ADMIN — CEFEPIME HYDROCHLORIDE 2 G: 2 INJECTION, POWDER, FOR SOLUTION INTRAVENOUS at 00:38

## 2018-06-11 RX ADMIN — HEPARIN SODIUM 5000 UNITS: 5000 INJECTION, SOLUTION INTRAVENOUS; SUBCUTANEOUS at 08:34

## 2018-06-11 RX ADMIN — VANCOMYCIN HYDROCHLORIDE 500 MG: 1 INJECTION, POWDER, LYOPHILIZED, FOR SOLUTION INTRAVENOUS at 20:03

## 2018-06-11 RX ADMIN — POTASSIUM CHLORIDE 10 MEQ: 10 INJECTION, SOLUTION INTRAVENOUS at 10:39

## 2018-06-11 RX ADMIN — METRONIDAZOLE 500 MG: 500 INJECTION, SOLUTION INTRAVENOUS at 18:43

## 2018-06-11 RX ADMIN — SODIUM CHLORIDE 50 ML/HR: 900 INJECTION, SOLUTION INTRAVENOUS at 13:22

## 2018-06-11 RX ADMIN — METRONIDAZOLE 500 MG: 500 INJECTION, SOLUTION INTRAVENOUS at 11:38

## 2018-06-11 RX ADMIN — CEFEPIME HYDROCHLORIDE 2 G: 2 INJECTION, POWDER, FOR SOLUTION INTRAVENOUS at 23:02

## 2018-06-11 RX ADMIN — POTASSIUM CHLORIDE 10 MEQ: 10 INJECTION, SOLUTION INTRAVENOUS at 11:38

## 2018-06-11 NOTE — PROGRESS NOTES
Attended Interdisciplinary rounds in Critical Care Unit, where patient care was discussed. Visit by: Alan Villasenor. Francisco Lance.  Steve Pablo MA, [address]

## 2018-06-11 NOTE — PROGRESS NOTES
[facility]      [address]                [address], [address]         [place], [address], [facility]      Phone: [phone]   Fax: [phone]      Phone: [phone]    [email or web address]    Nephrology Progress Note  Pt Name : Wilmer Banegas  Date of Admission : 6/8/2018    CC: Follow up for MIL     Assessment and Plan   MIL :  - 2/2 severe pre renal azotemia  From diarrhea, Hypotension, ACE (I), diuretics   - improving renal function  - Kidneys looked normal on CT Abdomen   - change ivf to . 45 nacl as na level increased to 145  - check mg and phos level  - hold lisinopril/hctz     Metabolic acidosis :  - 2/2 ARF, GI losses  - better      Hypokalemia :  - iv kcl 40 meq  - follow bmp       Septic Shock   - 2/2 C diff , perf diverticulitis        Breast Ca   Recent port-a-cath infection      For other plans, see orders. Interval History:  Seen and examined   Cr. Improving  Good urine out put  k low   Bp stable     No c/o sob, fever. No c/o nausea or vomiting  No c/o chest pain    Review of Systems: Pertinent items are noted in HPI.     Current Facility-Administered Medications   Medication Dose Route Frequency    potassium chloride 10 mEq in 100 ml IVPB  10 mEq IntraVENous Q1H    0.45% sodium chloride infusion  75 mL/hr IntraVENous CONTINUOUS    cefepime (MAXIPIME) 2 g in 0.9% sodium chloride (MBP/ADV) 100 mL  2 g IntraVENous Q24H    vancomycin (VANCOCIN) 1250 mg in  ml infusion  1,250 mg IntraVENous Q18H    mupirocin (BACTROBAN) 2 % ointment   Both Nostrils Q12H    metroNIDAZOLE (FLAGYL) IVPB premix 500 mg  500 mg IntraVENous Q8H    prochlorperazine (COMPAZINE) with saline injection 10 mg  10 mg IntraVENous Q6H PRN    sodium chloride (NS) flush 5-10 mL  5-10 mL IntraVENous Q8H    sodium chloride (NS) flush 5-10 mL  5-10 mL IntraVENous PRN    ondansetron (ZOFRAN) injection 4 mg  4 mg IntraVENous Q4H PRN    heparin (porcine) injection 5,000 Units  5,000 Units SubCUTAneous Q12H    vancomycin 50 mg/mL oral solution (compounded) 250 mg  250 mg Oral Q6H    acetaminophen (TYLENOL) suppository 650 mg  650 mg Rectal Q4H PRN    HYDROmorphone (DILAUDID) injection 1 mg  1 mg IntraVENous Q3H PRN    sodium chloride (NS) flush 5-10 mL  5-10 mL IntraVENous PRN      Allergies   Allergen Reactions    Nasacort [Triamcinolone Acetonide] Other (comments)     headache    Sulfa (Sulfonamide Antibiotics) Rash       Objective:  Vitals:    Vitals:    06/11/18 0500 06/11/18 0600 06/11/18 0700 06/11/18 0800   BP: (!) 107/36 108/79 124/69 122/56   Pulse: 67 63 76 61   Resp: 18 22 25 22   Temp:       SpO2: 93% 93% 94% 94%   Weight:       Height:         Intake and Output:  06/11 0701 - 06/11 1900  In: -   Out: 150 [Urine:150]  06/09 1901 - 06/11 0700  In: 4626.8 [I.V.:4626.8]  Out: 2964 [Urine:2660; Drains:300]    Physical Examination:    GENERAL ASSESSMENT: NAD  CHEST: CTA b/l, no wheezing  HEART: S1,S2,RRR  ABDOMEN: Soft,Non tender  EXTREMITY: + EDEMA  NEURO: Non focal, normal speech   -  watkins +    []    High complexity decision making was performed  []    Patient is at high-risk of decompensation with multiple organ involvement    Lab Data Personally Reviewed: I have reviewed all the pertinent labs, microbiology data and radiology studies during assessment.     Recent Labs      06/11/18   0504  06/10/18   0353  06/09/18   1249  06/09/18   0438   NA  145  144  139  134*   K  3.2*  3.3*  3.2*  3.2*   CL  115*  112*  105  103   CO2  21  20*  22  15*   GLU  82  109*  151*  118*   BUN  27*  46*  61*  65*   CREA  1.11*  2.31*  4.04*  4.83*   CA  8.4*  8.3*  8.1*  8.2*   MG   --   1.7  1.8  2.1   PHOS   --   2.4*  2.9   --    ALB   --   1.8*  1.7*  1.8*   SGOT   --   15  13*  15   ALT   --   12  11*  13   INR   --   1.2*   --    --      Recent Labs      06/11/18   0504  06/10/18   0353  06/09/18 1249   WBC  26.4*  20.3*  22.0*   HGB  7.2*  8.0*  8.1*   HCT  23.1*  24.9*  24.3*   PLT  213  250  287     Lab Results   Component Value Date/Time    Specimen Description: SERGIO HILL 06/04/2013 03:43 PM    Specimen Description: [address] 06/04/2013 03:30 PM     Lab Results   Component Value Date/Time    Culture result:  06/08/2018 10:15 PM     NO ROUTINE ENTERIC PATHOGENS ISOLATED INCLUDING SALMONELLA, SHIGELLA, YERSINIA, VIBRIO OR SHIGA TOXIN PRODUCING E. COLI SO FAR    Culture result: NO GROWTH 3 DAYS 06/08/2018 09:22 PM    Culture result: NO GROWTH 3 DAYS 06/08/2018 09:22 PM     Recent Results (from the past 24 hour(s))   CBC WITH AUTOMATED DIFF    Collection Time: 06/11/18  5:04 AM   Result Value Ref Range    WBC 26.4 (H) 3.6 - 11.0 K/uL    RBC 2.52 (L) 3.80 - 5.20 M/uL    HGB 7.2 (L) 11.5 - 16.0 g/dL    HCT 23.1 (L) 35.0 - 47.0 %    MCV 91.7 80.0 - 99.0 FL    MCH 28.6 26.0 - 34.0 PG    MCHC 31.2 30.0 - 36.5 g/dL    RDW 14.7 (H) 11.5 - 14.5 %    PLATELET 617 164 - 568 K/uL    MPV 10.6 8.9 - 12.9 FL    NRBC 0.2 (H) 0  WBC    ABSOLUTE NRBC 0.06 (H) 0.00 - 0.01 K/uL    NEUTROPHILS 85 (H) 32 - 75 %    BAND NEUTROPHILS 6 %    LYMPHOCYTES 4 (L) 12 - 49 %    MONOCYTES 4 (L) 5 - 13 %    EOSINOPHILS 0 0 - 7 %    BASOPHILS 0 0 - 1 %    OTHER CELL 1      IMMATURE GRANULOCYTES 0 0.0 - 0.5 %    ABS. NEUTROPHILS 24.0 (H) 1.8 - 8.0 K/UL    ABS. LYMPHOCYTES 1.1 0.8 - 3.5 K/UL    ABS. MONOCYTES 1.1 (H) 0.0 - 1.0 K/UL    ABS. EOSINOPHILS 0.0 0.0 - 0.4 K/UL    ABS. BASOPHILS 0.0 0.0 - 0.1 K/UL    ABS. IMM.  GRANS. 0.0 0.00 - 0.04 K/UL    DF MANUAL      RBC COMMENTS ANISOCYTOSIS  1+        WBC COMMENTS TOXIC GRANULATION     METABOLIC PANEL, BASIC    Collection Time: 06/11/18  5:04 AM   Result Value Ref Range    Sodium 145 136 - 145 mmol/L    Potassium 3.2 (L) 3.5 - 5.1 mmol/L    Chloride 115 (H) 97 - 108 mmol/L    CO2 21 21 - 32 mmol/L    Anion gap 9 5 - 15 mmol/L    Glucose 82 65 - 100 mg/dL    BUN 27 (H) 6 - 20 MG/DL Creatinine 1.11 (H) 0.55 - 1.02 MG/DL    BUN/Creatinine ratio 24 (H) 12 - 20      GFR est AA 60 (L) >60 ml/min/1.73m2    GFR est non-AA 49 (L) >60 ml/min/1.73m2    Calcium 8.4 (L) 8.5 - 10.1 MG/DL     I have reviewed the flowsheets. Chart reviewed. Pertinent Notes reviewed. Current Medications list reviewed by me. No change in PMH ,family and social history from Consult note.     Alesha Martinez MD  06/11/18

## 2018-06-11 NOTE — PROGRESS NOTES
Admit Date: 2018    POD * No surgery found *    Procedure:  * No surgery found *    Subjective:     Patient has no complaints. Objective:     Blood pressure 122/56, pulse 61, temperature 98.3 °F (36.8 °C), resp. rate 22, height 5' 2\" (1.575 m), weight 229 lb 4.5 oz (104 kg), SpO2 94 %. Temp (24hrs), Av.1 °F (36.7 °C), Min:97.9 °F (36.6 °C), Max:98.3 °F (36.8 °C)      Physical Exam:  GENERAL: alert, cooperative, no distress, appears stated age, LUNG: clear to auscultation bilaterally, HEART: regular rate and rhythm, ABDOMEN: soft, non-tender. Bowel sounds normal. No masses,  no organomegaly, EXTREMITIES:  extremities normal, atraumatic, no cyanosis or edema    Labs:   Recent Results (from the past 24 hour(s))   CBC WITH AUTOMATED DIFF    Collection Time: 18  5:04 AM   Result Value Ref Range    WBC 26.4 (H) 3.6 - 11.0 K/uL    RBC 2.52 (L) 3.80 - 5.20 M/uL    HGB 7.2 (L) 11.5 - 16.0 g/dL    HCT 23.1 (L) 35.0 - 47.0 %    MCV 91.7 80.0 - 99.0 FL    MCH 28.6 26.0 - 34.0 PG    MCHC 31.2 30.0 - 36.5 g/dL    RDW 14.7 (H) 11.5 - 14.5 %    PLATELET 583 568 - 140 K/uL    MPV 10.6 8.9 - 12.9 FL    NRBC 0.2 (H) 0  WBC    ABSOLUTE NRBC 0.06 (H) 0.00 - 0.01 K/uL    NEUTROPHILS 85 (H) 32 - 75 %    BAND NEUTROPHILS 6 %    LYMPHOCYTES 4 (L) 12 - 49 %    MONOCYTES 4 (L) 5 - 13 %    EOSINOPHILS 0 0 - 7 %    BASOPHILS 0 0 - 1 %    OTHER CELL 1      IMMATURE GRANULOCYTES 0 0.0 - 0.5 %    ABS. NEUTROPHILS 24.0 (H) 1.8 - 8.0 K/UL    ABS. LYMPHOCYTES 1.1 0.8 - 3.5 K/UL    ABS. MONOCYTES 1.1 (H) 0.0 - 1.0 K/UL    ABS. EOSINOPHILS 0.0 0.0 - 0.4 K/UL    ABS. BASOPHILS 0.0 0.0 - 0.1 K/UL    ABS. IMM.  GRANS. 0.0 0.00 - 0.04 K/UL    DF MANUAL      RBC COMMENTS ANISOCYTOSIS  1+        WBC COMMENTS TOXIC GRANULATION     METABOLIC PANEL, BASIC    Collection Time: 18  5:04 AM   Result Value Ref Range    Sodium 145 136 - 145 mmol/L    Potassium 3.2 (L) 3.5 - 5.1 mmol/L    Chloride 115 (H) 97 - 108 mmol/L    CO2 21 21 - 32 mmol/L    Anion gap 9 5 - 15 mmol/L    Glucose 82 65 - 100 mg/dL    BUN 27 (H) 6 - 20 MG/DL    Creatinine 1.11 (H) 0.55 - 1.02 MG/DL    BUN/Creatinine ratio 24 (H) 12 - 20      GFR est AA 60 (L) >60 ml/min/1.73m2    GFR est non-AA 49 (L) >60 ml/min/1.73m2    Calcium 8.4 (L) 8.5 - 10.1 MG/DL       Data Review images and reports reviewed    Assessment:     Active Problems:    Breast cancer of upper-outer quadrant of right female breast ([facility] [address] (3/28/2018)      Shock ([facility] [address] (6/9/2018)      HTN (hypertension) (6/9/2018)        Plan/Recommendations/Medical Decision Making:     WBC up today. C diff  Transverse colon diverticulitis  Pelvic abscess on CT 2 days ago  Interval CT today, if larger, should be drained. Continue current antimicrobial coverage. Recommend ID consult    Laurie Cunningham MD, [facility]

## 2018-06-11 NOTE — PROGRESS NOTES
Oncology Consultation Note        Patient: Kelly Smith MRN: [id number]  SSN: [id number]    YOB: 1950  Age: 79 y.o. Sex: female        Reason for consultation:     1. Right breast carcinoma:  T2 N0 M0 (Stage IIA) infiltrating ductal carcinoma, Tumor size 3.8 cm, LN -ve, grade 2, %, [place] 95%, Her 2 -ve. 2. Chemotherapy related infection and sepsis    Treatment:     1. Adjuvant chemotherapy   Taxotere, Cyclophosphamide - s/p 2 cycles  2. Right sided lumpectomy on 03/08/2018. HPI:      Kelly Smith is a 79 y.o. female with breast cancer admitted with sepsis / port infection. She received 2 cycles of adjuvant chemotherapy. Patient was admitted to ICU. She is doing better and should transfer out to the floor today. Her  is at the bedside. Ms. Meghan Carter does not want to continue chemotherapy. Hx for record:  Her breast cancer was seen on an annual mammogram that led to a bilateral biopsy on both the RIGHT and LEFT breast. The LEFT breast was benign and the RIGHT breast is positive for IDC. The patient denies any nipple inversion or discharge. She was seen by Dr. Jair Garrido. She underwent right sided lumpectomy on 03/08/2018. The tumor is 3.8 cm in size, LN -ve, %, [place] 95%, Her 2 -ve. Genomic profile by mammaprint reveals a high risk luminal B gene signature in the tumor. She is receiving adjuvant chemotherapy and tolerating it well. She notes some diarrhea following treatment that is managed with medication.       Review of Systems:  Per HPI    Constitutional: negative  Eyes: negative  Ears, Nose, Mouth, Throat, and Face: negative  Respiratory: negative  Cardiovascular: negative  Gastrointestinal: diarrhea  Genitourinary:negative  Integument/Breast: negative  Hematologic/Lymphatic: negative  Musculoskeletal:negative  Neurological: negative        Past Medical History:   Diagnosis Date    Arthritis     Breast cancer, right breast ([address]     Depression     GERD (gastroesophageal reflux disease)     Hypercholesterolemia     Hypertension     Other ill-defined conditions(799.89) 2012    dog bite dec 2012 needing blood transfusion     Past Surgical History:   Procedure Laterality Date    HX BREAST LUMPECTOMY Right 3/8/2018    RIGHT BREAST LUMPECTOMY WITH ULTRASOUND performed by Eli Shah [facility] at [facility] AMBULAT[facility] OR    HX COLONOSCOPY      HX KNEE REPLACEMENT Right 06/2013    HX TONSILLECTOMY        Family History   Problem Relation Age of Onset    Adopted: Yes    Breast Cancer Mother     Stroke Mother     Hypertension Mother     Cancer Mother      skin    Cancer Father      skin    Pulmonary Fibrosis Father     Cancer Sister 79     colon    Breast Cancer Maternal Aunt     Breast Cancer Paternal Aunt     Breast Cancer Maternal Aunt      ovarian and colon cancer    Breast Cancer Maternal Aunt      ovarian and colon cancer    Breast Cancer Maternal Aunt     Breast Cancer Paternal Aunt     Breast Cancer Maternal Aunt     Cancer Maternal Grandmother      liver/ovarian     Social History   Substance Use Topics    Smoking status: Former Smoker     Packs/day: 0.25     Years: 2.00     Quit date: 6/4/1972    Smokeless tobacco: Never Used    Alcohol use No      Prior to Admission medications    Medication Sig Start Date End Date Taking? Authorizing Provider   raNITIdine (ZANTAC) 150 mg tablet Take 150 mg by mouth two (2) times a day. Indications: Heartburn, PREVENTION OF STRESS ULCER   Yes Historical Provider   amoxicillin-clavulanate (AUGMENTIN) 500-125 mg per tablet Take 1 Tab by mouth every twelve (12) hours for 10 days. 6/1/18 6/11/18 Yes Naz Jorge, NP   ondansetron (ZOFRAN ODT) 4 mg disintegrating tablet Take 1 Tab by mouth every eight (8) hours as needed for Nausea. 4/20/18  Yes Robert Salts, NP   lidocaine-prilocaine (EMLA) topical cream Apply  to affected area as needed for Pain.  4/20/18  Yes Robert Salts, NP   dexamethasone (DECADRON) 4 mg tablet Take 2 Tabs by mouth daily. The day before, the day of and the day after chemotherapy 4/20/18  Yes Lazaro Harris NP   ondansetron (ZOFRAN ODT) 4 mg disintegrating tablet Take 1 Tab by mouth every six (6) hours as needed for Nausea. 3/8/18  Yes Elijah Kumar MD   lisinopril-hydroCHLOROthiazide (PRINZIDE, ZESTORETIC) 20-25 mg per tablet Take 1 Tab by mouth daily. Indications: hypertension   Yes Historical Provider   calcium-cholecalciferol, d3, (CALCIUM 600 + D) 600-125 mg-unit Tab Take 1 Tab by mouth two (2) times a day. Yes Historical Provider   Cholecalciferol, Vitamin D3, (VITAMIN D3) 1,000 unit cap Take 1 Tab by mouth daily. Yes Historical Provider   atenolol (TENORMIN) 50 mg tablet Take 25 mg by mouth daily. Yes Historical Provider   prochlorperazine (COMPAZINE) 10 mg tablet Take 10 mg by mouth every six (6) hours as needed. Phys MD Daphne   ibuprofen (MOTRIN) 200 mg tablet Take  by mouth. Historical Provider   garlic 1 mg cap Take  by mouth daily. Historical Provider   oxyCODONE-acetaminophen (PERCOCET) 5-325 mg per tablet Take 1 Tab by mouth every four (4) hours as needed for Pain. Max Daily Amount: 6 Tabs. 3/8/18   Leonora Castro MD   ferrous sulfate (IRON) 325 mg (65 mg iron) tablet Take 325 mg by mouth Daily (before breakfast). Historical Provider   etodolac (LODINE) 400 mg tablet Take 400 mg by mouth daily.     Historical Provider              Allergies   Allergen Reactions    Nasacort [Triamcinolone Acetonide] Other (comments)     headache    Sulfa (Sulfonamide Antibiotics) Rash           Objective:     Vitals:    06/11/18 1200 06/11/18 1500 06/11/18 1600 06/11/18 1623   BP: (!) 138/94 99/52  117/48   Pulse: 75  68 74   Resp: 20  17 16   Temp: 98.5 °F (36.9 °C)   98.4 °F (36.9 °C)   SpO2:   100% 99%   Weight:       Height:                Physical Exam:    GENERAL: alert, cooperative, in ICU  EYE: negative  THROAT & NECK: supple  LUNG: clear to auscultation bilaterally  HEART: regular rate and rhythm  ABDOMEN: soft, non-tender  EXTREMITIES:  no edema  SKIN: Normal.  NEUROLOGIC: nonfocal  Port site with slight redness    Lab Results   Component Value Date/Time    WBC 26.4 (H) 06/11/2018 05:04 AM    HGB 7.2 (L) 06/11/2018 05:04 AM    HCT 23.1 (L) 06/11/2018 05:04 AM    PLATELET 533 62/67/9382 05:04 AM    MCV 91.7 06/11/2018 05:04 AM         Lab Results   Component Value Date/Time    Sodium 145 06/11/2018 05:04 AM    Potassium 3.2 (L) 06/11/2018 05:04 AM    Chloride 115 (H) 06/11/2018 05:04 AM    CO2 21 06/11/2018 05:04 AM    Anion gap 9 06/11/2018 05:04 AM    Glucose 82 06/11/2018 05:04 AM    BUN 27 (H) 06/11/2018 05:04 AM    Creatinine 1.11 (H) 06/11/2018 05:04 AM    BUN/Creatinine ratio 24 (H) 06/11/2018 05:04 AM    GFR est AA 60 (L) 06/11/2018 05:04 AM    GFR est non-AA 49 (L) 06/11/2018 05:04 AM    Calcium 8.4 (L) 06/11/2018 05:04 AM    Bilirubin, total 0.4 06/10/2018 03:53 AM    AST (SGOT) 15 06/10/2018 03:53 AM    Alk. phosphatase 73 06/10/2018 03:53 AM    Protein, total 5.7 (L) 06/10/2018 03:53 AM    Albumin 1.8 (L) 06/10/2018 03:53 AM    Globulin 3.9 06/10/2018 03:53 AM    A-G Ratio 0.5 (L) 06/10/2018 03:53 AM    ALT (SGPT) 12 06/10/2018 03:53 AM           Assessment:     1. Right breast carcinoma:  T2 N0 M0 (Stage IIA) infiltrating ductal carcinoma, Tumor size 3.8 cm, LN -ve, grade 2, %, [place] 95%, Her 2 -ve. Mammaprint shows a luminal B gene signature. ECOG PS 0  Intent of Treatment - curative     S/P right breast lumpectomy and sentinel LN excision. Mammaprint shows a high risk luminal B genetic signature, therefore I would recommend adjuvant chemotherapy to reduce the risk of recurrence. Received adjuvant chemotherapy   Taxotere, Cyclophosphamide - s/p 2 Cycles    Will discontinue chemotherapy. Plan to start hormonal therapy after discharge and recovered from illness.     2. Septic vs Hypovolumic Shock POA/ per ICU team.   IVF  Off levophed  If sepsis then source could be line infection vs stool  IV vancomycin, cefepime and flagyl for now  F/u stool studies and blood cultures       3. Acute renal failure per renal.   Suspect dehydration as week of nausea, vomiting and diarrhea. IVF and monitor lytes  If doesn't improve significantly then consider nephrology consult and checking renal US  On NSAID at home, will avoid     4. Hypokalemia/Hypomagnesemia   Replete and monitor      5. Nausea, vomiting and diarrhea  Symptoms started right after chemo and starting Augmentin, so less likely infection but considering critically ill patient on Px Flagyl as above and f/u stool studies  Symptomatic management with PRN IV zofran  Avoid immodium (pt was using PTA) until infection rules out     6. Cdiff+ on treatment.       Plan:     > Discontinue chemotherapy  > Follow-up outpatient to discuss hormonal therapy  > Continue antibiotics  > Supportive care      Signed by: Ashlee Aguiar MD                     June 11, 2018         Attending Physician Note:     I have reviewed the history, physical examination, assessment and the plan of the care of the patient. I saw the patient with Mo Ramirez and I participated in the examination and critical decision making. I agree with the note as stated above. Ms. Valencia French is a lady with ER +ve breast cancer. Mammaprint showed a high risk luminal B signature. She is admitted with port site infection, sepsis and C Diff colitis. She had received two cycles of adjuvant chemotherapy. I plan to discontinue systemic therapy and will instead rely on hormonal therapy to reduce the risk of breast cancer.       Signed by: Ashlee Aguiar MD                     June 11, 2018

## 2018-06-11 NOTE — PROGRESS NOTES
Bedside shift change report given to Jaiden Beal RN (oncoming nurse). Report included the following information SBAR, Kardex, Intake/Output, MAR and Recent Results. SHIFT SUMMARY:            [address] NURSING NOTE   Admission Date 6/8/2018   Admission Diagnosis Shock ([address]   Consults IP CONSULT TO HOSPITALIST  IP CONSULT TO HEMATOLOGY  IP CONSULT TO NEPHROLOGY  IP CONSULT TO INFECTIOUS DISEASES  IP CONSULT TO GENERAL SURGERY      Cardiac Monitoring [x] Yes [] No      Purposeful Hourly Rounding [x] Yes    Kierra Score Total Score: 3   Kierra score 3 or > [x] Bed Alarm [] Avasys [] 1:1 sitter [] Patient refused (Signed refusal form in chart)   Raman Score Raman Score: 18   Raman score 14 or < [] PMT consult [] Wound Care consult    []  Specialty bed  [] Nutrition consult      Influenza Vaccine Received Flu Vaccine for Current Season (usually Sept-March): Not Flu Season           Oxygen needs? [x] Room air Oxygen @  []1L    []2L    []3L   []4L    []5L   []6L via  NC   Chronic home O2 use?  [] Yes [x] No  Perform O2 challenge test and document in progress note using smartphrase (.Homeoxygen)      Last bowel movement Last Bowel Movement Date: 06/11/18      Urinary Catheter [REMOVED] Urinary Catheter 06/09/18 Ghotra - Temperature-Indications for Use: Acute urinary retention/bladder outlet obstruction     [REMOVED] External Female Catheter 06/09/18-Urine Output (mL): 250 ml  [REMOVED] Urinary Catheter 06/09/18 Ghotra - Temperature-Urine Output (mL): 150 ml     LDAs             Venous Access Device port 05/09/18 (Active)      Peripheral IV 06/08/18 Left Antecubital (Active)   Site Assessment Clean, dry, & intact 6/11/2018  7:40 PM   Phlebitis Assessment 0 6/11/2018  7:40 PM   Infiltration Assessment 0 6/11/2018  7:40 PM   Dressing Status Clean, dry, & intact 6/11/2018  7:40 PM   Dressing Type Transparent 6/11/2018  7:40 PM   Hub Color/Line Status Pink 6/11/2018  7:40 PM   Action Taken Open ports on tubing capped 6/9/2018  8:00 PM Alcohol Cap Used Yes 6/11/2018  4:00 AM       Peripheral IV 06/08/18 Right Antecubital (Active)   Site Assessment Clean, dry, & intact 6/11/2018  7:40 PM   Phlebitis Assessment 0 6/11/2018  7:40 PM   Infiltration Assessment 0 6/11/2018  7:40 PM   Dressing Status Clean, dry, & intact 6/11/2018  7:40 PM   Dressing Type Transparent 6/11/2018  7:40 PM   Hub Color/Line Status Pink;Capped 6/11/2018  7:40 PM   Action Taken Open ports on tubing capped 6/10/2018 12:00 PM   Alcohol Cap Used Yes 6/11/2018  4:00 AM          Rectal Tube (Active)   Site Assessment Clean, dry, & intact; Clean;Dry 6/11/2018  7:40 PM   Drainage Description Freda Crawford 6/11/2018  4:00 PM   Drainage Chamber Level (ml) 800 ml 6/11/2018  4:00 PM   Output (ml) 0 ml 6/11/2018  7:40 PM                   Readmission Risk Assessment Tool Score Low Risk            10       Total Score        3 Has Seen PCP in Last 6 Months (Yes=3, No=0)    2 . Living with Significant Other. Assisted Living. LTAC. SNF. or   Rehab    5 Pt.  Coverage (Medicare=5 , Medicaid, or Self-Pay=4)        Criteria that do not apply:    Patient Length of Stay (>5 days = 3)    IP Visits Last 12 Months (1-3=4, 4=9, >4=11)    Charlson Comorbidity Score (Age + Comorbid Conditions)       Expected Length of Stay 4d 21h   Actual Length of Stay 2

## 2018-06-11 NOTE — PROGRESS NOTES
[facility]  Pulmonary, Critical Care, and Sleep Medicine    Name: Arron Cao MRN: [id number]   : 1950 Hospital: [facility]   Date: 2018        Critical Care Patient Consult    IMPRESSION:   · On abdominal CT noted to have as possible rupture colonic diverticula, Will ask Surgery to eval, does not seem to need IR intervention at this point, Will continue with abx. · Severe Sepsis with Shock, on levophed drip. Now off. · Has been on 2nd round treatment for her Breast Ca per Dr. Valarie Red. · C Diff infection, has been having 4-5 loose stools per day. · Possible port infection noted about 1 week ago, now appears better. Was on treatment with Augmentin. · Acute Renal Failure, Cr from 4 down to 2.   · At some risk of adrenal suppresion, if hypotension is refractory will add stress steroids. · Discussed with nurse this am.       RECOMMENDATIONS:   · On Empiric IV VAnc, cefepime, and flagyl, can de escalated in next 24 hrs. · Discussed with Dr. Rita Sanchez, appreciate his assistance. Will follow his recs. · Hold BP meds. · With C Diff, added po vanc. · Appreciate and reviewed recs per Renal.   · Follow up on Stool and blood Cx  · Will obtain UA and Ucx. · Will give additional volume resuscitation  · Will give bicarb, following Cr, K. If worsens will need renal consultation  · Will follow K and Mag levels. Subjective/History:   Last 24 hrs: Pt is feeling better today. Has ongoing loose stools. NO chest pain, no back pain. Has no abdominal pain. Wants to try to take po intake. Cc: Pt had nausea, vomiting and diarrhea. This patient has been seen and evaluated at the request of Dr. Dennie Bear for above. Patient is a 79 y.o. female    who presents with nausea, vomiting and diarrhea with 4-5 BMs per day. As per patient, pt is on chemotherapy (2nd round, first started about 4-5 weeks ago).  One  week ago she noted redness around the port and she was started on augmentin. The day she started abx, redness around the port got better but pt started to have diarrhea, nausea and vomiting. She claims diarrhea started on the same day of starting abx. Pt reported fever upto 100.4. Pt reports lower abdominal cramps with diarrhea but denies any abdominal pain, chest pain, cough, problems urination. In ED pt noted to be in shock without improvement after being given the IV fluids. Has been on  levophed.     She has some lower back and lower abdominal pain. NO leg pain. No redness now noted over her right upper chest port. NO Headaches. We were asked to admit for work up and evaluation of the above problems. Past Medical History:   Diagnosis Date    Arthritis     Breast cancer, right breast ([address]     Depression     GERD (gastroesophageal reflux disease)     Hypercholesterolemia     Hypertension     Other ill-defined conditions(799.89) 2012    dog bite dec 2012 needing blood transfusion      Past Surgical History:   Procedure Laterality Date    HX BREAST LUMPECTOMY Right 3/8/2018    RIGHT BREAST LUMPECTOMY WITH ULTRASOUND performed by Danielle Zimmerman [facility] AM[facility]    HX COLONOSCOPY      HX KNEE REPLACEMENT Right 06/2013    HX TONSILLECTOMY        Prior to Admission medications    Medication Sig Start Date End Date Taking? Authorizing Provider   raNITIdine (ZANTAC) 150 mg tablet Take 150 mg by mouth two (2) times a day. Indications: Heartburn, PREVENTION OF STRESS ULCER   Yes Historical Provider   amoxicillin-clavulanate (AUGMENTIN) 500-125 mg per tablet Take 1 Tab by mouth every twelve (12) hours for 10 days. 6/1/18 6/11/18 Yes Naz Jorge NP   ondansetron (ZOFRAN ODT) 4 mg disintegrating tablet Take 1 Tab by mouth every eight (8) hours as needed for Nausea. 4/20/18  Yes Dahiana Marcial NP   lidocaine-prilocaine (EMLA) topical cream Apply  to affected area as needed for Pain.  4/20/18  Yes Dahiana Marcial NP   dexamethasone (DECADRON) 4 mg tablet Take 2 Tabs by mouth daily. The day before, the day of and the day after chemotherapy 4/20/18  Yes Yuriy Dillon NP   ondansetron (ZOFRAN ODT) 4 mg disintegrating tablet Take 1 Tab by mouth every six (6) hours as needed for Nausea. 3/8/18  Yes Maria Hines MD   lisinopril-hydroCHLOROthiazide (PRINZIDE, ZESTORETIC) 20-25 mg per tablet Take 1 Tab by mouth daily. Indications: hypertension   Yes Historical Provider   calcium-cholecalciferol, d3, (CALCIUM 600 + D) 600-125 mg-unit Tab Take 1 Tab by mouth two (2) times a day. Yes Historical Provider   Cholecalciferol, Vitamin D3, (VITAMIN D3) 1,000 unit cap Take 1 Tab by mouth daily. Yes Historical Provider   atenolol (TENORMIN) 50 mg tablet Take 25 mg by mouth daily. Yes Historical Provider   prochlorperazine (COMPAZINE) 10 mg tablet Take 10 mg by mouth every six (6) hours as needed. Shelton Serna MD   ibuprofen (MOTRIN) 200 mg tablet Take  by mouth. Historical Provider   garlic 1 mg cap Take  by mouth daily. Historical Provider   oxyCODONE-acetaminophen (PERCOCET) 5-325 mg per tablet Take 1 Tab by mouth every four (4) hours as needed for Pain. Max Daily Amount: 6 Tabs. 3/8/18   Leonora Parsons MD   ferrous sulfate (IRON) 325 mg (65 mg iron) tablet Take 325 mg by mouth Daily (before breakfast). Historical Provider   etodolac (LODINE) 400 mg tablet Take 400 mg by mouth daily.     Historical Provider     Current Facility-Administered Medications   Medication Dose Route Frequency    cefepime (MAXIPIME) 2 g in 0.9% sodium chloride (MBP/ADV) 100 mL  2 g IntraVENous Q24H    vancomycin (VANCOCIN) 1250 mg in  ml infusion  1,250 mg IntraVENous Q18H    lactated Ringers infusion  100 mL/hr IntraVENous CONTINUOUS    mupirocin (BACTROBAN) 2 % ointment   Both Nostrils Q12H    metroNIDAZOLE (FLAGYL) IVPB premix 500 mg  500 mg IntraVENous Q8H    sodium chloride (NS) flush 5-10 mL  5-10 mL IntraVENous Q8H    heparin (porcine) injection 5,000 Units  5,000 Units SubCUTAneous Q12H    vancomycin 50 mg/mL oral solution (compounded) 250 mg  250 mg Oral Q6H     Allergies   Allergen Reactions    Nasacort [Triamcinolone Acetonide] Other (comments)     headache    Sulfa (Sulfonamide Antibiotics) Rash      Social History   Substance Use Topics    Smoking status: Former Smoker     Packs/day: 0.25     Years: 2.00     Quit date: 1972    Smokeless tobacco: Never Used    Alcohol use No      Family History   Problem Relation Age of Onset    Adopted:  Yes    Breast Cancer Mother     Stroke Mother     Hypertension Mother     Cancer Mother      skin    Cancer Father      skin    Pulmonary Fibrosis Father     Cancer Sister 79     colon    Breast Cancer Maternal Aunt     Breast Cancer Paternal Aunt     Breast Cancer Maternal Aunt      ovarian and colon cancer    Breast Cancer Maternal Aunt      ovarian and colon cancer    Breast Cancer Maternal Aunt     Breast Cancer Paternal Aunt     Breast Cancer Maternal Aunt     Cancer Maternal Grandmother      liver/ovarian        Review of Systems:  Constitutional: positive for fevers, chills, sweats, fatigue and anorexia  Eyes: negative  Ears, nose, mouth, throat, and face: negative  Respiratory: negative  Cardiovascular: negative  Gastrointestinal: positive for dyspepsia, reflux symptoms, nausea, vomiting, change in bowel habits, diarrhea and abdominal pain  Genitourinary:negative  Integument/breast: negative  Hematologic/lymphatic: negative  Musculoskeletal:positive for myalgias, arthralgias, stiff joints, back pain and muscle weakness  Neurological: negative  Behavioral/Psych: negative  Endocrine: negative  Allergic/Immunologic: negative    Objective:   Vital Signs:    Visit Vitals    /56    Pulse 61    Temp 98.3 °F (36.8 °C)    Resp 22    Ht 5' 2\" (1.575 m)    Wt 104 kg (229 lb 4.5 oz)    SpO2 94%    BMI 41.94 kg/m2       O2 Device: Room air       Temp (24hrs), Av.1 °F (36.7 °C), Min:97.9 °F (36.6 °C), Max:98.3 °F (36.8 °C)       Intake/Output:   Last shift:      06/11 0701 - 06/11 1900  In: -   Out: 150 [Urine:150]  Last 3 shifts: 06/09 1901 - 06/11 0700  In: 4626.8 [I.V.:4626.8]  Out: 2964 [Urine:2660; Drains:300]    Intake/Output Summary (Last 24 hours) at 06/11/18 0901  Last data filed at 06/11/18 0800   Gross per 24 hour   Intake             3070 ml   Output             1464 ml   Net             1606 ml     Hemodynamics:   PAP:   CO:     Wedge:   CI:     CVP:  CVP (mmHg): 1 mmHg (06/11/18 0500) SVR:       PVR:       Ventilator Settings:  Mode Rate Tidal Volume Pressure FiO2 PEEP                    Peak airway pressure:      Minute ventilation:        Physical Exam:    General:  Alert, cooperative, no distress, appears stated age. Head:  Normocephalic, without obvious abnormality, atraumatic. Eyes:  Conjunctivae/corneas clear. PERRL, EOMs intact. Nose: Nares normal. Septum midline. Mucosa normal. No drainage or sinus tenderness. Throat: Lips, mucosa, and tongue normal. Teeth and gums normal.   Neck: Supple, symmetrical, trachea midline, no adenopathy, thyroid: no enlargment/tenderness/nodules, no carotid bruit and no JVD. Back:   Symmetric, no curvature. ROM normal.   Lungs:   Clear to auscultation bilaterally. Chest wall:  No tenderness or deformity. Heart:  Regular rate and rhythm, S1, S2 normal, no murmur, click, rub or gallop. Abdomen:   Soft, non-tender. Bowel sounds normal. No masses,  No organomegaly. Extremities: Extremities normal, atraumatic, no cyanosis or edema. Pulses: 2+ and symmetric all extremities. Skin: Skin color, texture, turgor normal. No rashes or lesions   Lymph nodes: Cervical, supraclavicular, and axillary nodes normal.   Neurologic: Grossly nonfocal Psych: intact. No depression no anxiety.         Data:     Recent Results (from the past 24 hour(s))   CBC WITH AUTOMATED DIFF    Collection Time: 06/11/18  5:04 AM   Result Value Ref Range WBC 26.4 (H) 3.6 - 11.0 K/uL    RBC 2.52 (L) 3.80 - 5.20 M/uL    HGB 7.2 (L) 11.5 - 16.0 g/dL    HCT 23.1 (L) 35.0 - 47.0 %    MCV 91.7 80.0 - 99.0 FL    MCH 28.6 26.0 - 34.0 PG    MCHC 31.2 30.0 - 36.5 g/dL    RDW 14.7 (H) 11.5 - 14.5 %    PLATELET 802 669 - 554 K/uL    MPV 10.6 8.9 - 12.9 FL    NRBC 0.2 (H) 0  WBC    ABSOLUTE NRBC 0.06 (H) 0.00 - 0.01 K/uL    NEUTROPHILS 85 (H) 32 - 75 %    BAND NEUTROPHILS 6 %    LYMPHOCYTES 4 (L) 12 - 49 %    MONOCYTES 4 (L) 5 - 13 %    EOSINOPHILS 0 0 - 7 %    BASOPHILS 0 0 - 1 %    OTHER CELL 1      IMMATURE GRANULOCYTES 0 0.0 - 0.5 %    ABS. NEUTROPHILS 24.0 (H) 1.8 - 8.0 K/UL    ABS. LYMPHOCYTES 1.1 0.8 - 3.5 K/UL    ABS. MONOCYTES 1.1 (H) 0.0 - 1.0 K/UL    ABS. EOSINOPHILS 0.0 0.0 - 0.4 K/UL    ABS. BASOPHILS 0.0 0.0 - 0.1 K/UL    ABS. IMM. GRANS. 0.0 0.00 - 0.04 K/UL    DF MANUAL      RBC COMMENTS ANISOCYTOSIS  1+        WBC COMMENTS TOXIC GRANULATION     METABOLIC PANEL, BASIC    Collection Time: 18  5:04 AM   Result Value Ref Range    Sodium 145 136 - 145 mmol/L    Potassium 3.2 (L) 3.5 - 5.1 mmol/L    Chloride 115 (H) 97 - 108 mmol/L    CO2 21 21 - 32 mmol/L    Anion gap 9 5 - 15 mmol/L    Glucose 82 65 - 100 mg/dL    BUN 27 (H) 6 - 20 MG/DL    Creatinine 1.11 (H) 0.55 - 1.02 MG/DL    BUN/Creatinine ratio 24 (H) 12 - 20      GFR est AA 60 (L) >60 ml/min/1.73m2    GFR est non-AA 49 (L) >60 ml/min/1.73m2    Calcium 8.4 (L) 8.5 - 10.1 MG/DL             Telemetry:NSR    Imagin-11-18: Ct of abdomen:   IMPRESSION:  1. Perforated diverticulum from the transverse colon with adjacent inflammatory  change is stable. 2. Thickening of the sigmoid colon with adjacent inflammatory change is stable. 3. There is no drainable abscess at this point. 4. Cholelithiasis  5.  Small bilateral pleural effusions right more so than left      I have personally reviewed the patients radiographs and have reviewed the reports:  18: CXR: INDICATION: Sepsis. Cardiomediastinal silhouette is stable. Central venous port catheter extends to  the mid SVC. Left internal jugular central venous catheter extends to the right  atrium. There is no pneumothorax. Lungs are hypoinflated. There is mild  subsegmental atelectasis within the right midlung. No pleural fluid is  visualized.     IMPRESSION: Right midlung mild subsegmental atelectasis.      Angela Bosch MD

## 2018-06-11 NOTE — INTERDISCIPLINARY ROUNDS
Interdisciplinary team rounds were held 6/11/2018 with the following team members:Care Management, Diabetes Treatment Specialist, Nursing, Nutrition, Pastoral Care, Pharmacy, Physical Therapy, Physician and Respiratory Therapy. Plan of care discussed. See clinical pathway and/or care plan for interventions and desired outcomes.

## 2018-06-11 NOTE — PROGRESS NOTES
Bedside shift change report given to Norman Joyner RN (oncoming nurse). Report included the following information SBAR, Kardex, ED Summary, Intake/Output, MAR and Recent Results. SHIFT SUMMARY:  TRANSFER - IN REPORT:  Verbal report received from Aury Mann RN on Kelly Smith  being received from CCU (unit) for routine progression of care  Report consisted of patients Situation, Background, Assessment and   Recommendations(SBAR). Information from the following report(s) SBAR, Kardex, ED Summary, Procedure Summary, Intake/Output, MAR and Recent Results was reviewed with the receiving nurse. Opportunity for questions and clarification was provided. Assessment completed upon patients arrival to unit and care assumed. 1720: Pt transported via wheelchair to [facility] accompanied by family members. VSS, with no complaints of pain or discomfort. Flexiseal placement verified, The pt has been oriented to unit and room and is currently resting comfortably in bed with family. Primary Nurse Leonidas Ott and Liz Pan RN performed a dual skin assessment on this patient -- red, excoriated area noted in patients sacral area. Area cleaned with soap and water, barrier cream used for protection and blue pad used to promote absorption. Raman score is 18. Pt telemetry leads and remote telemetry placed on pt-- Current rhythm: NSR.      1740: Ghotra removed at [phone]. Purewick placed for pt comfort -- Pt to be bladder scanned if she has not voided in 6 hours. Will notify oncoming nurse of removal time. 1800: Per Dr. Mcneill Call, verbal orders given for clear liquid tray for patient.        [facility] NURSING NOTE   Admission Date 6/8/2018   Admission Diagnosis Shock ([address]   Consults IP CONSULT TO HOSPITALIST  IP CONSULT TO HEMATOLOGY  IP CONSULT TO NEPHROLOGY  IP CONSULT TO INFECTIOUS DISEASES  IP CONSULT TO GENERAL SURGERY      Cardiac Monitoring [x] Yes [] No      Purposeful Hourly Rounding [x] Yes    Kierra Score Total Score: 3 Kierra score 3 or > [x] Bed Alarm [] Avasys [] 1:1 sitter [] Patient refused (Signed refusal form in chart)   Raman Score Raman Score: 18   Raman score 14 or < [] PMT consult [] Wound Care consult    []  Specialty bed  [] Nutrition consult      Influenza Vaccine Received Flu Vaccine for Current Season (usually Sept-March): Not Flu Season           Oxygen needs? [x] Room air Oxygen @  []1L    []2L    []3L   []4L    []5L   []6L via  NC   Chronic home O2 use?  [] Yes [] No  Perform O2 challenge test and document in progress note using smartphrase (.Homeoxygen)      Last bowel movement Last Bowel Movement Date: 06/11/18      Urinary Catheter [REMOVED] Urinary Catheter 06/09/18 Ghotra - Temperature-Indications for Use: Acute urinary retention/bladder outlet obstruction     [REMOVED] External Female Catheter 06/09/18-Urine Output (mL): 250 ml  [REMOVED] Urinary Catheter 06/09/18 Ghotra - Temperature-Urine Output (mL): 150 ml     LDAs             Venous Access Device port 05/09/18 (Active)      Peripheral IV 06/08/18 Left Antecubital (Active)   Site Assessment Clean, dry, & intact 6/11/2018  7:40 PM   Phlebitis Assessment 0 6/11/2018  7:40 PM   Infiltration Assessment 0 6/11/2018  7:40 PM   Dressing Status Clean, dry, & intact 6/11/2018  7:40 PM   Dressing Type Transparent 6/11/2018  7:40 PM   Hub Color/Line Status Pink 6/11/2018  7:40 PM   Action Taken Open ports on tubing capped 6/9/2018  8:00 PM   Alcohol Cap Used Yes 6/11/2018  4:00 AM       Peripheral IV 06/08/18 Right Antecubital (Active)   Site Assessment Clean, dry, & intact 6/11/2018  7:40 PM   Phlebitis Assessment 0 6/11/2018  7:40 PM   Infiltration Assessment 0 6/11/2018  7:40 PM   Dressing Status Clean, dry, & intact 6/11/2018  7:40 PM   Dressing Type Transparent 6/11/2018  7:40 PM   Hub Color/Line Status Pink;Capped 6/11/2018  7:40 PM   Action Taken Open ports on tubing capped 6/10/2018 12:00 PM   Alcohol Cap Used Yes 6/11/2018  4:00 AM          Rectal Tube (Active)   Site Assessment Clean, dry, & intact; Clean;Dry 6/11/2018  7:40 PM   Drainage Description Adah Kawasaki 6/11/2018  4:00 PM   Drainage Chamber Level (ml) 800 ml 6/11/2018  4:00 PM   Output (ml) 0 ml 6/11/2018  7:40 PM                   Readmission Risk Assessment Tool Score Low Risk            10       Total Score        3 Has Seen PCP in Last 6 Months (Yes=3, No=0)    2 . Living with Significant Other. Assisted Living. LTAC. SNF. or   Rehab    5 Pt.  Coverage (Medicare=5 , Medicaid, or Self-Pay=4)        Criteria that do not apply:    Patient Length of Stay (>5 days = 3)    IP Visits Last 12 Months (1-3=4, 4=9, >4=11)    Charlson Comorbidity Score (Age + Comorbid Conditions)       Expected Length of Stay 4d 21h   Actual Length of Stay 2

## 2018-06-11 NOTE — PROGRESS NOTES
Problem: Mobility Impaired (Adult and Pediatric)  Goal: *Acute Goals and Plan of Care (Insert Text)  Physical Therapy Goals  Initiated 6/10/2018  1. Patient will move from supine to sit and sit to supine , scoot up and down and roll side to side in bed with independence within 7 day(s). 2.  Patient will transfer from bed to chair and chair to bed with independence using the least restrictive device within 7 day(s). 3.  Patient will perform sit to stand with independence within 7 day(s). 4.  Patient will ambulate with independence for 200 feet with the least restrictive device within 7 day(s). 5.  Patient will ascend/descend 5 stairs with 1 handrail(s) with modified independence within 7 day(s). physical Therapy TREATMENT  Patient: Kelly Smith (30 y.o. female)  Date: 6/11/2018  Diagnosis: Shock ([address] <principal problem not specified>       Precautions: Fall. Contact   Chart, physical therapy assessment, plan of care and goals were reviewed. ASSESSMENT:  Patient received in supine position with her  present in the room. Patient was reluctant to work with therapy but agreed to short session. Patient was able to transfer supine <>EOB with Min. Ax1. Patient was able to maintain sitting balance well (~2 min.) with HH support on the bed. Patient ambulated ~10 ft. to the sink with min Ax1 and HHA and took a short seated rest break in the chair. Patient then ambulated another ~15 ft. minAx1 and HHA and ended the session seated in the chair. At this time patient is still functioning below her baseline and has good potential to benefit from physical therapy to address her generalized weakness, decreased activity tolerance/endurance, and overall reduced functional mobility. Anticipate discharge to [facility] PT services with household caregiver support pending progress.    Continue to monitor BP and response to activity at next physical therapy session     Progression toward goals:  [x]    Improving appropriately and progressing toward goals  []    Improving slowly and progressing toward goals  []    Not making progress toward goals and plan of care will be adjusted     PLAN:  Patient continues to benefit from skilled intervention to address the above impairments. Continue treatment per established plan of care. Discharge Recommendations:  Home Health  Further Equipment Recommendations for Discharge:  TBD based on progress, likely none needed     SUBJECTIVE:   Patient stated I already sat in the chair earlier.     OBJECTIVE DATA SUMMARY:   Critical Behavior:  Neurologic State: Alert  Orientation Level: Oriented X4  Cognition: Follows commands     Functional Mobility Training:  Bed Mobility:     Supine to Sit: Minimum assistance;Assist x1 (HHAx1)     Scooting: Independent        Transfers:  Sit to Stand: Minimum assistance;Assist x1  Stand to Sit: Contact guard assistance        Bed to Chair: Minimum assistance;Assist x1                    Balance:  Sitting: Intact  Standing: Impaired; Without support  Standing - Static: Good  Standing - Dynamic : Fair  Ambulation/Gait Training:  Distance (ft): 25 Feet (ft)  Assistive Device: Gait belt; Other (comment) (HHAx1)  Ambulation - Level of Assistance: Contact guard assistance;Assist x1        Gait Abnormalities: Decreased step clearance;Shuffling gait        Base of Support: Widened     Speed/Stella: Pace decreased (<100 feet/min)  Step Length: Left shortened;Right shortened                 Pain:  Pain Scale 1: Numeric (0 - 10)  Pain Intensity 1: 0              Activity Tolerance:   Patient was able to tolerate sitting EOB, standing, and walking with only slight drop in BP. VSS after activity   Please refer to the flowsheet for vital signs taken during this treatment.   After treatment:   [x]    Patient left in no apparent distress sitting up in chair  []    Patient left in no apparent distress in bed  [x]    Call bell left within reach  [x]    Nursing notified  [x]    Caregiver present  []    Bed alarm activated    COMMUNICATION/COLLABORATION:   The patients plan of care was discussed with: Registered Nurse    HANNY Funes   Time Calculation: 24 mins    Regarding student involvement in patient care:  A student participated in this treatment session. Per CMS Medicare statements and APTA guidelines I certify that the following was true:  1. I was present and directly observed the entire session. 2. I made all skilled judgments and clinical decisions regarding care. 3. I am the practitioner responsible for assessment, treatment, and documentation.

## 2018-06-11 NOTE — PROGRESS NOTES
Progress Note        Patient: Mercedez Monzon MRN: [id number]  SSN: [id number]    YOB: 1950  Age: 79 y.o. Sex: female        Diagnosis:     1. Right breast carcinoma:  T2 N0 M0 (Stage IIA) infiltrating ductal carcinoma, Tumor size 3.8 cm, LN -ve, grade 2, %, NV 95%, Her 2 -ve. Treatment:     1. Adjuvant chemotherapy   Taxotere, Cyclophosphamide - s/p 2 cycles  2. Right sided lumpectomy on 03/08/2018. HPI:      Mercedez Monzon is a 79 y.o. female with breast cancer admitted with sepsis / port infection. She received 2 cycles of adjuvant chemotherapy. Patient was admitted to ICU on levophed. She is doing better and should transfer out to the floor today. Her  is at the bedside. Ms. Dinorah Harvey does not want to continue chemotherapy. Hx for record:  Her breast cancer was seen on an annual mammogram that led to a bilateral biopsy on both the RIGHT and LEFT breast. The LEFT breast was benign and the RIGHT breast is positive for IDC. The patient denies any nipple inversion or discharge. She was seen by Dr. Alonso Moya. She underwent right sided lumpectomy on 03/08/2018. The tumor is 3.8 cm in size, LN -ve, %, NV 95%, Her 2 -ve. Genomic profile by mammaprint reveals a high risk luminal B gene signature in the tumor. She is receiving adjuvant chemotherapy and tolerating it well. She notes some diarrhea following treatment that is managed with medication.       Review of Systems:  Per HPI    Constitutional: negative  Eyes: negative  Ears, Nose, Mouth, Throat, and Face: negative  Respiratory: negative  Cardiovascular: negative  Gastrointestinal: diarrhea  Genitourinary:negative  Integument/Breast: negative  Hematologic/Lymphatic: negative  Musculoskeletal:negative  Neurological: negative        Past Medical History:   Diagnosis Date    Arthritis     Breast cancer, right breast ([address]     Depression     GERD (gastroesophageal reflux disease)     Hypercholesterolemia     Hypertension     Other ill-defined conditions(799.89) 2012    dog bite dec 2012 needing blood transfusion     Past Surgical History:   Procedure Laterality Date    HX BREAST LUMPECTOMY Right 3/8/2018    RIGHT BREAST LUMPECTOMY WITH ULTRASOUND performed by Maricarmen Santiago MD at [facility] AMBULATORY OR    HX COLONOSCOPY      HX KNEE REPLACEMENT Right 06/2013    HX TONSILLECTOMY        Family History   Problem Relation Age of Onset    Adopted: Yes    Breast Cancer Mother     Stroke Mother     Hypertension Mother     Cancer Mother      skin    Cancer Father      skin    Pulmonary Fibrosis Father     Cancer Sister 79     colon    Breast Cancer Maternal Aunt     Breast Cancer Paternal Aunt     Breast Cancer Maternal Aunt      ovarian and colon cancer    Breast Cancer Maternal Aunt      ovarian and colon cancer    Breast Cancer Maternal Aunt     Breast Cancer Paternal Aunt     Breast Cancer Maternal Aunt     Cancer Maternal Grandmother      liver/ovarian     Social History   Substance Use Topics    Smoking status: Former Smoker     Packs/day: 0.25     Years: 2.00     Quit date: 6/4/1972    Smokeless tobacco: Never Used    Alcohol use No      Prior to Admission medications    Medication Sig Start Date End Date Taking? Authorizing Provider   raNITIdine (ZANTAC) 150 mg tablet Take 150 mg by mouth two (2) times a day. Indications: Heartburn, PREVENTION OF STRESS ULCER   Yes Historical Provider   amoxicillin-clavulanate (AUGMENTIN) 500-125 mg per tablet Take 1 Tab by mouth every twelve (12) hours for 10 days. 6/1/18 6/11/18 Yes Naz Jorge NP   ondansetron (ZOFRAN ODT) 4 mg disintegrating tablet Take 1 Tab by mouth every eight (8) hours as needed for Nausea. 4/20/18  Yes Keith Field NP   lidocaine-prilocaine (EMLA) topical cream Apply  to affected area as needed for Pain. 4/20/18  Yes Keith Field NP   dexamethasone (DECADRON) 4 mg tablet Take 2 Tabs by mouth daily.  The day before, the day of and the day after chemotherapy 4/20/18  Yes Bisi Cantor NP   ondansetron (ZOFRAN ODT) 4 mg disintegrating tablet Take 1 Tab by mouth every six (6) hours as needed for Nausea. 3/8/18  Yes Iris Ghosh MD   lisinopril-hydroCHLOROthiazide (PRINZIDE, ZESTORETIC) 20-25 mg per tablet Take 1 Tab by mouth daily. Indications: hypertension   Yes Historical Provider   calcium-cholecalciferol, d3, (CALCIUM 600 + D) 600-125 mg-unit Tab Take 1 Tab by mouth two (2) times a day. Yes Historical Provider   Cholecalciferol, Vitamin D3, (VITAMIN D3) 1,000 unit cap Take 1 Tab by mouth daily. Yes Historical Provider   atenolol (TENORMIN) 50 mg tablet Take 25 mg by mouth daily. Yes Historical Provider   prochlorperazine (COMPAZINE) 10 mg tablet Take 10 mg by mouth every six (6) hours as needed. Shelton Serna MD   ibuprofen (MOTRIN) 200 mg tablet Take  by mouth. Historical Provider   garlic 1 mg cap Take  by mouth daily. Historical Provider   oxyCODONE-acetaminophen (PERCOCET) 5-325 mg per tablet Take 1 Tab by mouth every four (4) hours as needed for Pain. Max Daily Amount: 6 Tabs. 3/8/18   Leonora Ruvalcaba MD   ferrous sulfate (IRON) 325 mg (65 mg iron) tablet Take 325 mg by mouth Daily (before breakfast). Historical Provider   etodolac (LODINE) 400 mg tablet Take 400 mg by mouth daily.     Historical Provider              Allergies   Allergen Reactions    Nasacort [Triamcinolone Acetonide] Other (comments)     headache    Sulfa (Sulfonamide Antibiotics) Rash           Objective:     Vitals:    06/11/18 1012 06/11/18 1014 06/11/18 1100 06/11/18 1200   BP: 135/59 135/59 113/52 (!) 138/94   Pulse: 75  74 75   Resp: 18  16 20   Temp:    98.5 °F (36.9 °C)   SpO2:  95% 100%    Weight:       Height:                Physical Exam:    GENERAL: alert, cooperative, in ICU  EYE: negative  THROAT & NECK: supple  LUNG: clear to auscultation bilaterally  HEART: regular rate and rhythm  ABDOMEN: soft, non-tender  EXTREMITIES:  no edema  SKIN: Normal.  NEUROLOGIC: nonfocal  Port site with slight redness    Lab Results   Component Value Date/Time    WBC 26.4 (H) 06/11/2018 05:04 AM    HGB 7.2 (L) 06/11/2018 05:04 AM    HCT 23.1 (L) 06/11/2018 05:04 AM    PLATELET 684 09/86/7797 05:04 AM    MCV 91.7 06/11/2018 05:04 AM         Lab Results   Component Value Date/Time    Sodium 145 06/11/2018 05:04 AM    Potassium 3.2 (L) 06/11/2018 05:04 AM    Chloride 115 (H) 06/11/2018 05:04 AM    CO2 21 06/11/2018 05:04 AM    Anion gap 9 06/11/2018 05:04 AM    Glucose 82 06/11/2018 05:04 AM    BUN 27 (H) 06/11/2018 05:04 AM    Creatinine 1.11 (H) 06/11/2018 05:04 AM    BUN/Creatinine ratio 24 (H) 06/11/2018 05:04 AM    GFR est AA 60 (L) 06/11/2018 05:04 AM    GFR est non-AA 49 (L) 06/11/2018 05:04 AM    Calcium 8.4 (L) 06/11/2018 05:04 AM    Bilirubin, total 0.4 06/10/2018 03:53 AM    AST (SGOT) 15 06/10/2018 03:53 AM    Alk. phosphatase 73 06/10/2018 03:53 AM    Protein, total 5.7 (L) 06/10/2018 03:53 AM    Albumin 1.8 (L) 06/10/2018 03:53 AM    Globulin 3.9 06/10/2018 03:53 AM    A-G Ratio 0.5 (L) 06/10/2018 03:53 AM    ALT (SGPT) 12 06/10/2018 03:53 AM           Assessment:     1. Right breast carcinoma:  T2 N0 M0 (Stage IIA) infiltrating ductal carcinoma, Tumor size 3.8 cm, LN -ve, grade 2, %, LA 95%, Her 2 -ve. Mammaprint shows a luminal B gene signature. ECOG PS 0  Intent of Treatment - curative     S/P right breast lumpectomy and sentinel LN excision. Mammaprint shows a high risk luminal B genetic signature, therefore I would recommend adjuvant chemotherapy to reduce the risk of recurrence. Received adjuvant chemotherapy   Taxotere, Cyclophosphamide - s/p 2 Cycles    Will discontinue chemotherapy. Plan to start hormonal therapy after discharge and recovered from illness.     2. Septic vs Hypovolumic Shock POA/ per ICU team.   IVF  Off levophed  If sepsis then source could be line infection vs stool  IV vancomycin, cefepime and flagyl for now  F/u stool studies and blood cultures       3. Acute renal failure per renal.   Suspect dehydration as week of nausea, vomiting and diarrhea. IVF and monitor lytes  If doesn't improve significantly then consider nephrology consult and checking renal US  On NSAID at home, will avoid     4. Hypokalemia/Hypomagnesemia   Replete and monitor      5. Nausea, vomiting and diarrhea  Symptoms started right after chemo and starting Augmentin, so less likely infection but considering critically ill patient on Px Flagyl as above and f/u stool studies  Symptomatic management with PRN IV zofran  Avoid immodium (pt was using PTA) until infection rules out     6. Cdiff+ on treatment.       Plan:     > Discontinue chemotherapy  > Follow-up outpatient to discuss hormonal therapy  > Continue antibiotics  > Supportive care      Signed by: Robert Espinoza NP                     June 11, 2018         Attending Physician Note:     I have reviewed the history, physical examination, assessment and the plan of the care of the patient. I saw the patient with Shanel and I participated in the examination and critical decision making. I agree with the note as stated above. Ms. Chloé Osman is a lady with ER +ve breast cancer. Mammaprint showed a high risk luminal B signature. She is admitted with port site infection, sepsis and C Diff colitis. She had received one cycle of adjuvant chemotherapy. I plan to discontinue systemic therapy and will instead rely on hormonal therapy to reduce the risk of breast cancer.       Signed by: Adilia Dawkins MD                     June 11, 2018

## 2018-06-11 NOTE — PROGRESS NOTES
Problem: Falls - Risk of  Goal: *Absence of Falls  Document Kierra Fall Risk and appropriate interventions in the flowsheet.    Outcome: Progressing Towards Goal  Fall Risk Interventions:    Mobility Interventions: Bed/chair exit alarm, Communicate number of staff needed for ambulation/transfer    Mentation Interventions: Bed/chair exit alarm, Door open when patient unattended    Medication Interventions: Bed/chair exit alarm, Evaluate medications/consider consulting pharmacy    Elimination Interventions: Bed/chair exit alarm, Call light in reach, Toileting schedule/hourly rounds

## 2018-06-11 NOTE — PROGRESS NOTES
0700 Bedside and Verbal shift change report received from Barby Mitchell [facility] (offgoing nurse). Report included the following information SBAR, Kardex, Intake/Output, MAR, Accordion and Recent Results. 0800 Assessment complete. See flowsheet for details. 0945 Patient up to chair with PT.     2700 Brandon Bean with Kayla Hawk in laboratory. Magnesium and phosphorus labs added on per orders. 1035 OK per Dr. Sunita Mobley for patient to go to CT off the monitor. CT aware. Transport order placed. 1200 Reassessment unchanged. Will continue to monitor. 1415 Patient to and back from CT. No issues noted. 1430 Dr. Tone Hardwick paged regarding patient's CT results and diet orders. 1555 OK to remove watkins catheter per Dr. Salma Jean. 1600 Reassessment unchanged. Will continue to monitor. 1635 TRANSFER - OUT REPORT:    Verbal report given to Yi Quiles RN (name) on Lc Avila being transferred to [address] (unit) for routine progression of care. Report consisted of patients Situation, Background, Assessment and Recommendations (SBAR). Information from the following report(s) SBAR, Kardex, Intake/Output, MAR, Accordion and Recent Results was reviewed with the receiving nurse.     Lines:   Venous Access Device port 05/09/18 (Active)       Peripheral IV 06/08/18 Left Antecubital (Active)   Site Assessment Clean, dry, & intact 6/11/2018  4:00 PM   Phlebitis Assessment 0 6/11/2018  4:00 PM   Infiltration Assessment 0 6/11/2018  4:00 PM   Dressing Status Clean, dry, & intact 6/11/2018  4:00 PM   Dressing Type Tape;Transparent 6/11/2018  4:00 PM   Hub Color/Line Status Pink;Flushed 6/11/2018  4:00 PM   Action Taken Open ports on tubing capped 6/9/2018  8:00 PM   Alcohol Cap Used Yes 6/11/2018  4:00 AM       Peripheral IV 06/08/18 Right Antecubital (Active)   Site Assessment Clean, dry, & intact 6/11/2018  4:00 PM   Phlebitis Assessment 0 6/11/2018  4:00 PM   Infiltration Assessment 0 6/11/2018  4:00 PM   Dressing Status Clean, dry, & intact 6/11/2018  4:00 PM   Dressing Type Tape;Transparent 6/11/2018  4:00 PM   Hub Color/Line Status Pink;Flushed 6/11/2018  4:00 PM   Action Taken Open ports on tubing capped 6/10/2018 12:00 PM   Alcohol Cap Used Yes 6/11/2018  4:00 AM        Opportunity for questions and clarification was provided. Patient transported with RN, patient belongings.

## 2018-06-11 NOTE — PROGRESS NOTES
Problem: Falls - Risk of  Goal: *Absence of Falls  Document Kierra Fall Risk and appropriate interventions in the flowsheet.    Outcome: Progressing Towards Goal  Fall Risk Interventions:  Mobility Interventions: Bed/chair exit alarm, Communicate number of staff needed for ambulation/transfer, Mechanical lift, OT consult for ADLs, Patient to call before getting OOB    Mentation Interventions: Adequate sleep, hydration, pain control, Bed/chair exit alarm, Door open when patient unattended, Evaluate medications/consider consulting pharmacy, Eyeglasses and hearing aids    Medication Interventions: Bed/chair exit alarm, Evaluate medications/consider consulting pharmacy, Patient to call before getting OOB, Teach patient to arise slowly    Elimination Interventions: Bed/chair exit alarm, Call light in reach, Elevated toilet seat, Patient to call for help with toileting needs, Toilet paper/wipes in reach, Toileting schedule/hourly rounds

## 2018-06-11 NOTE — PROGRESS NOTES
Pharmacy Automatic Renal Dosing Protocol - Antimicrobials    Indication for Antimicrobials: Septic shock (CDIFF; Possible other intraabdominal infection)    Current Regimen of Each Antimicrobial:  Cefepime 2 gm IV every 24 hours (Started 18; Day #3)  Metronidazole 500 mg IV every 8 hours (Started 18; Day #3)  Vancomycin 500 mg PO every 6 hours (Started 18; Day #3)    Previous Antimicrobials:  Vancomycin 1250 mg IV every 18 hours (Started 18; Day #3)    Significant Cultures:   18 CDIFF = Positive (FINAL)  18 Stool culture = Negative (FINAL)  18 Blood culture = No growth x 3 days (Results pending)    Radiology / Imaging results: (X-ray, CT scan or MRI):  18 CT: Thickening of the sigmoid colon. Air-fluid collection in the pelvic  cul-de-sac is likely related to a contained perforation from diverticulitis. This is not adjacent to the diverticulum in the transverse colon. Stranding is  seen along the left pelvic sidewall. Labs:  Recent Labs      18   0504  06/10/18   0353  18   1249   CREA  1.11*  2.31*  4.04*   BUN  27*  46*  61*   WBC  26.4*  20.3*  22.0*     Temp (24hrs), Av.2 °F (36.8 °C), Min:97.9 °F (36.6 °C), Max:98.6 °F (37 °C)    Creatinine Clearance (mL/min) or Dialysis: 39 mL/min    Impression/Plan:   · Cefepime dosed adjusted to 2 gm IV every 12 hours based on renal function improvement. · Vancomycin IV discontinued on 18 CCU rounds. · Vancomycin PO dose adjusted for CDIFF severity. Metronidazole IV dosed appropriately for CDIFF severity. · Antimicrobial stop date: Not appropriately currently     Pharmacy will follow daily and adjust medications as appropriate for renal function and/or serum levels.     Thank you,  Leann Vanegas, PHARMD

## 2018-06-11 NOTE — PROGRESS NOTES
Problem: Self Care Deficits Care Plan (Adult)  Goal: *Acute Goals and Plan of Care (Insert Text)  Occupational Therapy Goals  Initiated 6/11/2018  1. Patient will perform grooming in standing with supervision/set-up within 7 day(s). 2.  Patient will perform upper body dressing and lower body dressing with supervision/set-up within 7 day(s). 3.  Patient will perform simple home management with supervision/set-up within 7 day(s). 4.  Patient will perform toilet transfers with supervision/set-up within 7 day(s). 5.  Patient will perform all aspects of toileting with supervision/set-up within 7 day(s). 6.  Patient will participate in upper extremity therapeutic exercise/activities with supervision/set-up for 5 minutes within 7 day(s). 7.  Patient will utilize energy conservation techniques during functional activities with minimal verbal cues within 7 day(s). 8.  Patient will tolerate at least 8 minutes of standing adls within 7 days. Occupational Therapy EVALUATION  Patient: Emilie Silva (87 y.o. female)  Date: 6/11/2018  Primary Diagnosis: Shock [facility])        Precautions:   Fall  Contact    ASSESSMENT :  Based on the objective data described below, the patient presents with decreased tolerance for adls and mobility mainly due to there medical condition. Pt presents with adequate ROM, generalized weakness, and decreased endurance impairing independence and safety during adls and mobility. Pt performs self care activities at set up to min/moderate assistance. She was able to stand from the chair with CGA-she declined functional mobility at this time. Pt is functioning below her independent baseline and will benefit from OT up to 4X /week to address goals. Pt will likely need 24 hour supervision at home at discharge. Will need to continue to assess adl mobility and need for adaptive equipment/DME .   On contact for cdiff  Patient will benefit from skilled intervention to address the above impairments. Patients rehabilitation potential is considered to be Good  Factors which may influence rehabilitation potential include:   []             None noted  []             Mental ability/status  [x]             Medical condition  []             Home/family situation and support systems  []             Safety awareness  []             Pain tolerance/management  []             Other:      PLAN :  Recommendations and Planned Interventions:  [x]               Self Care Training                  [x]        Therapeutic Activities  [x]               Functional Mobility Training    []        Cognitive Retraining  [x]               Therapeutic Exercises           [x]        Endurance Activities  [x]               Balance Training                   []        Neuromuscular Re-Education  []               Visual/Perceptual Training     [x]   Home Safety Training  [x]               Patient Education                 [x]        Family Training/Education  []               Other (comment):    Frequency/Duration: Patient will be followed by occupational therapy 4 times a week to address goals. Discharge Recommendations: Home Health therapies and 24 hour supervision/assistance for IADLs. Further Equipment Recommendations for Discharge: tbd     SUBJECTIVE:   Patient stated I'm going for the CT in a few minutes.     OBJECTIVE DATA SUMMARY:   HISTORY:   Past Medical History:   Diagnosis Date    Arthritis     Breast cancer, right breast ([facility] [address]     Depression     GERD (gastroesophageal reflux disease)     Hypercholesterolemia     Hypertension     Other ill-defined conditions(799.89) 2012    dog bite dec 2012 needing blood transfusion     Past Surgical History:   Procedure Laterality Date    HX BREAST LUMPECTOMY Right 3/8/2018    RIGHT BREAST LUMPECTOMY WITH ULTRASOUND [facility] Lloyd [facility]ires [facility]    HX COLONOSCOPY      HX KNEE REPLACEMENT Right 06/2013    HX TONSILLECTOMY         Prior Level of Function/Environment/Context: Pt lives with her . She is independent in adls and IADLs. She cares for her grandchildren   Occupations in which the patient is/was successful, what are the barriers preventing that success: medical condition  Performance Patterns (routines, roles, habits, and rituals):   Personal Interests and/or values:   Expanded or extensive additional review of patient history:  Breast CA dx 3/2018 with R lumpectomy-has had 2 cycles of chemotherapy,  for CT for intrabdominal process,     Home Situation  Home Environment: Private residence  # Steps to Enter: 4  Rails to Enter: Yes  Hand Rails : Bilateral  Wheelchair Ramp: No  One/Two Story Residence: One story  Living Alone: No  Support Systems: Spouse/Significant Other/Partner  Patient Expects to be Discharged to[de-identified] Private residence  Current DME Used/Available at Home: Walker, rolling  Tub or Shower Type: Tub/Shower combination    Hand dominance: Right    EXAMINATION OF PERFORMANCE DEFICITS:  Cognitive/Behavioral Status:  Neurologic State: Alert  Orientation Level: Appropriate for age  Cognition: Follows commands  Perception: Appears intact  Perseveration: No perseveration noted  Safety/Judgement: Awareness of environment; Insight into deficits    Skin: generally intact, has flexiseal and watkins catheter, port for lines, IV lines     Edema: none observed    Hearing: Auditory  Auditory Impairment: None    Vision/Perceptual:    Tracking:  (functionally tracks environment)                           Corrective Lenses: Reading glasses    Range of Motion:  BUEs:  Within functional limits                            Strength:  Generalized weakness                   Coordination:     Fine Motor Skills-Upper: Left Intact; Right Intact    Gross Motor Skills-Upper: Left Intact; Right Intact    Tone & Sensation: Tone is normal  Sensation is intact                            Balance:  Sitting: Intact  Standing: Impaired; Without support  Standing - Static: Good  Standing - Dynamic :declined  Functional Mobility and Transfers for ADLs:  Bed Mobility:  Rolling:  (pt seated upon arrival)    Transfers:  Sit to Stand: Minimum assistance;Assist x1  Stand to Sit: Contact guard assistance  Bed to Chair: Minimum assistance;Assist x1  Toilet Transfer :  (declined)    ADL Assessment:  Feeding: Independent    Oral Facial Hygiene/Grooming: Setup (declines standing grooming)    Bathing: Minimum assistance (set up and S for periarea/thighs in standing)    Upper Body Dressing: Minimum assistance (with hospital gown)    Lower Body Dressing: Setup (for donning socks )    Toileting: Total assistance (has urinary catheter and rectal tube)                ADL Intervention and task modifications:   Encopuraged participation in standing grooming and in toilet transfer, however, pt declined. Pt  With watkins catheter and flexiseal.  Pt performed seated b athing of periarea/thighs with set up, CGA for standing. Pt was able to bend forward and don her socks with set up                                  Cognitive Retraining  Safety/Judgement: Awareness of environment; Insight into deficits      Functional Measure:  Barthel Index:    Bathin  Bladder: 0  Bowels: 0  Groomin  Dressin  Feeding: 10  Mobility: 0  Stairs: 0  Toilet Use: 5  Transfer (Bed to Chair and Back): 10  Total: 35       Barthel and G-code impairment scale:  Percentage of impairment CH  0% CI  1-19% CJ  20-39% CK  40-59% CL  60-79% CM  80-99% CN  100%   Barthel Score 0-100 100 99-80 79-60 59-40 20-39 1-19   0   Barthel Score 0-20 20 17-19 13-16 9-12 5-8 1-4 0      The Barthel ADL Index: Guidelines  1. The index should be used as a record of what a patient does, not as a record of what a patient could do. 2. The main aim is to establish degree of independence from any help, physical or verbal, however minor and for whatever reason. 3. The need for supervision renders the patient not independent.   4. A patient's performance should be established using the best available evidence. Asking the patient, friends/relatives and nurses are the usual sources, but direct observation and common sense are also important. However direct testing is not needed. 5. Usually the patient's performance over the preceding 24-48 hours is important, but occasionally longer periods will be relevant. 6. Middle categories imply that the patient supplies over 50 per cent of the effort. 7. Use of aids to be independent is allowed. Diana Kelley., Barthel, DSapnaW. (2995). Functional evaluation: the Barthel Index. [facility] (14)2. Shirin Ray namrata JUAN JOSÉ Anguiano, Caro Lowry., Charis Apley., [place], [address] (1999). Measuring the change indisability after inpatient rehabilitation; comparison of the responsiveness of the Barthel Index and Functional [place] Measure. Journal of Neurology, Neurosurgery, and Psychiatry, 66(4), 616-390. Leland Lennox, N.J.A, BRYAN Beltran, & Shiela Rucker MSapnaA. (2004.) Assessment of post-stroke quality of life in cost-effectiveness studies: The usefulness of the Barthel Index and the EuroQoL-5D. Quality of Life Research, 13, 961-78       G codes: In compliance with CMSs Claims Based Outcome Reporting, the following G-code set was chosen for this patient based on their primary functional limitation being treated: The outcome measure chosen to determine the severity of the functional limitation was the Barthel Index with a score of 35/100 which was correlated with the impairment scale. ?  Self Care:     - CURRENT STATUS: CL - 60%-79% impaired, limited or restricted    - GOAL STATUS: CK - 40%-59% impaired, limited or restricted    - D/C STATUS:  ---------------To be determined---------------     Occupational Therapy Evaluation Charge Determination   History Examination Decision-Making   LOW Complexity : Brief history review  MEDIUM Complexity : 3-5 performance deficits relating to physical, cognitive , or psychosocial skils that result in activity limitations and / or participation restrictions MEDIUM Complexity : Patient may present with comorbidities that affect occupational performnce. Miniml to moderate modification of tasks or assistance (eg, physical or verbal ) with assesment(s) is necessary to enable patient to complete evaluation       Based on the above components, the patient evaluation is determined to be of the following complexity level: MEDIUM  Pain:  Pain Scale 1: Numeric (0 - 10)  Pain Intensity 1: 0              Activity Tolerance:   Good. No complaints  After treatment:   [x] Patient left in no apparent distress sitting up in chair  [] Patient left in no apparent distress in bed  [x] Call bell left within reach  [x] Nursing notified  [x] Caregiver present-  [] Bed alarm activated    COMMUNICATION/EDUCATION:   The patients plan of care was discussed with: Registered Nurse.  [] Home safety education was provided and the patient/caregiver indicated understanding. [x] Patient/family have participated as able in goal setting and plan of care. [x] Patient/family agree to work toward stated goals and plan of care. [] Patient understands intent and goals of therapy, but is neutral about his/her participation. [] Patient is unable to participate in goal setting and plan of care. This patients plan of care is appropriate for delegation to [facility].     Thank you for this referral.  Francisca Velázquez OTR/L  Time Calculation: 27 mins

## 2018-06-12 LAB
ANION GAP SERPL CALC-SCNC: 12 MMOL/L (ref 5–15)
BASOPHILS # BLD: 0 K/UL (ref 0–0.1)
BASOPHILS NFR BLD: 0 % (ref 0–1)
BUN SERPL-MCNC: 19 MG/DL (ref 6–20)
BUN/CREAT SERPL: 21 (ref 12–20)
CALCIUM SERPL-MCNC: 8.3 MG/DL (ref 8.5–10.1)
CHLORIDE SERPL-SCNC: 114 MMOL/L (ref 97–108)
CO2 SERPL-SCNC: 18 MMOL/L (ref 21–32)
CREAT SERPL-MCNC: 0.89 MG/DL (ref 0.55–1.02)
DIFFERENTIAL METHOD BLD: ABNORMAL
EOSINOPHIL # BLD: 0 K/UL (ref 0–0.4)
EOSINOPHIL NFR BLD: 0 % (ref 0–7)
ERYTHROCYTE [DISTWIDTH] IN BLOOD BY AUTOMATED COUNT: 15.3 % (ref 11.5–14.5)
GLUCOSE SERPL-MCNC: 78 MG/DL (ref 65–100)
HCT VFR BLD AUTO: 27.7 % (ref 35–47)
HGB BLD-MCNC: 8.6 G/DL (ref 11.5–16)
IMM GRANULOCYTES # BLD: 0 K/UL (ref 0–0.04)
IMM GRANULOCYTES NFR BLD AUTO: 0 % (ref 0–0.5)
LYMPHOCYTES # BLD: 1.8 K/UL (ref 0.8–3.5)
LYMPHOCYTES NFR BLD: 6 % (ref 12–49)
MCH RBC QN AUTO: 29 PG (ref 26–34)
MCHC RBC AUTO-ENTMCNC: 31 G/DL (ref 30–36.5)
MCV RBC AUTO: 93.3 FL (ref 80–99)
METAMYELOCYTES NFR BLD MANUAL: 8 %
MONOCYTES # BLD: 1.5 K/UL (ref 0–1)
MONOCYTES NFR BLD: 5 % (ref 5–13)
MYELOCYTES NFR BLD MANUAL: 5 %
NEUTS BAND NFR BLD MANUAL: 8 %
NEUTS SEG # BLD: 23.4 K/UL (ref 1.8–8)
NEUTS SEG NFR BLD: 68 % (ref 32–75)
NRBC # BLD: 0.15 K/UL (ref 0–0.01)
NRBC BLD-RTO: 0.5 PER 100 WBC
PLATELET # BLD AUTO: 230 K/UL (ref 150–400)
PMV BLD AUTO: 10.5 FL (ref 8.9–12.9)
POTASSIUM SERPL-SCNC: 3.2 MMOL/L (ref 3.5–5.1)
RBC # BLD AUTO: 2.97 M/UL (ref 3.8–5.2)
RBC MORPH BLD: ABNORMAL
SODIUM SERPL-SCNC: 144 MMOL/L (ref 136–145)
WBC # BLD AUTO: 30.8 K/UL (ref 3.6–11)

## 2018-06-12 PROCEDURE — 36415 COLL VENOUS BLD VENIPUNCTURE: CPT | Performed by: INTERNAL MEDICINE

## 2018-06-12 PROCEDURE — 74011000258 HC RX REV CODE- 258: Performed by: INTERNAL MEDICINE

## 2018-06-12 PROCEDURE — 74011000250 HC RX REV CODE- 250: Performed by: INTERNAL MEDICINE

## 2018-06-12 PROCEDURE — 74011250637 HC RX REV CODE- 250/637: Performed by: INTERNAL MEDICINE

## 2018-06-12 PROCEDURE — 74011250636 HC RX REV CODE- 250/636: Performed by: INTERNAL MEDICINE

## 2018-06-12 PROCEDURE — 97535 SELF CARE MNGMENT TRAINING: CPT

## 2018-06-12 PROCEDURE — 77030038269 HC DRN EXT URIN PURWCK BARD -A

## 2018-06-12 PROCEDURE — 97116 GAIT TRAINING THERAPY: CPT

## 2018-06-12 PROCEDURE — 80048 BASIC METABOLIC PNL TOTAL CA: CPT | Performed by: INTERNAL MEDICINE

## 2018-06-12 PROCEDURE — 87040 BLOOD CULTURE FOR BACTERIA: CPT | Performed by: INTERNAL MEDICINE

## 2018-06-12 PROCEDURE — 65660000000 HC RM CCU STEPDOWN

## 2018-06-12 PROCEDURE — 97530 THERAPEUTIC ACTIVITIES: CPT

## 2018-06-12 PROCEDURE — 85025 COMPLETE CBC W/AUTO DIFF WBC: CPT | Performed by: INTERNAL MEDICINE

## 2018-06-12 PROCEDURE — 77030020291 HC FLEXSEAL FMS BMS -C

## 2018-06-12 RX ORDER — POTASSIUM CHLORIDE 7.45 MG/ML
10 INJECTION INTRAVENOUS
Status: DISPENSED | OUTPATIENT
Start: 2018-06-12 | End: 2018-06-12

## 2018-06-12 RX ORDER — POTASSIUM CHLORIDE 7.45 MG/ML
10 INJECTION INTRAVENOUS
Status: COMPLETED | OUTPATIENT
Start: 2018-06-12 | End: 2018-06-12

## 2018-06-12 RX ORDER — POTASSIUM CHLORIDE 750 MG/1
40 TABLET, FILM COATED, EXTENDED RELEASE ORAL
Status: COMPLETED | OUTPATIENT
Start: 2018-06-12 | End: 2018-06-12

## 2018-06-12 RX ORDER — VANCOMYCIN HYDROCHLORIDE
1250
Status: DISCONTINUED | OUTPATIENT
Start: 2018-06-12 | End: 2018-06-12 | Stop reason: DRUGHIGH

## 2018-06-12 RX ORDER — MAGNESIUM SULFATE HEPTAHYDRATE 40 MG/ML
2 INJECTION, SOLUTION INTRAVENOUS ONCE
Status: COMPLETED | OUTPATIENT
Start: 2018-06-12 | End: 2018-06-12

## 2018-06-12 RX ORDER — VANCOMYCIN HYDROCHLORIDE
1250
Status: DISCONTINUED | OUTPATIENT
Start: 2018-06-12 | End: 2018-06-15 | Stop reason: DRUGHIGH

## 2018-06-12 RX ORDER — SODIUM,POTASSIUM PHOSPHATES 280-250MG
2 POWDER IN PACKET (EA) ORAL 4 TIMES DAILY
Status: DISPENSED | OUTPATIENT
Start: 2018-06-12 | End: 2018-06-13

## 2018-06-12 RX ADMIN — MUPIROCIN: 20 OINTMENT TOPICAL at 10:15

## 2018-06-12 RX ADMIN — VANCOMYCIN HYDROCHLORIDE 500 MG: 1 INJECTION, POWDER, LYOPHILIZED, FOR SOLUTION INTRAVENOUS at 02:28

## 2018-06-12 RX ADMIN — CEFEPIME HYDROCHLORIDE 2 G: 2 INJECTION, POWDER, FOR SOLUTION INTRAVENOUS at 16:30

## 2018-06-12 RX ADMIN — VANCOMYCIN HYDROCHLORIDE 500 MG: 1 INJECTION, POWDER, LYOPHILIZED, FOR SOLUTION INTRAVENOUS at 12:47

## 2018-06-12 RX ADMIN — HEPARIN SODIUM 5000 UNITS: 5000 INJECTION, SOLUTION INTRAVENOUS; SUBCUTANEOUS at 21:06

## 2018-06-12 RX ADMIN — POTASSIUM CHLORIDE 10 MEQ: 10 INJECTION, SOLUTION INTRAVENOUS at 10:11

## 2018-06-12 RX ADMIN — SODIUM CHLORIDE 75 ML/HR: 450 INJECTION, SOLUTION INTRAVENOUS at 11:24

## 2018-06-12 RX ADMIN — HEPARIN SODIUM 5000 UNITS: 5000 INJECTION, SOLUTION INTRAVENOUS; SUBCUTANEOUS at 10:10

## 2018-06-12 RX ADMIN — METRONIDAZOLE 500 MG: 500 INJECTION, SOLUTION INTRAVENOUS at 02:28

## 2018-06-12 RX ADMIN — Medication 10 ML: at 21:07

## 2018-06-12 RX ADMIN — VANCOMYCIN HYDROCHLORIDE 500 MG: 1 INJECTION, POWDER, LYOPHILIZED, FOR SOLUTION INTRAVENOUS at 23:10

## 2018-06-12 RX ADMIN — POTASSIUM CHLORIDE 40 MEQ: 750 TABLET, EXTENDED RELEASE ORAL at 10:48

## 2018-06-12 RX ADMIN — POTASSIUM CHLORIDE 10 MEQ: 10 INJECTION, SOLUTION INTRAVENOUS at 12:00

## 2018-06-12 RX ADMIN — VANCOMYCIN HYDROCHLORIDE 1250 MG: 10 INJECTION, POWDER, LYOPHILIZED, FOR SOLUTION INTRAVENOUS at 18:16

## 2018-06-12 RX ADMIN — METRONIDAZOLE 500 MG: 500 INJECTION, SOLUTION INTRAVENOUS at 15:15

## 2018-06-12 RX ADMIN — POTASSIUM CHLORIDE 10 MEQ: 10 INJECTION, SOLUTION INTRAVENOUS at 20:37

## 2018-06-12 RX ADMIN — POTASSIUM CHLORIDE 10 MEQ: 10 INJECTION, SOLUTION INTRAVENOUS at 11:00

## 2018-06-12 RX ADMIN — METRONIDAZOLE 500 MG: 500 INJECTION, SOLUTION INTRAVENOUS at 21:07

## 2018-06-12 RX ADMIN — POTASSIUM & SODIUM PHOSPHATES POWDER PACK 280-160-250 MG 2 PACKET: 280-160-250 PACK at 10:48

## 2018-06-12 RX ADMIN — POTASSIUM & SODIUM PHOSPHATES POWDER PACK 280-160-250 MG 2 PACKET: 280-160-250 PACK at 21:06

## 2018-06-12 RX ADMIN — VANCOMYCIN HYDROCHLORIDE 500 MG: 1 INJECTION, POWDER, LYOPHILIZED, FOR SOLUTION INTRAVENOUS at 18:35

## 2018-06-12 RX ADMIN — Medication 10 ML: at 15:25

## 2018-06-12 RX ADMIN — MUPIROCIN: 20 OINTMENT TOPICAL at 21:07

## 2018-06-12 RX ADMIN — MAGNESIUM SULFATE HEPTAHYDRATE 2 G: 40 INJECTION, SOLUTION INTRAVENOUS at 10:49

## 2018-06-12 RX ADMIN — POTASSIUM & SODIUM PHOSPHATES POWDER PACK 280-160-250 MG 2 PACKET: 280-160-250 PACK at 18:16

## 2018-06-12 NOTE — PROGRESS NOTES
Hospitalist Progress Note    NAME: Weston Berry   :  1950   MRN:  [id number]       Assessment / Plan:  Septic versus hypovolemic shock resolved  Acute diverticulitis with contained perforation  C. difficile colitis  Possible port infection in about 1 week ago and was treated with Augmentin which caused C diff   Leucocytosis is worse today but symptoms are improving. Continue vancomycin, cefepime and Flagyl  Continue p.o. vancomycin for C. difficile  Repeat ct done showed stable appearance of perforation with no worsening  Appreciate ID seeing the pt  Cbc in am   D/w Dr Dwayne Trevino and cody to remove port and will notifty Dr Guilherme Han. Acute kidney injury likely prerenal  Creatinine peaked at 5.69 and is currently back to normal   Nephrology following  Hold lisinopril    Hypokalemia  Hypophosphatemia   Replaced today    History of breast cancer  Currently on treatment and follows up with Dr. Dwayne Trevino  Oncology following  Leelee Zhu will consider hormonal therapy moving forward  D/w dr Dwayne Trevino    Anemia of chronic disease  Check cbc in am  Hb is around base line of 8               Body mass index is 41.94 kg/(m^2). Code status: Full  Prophylaxis: Hep SQ  Recommended Disposition: Home w/Family     Subjective:     Chief Complaint / Reason for Physician Visit  Able to tolerate clears today. No abdominal pain. Review of Systems:  Symptom Y/N Comments  Symptom Y/N Comments   Fever/Chills n   Chest Pain n    Poor Appetite    Edema     Cough n   Abdominal Pain     Sputum    Joint Pain     SOB/JACOBO n   Pruritis/Rash     Nausea/vomit    Tolerating PT/OT     Diarrhea    Tolerating Diet     Constipation    Other       Could NOT obtain due to:      Objective:     VITALS:   Last 24hrs VS reviewed since prior progress note.  Most recent are:  Patient Vitals for the past 24 hrs:   Temp Pulse Resp BP SpO2   18 1107 98.5 °F (36.9 °C) 68 17 135/58 94 %   18 0732 98.3 °F (36.8 °C) 60 18 132/62 92 %   18 0226 98.5 °F (36.9 °C) 68 18 128/71 95 %   06/11/18 2311 98.2 °F (36.8 °C) 70 18 133/62 95 %   06/11/18 1935 98.4 °F (36.9 °C) 77 18 134/58 96 %   06/11/18 1727 98.7 °F (37.1 °C) 75 18 139/52 96 %   06/11/18 1623 98.4 °F (36.9 °C) 74 16 117/48 99 %   06/11/18 1600 - 68 17 - 100 %   06/11/18 1500 - - - 99/52 -       Intake/Output Summary (Last 24 hours) at 06/12/18 1354  Last data filed at 06/12/18 1302   Gross per 24 hour   Intake          2623.75 ml   Output             1450 ml   Net          1173.75 ml        PHYSICAL EXAM:  General: cooperative, no acute distress    EENT:  EOMI. Anicteric sclerae. MMM  Resp:  CTA bilaterally, no wheezing or rales. No accessory muscle use  CV:  Regular  rhythm,  No edema  GI:  Soft, Non distended, Non tender.  +Bowel sounds  Neurologic:  Alert and awake, normal speech,   Psych:   Not anxious nor agitated  Skin:  No rashes.   No jaundice    Reviewed most current lab test results and cultures  YES  Reviewed most current radiology test results   YES  Review and summation of old records today    NO  Reviewed patient's current orders and MAR    YES  PMH/SH reviewed - no change compared to H&P          Current Facility-Administered Medications:     potassium chloride 10 mEq in 100 ml IVPB, 10 mEq, IntraVENous, Q1H, Merlyn Rene MD, Last Rate: 100 mL/hr at 06/12/18 1011, 10 mEq at 06/12/18 1011    potassium, sodium phosphates (NEUTRA-PHOS) packet 2 Packet, 2 Packet, Oral, QID, Alphonse Guzman MD, 2 Packet at 06/12/18 1048    vancomycin (VANCOCIN) 1250 mg in  ml infusion, 1,250 mg, IntraVENous, Q16H, Merlyn Rene MD    0.45% sodium chloride infusion, 75 mL/hr, IntraVENous, CONTINUOUS, Alphonse Guzman MD, Last Rate: 75 mL/hr at 06/12/18 1124, 75 mL/hr at 06/12/18 1124    vancomycin 50 mg/mL oral solution (compounded) 500 mg, 500 mg, Oral, Q6H, Elsa Talbot MD, 500 mg at 06/12/18 1247    cefepime (MAXIPIME) 2 g in 0.9% sodium chloride (MBP/ADV) 100 mL, 2 g, IntraVENous, Q12H, Brittnee Vega Poli Finney MD, Last Rate: 200 mL/hr at 06/11/18 2302, 2 g at 06/11/18 2302    mupirocin (BACTROBAN) 2 % ointment, , Both Nostrils, Q12H, Ever Collado MD    metroNIDAZOLE (FLAGYL) IVPB premix 500 mg, 500 mg, IntraVENous, Q8H, Sathya Reeves MD, Last Rate: 100 mL/hr at 06/12/18 0228, 500 mg at 06/12/18 0228    prochlorperazine (COMPAZINE) with saline injection 10 mg, 10 mg, IntraVENous, Q6H PRN, Cristopher Sanchez MD    sodium chloride (NS) flush 5-10 mL, 5-10 mL, IntraVENous, Q8H, Sathya Reeves MD, 10 mL at 06/11/18 2045    sodium chloride (NS) flush 5-10 mL, 5-10 mL, IntraVENous, PRN, Cristopher Sanchez MD    ondansetron (ZOFRAN) injection 4 mg, 4 mg, IntraVENous, Q4H PRN, Cristopher Sanchez MD    heparin (porcine) injection 5,000 Units, 5,000 Units, SubCUTAneous, Q12H, Cristopher Sanchez MD, 5,000 Units at 06/12/18 1010    acetaminophen (TYLENOL) suppository 650 mg, 650 mg, Rectal, Q4H PRN, Regis Libman, MD    sodium chloride (NS) flush 5-10 mL, 5-10 mL, IntraVENous, PRN, Rhoda Soni MD  ________________________________________________________________________  Care Plan discussed with:    Comments   Patient y    Family  y    RN y    Care Manager     Consultant  y Dr Raya Arizmendi team rounds were held today with , nursing, pharmacist and clinical coordinator. Patient's plan of care was discussed; medications were reviewed and discharge planning was addressed. ________________________________________________________________________  Total NON critical care TIME:  25  Minutes    Total CRITICAL CARE TIME Spent:   Minutes non procedure based      Comments   >50% of visit spent in counseling and coordination of care     ________________________________________________________________________  Ever Collado MD     Procedures: see electronic medical records for all procedures/Xrays and details which were not copied into this note but were reviewed prior to creation of Plan. LABS:  I reviewed today's most current labs and imaging studies.   Pertinent labs include:  Recent Labs      06/12/18   1311  06/11/18   0504  06/10/18   0353   WBC  30.8*  26.4*  20.3*   HGB  8.6*  7.2*  8.0*   HCT  27.7*  23.1*  24.9*   PLT  230  213  250     Recent Labs      06/12/18   0234  06/11/18   0504  06/10/18   0353   NA  144  145  144   K  3.2*  3.2*  3.3*   CL  114*  115*  112*   CO2  18*  21  20*   GLU  78  82  109*   BUN  19  27*  46*   CREA  0.89  1.11*  2.31*   CA  8.3*  8.4*  8.3*   MG   --   1.5*  1.7   PHOS   --   2.4*  2.4*   ALB   --    --   1.8*   TBILI   --    --   0.4   SGOT   --    --   15   ALT   --    --   12   INR   --    --   1.2*       Signed: Luis Jj MD

## 2018-06-12 NOTE — PROGRESS NOTES
Reason for Admission: Shock                     RRAT Score: 10                    Plan for utilizing home health: Declined recommended HH PT/OT with 24/7 supervision, declined H2H                         Likelihood of Readmission: Low                          Transition of Care Plan: Follow-up Care appointments, family assistance - pt declined [address]    Pt is a 80 yo  female admitted on 6/8/18 for Shock. Pt lives in a one-story rancher ([address]) with spouse. Pt is independent in ADLs/IADLs to include driving. Pt has had HH and OP PT services in the past after knee replacement, no hx of SNF, acute inpatient rehab. Pt has no reported DME at home. Pt to dc home by private vehicle with spouse. Pt to transport self or use family support for follow-up care appointments. Pt's preferred Rx is [facility] ([address]). CM met with pt to verify demographic info and complete initial assessment, dc planning. Pt is alert and oriented x 4. Pt's spouse is bedside at this time. Pt sees Dr. Raul Velázquez (PCP), Dr. Aaron Nina (Oncology), and Dr. Victoriano Gardner (Surgery) OP. PT/OT recommended [facility] PT/OT with 24/7 supervision - pt and family declined at this time. Also offered [facility] visit for Sepsis, pt declined at this time, feels that \"I don't need it. \" CM will continue to follow-up to ensure additional CM needs are met. Care Management Interventions  PCP Verified by CM: Yes  Palliative Care Criteria Met (RRAT>21 & CHF Dx)?: No  Mode of Transport at Discharge:  Other (see comment) (By private vehicle with spouse)  Transition of Care Consult (CM Consult): Discharge Planning  Discharge Durable Medical Equipment: No (None at home)  Health Maintenance Reviewed: Yes  Physical Therapy Consult: Yes  Occupational Therapy Consult: Yes  Speech Therapy Consult: No  Current Support Network: Lives with Spouse, Own Home, Family Lives Nearby (One-story rancher ([address]) with spouse)  Confirm Follow Up Transport: Self  Plan discussed with Pt/Family/Caregiver: Yes  Discharge Location  Discharge Placement: Home with family assistance    ELTON Miller Supervisee in Social Work, [employer]  [phone]

## 2018-06-12 NOTE — PROGRESS NOTES
CM attempted to meet with pt to verify demographic info and complete initial assessment, dc planning. Pt is currently asleep bedside with lights off at this time. No family present. CM will follow-up at a later time.      ELTON Cardoza Supervisee in Social Work, [address] [place] [address]  [phone]

## 2018-06-12 NOTE — CONSULTS
Infectious Disease Consult  Alisha Combs MD FAC    Date of Consultation:  June 12, 2018    Referring Physician:  Dr Mayo Julien:     Patient is a 79 y.o. female who is being seen for  Sepsis , leucocytosis    IMPRESSION:   · Sepsis   · Acute diverticulitis with improved CT appearance (6/11) -no drainable collection/ thickening of sigmoid colon / perforated diverticulum from transverse colon with adjacent inflammatory change stable   · Moderately severe C.diff colitis on po Vanc/ IV Flagyl   · H/o aj cath infection( swelling, erythema, fever) after chemotherapy on 5/31  · Leucocytosis , persistent probably multifactorial - Acute C.diffcolitis ,also acute diverticulitis ( to lesser degree) effect of peg filgrastin (administered 6/1), Decadron po ( pt had 3 doses 5/30,5/31,6/1)   · MIL resolved  · Infiltrating ductal Ca R/breast  s/p lumpectomy ,s/p  adjuvant chemotherapy 2nd round 5/31       PLAN:      · Continue Vancomycin / Cefepime IV x 10-14 days total  · Recommend removal of aj cath ( has not been accessed since infection , pt has made decision not to have any more chemotherapy)   · Continue po vancomycin / IV flagyl for now,change to po Vanc alone when stool more formed  · Po vancomycin x 14 days  · Advance to GI lite if ok with General Surgery  · No more imodium  · Increase po fluid intake      Arron Cao is a 79 y.o. female who was admitted with nausea, vomiting and diarrhea. Pt  receved chemotherapy , 2nd round on 5/31. That night she  noted redness, significant swelling around port. Pt had informed Dr Valarie Red. She was given augmentin po . The day she started augmentin , redness around the port got better but pt started to have diarrhea, nausea and vomiting. She had  fever upto 100.4 when there was redness around the port  . Pt was thereafter  admitted . She was given IV fluids . But as she did not  respond to IVF she was started on levophed.   WBC count initially on night of admission was 12.o thereafter increased to 20.3 , then 22. Pt did not have a significant fever . CT abdomen done on 6/9 showed the following:    . Inflamed diverticula in the transverse colon likely related to acute  diverticulitis. 2. Thickening of the sigmoid colon. Air-fluid collection in the pelvic  cul-de-sac is likely related to a contained perforation from diverticulitis. This is not adjacent to the diverticulum in the transverse colon. Stranding is  seen along the left pelvic sidewall. 3. Cholelithiasis. Pt was kept NPO, started on IV vancomycin / Cefepime / Flagyl/ po vancomycin ( see antibiotic history below)  . C.diff  Test came back +. the patient slowly improved clinically , but WBC stayed persistently high WBC on 6/11 was 26.4   A repeat CT abdomen( 6/11)  showed improvement :  . Perforated diverticulum from the transverse colon with adjacent inflammatory  change is stable. 2. Thickening of the sigmoid colon with adjacent inflammatory change is stable. 3. There is no drainable abscess at this point. 4. Cholelithiasis  5. Small bilateral pleural effusions right more so than left          Pt seen today .  at bedside . She feels better . Rectal tube + , last shift 300 c out put. Pt denies abdominal pain , wants some \"real food\". No fever, chills.      Antibiotic history  Cefepime IV ( 6/9)   Flagyl IV (6/9)  Vancomycin Po (6/9)  Vancomycin IV (6/8-6/9)  S/p Pip-tazobactam (6/8)  S/p Levaquin (6/8-6/9)          Patient Active Problem List   Diagnosis Code    Obesity, morbid ([address] E66.01    Breast cancer of upper-outer quadrant of right female breast ([address] C50.411    S/P lumpectomy, right breast Z98.890    Malignant neoplasm of upper-outer quadrant of right breast in female, estrogen receptor positive ([address] C50.411, Z17.0    Shock ([address] R57.9    HTN (hypertension) I10     Past Medical History:   Diagnosis Date    Arthritis     Breast cancer, right breast ([address]     Depression     GERD (gastroesophageal reflux disease)     Hypercholesterolemia     Hypertension     Other ill-defined conditions(799.89) 2012    dog bite dec 2012 needing blood transfusion      Family History   Problem Relation Age of Onset    Adopted: Yes    Breast Cancer Mother     Stroke Mother     Hypertension Mother     Cancer Mother      skin    Cancer Father      skin    Pulmonary Fibrosis Father     Cancer Sister 79     colon    Breast Cancer Maternal Aunt     Breast Cancer Paternal Aunt     Breast Cancer Maternal Aunt      ovarian and colon cancer    Breast Cancer Maternal Aunt      ovarian and colon cancer    Breast Cancer Maternal Aunt     Breast Cancer Paternal Aunt     Breast Cancer Maternal Aunt     Cancer Maternal Grandmother      liver/ovarian      Social History   Substance Use Topics    Smoking status: Former Smoker     Packs/day: 0.25     Years: 2.00     Quit date: 6/4/1972    Smokeless tobacco: Never Used    Alcohol use No     Past Surgical History:   Procedure Laterality Date    HX BREAST LUMPECTOMY Right 3/8/2018    RIGHT BREAST LUMPECTOMY WITH ULTRASOUND performed by Marquita Pitts MD at \[facility]"" AMBULATORY OR    HX COLONOSCOPY      HX KNEE REPLACEMENT Right 06/2013    HX TONSILLECTOMY        Prior to Admission medications    Medication Sig Start Date End Date Taking? Authorizing Provider   raNITIdine (ZANTAC) 150 mg tablet Take 150 mg by mouth two (2) times a day. Indications: Heartburn, PREVENTION OF STRESS ULCER   Yes Historical Provider   amoxicillin-clavulanate (AUGMENTIN) 500-125 mg per tablet Take 1 Tab by mouth every twelve (12) hours for 10 days. 6/1/18 6/11/18 Yes Naz Jorge NP   ondansetron (ZOFRAN ODT) 4 mg disintegrating tablet Take 1 Tab by mouth every eight (8) hours as needed for Nausea. 4/20/18  Yes Dorthkatlyn Wylie, NP   lidocaine-prilocaine (EMLA) topical cream Apply  to affected area as needed for Pain.  4/20/18  Yes Dorthula Inessa, NP   dexamethasone (DECADRON) 4 mg tablet Take 2 Tabs by mouth daily. The day before, the day of and the day after chemotherapy 18  Yes Roseline Arcos NP   ondansetron (ZOFRAN ODT) 4 mg disintegrating tablet Take 1 Tab by mouth every six (6) hours as needed for Nausea. 3/8/18  Yes Jenny Madrid MD   lisinopril-hydroCHLOROthiazide (PRINZIDE, ZESTORETIC) 20-25 mg per tablet Take 1 Tab by mouth daily. Indications: hypertension   Yes Historical Provider   calcium-cholecalciferol, d3, (CALCIUM 600 + D) 600-125 mg-unit Tab Take 1 Tab by mouth two (2) times a day. Yes Historical Provider   Cholecalciferol, Vitamin D3, (VITAMIN D3) 1,000 unit cap Take 1 Tab by mouth daily. Yes Historical Provider   atenolol (TENORMIN) 50 mg tablet Take 25 mg by mouth daily. Yes Historical Provider   prochlorperazine (COMPAZINE) 10 mg tablet Take 10 mg by mouth every six (6) hours as needed. Shelton Serna MD   ibuprofen (MOTRIN) 200 mg tablet Take  by mouth. Historical Provider   garlic 1 mg cap Take  by mouth daily. Historical Provider   oxyCODONE-acetaminophen (PERCOCET) 5-325 mg per tablet Take 1 Tab by mouth every four (4) hours as needed for Pain. Max Daily Amount: 6 Tabs. 3/8/18   Leonora Díaz MD   ferrous sulfate (IRON) 325 mg (65 mg iron) tablet Take 325 mg by mouth Daily (before breakfast). Historical Provider   etodolac (LODINE) 400 mg tablet Take 400 mg by mouth daily. Historical Provider     Allergies   Allergen Reactions    Nasacort [Triamcinolone Acetonide] Other (comments)     headache    Sulfa (Sulfonamide Antibiotics) Rash        Review of Systems:  A comprehensive review of systems was negative except for that written in the History of Present Illness. Objective:   Blood pressure 132/62, pulse 60, temperature 98.3 °F (36.8 °C), resp. rate 18, height 5' 2\" (1.575 m), weight 104 kg (229 lb 4.5 oz), SpO2 92 %.   Temp (24hrs), Av.4 °F (36.9 °C), Min:98.2 °F (36.8 °C), Max:98.7 °F (37.1 °C)    Current Facility-Administered Medications   Medication Dose Route Frequency    potassium chloride 10 mEq in 100 ml IVPB  10 mEq IntraVENous Q1H    magnesium sulfate 2 g/50 ml IVPB (premix or compounded)  2 g IntraVENous ONCE    potassium, sodium phosphates (NEUTRA-PHOS) packet 2 Packet  2 Packet Oral QID    potassium chloride SR (KLOR-CON 10) tablet 40 mEq  40 mEq Oral NOW    0.45% sodium chloride infusion  75 mL/hr IntraVENous CONTINUOUS    vancomycin 50 mg/mL oral solution (compounded) 500 mg  500 mg Oral Q6H    cefepime (MAXIPIME) 2 g in 0.9% sodium chloride (MBP/ADV) 100 mL  2 g IntraVENous Q12H    mupirocin (BACTROBAN) 2 % ointment   Both Nostrils Q12H    metroNIDAZOLE (FLAGYL) IVPB premix 500 mg  500 mg IntraVENous Q8H    prochlorperazine (COMPAZINE) with saline injection 10 mg  10 mg IntraVENous Q6H PRN    sodium chloride (NS) flush 5-10 mL  5-10 mL IntraVENous Q8H    sodium chloride (NS) flush 5-10 mL  5-10 mL IntraVENous PRN    ondansetron (ZOFRAN) injection 4 mg  4 mg IntraVENous Q4H PRN    heparin (porcine) injection 5,000 Units  5,000 Units SubCUTAneous Q12H    acetaminophen (TYLENOL) suppository 650 mg  650 mg Rectal Q4H PRN    sodium chloride (NS) flush 5-10 mL  5-10 mL IntraVENous PRN        Exam:    General:  Awake, cooperative   Eyes:  Sclera anicteric. Pupils equally round and reactive to light. Mouth/Throat: Mucous membranes , oral pharynx clear   Neck: Supple   Lungs:   Clear to auscultation bilaterally Chest - portocath +   CV:  Regular rate and rhythm,no murmur, click, rub or gallop   Abdomen:   Soft, non-tender.  bowel sounds +. non-distended   Extremities: No  edema   Skin: Skin color, texture, turgor normal. no  rash / lesions   Lymph nodes: Cervical and supraclavicular normal   Lines/Devices:  Intact, no erythema, drainage or tenderness B/L UE   Psych: Alert and oriented,       Data Review:   Lab Results   Component Value Date/Time    Culture result:  06/08/2018 10:15 PM     NO ROUTINE ENTERIC PATHOGENS ISOLATED INCLUDING SALMONELLA, SHIGELLA, YERSINIA, VIBRIO OR SHIGA TOXIN PRODUCING E. COLI    Culture result: NO GROWTH 4 DAYS 06/08/2018 09:22 PM    Culture result: NO GROWTH 4 DAYS 06/08/2018 09:22 PM    Culture result:  06/04/2013 03:43 PM     MRSA NOT PRESENT      Screening of patient nares for MRSA is for surveillance purposes and, if positive, to facilitate isolation considerations in high risk settings. It is not intended for automatic decolonization interventions per se as regimens are not sufficiently effective to warrant routine use. Culture result: STREPTOCOCCI, BETA HEMOLYTIC GROUP B 06/04/2013 03:30 PM          XR Results (most recent):    Results from Hospital Encounter encounter on 06/08/18   XR CHEST PORT   Narrative Clinical indication: Shortness of breath. Portable AP semierect view of the chest is obtained compared to June 8. Central  line tip is at this time in the expected position of the right atrium. The lung  fields are clear. Impression impression: No acute changes. Support devices as above. ICD-10-CM ICD-9-CM    1. Septic shock (HCC) A41.9 038.9     R65.21 785.52      995.92    2. Infection due to port-a-cath, initial encounter T80.219A 999.31    3. Diarrhea, unspecified type R19.7 787.91    4. C. difficile diarrhea A04.72 008.45    5. Diverticulitis of large intestine with perforation and abscess without bleeding K57.20 562.11    6. Malignant neoplasm of upper-outer quadrant of right breast in female, estrogen receptor positive ([facility] C50.411 174.4     Z17.0 V86.0    7. Hypotension, unspecified hypotension type I95.9 458.9    8. Hypovolemic shock (HCC) R57.1 785.59    9. Acute renal failure, unspecified acute renal failure type ([facility] N17.9 584.9    10. Non-intractable vomiting with nausea, unspecified vomiting type R11.2 787.01    11. Diarrhea of presumed infectious origin R19.7 009.3    12. Acute hypokalemia E87.6 276.8    13. Hypomagnesemia E83.42 275.2    14. Metabolic acidosis B50.4 994.5        I have discussed the diagnosis with the patient and the intended plan as seen in the above orders. I have discussed medication side effects and warnings with the patient as well.     Reviewed test results at length with patient    Signed By: Catracho Olson MD  FACP    June 12, 2018           Regulo Hutton MD FACP

## 2018-06-12 NOTE — PROGRESS NOTES
0730  [facility] SBAR, Kardex, ED Summary, Procedure Summary, Intake/Output, MAR, Accordion, Recent Results, Med Rec Status and Cardiac Rhythm NSR were discussed. Arlin Falk       Bedside shift change report given to , RN (oncoming nurse). Report included the following information SBAR, Kardex, ED Summary, Procedure Summary, Intake/Output, MAR, Accordion, Recent Results, Med Rec Status and Cardiac Rhythm NSR.     SHIFT SUMMARY:    Rectal tube fell out today while patient was ambulating. New rectal tube placed. Patient advanced to a full liquid diet. Tolerated well. [address] NURSING NOTE   Admission Date 6/8/2018   Admission Diagnosis Shock ([address]   Consults IP CONSULT TO HOSPITALIST  IP CONSULT TO HEMATOLOGY  IP CONSULT TO NEPHROLOGY  IP CONSULT TO INFECTIOUS DISEASES  IP CONSULT TO GENERAL SURGERY      Cardiac Monitoring [x] Yes [] No      Purposeful Hourly Rounding [x] Yes    Kierra Score Total Score: 4   Kierra score 3 or > [x] Bed Alarm [] Avasys [] 1:1 sitter [] Patient refused (Signed refusal form in chart)   Raman Score Raman Score: 17   Raman score 14 or < [] PMT consult [] Wound Care consult    []  Specialty bed  [x] Nutrition consult      Influenza Vaccine Received Flu Vaccine for Current Season (usually Sept-March): Not Flu Season           Oxygen needs? [x] Room air Oxygen @  []1L    []2L    []3L   []4L    []5L   []6L via  NC   Chronic home O2 use?  [] Yes [] No  Perform O2 challenge test and document in progress note using smartphrase (.Homeoxygen)      Last bowel movement Last Bowel Movement Date: 06/12/18      Urinary Catheter [REMOVED] Urinary Catheter 06/09/18 Ghotra - Temperature-Indications for Use: Acute urinary retention/bladder outlet obstruction     [REMOVED] External Female Catheter 06/09/18-Urine Output (mL): 250 ml  [REMOVED] Urinary Catheter 06/09/18 Ghotra - Temperature-Urine Output (mL): 150 ml     LDAs             Venous Access Device port 05/09/18 (Active)      Peripheral IV 06/08/18 Left Antecubital (Active)   Site Assessment Clean, dry, & intact 6/12/2018  8:00 AM   Phlebitis Assessment 0 6/12/2018  8:00 AM   Infiltration Assessment 0 6/12/2018  8:00 AM   Dressing Status Clean, dry, & intact 6/12/2018  8:00 AM   Dressing Type Tape;Transparent 6/12/2018  8:00 AM   Hub Color/Line Status Pink;Flushed 6/12/2018  8:00 AM   Action Taken Open ports on tubing capped 6/9/2018  8:00 PM   Alcohol Cap Used Yes 6/11/2018  4:00 AM       Peripheral IV 06/08/18 Right Antecubital (Active)   Site Assessment Clean, dry, & intact 6/12/2018  8:00 AM   Phlebitis Assessment 0 6/12/2018  8:00 AM   Infiltration Assessment 0 6/12/2018  8:00 AM   Dressing Status Clean, dry, & intact 6/12/2018  8:00 AM   Dressing Type Tape;Transparent 6/12/2018  8:00 AM   Hub Color/Line Status Pink;Flushed 6/12/2018  8:00 AM   Action Taken Open ports on tubing capped 6/10/2018 12:00 PM   Alcohol Cap Used Yes 6/11/2018  4:00 AM          Rectal Tube (Active)   Site Assessment Clean, dry, & intact 6/12/2018  2:00 PM   Drainage Description Patel Servant 6/12/2018  2:00 PM                   Readmission Risk Assessment Tool Score Low Risk            10       Total Score        3 Has Seen PCP in Last 6 Months (Yes=3, No=0)    2 . Living with Significant Other. Assisted Living. LTAC. SNF. or   Rehab    5 Pt.  Coverage (Medicare=5 , Medicaid, or Self-Pay=4)        Criteria that do not apply:    Patient Length of Stay (>5 days = 3)    IP Visits Last 12 Months (1-3=4, 4=9, >4=11)    Charlson Comorbidity Score (Age + Comorbid Conditions)       Expected Length of Stay 4d 21h   Actual Length of Stay 3

## 2018-06-12 NOTE — PROGRESS NOTES
Hospitalist Progress Note    NAME: Florence Schultz   :  1950   MRN:  [id number]       Assessment / Plan:  Septic versus hypovolemic shock  Acute diverticulitis with contained perforation  C. difficile colitis  Possible port infection in about 1 week ago and was treated with Augmentin  Leucocytosis is worse today but symptoms are improving. Off all pressors.  Continue vancomycin, cefepime and Flagyl  Continue p.o. vancomycin for C. difficile  Repeat ct done today shows stable appearance of perforation with no worsening  Continue IV fluids  Gen Sx recommending ID consult due to worsening leucocytosis  Cbc in am      Acute kidney injury likely prerenal  Creatinine peaked at 5.69 and is currently back to normal at 1.11  Continue IV fluids  Nephrology following  Hold lisinopril    Hypokalemia  Hypophosphatemia   Replaced    History of breast cancer  Currently on treatment and follows up with Dr. Kwame Medina  Oncology following  Annette Cassette will consider hormonal therapy moving forward    Anemia of chronic disease  Check cbc in am               Body mass index is 41.94 kg/(m^2). Code status: Full  Prophylaxis: Hep SQ  Recommended Disposition: Home w/Family     Subjective:     Chief Complaint / Reason for Physician Visit  Doing better. Does not report any symptoms. No abdominal pain. Review of Systems:  Symptom Y/N Comments  Symptom Y/N Comments   Fever/Chills n   Chest Pain n    Poor Appetite    Edema     Cough n   Abdominal Pain     Sputum    Joint Pain     SOB/JACOBO n   Pruritis/Rash     Nausea/vomit    Tolerating PT/OT     Diarrhea    Tolerating Diet     Constipation    Other       Could NOT obtain due to:      Objective:     VITALS:   Last 24hrs VS reviewed since prior progress note.  Most recent are:  Patient Vitals for the past 24 hrs:   Temp Pulse Resp BP SpO2   18 1935 98.4 °F (36.9 °C) 77 18 134/58 96 %   18 1727 98.7 °F (37.1 °C) 75 18 139/52 96 %   18 1623 98.4 °F (36.9 °C) 74 16 117/48 99 % 06/11/18 1600 - 68 17 - 100 %   06/11/18 1500 - - - 99/52 -   06/11/18 1200 98.5 °F (36.9 °C) 75 20 (!) 138/94 -   06/11/18 1100 - 74 16 113/52 100 %   06/11/18 1014 - - - 135/59 95 %   06/11/18 1012 - 75 18 135/59 -   06/11/18 1010 - 88 21 126/61 -   06/11/18 1002 - 80 19 (!) 164/34 -   06/11/18 1000 - 69 21 - 91 %   06/11/18 0956 - 71 - 146/59 -   06/11/18 0900 - 60 20 110/43 96 %   06/11/18 0800 98.6 °F (37 °C) 61 22 122/56 94 %   06/11/18 0700 - 76 25 124/69 94 %   06/11/18 0600 - 63 22 108/79 93 %   06/11/18 0500 - 67 18 (!) 107/36 93 %   06/11/18 0400 98.3 °F (36.8 °C) (!) 55 18 - 94 %   06/11/18 0300 - 65 24 - 94 %   06/11/18 0200 - 62 18 108/54 94 %   06/11/18 0100 - 61 15 91/71 92 %   06/11/18 0002 98 °F (36.7 °C) 69 15 119/86 95 %   06/10/18 2330 - 61 19 111/64 93 %   06/10/18 2300 - 67 19 110/55 -   06/10/18 2200 - 66 18 113/54 -   06/10/18 2100 - 61 16 103/40 -       Intake/Output Summary (Last 24 hours) at 06/11/18 2038  Last data filed at 06/11/18 1940   Gross per 24 hour   Intake             2295 ml   Output             1725 ml   Net              570 ml        PHYSICAL EXAM:  General: cooperative, no acute distress    EENT:  EOMI. Anicteric sclerae. MMM  Resp:  CTA bilaterally, no wheezing or rales. No accessory muscle use  CV:  Regular  rhythm,  No edema  GI:  Soft, Non distended, Non tender.  +Bowel sounds  Neurologic:  Alert and awake, normal speech,   Psych:   Not anxious nor agitated  Skin:  No rashes.   No jaundice    Reviewed most current lab test results and cultures  YES  Reviewed most current radiology test results   YES  Review and summation of old records today    NO  Reviewed patient's current orders and MAR    YES  PMH/SH reviewed - no change compared to H&P          Current Facility-Administered Medications:     0.45% sodium chloride infusion, 75 mL/hr, IntraVENous, CONTINUOUS, Beth Weems MD, Last Rate: 75 mL/hr at 06/11/18 1843, 75 mL/hr at 06/11/18 1843    vancomycin 50 mg/mL oral solution (compounded) 500 mg, 500 mg, Oral, Q6H, Jd Durand MD, 500 mg at 06/11/18 2003    cefepime (MAXIPIME) 2 g in 0.9% sodium chloride (MBP/ADV) 100 mL, 2 g, IntraVENous, Q12H, Jd Durand MD, Last Rate: 200 mL/hr at 06/11/18 1252, 2 g at 06/11/18 1252    mupirocin (BACTROBAN) 2 % ointment, , Both Nostrils, Q12H, Davion Stein MD    metroNIDAZOLE (FLAGYL) IVPB premix 500 mg, 500 mg, IntraVENous, Q8H, Sathya Reeves MD, Last Rate: 100 mL/hr at 06/11/18 1843, 500 mg at 06/11/18 1843    prochlorperazine (COMPAZINE) with saline injection 10 mg, 10 mg, IntraVENous, Q6H PRN, Len Juarez MD    sodium chloride (NS) flush 5-10 mL, 5-10 mL, IntraVENous, Q8H, Sathya Reeves MD, 10 mL at 06/11/18 1414    sodium chloride (NS) flush 5-10 mL, 5-10 mL, IntraVENous, PRN, Len Juarez MD    ondansetron (ZOFRAN) injection 4 mg, 4 mg, IntraVENous, Q4H PRN, Len Juarez MD    heparin (porcine) injection 5,000 Units, 5,000 Units, SubCUTAneous, Q12H, Len Juarez MD, 5,000 Units at 06/11/18 0834    acetaminophen (TYLENOL) suppository 650 mg, 650 mg, Rectal, Q4H PRN, Waqar Villalba MD    sodium chloride (NS) flush 5-10 mL, 5-10 mL, IntraVENous, PRN, Teryl Saint, MD  ________________________________________________________________________  Care Plan discussed with:    Comments   Patient y    Family  y    RN y    Care Manager     Consultant                        Multidiciplinary team rounds were held today with , nursing, pharmacist and clinical coordinator. Patient's plan of care was discussed; medications were reviewed and discharge planning was addressed.      ________________________________________________________________________  Total NON critical care TIME:  25  Minutes    Total CRITICAL CARE TIME Spent:   Minutes non procedure based      Comments   >50% of visit spent in counseling and coordination of care     ________________________________________________________________________  Gerardo Akins Abby Zamudio MD     Procedures: see electronic medical records for all procedures/Xrays and details which were not copied into this note but were reviewed prior to creation of Plan. LABS:  I reviewed today's most current labs and imaging studies.   Pertinent labs include:  Recent Labs      06/11/18   0504  06/10/18   0353  06/09/18   1249   WBC  26.4*  20.3*  22.0*   HGB  7.2*  8.0*  8.1*   HCT  23.1*  24.9*  24.3*   PLT  213  250  287     Recent Labs      06/11/18   0504  06/10/18   0353  06/09/18   1249  06/09/18   0438   NA  145  144  139  134*   K  3.2*  3.3*  3.2*  3.2*   CL  115*  112*  105  103   CO2  21  20*  22  15*   GLU  82  109*  151*  118*   BUN  27*  46*  61*  65*   CREA  1.11*  2.31*  4.04*  4.83*   CA  8.4*  8.3*  8.1*  8.2*   MG  1.5*  1.7  1.8  2.1   PHOS  2.4*  2.4*  2.9   --    ALB   --   1.8*  1.7*  1.8*   TBILI   --   0.4  0.4  0.4   SGOT   --   15  13*  15   ALT   --   12  11*  13   INR   --   1.2*   --    --        Signed: Davion Stein MD

## 2018-06-12 NOTE — PROGRESS NOTES
Initial Nutrition Assessment:    INTERVENTIONS/RECOMMENDATIONS:   · Low fiber diet when able  · Initial/Brief Nutrition Education: Purpose of nutrition education, Survival information: low fiber x4 weeks with transition to high fiber     ASSESSMENT:   Chart reviewed, medically noted for Acute diverticulitis with contained perforation and C. Diff, MIL, and PMH shown below. Nutrition consulted to provide diverticulitis nutrition therapy. Pt was present with her . They were provided with verbal education as well as printed material on low fiber diet to be followed for 4 weeks with a slow transition to high fiber diet. Past Medical History:   Diagnosis Date    Arthritis     Breast cancer, right breast ([address]     Depression     GERD (gastroesophageal reflux disease)     Hypercholesterolemia     Hypertension     Other ill-defined conditions(799.89) 2012    dog bite dec 2012 needing blood transfusion       Diet Order: Full liquids  % Eaten:  No data found.     Pertinent Medications: [x]Reviewed: 1/2 NS, flagyl, KCl, KPhos  Pertinent Labs: [x]Reviewed: K+ 3.2, Mg 1.5, phos 2.4  Food Allergies: [x]NKFA  []Other   Last BM: 6/11 (c-diff)  Edema:        []RUE   []LUE   []RLE   []LLE      Pressure Injury:      [] Stage I   [] Stage II   [] Stage III   [] Stage IV      Wt Readings from Last 30 Encounters:   06/09/18 104 kg (229 lb 4.5 oz)   05/31/18 103.4 kg (228 lb)   05/31/18 102.6 kg (226 lb 3.2 oz)   05/10/18 105.1 kg (231 lb 12.8 oz)   05/10/18 104.8 kg (231 lb)   05/09/18 104.3 kg (230 lb)   04/20/18 105.3 kg (232 lb 3.2 oz)   03/28/18 103.4 kg (228 lb)   03/08/18 103.4 kg (228 lb)   03/01/18 104.3 kg (230 lb)   02/09/18 105.2 kg (232 lb)   06/17/13 96.2 kg (212 lb)   06/04/13 97.3 kg (214 lb 8.1 oz)       Anthropometrics:   Height: 5' 2\" (157.5 cm) Weight: 104 kg (229 lb 4.5 oz)   IBW (%IBW):   ( ) UBW (%UBW):   (  %)   Last Weight Metrics:  Weight Loss Metrics 6/9/2018 6/8/2018 5/31/2018 5/31/2018 5/10/2018 5/10/2018 5/9/2018   Today's Wt 229 lb 4.5 oz - 228 lb 226 lb 3.2 oz 231 lb 12.8 oz 231 lb 230 lb   BMI - 41.94 kg/m2 41.7 kg/m2 41.37 kg/m2 42.4 kg/m2 42.25 kg/m2 42.07 kg/m2       BMI: Body mass index is 41.94 kg/(m^2). This BMI is indicative of:   []Underweight    []Normal    []Overweight    [] Obesity   [x] Extreme Obesity (BMI>40)     Estimated Nutrition Needs (Based on):   1550 Kcals/day (BMR: 1550 x 1 d/t BMI >40) , 85 g (0.8 g/kg) Protein  Carbohydrate: At Least 130 g/day  Fluids: 1550 mL/day (1ml/kcal) or per primary team    NUTRITION DIAGNOSES:   Problem:  Altered GI function      Etiology: related to Acute diverticulitis with contained perforation and C. Diff     Signs/Symptoms: as evidenced by Acute diverticulitis with contained perforation and C. Diff      NUTRITION INTERVENTIONS:  Meals/Snacks: Other (low fiber diet x 4 weeks)         Initial/Brief Nutrition Education: Purpose of nutrition education, Survival information        GOAL:   consume >50% of meals in 3-5 days    LEARNING NEEDS (Diet, Food/Nutrient-Drug Interaction):    [] None Identified   [x] Identified and Education Provided/Documented   [] Identified and Pt declined/was not appropriate     Cultureal, Latter day, OR Ethnic Dietary Needs:    [x] None Identified   [] Identified and Addressed     [x] Interdisciplinary Care Plan Reviewed/Documented    [x] Discharge Planning:   Low fiber diet x 4 weeks with slow transition to high fiber diet    MONITORING /EVALUATION:   Behavioral-Environmental Outcomes: Food/nutrition knowledge  Food/Nutrient Intake Outcomes:  Total energy intake  Physical Signs/Symptoms Outcomes: Weight/weight change, Electrolyte and renal profile, GI    NUTRITION RISK:    [] High              [x] Moderate           []  Low  []  Minimal/Uncompromised    PT SEEN FOR:    [x]  MD Consult: []Calorie Count      []Diabetic Diet Education        [x]Diet Education     []Electrolyte Management     []General Nutrition Management and Supplements     []Management of Tube Feeding     []TPN Recommendations    []  RN Referral:  []MST score >=2     []Enteral/Parenteral Nutrition PTA     []Pregnant: Gestational DM or Multigestation     []Pressure Ulcer/Wound Care needs        []  Low BMI  []  LOS Referral       Mingo Gomez RDN  Pager [phone]  Weekend Pager [phone]

## 2018-06-12 NOTE — PROGRESS NOTES
[facility]      [address]                [address] [address], [address]         [place], [address]      Phone: [phone]   Fax: [phone]      Phone: [phone]    www. Pulse [employer]    Nephrology Progress Note  Pt Name : Heath Trevino  Date of Admission : 6/8/2018    CC: Follow up for MIL     Assessment and Plan   mil-   - resolved  - 2/2 severe pre renal azotemia  From diarrhea, Hypotension, ACE (I), diuretics   - Kidneys looked normal on CT Abdomen   - stop ivf  - hold lisinopril/hctz     hypomagnsemia  Iv mag sulfate    Hypophosphatemia  Po neutraphos     Hypokalemia :  - replace kcl       Septic Shock   - 2/2 C diff , perf diverticulitis      c. Diff colitis    Breast Ca   Recent port-a-cath infection     OKAY TO D/C HOME- renal stand poing   For other plans, see orders. Interval History:  CR. NORMAL  k ,mg,phos low  bp stable  Ghotra removed  Voiding well    Ros    No c/o sob, fever. No c/o nausea or vomiting  No c/o chest pain    Review of Systems: Pertinent items are noted in HPI.     Current Facility-Administered Medications   Medication Dose Route Frequency    potassium chloride 10 mEq in 100 ml IVPB  10 mEq IntraVENous Q1H    magnesium sulfate 2 g/50 ml IVPB (premix or compounded)  2 g IntraVENous ONCE    potassium, sodium phosphates (NEUTRA-PHOS) packet 2 Packet  2 Packet Oral QID    potassium chloride SR (KLOR-CON 10) tablet 40 mEq  40 mEq Oral NOW    0.45% sodium chloride infusion  75 mL/hr IntraVENous CONTINUOUS    vancomycin 50 mg/mL oral solution (compounded) 500 mg  500 mg Oral Q6H    cefepime (MAXIPIME) 2 g in 0.9% sodium chloride (MBP/ADV) 100 mL  2 g IntraVENous Q12H    mupirocin (BACTROBAN) 2 % ointment   Both Nostrils Q12H    metroNIDAZOLE (FLAGYL) IVPB premix 500 mg  500 mg IntraVENous Q8H    prochlorperazine (COMPAZINE) with saline injection 10 mg  10 mg IntraVENous Q6H PRN    sodium chloride (NS) flush 5-10 mL  5-10 mL IntraVENous Q8H    sodium chloride (NS) flush 5-10 mL  5-10 mL IntraVENous PRN    ondansetron (ZOFRAN) injection 4 mg  4 mg IntraVENous Q4H PRN    heparin (porcine) injection 5,000 Units  5,000 Units SubCUTAneous Q12H    acetaminophen (TYLENOL) suppository 650 mg  650 mg Rectal Q4H PRN    sodium chloride (NS) flush 5-10 mL  5-10 mL IntraVENous PRN      Allergies   Allergen Reactions    Nasacort [Triamcinolone Acetonide] Other (comments)     headache    Sulfa (Sulfonamide Antibiotics) Rash       Objective:  Vitals:    Vitals:    06/11/18 1935 06/11/18 2311 06/12/18 0226 06/12/18 0732   BP: 134/58 133/62 128/71 132/62   Pulse: 77 70 68 60   Resp: 18 18 18 18   Temp: 98.4 °F (36.9 °C) 98.2 °F (36.8 °C) 98.5 °F (36.9 °C) 98.3 °F (36.8 °C)   SpO2: 96% 95% 95% 92%   Weight:       Height:         Intake and Output:     06/10 1901 - 06/12 0700  In: 4000.4 [P.O.:400; I.V.:3600.4]  Out: 2950 [Urine:1750; Drains:1200]    Physical Examination:  General: NAD  Resp:  Clear lungs, no wheezing or rales. No accessory muscle use  CV:  Regular  rhythm,  S1,S2  GI:  Soft, Non distended, Non tender. Skin:  no rashes or ulcers. No jaundice  Neurologic:  Alert and oriented X 3. Non Focal      []    High complexity decision making was performed  []    Patient is at high-risk of decompensation with multiple organ involvement    Lab Data Personally Reviewed: I have reviewed all the pertinent labs, microbiology data and radiology studies during assessment.     Recent Labs      06/12/18   0234  06/11/18   0504  06/10/18   0353  06/09/18   1249   NA  144  145  144  139   K  3.2*  3.2*  3.3*  3.2*   CL  114*  115*  112*  105   CO2  18*  21  20*  22   GLU  78  82  109*  151*   BUN  19  27*  46*  61*   CREA  0.89  1.11*  2.31*  4.04*   CA  8.3*  8.4*  8.3*  8.1*   MG   --   1.5*  1.7  1.8   PHOS   --   2.4*  2.4*  2.9   ALB   --    --   1.8*  1.7* SGOT   --    --   15  13*   ALT   --    --   12  11*   INR   --    --   1.2*   --      Recent Labs      06/11/18   0504  06/10/18   0353  06/09/18   1249   WBC  26.4*  20.3*  22.0*   HGB  7.2*  8.0*  8.1*   HCT  23.1*  24.9*  24.3*   PLT  213  250  287     Lab Results   Component Value Date/Time    Specimen Description: SERGIO HILL 06/04/2013 03:43 PM    Specimen Description: Maite Cabrera 06/04/2013 03:30 PM     Lab Results   Component Value Date/Time    Culture result:  06/08/2018 10:15 PM     NO ROUTINE ENTERIC PATHOGENS ISOLATED INCLUDING SALMONELLA, SHIGELLA, YERSINIA, VIBRIO OR SHIGA TOXIN PRODUCING E. COLI    Culture result: NO GROWTH 4 DAYS 06/08/2018 09:22 PM    Culture result: NO GROWTH 4 DAYS 06/08/2018 09:22 PM     Recent Results (from the past 24 hour(s))   METABOLIC PANEL, BASIC    Collection Time: 06/12/18  2:34 AM   Result Value Ref Range    Sodium 144 136 - 145 mmol/L    Potassium 3.2 (L) 3.5 - 5.1 mmol/L    Chloride 114 (H) 97 - 108 mmol/L    CO2 18 (L) 21 - 32 mmol/L    Anion gap 12 5 - 15 mmol/L    Glucose 78 65 - 100 mg/dL    BUN 19 6 - 20 MG/DL    Creatinine 0.89 0.55 - 1.02 MG/DL    BUN/Creatinine ratio 21 (H) 12 - 20      GFR est AA >60 >60 ml/min/1.73m2    GFR est non-AA >60 >60 ml/min/1.73m2    Calcium 8.3 (L) 8.5 - 10.1 MG/DL     I have reviewed the flowsheets. Chart reviewed. Pertinent Notes reviewed. Current Medications list reviewed by me. No change in PMH ,family and social history from Consult note.     Yuni Simmons MD  06/12/18

## 2018-06-12 NOTE — PROGRESS NOTES
Admit Date: 2018    POD * No surgery found *    Procedure:  * No surgery found *    Subjective:     Patient has no complaints. Abdominal pain improved. Jenna clears well.  + BMs    Objective:     Blood pressure 135/58, pulse 68, temperature 98.5 °F (36.9 °C), resp. rate 17, height 5' 2\" (1.575 m), weight 229 lb 4.5 oz (104 kg), SpO2 94 %. Temp (24hrs), Av.4 °F (36.9 °C), Min:98.2 °F (36.8 °C), Max:98.7 °F (37.1 °C)      Physical Exam:  GENERAL: alert, cooperative, no distress, appears stated age, LUNG: clear to auscultation bilaterally, HEART: regular rate and rhythm, ABDOMEN: soft, non-tender. Bowel sounds normal. No masses,  no organomegaly, EXTREMITIES:  extremities normal, atraumatic, no cyanosis or edema    Labs:   Recent Results (from the past 24 hour(s))   METABOLIC PANEL, BASIC    Collection Time: 18  2:34 AM   Result Value Ref Range    Sodium 144 136 - 145 mmol/L    Potassium 3.2 (L) 3.5 - 5.1 mmol/L    Chloride 114 (H) 97 - 108 mmol/L    CO2 18 (L) 21 - 32 mmol/L    Anion gap 12 5 - 15 mmol/L    Glucose 78 65 - 100 mg/dL    BUN 19 6 - 20 MG/DL    Creatinine 0.89 0.55 - 1.02 MG/DL    BUN/Creatinine ratio 21 (H) 12 - 20      GFR est AA >60 >60 ml/min/1.73m2    GFR est non-AA >60 >60 ml/min/1.73m2    Calcium 8.3 (L) 8.5 - 10.1 MG/DL       Data Review images and reports reviewed    Assessment:     Active Problems:    Breast cancer of upper-outer quadrant of right female breast ([address] (3/28/2018)      Shock ([address] (2018)      HTN (hypertension) (2018)        Plan/Recommendations/Medical Decision Making:     WBC up today. C diff  Transverse colon diverticulitis  Interval CT stable, no IR drainage indicated  Continue current antimicrobial coverage. Advance diet to full liquids, then low residue. Should be ready for d/c home soon on po antibiotics regimen. Nutritionist consult for dietary teaching for diverticulitis. Justyna Castle.  Kelly Morelos MD, [facility]

## 2018-06-12 NOTE — PROGRESS NOTES
Problem: Self Care Deficits Care Plan (Adult)  Goal: *Acute Goals and Plan of Care (Insert Text)  Occupational Therapy Goals  Initiated 6/11/2018  1. Patient will perform grooming in standing with supervision/set-up within 7 day(s). 2.  Patient will perform upper body dressing and lower body dressing with supervision/set-up within 7 day(s). 3.  Patient will perform simple home management with supervision/set-up within 7 day(s). 4.  Patient will perform toilet transfers with supervision/set-up within 7 day(s). 5.  Patient will perform all aspects of toileting with supervision/set-up within 7 day(s). 6.  Patient will participate in upper extremity therapeutic exercise/activities with supervision/set-up for 5 minutes within 7 day(s). 7.  Patient will utilize energy conservation techniques during functional activities with minimal verbal cues within 7 day(s). 8.  Patient will tolerate at least 8 minutes of standing adls within 7 days. Occupational Therapy TREATMENT  Patient: Zac De La Rosa (22 y.o. female)  Date: 6/12/2018  Diagnosis: Shock ([address] <principal problem not specified>       Precautions: Fall  Chart, occupational therapy assessment, plan of care, and goals were reviewed. ASSESSMENT:  Pt was cleared to be seen for therapy and all VSS. Pt was supine in bed and with encouragement she was willing to get out of bed. Pt was CGA for bed mobility and she was able to scoot to EOB with CGA. She stood with min assist of 2 and she was able to transfer to toilet with min assist of 2 , HHA, and no AD. Pt was able to walk in room and appeared to have more energy today. She was educated on sitting up in her chair at least for 1 hour 3 times a day.   Pt is making progress and recommend that she have further therapy at discharge at home with 24/7 assist and home care PT  Progression toward goals:  [x]       Improving appropriately and progressing toward goals  []       Improving slowly and progressing toward goals  []       Not making progress toward goals and plan of care will be adjusted     PLAN:  Patient continues to benefit from skilled intervention to address the above impairments. Continue treatment per established plan of care. Discharge Recommendations:  Home Health PT  Further Equipment Recommendations for Discharge:  tbd     SUBJECTIVE:   Patient stated I'll be nice today and get up. Gloria Israel    OBJECTIVE DATA SUMMARY:   Cognitive/Behavioral Status:  Neurologic State: Alert  Orientation Level: Appropriate for age;Oriented X4  Cognition: Appropriate decision making; Follows commands  Perception: Appears intact  Perseveration: No perseveration noted  Safety/Judgement: Fall prevention; Awareness of environment    Functional Mobility and Transfers for ADLs:  Bed Mobility:  Rolling: Contact guard assistance; Additional time; Adaptive equipment  Supine to Sit: Minimum assistance;Assist x1;Additional time; Adaptive equipment  Scooting: Contact guard assistance; Additional time    Transfers:  Sit to Stand: Minimum assistance  Functional Transfers  Bathroom Mobility: Minimum assistance (of 2)  Toilet Transfer : Minimum assistance;Contact guard assistance  Bed to Chair: Minimum assistance;Assist x2    Balance:  Sitting: Intact  Standing: Impaired; Without support  Standing - Static: Good;Fair  Standing - Dynamic : Fair    ADL Intervention:  Feeding  Feeding Assistance: Independent       Pt is max for ADLs       Toileting  Toileting Assistance: Minimum assistance (of 2)  Bladder Hygiene: Contact guard assistance  Bowel Hygiene: Total assistance (dependent)    Cognitive Retraining  Safety/Judgement: Fall prevention; Awareness of environment    Pain:  Pain Scale 1: Numeric (0 - 10)  Pain Intensity 1: 0              Activity Tolerance:   vss  Please refer to the flowsheet for vital signs taken during this treatment.   After treatment:   [x] Patient left in no apparent distress sitting up in chair  [] Patient left in no apparent distress in bed  [x] Call bell left within reach  [x] Nursing notified  [x] Caregiver present  [x] Bed alarm activated    COMMUNICATION/COLLABORATION:   The patients plan of care was discussed with: Physical Therapist and Registered Nurse and family    Santa Hayden OT  Time Calculation: 23 mins

## 2018-06-12 NOTE — PROGRESS NOTES
Pharmacy Automatic Renal Dosing Protocol - Antimicrobials    Indication for Antimicrobials: Septic shock (CDIFF; Possible other intraabdominal infection) possible bacteremia     Current Regimen of Each Antimicrobial:  Vancomycin 1250 mg every 16 hours (Start Date ; Day # 4)  Cefepime 2 g IV every 12 hours (Start Date ; Day #4)  Metronidazole 500 mg IV every 8 hours (Started ; Day #4)  Vancomycin 500 mg PO every 6 hours (Started ; Day #4)    Previous abx:  Zosyn 3.375g x1 dose  Levaquin 750mg x1 dose    Goal Level: VANCOMYCIN TROUGH GOAL RANGE  Vancomycin Trough: 15 - 20 mcg/mL    Date Dose & Interval Measured (mcg/mL) Extrapolated (mcg/mL)                       Significant Cultures:   18 CDIFF = Positive (FINAL)  18 Stool culture = Negative (FINAL)  18 Blood culture = No growth x 4 days (Results pending)    Radiology / Imaging results: (X-ray, CT scan or MRI): NA   X- ray: Right midlung mild subsegmental atelectasis. 18 CT: Thickening of the sigmoid colon. Air-fluid collection in the pelvic  cul-de-sac is likely related to a contained perforation from diverticulitis. This is not adjacent to the diverticulum in the transverse colon. Stranding is  seen along the left pelvic sidewall. Paralysis, amputations, malnutrition: NA    Labs:  Recent Labs      18   0234  18   0504  06/10/18   0353  18   1249   CREA  0.89  1.11*  2.31*  4.04*   BUN  19  27*  46*  61*   WBC   --   26.4*  20.3*  22.0*     Temp (24hrs), Av.4 °F (36.9 °C), Min:98.2 °F (36.8 °C), Max:98.7 °F (37.1 °C)    Creatinine Clearance (mL/min) or Dialysis: 49 mL/min     Impression/Plan:   · Vancomycin restarted by ID. Based on the previous dosing regimen the patient has only been without vancomycin for ~10 hours after the dose was due. · Will order vancomycin 1250 mg IV every 16 hours for an estimated trough of 15.8 mcg/mL.   · Will continue current regimen for cefepime as appropriate for indication and renal function. · Vancomycin PO dose adjusted for CDIFF severity. Metronidazole IV dosed appropriately for CDIFF severity. · Antimicrobial stop date: pending (14 days per ID note but not entered yet)     Pharmacy will follow daily and adjust medications as appropriate for renal function and/or serum levels. Thank you,  DMITRIY MaxwellD    Recommended duration of therapy  [email or web address]/Pharmacy/Clinical%20Companion/[email or web address]. [email or web address]    Renal Dosing  [email or web address]/[facility]/Pharmacy/Clinical%20Companion/Renal%[id number]. pdf

## 2018-06-12 NOTE — PROGRESS NOTES
Problem: Mobility Impaired (Adult and Pediatric)  Goal: *Acute Goals and Plan of Care (Insert Text)  Physical Therapy Goals  Initiated 6/10/2018  1. Patient will move from supine to sit and sit to supine , scoot up and down and roll side to side in bed with independence within 7 day(s). 2.  Patient will transfer from bed to chair and chair to bed with independence using the least restrictive device within 7 day(s). 3.  Patient will perform sit to stand with independence within 7 day(s). 4.  Patient will ambulate with independence for 200 feet with the least restrictive device within 7 day(s). 5.  Patient will ascend/descend 5 stairs with 1 handrail(s) with modified independence within 7 day(s). physical Therapy TREATMENT  Patient: Emilie Silva (42 y.o. female)  Date: 6/12/2018  Diagnosis: Shock ([address] <principal problem not specified>       Precautions: Fall  Chart, physical therapy assessment, plan of care and goals were reviewed. ASSESSMENT:  Pt received supine in bed and agreeable to PT intervention. Pt cleared by nursing for mobility. Pt did need encouragement from her  and therapist to participate in acute PT as pt with c/o fatigue. She was able to increase her gait distance this date, however continues to exhibit limited endurance and fatigue with activity. She performed bed mobility with min/CGA x 1 and use of bed railings, needing VCs for use of bed railings and proper sequencing to complete. Intact sitting balance noted. Sit<>stand transfers completed with min/CGA x 2, needing increased time and min external support to stabilize in static stance. She tolerated gait training x 50 ft in room with min/CGA x 2 using HHA x 1-2. She is unsteady overall throughout OOB mobility, however no overt LOB noted. Pt was assisted into bedside chair and left with all needs met, RN and  present, and chair alarm on following therapy session.  Encouraged pt to sit up in bedside chair 3x/day and ambulate to the bathroom with nursing assist during hospital admission to improve strength and activity tolerance; pt verbalized understanding. Anticipate pt will continue to make progress in acute PT and return home with HHPT and initial 24/7 supervision. Progression toward goals:  []    Improving appropriately and progressing toward goals  [x]    Improving slowly and progressing toward goals  []    Not making progress toward goals and plan of care will be adjusted     PLAN:  Patient continues to benefit from skilled intervention to address the above impairments. Continue treatment per established plan of care. Discharge Recommendations:  Home Health and 24/7 supervision  Further Equipment Recommendations for Discharge:  TBD based on progress in acute PT - possibly SPC     SUBJECTIVE:   Patient stated I know you.     OBJECTIVE DATA SUMMARY:   Critical Behavior:  Neurologic State: Alert  Orientation Level: Appropriate for age, Oriented X4  Cognition: Appropriate decision making, Follows commands  Safety/Judgement: Fall prevention, Awareness of environment  Functional Mobility Training:  Bed Mobility:  Rolling: Contact guard assistance; Additional time; Adaptive equipment  Supine to Sit: Minimum assistance;Assist x1;Additional time; Adaptive equipment     Scooting: Contact guard assistance; Additional time        Transfers:  Sit to Stand: Minimum assistance  Stand to Sit: Minimum assistance;Contact guard assistance        Bed to Chair: Minimum assistance;Assist x2                    Balance:  Sitting: Intact  Standing: Impaired; Without support  Standing - Static: Good;Fair  Standing - Dynamic : Fair  Ambulation/Gait Training:  Distance (ft): 50 Feet (ft)  Assistive Device: Gait belt (HHA x 1-2)  Ambulation - Level of Assistance: Contact guard assistance;Minimal assistance;Assist x2; Additional time        Gait Abnormalities: Shuffling gait; Decreased step clearance;Trunk sway increased        Base of Support: Widened Speed/Stella: Pace decreased (<100 feet/min); Shuffled  Step Length: Left shortened;Right shortened  Pain:  Pain Scale 1: Numeric (0 - 10)  Pain Intensity 1: 0              Activity Tolerance:   Fair/improving - increased gait distance; no complaints; limited endurance  Please refer to the flowsheet for vital signs taken during this treatment.   After treatment:   [x]    Patient left in no apparent distress sitting up in chair  []    Patient left in no apparent distress in bed  [x]    Call bell left within reach  [x]    Nursing notified  [x]    Caregiver present  [x]    Bed alarm activated    COMMUNICATION/COLLABORATION:   The patients plan of care was discussed with: Physical Therapist, Occupational Therapist and Registered Nurse    Ovidio Yin PT, DPT   Time Calculation: 27 mins

## 2018-06-13 LAB
ANION GAP SERPL CALC-SCNC: 10 MMOL/L (ref 5–15)
BASOPHILS # BLD: 0 K/UL (ref 0–0.1)
BASOPHILS NFR BLD: 0 % (ref 0–1)
BUN SERPL-MCNC: 13 MG/DL (ref 6–20)
BUN/CREAT SERPL: 17 (ref 12–20)
CALCIUM SERPL-MCNC: 8.2 MG/DL (ref 8.5–10.1)
CHLORIDE SERPL-SCNC: 115 MMOL/L (ref 97–108)
CO2 SERPL-SCNC: 19 MMOL/L (ref 21–32)
CREAT SERPL-MCNC: 0.78 MG/DL (ref 0.55–1.02)
DIFFERENTIAL METHOD BLD: ABNORMAL
EOSINOPHIL # BLD: 0 K/UL (ref 0–0.4)
EOSINOPHIL NFR BLD: 0 % (ref 0–7)
ERYTHROCYTE [DISTWIDTH] IN BLOOD BY AUTOMATED COUNT: 15.3 % (ref 11.5–14.5)
GLUCOSE SERPL-MCNC: 85 MG/DL (ref 65–100)
HCT VFR BLD AUTO: 25.2 % (ref 35–47)
HGB BLD-MCNC: 7.9 G/DL (ref 11.5–16)
IMM GRANULOCYTES # BLD: 0 K/UL (ref 0–0.04)
IMM GRANULOCYTES NFR BLD AUTO: 0 % (ref 0–0.5)
LYMPHOCYTES # BLD: 2 K/UL (ref 0.8–3.5)
LYMPHOCYTES NFR BLD: 7 % (ref 12–49)
MCH RBC QN AUTO: 29 PG (ref 26–34)
MCHC RBC AUTO-ENTMCNC: 31.3 G/DL (ref 30–36.5)
MCV RBC AUTO: 92.6 FL (ref 80–99)
METAMYELOCYTES NFR BLD MANUAL: 3 %
MONOCYTES # BLD: 1.7 K/UL (ref 0–1)
MONOCYTES NFR BLD: 6 % (ref 5–13)
MYELOCYTES NFR BLD MANUAL: 4 %
NEUTS BAND NFR BLD MANUAL: 12 %
NEUTS SEG # BLD: 22.6 K/UL (ref 1.8–8)
NEUTS SEG NFR BLD: 68 % (ref 32–75)
NRBC # BLD: 0.07 K/UL (ref 0–0.01)
NRBC BLD-RTO: 0.2 PER 100 WBC
PLATELET # BLD AUTO: 197 K/UL (ref 150–400)
PMV BLD AUTO: 10.3 FL (ref 8.9–12.9)
POTASSIUM SERPL-SCNC: 3.7 MMOL/L (ref 3.5–5.1)
RBC # BLD AUTO: 2.72 M/UL (ref 3.8–5.2)
RBC MORPH BLD: ABNORMAL
SODIUM SERPL-SCNC: 144 MMOL/L (ref 136–145)
WBC # BLD AUTO: 28.3 K/UL (ref 3.6–11)

## 2018-06-13 PROCEDURE — 80048 BASIC METABOLIC PNL TOTAL CA: CPT | Performed by: INTERNAL MEDICINE

## 2018-06-13 PROCEDURE — 74011000250 HC RX REV CODE- 250: Performed by: INTERNAL MEDICINE

## 2018-06-13 PROCEDURE — 36415 COLL VENOUS BLD VENIPUNCTURE: CPT | Performed by: INTERNAL MEDICINE

## 2018-06-13 PROCEDURE — 65660000000 HC RM CCU STEPDOWN

## 2018-06-13 PROCEDURE — 74011250636 HC RX REV CODE- 250/636: Performed by: INTERNAL MEDICINE

## 2018-06-13 PROCEDURE — 85025 COMPLETE CBC W/AUTO DIFF WBC: CPT | Performed by: INTERNAL MEDICINE

## 2018-06-13 PROCEDURE — 74011000258 HC RX REV CODE- 258: Performed by: INTERNAL MEDICINE

## 2018-06-13 PROCEDURE — 74011250637 HC RX REV CODE- 250/637: Performed by: INTERNAL MEDICINE

## 2018-06-13 PROCEDURE — 77030038269 HC DRN EXT URIN PURWCK BARD -A

## 2018-06-13 RX ADMIN — CEFEPIME HYDROCHLORIDE 2 G: 2 INJECTION, POWDER, FOR SOLUTION INTRAVENOUS at 17:39

## 2018-06-13 RX ADMIN — HEPARIN SODIUM 5000 UNITS: 5000 INJECTION, SOLUTION INTRAVENOUS; SUBCUTANEOUS at 09:50

## 2018-06-13 RX ADMIN — Medication: at 09:52

## 2018-06-13 RX ADMIN — MUPIROCIN: 20 OINTMENT TOPICAL at 09:52

## 2018-06-13 RX ADMIN — MUPIROCIN: 20 OINTMENT TOPICAL at 20:44

## 2018-06-13 RX ADMIN — METRONIDAZOLE 500 MG: 500 INJECTION, SOLUTION INTRAVENOUS at 03:08

## 2018-06-13 RX ADMIN — Medication 10 ML: at 09:50

## 2018-06-13 RX ADMIN — VANCOMYCIN HYDROCHLORIDE 1250 MG: 10 INJECTION, POWDER, LYOPHILIZED, FOR SOLUTION INTRAVENOUS at 10:50

## 2018-06-13 RX ADMIN — SODIUM CHLORIDE 75 ML/HR: 450 INJECTION, SOLUTION INTRAVENOUS at 11:11

## 2018-06-13 RX ADMIN — SODIUM CHLORIDE 10 MG: 9 INJECTION INTRAMUSCULAR; INTRAVENOUS; SUBCUTANEOUS at 23:19

## 2018-06-13 RX ADMIN — Medication 10 ML: at 13:25

## 2018-06-13 RX ADMIN — METRONIDAZOLE 500 MG: 500 INJECTION, SOLUTION INTRAVENOUS at 10:50

## 2018-06-13 RX ADMIN — VANCOMYCIN HYDROCHLORIDE 500 MG: 1 INJECTION, POWDER, LYOPHILIZED, FOR SOLUTION INTRAVENOUS at 05:26

## 2018-06-13 RX ADMIN — ONDANSETRON 4 MG: 2 INJECTION INTRAMUSCULAR; INTRAVENOUS at 09:50

## 2018-06-13 RX ADMIN — HEPARIN SODIUM 5000 UNITS: 5000 INJECTION, SOLUTION INTRAVENOUS; SUBCUTANEOUS at 20:44

## 2018-06-13 RX ADMIN — ONDANSETRON 4 MG: 2 INJECTION INTRAMUSCULAR; INTRAVENOUS at 19:26

## 2018-06-13 RX ADMIN — Medication 10 ML: at 05:26

## 2018-06-13 RX ADMIN — VANCOMYCIN HYDROCHLORIDE 500 MG: 1 INJECTION, POWDER, LYOPHILIZED, FOR SOLUTION INTRAVENOUS at 23:11

## 2018-06-13 RX ADMIN — VANCOMYCIN HYDROCHLORIDE 500 MG: 1 INJECTION, POWDER, LYOPHILIZED, FOR SOLUTION INTRAVENOUS at 13:24

## 2018-06-13 RX ADMIN — METRONIDAZOLE 500 MG: 500 INJECTION, SOLUTION INTRAVENOUS at 19:24

## 2018-06-13 RX ADMIN — CEFEPIME HYDROCHLORIDE 2 G: 2 INJECTION, POWDER, FOR SOLUTION INTRAVENOUS at 05:22

## 2018-06-13 NOTE — PROGRESS NOTES
Admit Date: 2018    POD * No surgery found *    Procedure:  * No surgery found *    Subjective:     Patient has no complaints. Abdominal pain improved. Jenna full liquids well.  + BMs    Objective:     Blood pressure 141/63, pulse 63, temperature 98.4 °F (36.9 °C), resp. rate 20, height 5' 2\" (1.575 m), weight 229 lb 4.5 oz (104 kg), SpO2 100 %. Temp (24hrs), Av.4 °F (36.9 °C), Min:98.2 °F (36.8 °C), Max:98.6 °F (37 °C)      Physical Exam:  GENERAL: alert, cooperative, no distress, appears stated age, LUNG: clear to auscultation bilaterally, HEART: regular rate and rhythm, ABDOMEN: soft, non-tender. Bowel sounds normal. No masses,  no organomegaly, EXTREMITIES:  extremities normal, atraumatic, no cyanosis or edema    Labs:   Recent Results (from the past 24 hour(s))   CBC WITH AUTOMATED DIFF    Collection Time: 18  1:11 PM   Result Value Ref Range    WBC 30.8 (H) 3.6 - 11.0 K/uL    RBC 2.97 (L) 3.80 - 5.20 M/uL    HGB 8.6 (L) 11.5 - 16.0 g/dL    HCT 27.7 (L) 35.0 - 47.0 %    MCV 93.3 80.0 - 99.0 FL    MCH 29.0 26.0 - 34.0 PG    MCHC 31.0 30.0 - 36.5 g/dL    RDW 15.3 (H) 11.5 - 14.5 %    PLATELET 098 359 - 851 K/uL    MPV 10.5 8.9 - 12.9 FL    NRBC 0.5 (H) 0  WBC    ABSOLUTE NRBC 0.15 (H) 0.00 - 0.01 K/uL    NEUTROPHILS 68 32 - 75 %    BAND NEUTROPHILS 8 %    LYMPHOCYTES 6 (L) 12 - 49 %    MONOCYTES 5 5 - 13 %    EOSINOPHILS 0 0 - 7 %    BASOPHILS 0 0 - 1 %    METAMYELOCYTES 8 %    MYELOCYTES 5 %    IMMATURE GRANULOCYTES 0 0.0 - 0.5 %    ABS. NEUTROPHILS 23.4 (H) 1.8 - 8.0 K/UL    ABS. LYMPHOCYTES 1.8 0.8 - 3.5 K/UL    ABS. MONOCYTES 1.5 (H) 0.0 - 1.0 K/UL    ABS. EOSINOPHILS 0.0 0.0 - 0.4 K/UL    ABS. BASOPHILS 0.0 0.0 - 0.1 K/UL    ABS. IMM.  GRANS. 0.0 0.00 - 0.04 K/UL    DF MANUAL      RBC COMMENTS NORMOCYTIC, NORMOCHROMIC     CULTURE, BLOOD, PERIPHERAL    Collection Time: 18  1:11 PM   Result Value Ref Range    Special Requests: NO SPECIAL REQUESTS      Culture result: NO GROWTH AFTER 18 HOURS     CBC WITH AUTOMATED DIFF    Collection Time: 06/13/18 12:23 AM   Result Value Ref Range    WBC 28.3 (H) 3.6 - 11.0 K/uL    RBC 2.72 (L) 3.80 - 5.20 M/uL    HGB 7.9 (L) 11.5 - 16.0 g/dL    HCT 25.2 (L) 35.0 - 47.0 %    MCV 92.6 80.0 - 99.0 FL    MCH 29.0 26.0 - 34.0 PG    MCHC 31.3 30.0 - 36.5 g/dL    RDW 15.3 (H) 11.5 - 14.5 %    PLATELET 941 647 - 090 K/uL    MPV 10.3 8.9 - 12.9 FL    NRBC 0.2 (H) 0  WBC    ABSOLUTE NRBC 0.07 (H) 0.00 - 0.01 K/uL    NEUTROPHILS 68 32 - 75 %    BAND NEUTROPHILS 12 %    LYMPHOCYTES 7 (L) 12 - 49 %    MONOCYTES 6 5 - 13 %    EOSINOPHILS 0 0 - 7 %    BASOPHILS 0 0 - 1 %    METAMYELOCYTES 3 %    MYELOCYTES 4 %    IMMATURE GRANULOCYTES 0 0.0 - 0.5 %    ABS. NEUTROPHILS 22.6 (H) 1.8 - 8.0 K/UL    ABS. LYMPHOCYTES 2.0 0.8 - 3.5 K/UL    ABS. MONOCYTES 1.7 (H) 0.0 - 1.0 K/UL    ABS. EOSINOPHILS 0.0 0.0 - 0.4 K/UL    ABS. BASOPHILS 0.0 0.0 - 0.1 K/UL    ABS. IMM. GRANS. 0.0 0.00 - 0.04 K/UL    DF AUTOMATED      RBC COMMENTS ANISOCYTOSIS  1+       METABOLIC PANEL, BASIC    Collection Time: 06/13/18 12:23 AM   Result Value Ref Range    Sodium 144 136 - 145 mmol/L    Potassium 3.7 3.5 - 5.1 mmol/L    Chloride 115 (H) 97 - 108 mmol/L    CO2 19 (L) 21 - 32 mmol/L    Anion gap 10 5 - 15 mmol/L    Glucose 85 65 - 100 mg/dL    BUN 13 6 - 20 MG/DL    Creatinine 0.78 0.55 - 1.02 MG/DL    BUN/Creatinine ratio 17 12 - 20      GFR est AA >60 >60 ml/min/1.73m2    GFR est non-AA >60 >60 ml/min/1.73m2    Calcium 8.2 (L) 8.5 - 10.1 MG/DL       Data Review images and reports reviewed    Assessment:     Active Problems:    Breast cancer of upper-outer quadrant of right female breast ([facility] [address] (3/28/2018)      Shock ([facility] [address] (6/9/2018)      HTN (hypertension) (6/9/2018)        Plan/Recommendations/Medical Decision Making:     WBC stable at 28 today.   C diff  Transverse colon diverticulitis  Interval CT stable, no IR drainage indicated  Antimicrobial coverage per ID  Advance diet to GI lite low residue. Should be ready for d/c home soon on po antibiotics regimen. Appreciate Nutritionist consult for dietary teaching for diverticulitis. Will remove port at bedside tomorrow am, NPO after MN tonight. Raghu Salinas.  Edgar Turner MD, [facility]

## 2018-06-13 NOTE — PROGRESS NOTES
Bedside shift change report given to Vannessa Cherry RN (oncoming nurse). Report included the following information SBAR, Kardex, Intake/Output, MAR and Recent Results. SHIFT SUMMARY:            [address] NURSING NOTE   Admission Date 6/8/2018   Admission Diagnosis Shock ([address]   Consults IP CONSULT TO HOSPITALIST  IP CONSULT TO HEMATOLOGY  IP CONSULT TO NEPHROLOGY  IP CONSULT TO INFECTIOUS DISEASES  IP CONSULT TO GENERAL SURGERY      Cardiac Monitoring [] Yes [] No      Purposeful Hourly Rounding [x] Yes    Kierra Score Total Score: 3   Kierra score 3 or > [x] Bed Alarm [] Avasys [] 1:1 sitter [] Patient refused (Signed refusal form in chart)   Raman Score Raman Score: 17   Raman score 14 or < [] PMT consult [] Wound Care consult    []  Specialty bed  [] Nutrition consult      Influenza Vaccine Received Flu Vaccine for Current Season (usually Sept-March): Not Flu Season           Oxygen needs? [x] Room air Oxygen @  []1L    []2L    []3L   []4L    []5L   []6L via  NC   Chronic home O2 use?  [] Yes [x] No  Perform O2 challenge test and document in progress note using smartphrase (.Homeoxygen)      Last bowel movement Last Bowel Movement Date: 06/12/18      Urinary Catheter [REMOVED] Urinary Catheter 06/09/18 Ghotra - Temperature-Indications for Use: Acute urinary retention/bladder outlet obstruction     [REMOVED] External Female Catheter 06/09/18-Urine Output (mL): 250 ml  [REMOVED] Urinary Catheter 06/09/18 Ghotra - Temperature-Urine Output (mL): 150 ml     LDAs             Venous Access Device port 05/09/18 (Active)      Peripheral IV 06/08/18 Right Antecubital (Active)   Site Assessment Clean, dry, & intact 6/13/2018  3:01 AM   Phlebitis Assessment 0 6/13/2018  3:01 AM   Infiltration Assessment 0 6/13/2018  3:01 AM   Dressing Status Clean, dry, & intact 6/13/2018  3:01 AM   Dressing Type Transparent 6/13/2018  3:01 AM   Hub Color/Line Status Pink;Flushed 6/13/2018  3:01 AM   Action Taken Open ports on tubing capped 6/10/2018 12:00 PM   Alcohol Cap Used Yes 6/11/2018  4:00 AM       Peripheral IV 06/12/18 Left Antecubital (Active)   Site Assessment Clean, dry, & intact 6/13/2018  3:01 AM   Phlebitis Assessment 0 6/13/2018  3:01 AM   Infiltration Assessment 0 6/13/2018  3:01 AM   Dressing Status Clean, dry, & intact 6/13/2018  3:01 AM   Dressing Type Transparent 6/13/2018  3:01 AM   Hub Color/Line Status Blue; Infusing 6/13/2018  3:01 AM          Rectal Tube (Active)   Site Assessment Clean, dry, & intact; Clean 6/13/2018  3:02 AM   Drainage Description Catherne Stairs 6/13/2018  3:02 AM   Drainage Chamber Level (ml) 600 ml 6/13/2018  3:02 AM   Output (ml) 300 ml 6/13/2018  3:02 AM                   Readmission Risk Assessment Tool Score Low Risk            10       Total Score        3 Has Seen PCP in Last 6 Months (Yes=3, No=0)    2 . Living with Significant Other. Assisted Living. LTAC. SNF. or   Rehab    5 Pt.  Coverage (Medicare=5 , Medicaid, or Self-Pay=4)        Criteria that do not apply:    Patient Length of Stay (>5 days = 3)    IP Visits Last 12 Months (1-3=4, 4=9, >4=11)    Charlson Comorbidity Score (Age + Comorbid Conditions)       Expected Length of Stay 4d 21h   Actual Length of Stay 4

## 2018-06-13 NOTE — ROUTINE PROCESS
PCP GABY appt scheduled with Jung Pham on 6/20/2018 at 3:15pm. Appt added to [address] CM Specialist

## 2018-06-13 NOTE — PROGRESS NOTES
Hospitalist Progress Note    NAME: Richard Wang   :  1950   MRN:  [id number]       Assessment / Plan:  Septic shock plus hypovolemic  resolved  Acute diverticulitis with contained perforation  C. difficile colitis  Possible port infection in about 1 week ago and was treated with Augmentin which caused C diff   Leucocytosis down trending today form 30--->28k  Continue vancomycin, cefepime and Flagyl  Continue p.o. vancomycin for C. difficile  Repeat ct done showed stable appearance of perforation with no worsening  ID and Gen sx are following  For port removal in am tomorrow by Dr Nat Goldberg  [facility] GI l[facility]  Surgery feels she will ready for discharge soon on po antibiotics  Cbc in am      Acute kidney injury likely prerenal  Creatinine peaked at 5.69 and is currently back to normal   Nephrology signed off    Hypokalemia  Hypophosphatemia   Lytes are now normal    History of breast cancer  Currently on treatment and follows up with Dr. Khurram Ackerman following  Corrinne Span will consider hormonal therapy moving forward  D/w dr Jazmyn Mejia and port can be removed    Anemia of chronic disease  Hb is around base line of 8               Body mass index is 41.94 kg/(m^2). Code status: Full  Prophylaxis: Hep SQ  Recommended Disposition: Home w/Family once cleared by surgery in 2 days     Subjective:     Chief Complaint / Reason for Physician Visit  Reports some nausea today but able to tolerate diet. Had a BM today  No abdominal pain    Review of Systems:  Symptom Y/N Comments  Symptom Y/N Comments   Fever/Chills n   Chest Pain n    Poor Appetite    Edema     Cough n   Abdominal Pain     Sputum    Joint Pain     SOB/JACOBO n   Pruritis/Rash     Nausea/vomit    Tolerating PT/OT     Diarrhea    Tolerating Diet     Constipation    Other       Could NOT obtain due to:      Objective:     VITALS:   Last 24hrs VS reviewed since prior progress note.  Most recent are:  Patient Vitals for the past 24 hrs:   Temp Pulse Resp BP SpO2   06/13/18 1110 98.4 °F (36.9 °C) 77 18 126/60 99 %   06/13/18 0731 98.4 °F (36.9 °C) 63 20 141/63 100 %   06/13/18 0252 98.6 °F (37 °C) 63 - 120/49 96 %   06/12/18 2308 98.4 °F (36.9 °C) 71 18 148/75 95 %   06/12/18 2026 98.5 °F (36.9 °C) 70 19 124/58 93 %   06/12/18 1518 98.2 °F (36.8 °C) 76 18 141/58 94 %       Intake/Output Summary (Last 24 hours) at 06/13/18 1220  Last data filed at 06/13/18 1066   Gross per 24 hour   Intake           2117.5 ml   Output             2000 ml   Net            117.5 ml        PHYSICAL EXAM:  General: cooperative, no acute distress    EENT:  EOMI. Anicteric sclerae. MMM  Resp:  CTA bilaterally, no wheezing or rales. No accessory muscle use  CV:  Regular  rhythm,  No edema, port +   GI:  Soft, Non distended, Non tender.  +Bowel sounds  Neurologic:  Alert and awake, normal speech,   Psych:   Not anxious nor agitated  Skin:  No rashes.   No jaundice    Reviewed most current lab test results and cultures  YES  Reviewed most current radiology test results   YES  Review and summation of old records today    NO  Reviewed patient's current orders and MAR    YES  PMH/SH reviewed - no change compared to H&P          Current Facility-Administered Medications:     vancomycin (VANCOCIN) 1250 mg in  ml infusion, 1,250 mg, IntraVENous, Q16H, Christian De La Paz MD, Last Rate: 125 mL/hr at 06/13/18 1050, 1,250 mg at 06/13/18 1050    zinc oxide-cod liver oil (DESITIN) 40 % paste, , Topical, PRN, Christian De La Paz MD    0.45% sodium chloride infusion, 75 mL/hr, IntraVENous, CONTINUOUS, Jewell Truong MD, Last Rate: 75 mL/hr at 06/13/18 1111, 75 mL/hr at 06/13/18 1111    vancomycin 50 mg/mL oral solution (compounded) 500 mg, 500 mg, Oral, Q6H, Chinmay Hinojosa MD, 500 mg at 06/13/18 0526    cefepime (MAXIPIME) 2 g in 0.9% sodium chloride (MBP/ADV) 100 mL, 2 g, IntraVENous, Q12H, Chinmay Hinojosa MD, Last Rate: 200 mL/hr at 06/13/18 0522, 2 g at 06/13/18 0522   mupirocin (BACTROBAN) 2 % ointment, , Both Nostrils, Q12H, Sj Suggs MD    metroNIDAZOLE (FLAGYL) IVPB premix 500 mg, 500 mg, IntraVENous, Q8H, Sathya Reeves MD, Last Rate: 100 mL/hr at 06/13/18 1050, 500 mg at 06/13/18 1050    prochlorperazine (COMPAZINE) with saline injection 10 mg, 10 mg, IntraVENous, Q6H PRN, Hannah Black MD    sodium chloride (NS) flush 5-10 mL, 5-10 mL, IntraVENous, Q8H, Sathya Reeves MD, 10 mL at 06/13/18 0526    sodium chloride (NS) flush 5-10 mL, 5-10 mL, IntraVENous, PRN, Hannah Black MD, 10 mL at 06/13/18 0950    ondansetron (ZOFRAN) injection 4 mg, 4 mg, IntraVENous, Q4H PRN, Hannah Black MD, 4 mg at 06/13/18 0950    heparin (porcine) injection 5,000 Units, 5,000 Units, SubCUTAneous, Q12H, Hannah Black MD, 5,000 Units at 06/13/18 0950    acetaminophen (TYLENOL) suppository 650 mg, 650 mg, Rectal, Q4H PRN, Shirley Lozada MD    sodium chloride (NS) flush 5-10 mL, 5-10 mL, IntraVENous, PRN, Melquiades Lopez MD  ________________________________________________________________________  Care Plan discussed with:    Comments   Patient y    Family      RN y    Care Manager     Consultant                        Multidiciplinary team rounds were held today with , nursing, pharmacist and clinical coordinator. Patient's plan of care was discussed; medications were reviewed and discharge planning was addressed. ________________________________________________________________________  Total NON critical care TIME:  25  Minutes    Total CRITICAL CARE TIME Spent:   Minutes non procedure based      Comments   >50% of visit spent in counseling and coordination of care     ________________________________________________________________________  Sj Suggs MD     Procedures: see electronic medical records for all procedures/Xrays and details which were not copied into this note but were reviewed prior to creation of Plan.       LABS:  I reviewed today's most current labs and imaging studies.   Pertinent labs include:  Recent Labs      06/13/18   0023  06/12/18   1311  06/11/18   0504   WBC  28.3*  30.8*  26.4*   HGB  7.9*  8.6*  7.2*   HCT  25.2*  27.7*  23.1*   PLT  197  230  213     Recent Labs      06/13/18   0023  06/12/18   0234  06/11/18   0504   NA  144  144  145   K  3.7  3.2*  3.2*   CL  115*  114*  115*   CO2  19*  18*  21   GLU  85  78  82   BUN  13  19  27*   CREA  0.78  0.89  1.11*   CA  8.2*  8.3*  8.4*   MG   --    --   1.5*   PHOS   --    --   2.4*       Signed: Vinny Lucero MD

## 2018-06-13 NOTE — PROGRESS NOTES
[address]  LXJ:[id number]   :1950           Labs reviewed  k Improved  Cr. Stable  NOTHING MUCH TO ADD  WE WILL SIGN OFF. PLEASE CALL US IF NEEDED. THANKS. Nathaniel Kendrick, [address] [place] [address] [facility]  [facility] [address], Pauline Sosaineau, [address]  Phone - [phone]         Fax - [phone] [facility] Office  [address] [place] [address]  Phone - [phone]        Fax - [phone]     [email or web address]

## 2018-06-13 NOTE — PROGRESS NOTES
0720  Report received from [facility], [address] [facility]. SBAR, Kardex, ED Summary, Procedure Summary, Intake/Output, MAR, Accordion, Recent Results, Med Rec Status and Cardiac Rhythm NSR were discussed. Arlin Falk     1130  Patients flexiseal came out while patient was getting out of bed and to the chair. Replaced flexiseal with CCL. 1430  There is no drainage in flexiseal.  Nurse and CCL deflated, checked position, repositioned slightly and reinflated at 45 cc's. Will continue to monitor closely. 1830  Patient has loose stool draining from flexiseal.    Total output of stool for day shift was 400 cc's. Patient ate breakfast this am and got nauseous. Nausea passed on its own. Patient stated, \"I can't eat the hospital food. \". Patients  got the patient [employer] pancakes and patient tolerated well. Patient got nauseous after eating tuna fish for dinner. Patient stated, \"I feel like the tuna fish is just sitting in my stomach. \"  IV Zofran administered. Patient tearful throughout shift. She stated she is very nervous about having the port removed at bedside, she would prefer being sedated.

## 2018-06-13 NOTE — PROGRESS NOTES
Visit attempted per nurse pt not feeling well this morning . Will continue  to follow. Visit x2 attempted pt still not feeling great. Will continue to follow.

## 2018-06-13 NOTE — PROGRESS NOTES
OT attempted to see pt and per nursing pt had a bad am and had to have another rectal tube placed and was sleeping when OT arrived to the room. Pt at this time per her spouse was very tired and needed to sleep.   Nursing agreed and will defer this am and continue to follow

## 2018-06-14 LAB
ANION GAP SERPL CALC-SCNC: 9 MMOL/L (ref 5–15)
BACTERIA SPEC CULT: NORMAL
BACTERIA SPEC CULT: NORMAL
BASOPHILS # BLD: 0 K/UL (ref 0–0.1)
BASOPHILS NFR BLD: 0 % (ref 0–1)
BUN SERPL-MCNC: 9 MG/DL (ref 6–20)
BUN/CREAT SERPL: 14 (ref 12–20)
CALCIUM SERPL-MCNC: 7.8 MG/DL (ref 8.5–10.1)
CHLORIDE SERPL-SCNC: 116 MMOL/L (ref 97–108)
CO2 SERPL-SCNC: 20 MMOL/L (ref 21–32)
CREAT SERPL-MCNC: 0.66 MG/DL (ref 0.55–1.02)
DATE LAST DOSE: ABNORMAL
DIFFERENTIAL METHOD BLD: ABNORMAL
EOSINOPHIL # BLD: 0 K/UL (ref 0–0.4)
EOSINOPHIL NFR BLD: 0 % (ref 0–7)
ERYTHROCYTE [DISTWIDTH] IN BLOOD BY AUTOMATED COUNT: 15.1 % (ref 11.5–14.5)
GLUCOSE SERPL-MCNC: 102 MG/DL (ref 65–100)
HCT VFR BLD AUTO: 23.9 % (ref 35–47)
HGB BLD-MCNC: 7.5 G/DL (ref 11.5–16)
IMM GRANULOCYTES # BLD: 0 K/UL (ref 0–0.04)
IMM GRANULOCYTES NFR BLD AUTO: 0 % (ref 0–0.5)
LYMPHOCYTES # BLD: 1.6 K/UL (ref 0.8–3.5)
LYMPHOCYTES NFR BLD: 8 % (ref 12–49)
MCH RBC QN AUTO: 28.7 PG (ref 26–34)
MCHC RBC AUTO-ENTMCNC: 31.4 G/DL (ref 30–36.5)
MCV RBC AUTO: 91.6 FL (ref 80–99)
MONOCYTES # BLD: 0 K/UL (ref 0–1)
MONOCYTES NFR BLD: 0 % (ref 5–13)
MYELOCYTES NFR BLD MANUAL: 2 %
NEUTS BAND NFR BLD MANUAL: 3 %
NEUTS SEG # BLD: 17.8 K/UL (ref 1.8–8)
NEUTS SEG NFR BLD: 87 % (ref 32–75)
NRBC # BLD: 0.04 K/UL (ref 0–0.01)
NRBC BLD-RTO: 0.2 PER 100 WBC
PLATELET # BLD AUTO: 155 K/UL (ref 150–400)
PMV BLD AUTO: 10.6 FL (ref 8.9–12.9)
POTASSIUM SERPL-SCNC: 3.2 MMOL/L (ref 3.5–5.1)
RBC # BLD AUTO: 2.61 M/UL (ref 3.8–5.2)
RBC MORPH BLD: ABNORMAL
REPORTED DOSE,DOSE: ABNORMAL UNITS
REPORTED DOSE/TIME,TMG: ABNORMAL
SERVICE CMNT-IMP: NORMAL
SERVICE CMNT-IMP: NORMAL
SODIUM SERPL-SCNC: 145 MMOL/L (ref 136–145)
VANCOMYCIN TROUGH SERPL-MCNC: 19.1 UG/ML (ref 5–10)
WBC # BLD AUTO: 19.8 K/UL (ref 3.6–11)

## 2018-06-14 PROCEDURE — 74011000258 HC RX REV CODE- 258: Performed by: INTERNAL MEDICINE

## 2018-06-14 PROCEDURE — 74011000250 HC RX REV CODE- 250: Performed by: SURGERY

## 2018-06-14 PROCEDURE — 74011250636 HC RX REV CODE- 250/636: Performed by: SURGERY

## 2018-06-14 PROCEDURE — 36415 COLL VENOUS BLD VENIPUNCTURE: CPT | Performed by: INTERNAL MEDICINE

## 2018-06-14 PROCEDURE — 74011250636 HC RX REV CODE- 250/636: Performed by: INTERNAL MEDICINE

## 2018-06-14 PROCEDURE — 74011000250 HC RX REV CODE- 250: Performed by: INTERNAL MEDICINE

## 2018-06-14 PROCEDURE — 74011250637 HC RX REV CODE- 250/637: Performed by: INTERNAL MEDICINE

## 2018-06-14 PROCEDURE — 80202 ASSAY OF VANCOMYCIN: CPT | Performed by: INTERNAL MEDICINE

## 2018-06-14 PROCEDURE — 0WP803Z REMOVAL OF INFUSION DEVICE FROM CHEST WALL, OPEN APPROACH: ICD-10-PCS | Performed by: SURGERY

## 2018-06-14 PROCEDURE — 85025 COMPLETE CBC W/AUTO DIFF WBC: CPT | Performed by: INTERNAL MEDICINE

## 2018-06-14 PROCEDURE — 80048 BASIC METABOLIC PNL TOTAL CA: CPT | Performed by: INTERNAL MEDICINE

## 2018-06-14 PROCEDURE — 65660000000 HC RM CCU STEPDOWN

## 2018-06-14 RX ORDER — LANOLIN ALCOHOL/MO/W.PET/CERES
5 CREAM (GRAM) TOPICAL
Status: DISCONTINUED | OUTPATIENT
Start: 2018-06-14 | End: 2018-06-19 | Stop reason: HOSPADM

## 2018-06-14 RX ORDER — LORAZEPAM 2 MG/ML
1 INJECTION INTRAMUSCULAR ONCE
Status: COMPLETED | OUTPATIENT
Start: 2018-06-14 | End: 2018-06-14

## 2018-06-14 RX ORDER — LIDOCAINE HYDROCHLORIDE 10 MG/ML
10 INJECTION, SOLUTION EPIDURAL; INFILTRATION; INTRACAUDAL; PERINEURAL ONCE
Status: COMPLETED | OUTPATIENT
Start: 2018-06-14 | End: 2018-06-14

## 2018-06-14 RX ORDER — LIDOCAINE HYDROCHLORIDE 10 MG/ML
10 INJECTION INFILTRATION; PERINEURAL ONCE
Status: DISCONTINUED | OUTPATIENT
Start: 2018-06-14 | End: 2018-06-14 | Stop reason: SDUPTHER

## 2018-06-14 RX ORDER — HYDROMORPHONE HYDROCHLORIDE 1 MG/ML
0.5 INJECTION, SOLUTION INTRAMUSCULAR; INTRAVENOUS; SUBCUTANEOUS ONCE
Status: COMPLETED | OUTPATIENT
Start: 2018-06-14 | End: 2018-06-14

## 2018-06-14 RX ADMIN — METRONIDAZOLE 500 MG: 500 INJECTION, SOLUTION INTRAVENOUS at 11:54

## 2018-06-14 RX ADMIN — METRONIDAZOLE 500 MG: 500 INJECTION, SOLUTION INTRAVENOUS at 04:24

## 2018-06-14 RX ADMIN — SODIUM CHLORIDE 10 MG: 9 INJECTION INTRAMUSCULAR; INTRAVENOUS; SUBCUTANEOUS at 13:02

## 2018-06-14 RX ADMIN — VANCOMYCIN HYDROCHLORIDE 1250 MG: 10 INJECTION, POWDER, LYOPHILIZED, FOR SOLUTION INTRAVENOUS at 01:25

## 2018-06-14 RX ADMIN — SODIUM CHLORIDE 10 MG: 9 INJECTION INTRAMUSCULAR; INTRAVENOUS; SUBCUTANEOUS at 20:53

## 2018-06-14 RX ADMIN — VANCOMYCIN HYDROCHLORIDE 500 MG: 1 INJECTION, POWDER, LYOPHILIZED, FOR SOLUTION INTRAVENOUS at 13:38

## 2018-06-14 RX ADMIN — CEFEPIME HYDROCHLORIDE 2 G: 2 INJECTION, POWDER, FOR SOLUTION INTRAVENOUS at 05:34

## 2018-06-14 RX ADMIN — VANCOMYCIN HYDROCHLORIDE 500 MG: 1 INJECTION, POWDER, LYOPHILIZED, FOR SOLUTION INTRAVENOUS at 20:54

## 2018-06-14 RX ADMIN — METRONIDAZOLE 500 MG: 500 INJECTION, SOLUTION INTRAVENOUS at 18:22

## 2018-06-14 RX ADMIN — MELATONIN TAB 3 MG 3 MG: 3 TAB at 21:33

## 2018-06-14 RX ADMIN — SODIUM CHLORIDE 75 ML/HR: 450 INJECTION, SOLUTION INTRAVENOUS at 10:08

## 2018-06-14 RX ADMIN — LIDOCAINE HYDROCHLORIDE 10 ML: 10 INJECTION, SOLUTION EPIDURAL; INFILTRATION; INTRACAUDAL; PERINEURAL at 10:02

## 2018-06-14 RX ADMIN — Medication 10 ML: at 20:54

## 2018-06-14 RX ADMIN — HYDROMORPHONE HYDROCHLORIDE 0.5 MG: 1 INJECTION, SOLUTION INTRAMUSCULAR; INTRAVENOUS; SUBCUTANEOUS at 09:59

## 2018-06-14 RX ADMIN — CEFEPIME HYDROCHLORIDE 2 G: 2 INJECTION, POWDER, FOR SOLUTION INTRAVENOUS at 16:37

## 2018-06-14 RX ADMIN — MUPIROCIN: 20 OINTMENT TOPICAL at 09:00

## 2018-06-14 RX ADMIN — VANCOMYCIN HYDROCHLORIDE 1250 MG: 10 INJECTION, POWDER, LYOPHILIZED, FOR SOLUTION INTRAVENOUS at 19:39

## 2018-06-14 RX ADMIN — LORAZEPAM 1 MG: 2 INJECTION INTRAMUSCULAR; INTRAVENOUS at 10:14

## 2018-06-14 RX ADMIN — Medication 10 ML: at 13:03

## 2018-06-14 RX ADMIN — HEPARIN SODIUM 5000 UNITS: 5000 INJECTION, SOLUTION INTRAVENOUS; SUBCUTANEOUS at 20:53

## 2018-06-14 NOTE — PROGRESS NOTES
Admit Date: 2018    POD * No surgery found *    Procedure:  * No surgery found *    Subjective:     Patient has no complaints. Abdominal pain improved. Jenna GI lite diet  + BMs    Objective:     Blood pressure 149/67, pulse 63, temperature 98.3 °F (36.8 °C), resp. rate 18, height 5' 2\" (1.575 m), weight 229 lb 4.5 oz (104 kg), SpO2 93 %. Temp (24hrs), Av.4 °F (36.9 °C), Min:98.2 °F (36.8 °C), Max:98.6 °F (37 °C)      Physical Exam:  GENERAL: alert, cooperative, no distress, appears stated age, LUNG: clear to auscultation bilaterally, HEART: regular rate and rhythm, ABDOMEN: soft, non-tender.  Bowel sounds normal. No masses,  no organomegaly, EXTREMITIES:  extremities normal, atraumatic, no cyanosis or edema    Labs:   Recent Results (from the past 24 hour(s))   METABOLIC PANEL, BASIC    Collection Time: 18  2:51 AM   Result Value Ref Range    Sodium 145 136 - 145 mmol/L    Potassium 3.2 (L) 3.5 - 5.1 mmol/L    Chloride 116 (H) 97 - 108 mmol/L    CO2 20 (L) 21 - 32 mmol/L    Anion gap 9 5 - 15 mmol/L    Glucose 102 (H) 65 - 100 mg/dL    BUN 9 6 - 20 MG/DL    Creatinine 0.66 0.55 - 1.02 MG/DL    BUN/Creatinine ratio 14 12 - 20      GFR est AA >60 >60 ml/min/1.73m2    GFR est non-AA >60 >60 ml/min/1.73m2    Calcium 7.8 (L) 8.5 - 10.1 MG/DL   CBC WITH AUTOMATED DIFF    Collection Time: 18  2:51 AM   Result Value Ref Range    WBC 19.8 (H) 3.6 - 11.0 K/uL    RBC 2.61 (L) 3.80 - 5.20 M/uL    HGB 7.5 (L) 11.5 - 16.0 g/dL    HCT 23.9 (L) 35.0 - 47.0 %    MCV 91.6 80.0 - 99.0 FL    MCH 28.7 26.0 - 34.0 PG    MCHC 31.4 30.0 - 36.5 g/dL    RDW 15.1 (H) 11.5 - 14.5 %    PLATELET 538 263 - 090 K/uL    MPV 10.6 8.9 - 12.9 FL    NRBC 0.2 (H) 0  WBC    ABSOLUTE NRBC 0.04 (H) 0.00 - 0.01 K/uL    NEUTROPHILS 87 (H) 32 - 75 %    BAND NEUTROPHILS 3 %    LYMPHOCYTES 8 (L) 12 - 49 %    MONOCYTES 0 (L) 5 - 13 %    EOSINOPHILS 0 0 - 7 %    BASOPHILS 0 0 - 1 %    MYELOCYTES 2 %    IMMATURE GRANULOCYTES 0 0.0 - 0.5 %    ABS. NEUTROPHILS 17.8 (H) 1.8 - 8.0 K/UL    ABS. LYMPHOCYTES 1.6 0.8 - 3.5 K/UL    ABS. MONOCYTES 0.0 0.0 - 1.0 K/UL    ABS. EOSINOPHILS 0.0 0.0 - 0.4 K/UL    ABS. BASOPHILS 0.0 0.0 - 0.1 K/UL    ABS. IMM. GRANS. 0.0 0.00 - 0.04 K/UL    DF MANUAL      RBC COMMENTS POLYCHROMASIA  1+           Data Review images and reports reviewed    Assessment:     Active Problems:    Breast cancer of upper-outer quadrant of right female breast ([facility] [facility] (3/28/2018)      Shock ([facility] (6/9/2018)      HTN (hypertension) (6/9/2018)        Plan/Recommendations/Medical Decision Making:     WBC down significantly today to 19.8  C diff  Transverse colon diverticulitis  Interval CT stable, no IR drainage indicated  Antimicrobial coverage per ID  Advanced diet to GI lite low residue, going well  Should be ready for d/c home soon on po antibiotics regimen. Appreciate Nutritionist consult for dietary teaching for diverticulitis. Port  Removed at bedside this am, resume diet. Christen Rahman.  Susan Poole MD, [facility]

## 2018-06-14 NOTE — PROCEDURES
OPERATION          PREOPERATIVE DIAGNOSIS:  Recently infected portacath    POSTOPERATIVE DIAGNOSIS:  same    PROCEDURE:   portacath device removal    SURGEON:  Meliton Ulrich MD, FACS. ANESTHESIA:  1% lidocaine plain local infiltration    FINDINGS:  No gross infection, normal anatomy    SPECIMENS REMOVED:  Port, not sent to pathology or micro. DESCRIPTION OF OPERATION:      After informed consent was obtained, the patient was prepped and draped in the standard fashion. Local anesthetic was used to infiltrate surrounding the port site. A #15 blade was used to incise the skin and curved hemostat was used to spread into the subcutaneous space. The catheter was clamped in 2 spots and divided with scissors between clamps. The distal catheter was then withdrawn from the central circulation. The port was freed from it's subcutaneous attachments and withdrawn from the pocket. There was no gross infection noted. Hemostasis was confirmed and the skin was approximated with #4-0 Vicryl stitch. A steristrip and a sterile bandage were applied. There were no operative complications and the procedure was tolerated well. Emerson Patel.  Almita Pearce MD, [facility]

## 2018-06-14 NOTE — PROGRESS NOTES
Infectious Disease Progress Note        IMPRESSION:   · Sepsis   · Acute diverticulitis with improved CT appearance () -no drainable collection/ thickening of sigmoid colon / perforated diverticulum from transverse colon with adjacent inflammatory change stable   · Moderately severe C.diff colitis on po Vanc/ IV Flagyl   · H/o aj cath infection( swelling, erythema, fever) after chemotherapy on   · Leucocytosis , persistent probably multifactorial - Acute C.diffcolitis ,also acute diverticulitis ( to lesser degree) effect of peg filgrastin (administered ), Decadron po ( pt had 3 doses ,,)   · MIL resolved  · Infiltrating ductal Ca R/breast  s/p lumpectomy ,s/p  adjuvant chemotherapy 2nd round           PLAN:       · Continue Vancomycin / Cefepime IV x 10-14 days total  · Recommend removal of aj cath ( has not been accessed since infection , pt has made decision not to have any more chemotherapy)   · Continue po vancomycin / IV flagyl for now,change to po Vanc alone when stool more formed  · Po vancomycin x 14 days  · Advance diet as tolerated  · No more imodium  · Increase po fluid intake          Subjective:     Pt seen . Bowel movements still +, more formed in rectal tube     Review of Systems:  A comprehensive review of systems was negative except for that written in the History of Present Illness. Objective:     Blood pressure 146/66, pulse 62, temperature 98.2 °F (36.8 °C), resp. rate 16, height 5' 2\" (1.575 m), weight 104 kg (229 lb 4.5 oz), SpO2 96 %.   Temp (24hrs), Av.4 °F (36.9 °C), Min:98.2 °F (36.8 °C), Max:98.6 °F (37 °C)      Patient Vitals for the past 24 hrs:   Temp Pulse Resp BP SpO2   18 0427 98.2 °F (36.8 °C) 62 16 146/66 96 %   18 2321 98.4 °F (36.9 °C) 69 18 152/69 97 %   18 1915 98.6 °F (37 °C) 66 18 153/81 95 %   18 1511 98.4 °F (36.9 °C) 68 18 129/58 93 %   18 1110 98.4 °F (36.9 °C) 77 18 126/60 99 %   18 0731 98.4 °F (36.9 °C) 63 20 141/63 100 %         Lines:  Peripheral IV:         and portocath    Physical Exam:   General:  Awake, cooperative   Eyes:  Sclera anicteric. Pupils equally round and reactive to light. Mouth/Throat: Mucous membranes , oral pharynx clear   Neck: Supple   Lungs:   Clear to auscultation bilaterally Chest - portocath +   CV:  Regular rate and rhythm,no murmur, click, rub or gallop   Abdomen:   Soft, non-tender. bowel sounds +. non-distended   Extremities: No  edema   Skin: Skin color, texture, turgor normal. no  rash / lesions   Lymph nodes: Cervical and supraclavicular normal   Lines/Devices:  Intact, no erythema, drainage or tenderness B/L UE   Psych: Alert and oriented     Studies:      Lab Results   Component Value Date/Time    Culture result: NO GROWTH AFTER 18 HOURS 06/12/2018 01:11 PM    Culture result:  06/08/2018 10:15 PM     NO ROUTINE ENTERIC PATHOGENS ISOLATED INCLUDING SALMONELLA, SHIGELLA, YERSINIA, VIBRIO OR SHIGA TOXIN PRODUCING E. COLI    Culture result: NO GROWTH 5 DAYS 06/08/2018 09:22 PM    Culture result: NO GROWTH 5 DAYS 06/08/2018 09:22 PM    Culture result:  06/04/2013 03:43 PM     MRSA NOT PRESENT      Screening of patient nares for MRSA is for surveillance purposes and, if positive, to facilitate isolation considerations in high risk settings. It is not intended for automatic decolonization interventions per se as regimens are not sufficiently effective to warrant routine use. XR Results (most recent):    Results from Hospital Encounter encounter on 06/08/18   XR CHEST PORT   Narrative Clinical indication: Shortness of breath. Portable AP semierect view of the chest is obtained compared to June 8. Central  line tip is at this time in the expected position of the right atrium. The lung  fields are clear. Impression impression: No acute changes. Support devices as above.         Patient Active Problem List   Diagnosis Code    Obesity, morbid ([facility] E66.01  Breast cancer of upper-outer quadrant of right female breast ([facility] C50.411    S/P lumpectomy, right breast Z98.890    Malignant neoplasm of upper-outer quadrant of right breast in female, estrogen receptor positive ([facility] C50.411, Z17.0    Shock ([facility] R57.9    HTN (hypertension) I10         ICD-10-CM ICD-9-CM    1. Septic shock (HCC) A41.9 038.9     R65.21 785.52      995.92    2. Infection due to port-a-cath, initial encounter T80.219A 999.31    3. Diarrhea, unspecified type R19.7 787.91    4. C. difficile diarrhea A04.72 008.45    5. Diverticulitis of large intestine with perforation and abscess without bleeding K57.20 562.11    6. Malignant neoplasm of upper-outer quadrant of right breast in female, estrogen receptor positive ([facility] C50.411 174.4     Z17.0 V86.0    7. Hypotension, unspecified hypotension type I95.9 458.9    8. Hypovolemic shock (HCC) R57.1 785.59    9. Acute renal failure, unspecified acute renal failure type ([facility] N17.9 584.9    10. Non-intractable vomiting with nausea, unspecified vomiting type R11.2 787.01    11. Diarrhea of presumed infectious origin R19.7 009.3    12. Acute hypokalemia E87.6 276.8    13. Hypomagnesemia E83.42 275.2    14. Metabolic acidosis H49.9 834.7        I have discussed the diagnosis with the patient and the intended plan as seen in the above orders. I have discussed medication side effects and warnings with the patient as well.     Reviewed test results at length with patient    Anti-infectives:     Vancomycin IV , Cefepime IV ,   Flagyl IV , po Vancomycin      Kannan Carbajal

## 2018-06-14 NOTE — PROGRESS NOTES
Hospitalist Progress Note    NAME: Yola Bauer   :  1950   MRN:  [id number]     Septic versus hypovolemic shock resolved  Acute diverticulitis with contained perforation  C. difficile colitis history of breast cancer on chemotherapy  She had a port infection and was started on Augmentin on outpatient basis and subsequently started to have diarrhea and presented with septic versus hypovolemic shock on arrival which is now better   She had diverticulitis with contained perforation on CT and managed medically by surgery  She did improve significantly on antibiotics and repeat CT showed stable appearance  Port removal in a.m. tomorrow by surgery and surgery advance diet and possibly will be ready  in next 2 days for discharge              Assessment / Plan:  Septic shock plus hypovolemic  resolved  Acute diverticulitis with contained perforation  C. difficile colitis  Possible port infection in about 1 week ago and was treated with Augmentin which caused C diff   Leucocytosis down trending today form 30--->28k, Follow, improving  Continue vancomycin, cefepime and Flagyl  Continue p.o. vancomycin for C. difficile  Repeat ct done showed stable appearance of perforation with no worsening  ID and Gen sx are following  For port removal in am tomorrow by Dr Dory Martinez today  On GI lite diet tolerating but still watery stools  Surgery feels she will ready for discharge soon on po antibiotics  Cbc in am      Acute kidney injury likely prerenal  Creatinine peaked at 5.69 and is currently back to normal   Nephrology signed off    Hypokalemia  Hypophosphatemia   Lytes are now normal    History of breast cancer  Currently on treatment and follows up with Dr. Stu Reed  Oncology following  Cassi Guerrero will consider hormonal therapy moving forward  D/w dr Stu Reed and port can be removed    Anemia of chronic disease  Hb is around base line of 8               Body mass index is 41.94 kg/(m^2).     Code status: Full  Prophylaxis: Hep SQ  Recommended Disposition: Home w/Family once cleared by surgery in 2 days     Subjective:     Chief Complaint / Reason for Physician Visit  Reports some nausea today but able to tolerate diet. Had a BM today  No abdominal pain    Review of Systems:  Symptom Y/N Comments  Symptom Y/N Comments   Fever/Chills n   Chest Pain n    Poor Appetite    Edema     Cough n   Abdominal Pain     Sputum    Joint Pain     SOB/JACOBO n   Pruritis/Rash     Nausea/vomit    Tolerating PT/OT     Diarrhea    Tolerating Diet     Constipation    Other       Could NOT obtain due to:      Objective:     VITALS:   Last 24hrs VS reviewed since prior progress note. Most recent are:  Patient Vitals for the past 24 hrs:   Temp Pulse Resp BP SpO2   06/14/18 1455 98.2 °F (36.8 °C) 78 18 122/64 97 %   06/14/18 1038 98.2 °F (36.8 °C) 71 18 143/66 93 %   06/14/18 0729 98.3 °F (36.8 °C) 63 18 149/67 93 %   06/14/18 0427 98.2 °F (36.8 °C) 62 16 146/66 96 %   06/13/18 2321 98.4 °F (36.9 °C) 69 18 152/69 97 %   06/13/18 1915 98.6 °F (37 °C) 66 18 153/81 95 %       Intake/Output Summary (Last 24 hours) at 06/14/18 1752  Last data filed at 06/14/18 1154   Gross per 24 hour   Intake             1100 ml   Output              900 ml   Net              200 ml        PHYSICAL EXAM:  General: cooperative, no acute distress    EENT:  EOMI. Anicteric sclerae. MMM  Resp:  CTA bilaterally, no wheezing or rales. No accessory muscle use  CV:  Regular  rhythm,  No edema, port +   GI:  Soft, Non distended, Non tender.  +Bowel sounds  Neurologic:  Alert and awake, normal speech,   Psych:   Not anxious nor agitated  Skin:  No rashes.   No jaundice    Reviewed most current lab test results and cultures  YES  Reviewed most current radiology test results   YES  Review and summation of old records today    NO  Reviewed patient's current orders and MAR    YES  PMH/SH reviewed - no change compared to H&P          Current Facility-Administered Medications:     Vancomycin - Please draw trough prior to dose due at 1800.  Thank you!, , Other, China Ashraf MD    melatonin tablet 4.5 mg, 4.5 mg, Oral, QHS, Tirso Gamez MD    vancomycin (VANCOCIN) 1250 mg in  ml infusion, 1,250 mg, IntraVENous, Q16H, Saima Marquez MD, Last Rate: 125 mL/hr at 06/14/18 0125, 1,250 mg at 06/14/18 0125    zinc oxide-cod liver oil (DESITIN) 40 % paste, , Topical, PRN, Saima Marquez MD    0.45% sodium chloride infusion, 75 mL/hr, IntraVENous, CONTINUOUS, Willi Baca MD, Last Rate: 75 mL/hr at 06/14/18 1008, 75 mL/hr at 06/14/18 1008    vancomycin 50 mg/mL oral solution (compounded) 500 mg, 500 mg, Oral, Q6H, El Lane MD, 500 mg at 06/14/18 1338    cefepime (MAXIPIME) 2 g in 0.9% sodium chloride (MBP/ADV) 100 mL, 2 g, IntraVENous, Q12H, El Lane MD, Last Rate: 200 mL/hr at 06/14/18 1637, 2 g at 06/14/18 1637    mupirocin (BACTROBAN) 2 % ointment, , Both Nostrils, Q12H, Saima Marquez MD    metroNIDAZOLE (FLAGYL) IVPB premix 500 mg, 500 mg, IntraVENous, Q8H, Sathya Reeves MD, Last Rate: 100 mL/hr at 06/14/18 1154, 500 mg at 06/14/18 1154    prochlorperazine (COMPAZINE) with saline injection 10 mg, 10 mg, IntraVENous, Q6H PRN, Jay Jordan MD, 10 mg at 06/14/18 1302    sodium chloride (NS) flush 5-10 mL, 5-10 mL, IntraVENous, Q8H, Sathya Reeves MD, 10 mL at 06/14/18 1303    sodium chloride (NS) flush 5-10 mL, 5-10 mL, IntraVENous, PRN, Jay Jordan MD, 10 mL at 06/13/18 0950    ondansetron (ZOFRAN) injection 4 mg, 4 mg, IntraVENous, Q4H PRN, Jay Jordan MD, 4 mg at 06/13/18 1926    heparin (porcine) injection 5,000 Units, 5,000 Units, SubCUTAneous, Q12H, Jay Jordan MD, Stopped at 06/14/18 0900    acetaminophen (TYLENOL) suppository 650 mg, 650 mg, Rectal, Q4H PRN, Vernadine MD Juice    sodium chloride (NS) flush 5-10 mL, 5-10 mL, IntraVENous, PRN, Maine Brink MD  ________________________________________________________________________  Care Plan discussed with:    Comments   Patient y    Family  y  bedside   RN y    Care Manager     Consultant  y surgeon                     Multidiciplinary team rounds were held today with , nursing, pharmacist and clinical coordinator. Patient's plan of care was discussed; medications were reviewed and discharge planning was addressed. ________________________________________________________________________  Total NON critical care TIME:  25  Minutes    Total CRITICAL CARE TIME Spent:   Minutes non procedure based      Comments   >50% of visit spent in counseling and coordination of care     ________________________________________________________________________  Maite Dudley MD     Procedures: see electronic medical records for all procedures/Xrays and details which were not copied into this note but were reviewed prior to creation of Plan. LABS:  I reviewed today's most current labs and imaging studies.   Pertinent labs include:  Recent Labs      06/14/18   0251  06/13/18   0023  06/12/18   1311   WBC  19.8*  28.3*  30.8*   HGB  7.5*  7.9*  8.6*   HCT  23.9*  25.2*  27.7*   PLT  155  197  230     Recent Labs      06/14/18   0251  06/13/18   0023  06/12/18   0234   NA  145  144  144   K  3.2*  3.7  3.2*   CL  116*  115*  114*   CO2  20*  19*  18*   GLU  102*  85  78   BUN  9  13  19   CREA  0.66  0.78  0.89   CA  7.8*  8.2*  8.3*       Signed: Maite Dudley MD

## 2018-06-14 NOTE — PROGRESS NOTES
OT attempted to see pt for tx and pt was cleared to be seen for therapy. Pt was asleep in bed and after waking, she stated that she was still groggy from med given when her port was removed. OT tried to encourage pt to walk in room or at least sit up in chair and pt politely said no and that she would get up tomorrow.  Will continue to follow

## 2018-06-14 NOTE — PROGRESS NOTES
Problem: Falls - Risk of  Goal: *Absence of Falls  Document Kierra Fall Risk and appropriate interventions in the flowsheet.    Outcome: Progressing Towards Goal  Fall Risk Interventions:  Mobility Interventions: Communicate number of staff needed for ambulation/transfer, Patient to call before getting OOB    Mentation Interventions: Adequate sleep, hydration, pain control, Bed/chair exit alarm, Door open when patient unattended, Evaluate medications/consider consulting pharmacy, Familiar objects from home, Family/sitter at bedside    Medication Interventions: Evaluate medications/consider consulting pharmacy, Patient to call before getting OOB, Teach patient to arise slowly    Elimination Interventions: Call light in reach, Patient to call for help with toileting needs

## 2018-06-14 NOTE — PROGRESS NOTES
Bedside shift change report given to Joselo Benjamin RN and Fortino Alpers, RN(oncoming nurse). Report included the following information SBAR, Kardex, Procedure Summary, Intake/Output, MAR and Recent Results. SHIFT SUMMARY:  0700: Bedside shift change report given to Bakari Chacon RN (oncoming nurse) by Сергей Morales RN (offgoing nurse). Report included the following information SBAR, Kardex, Intake/Output, MAR and Recent Results. 1005: Procedure consent reviewed with patient and spouse, pt has no questions at this time. Signed consent placed in chart. Dr. Nunez Congress at bedside for port removal.-- diludid and ativan administered     65: Pt currently resting in bed with no complaints of pain or discomfort -- will continue to monitor. [phone]: Per discussion with PT/OT, pt ambulated to chair at bedside for dinner and is now resting comfortably. 1855: RN notified by pts  was pulled out when pt transferred from chair to bedside commode. Oncoming RN notified. [facility] NURSING NOTE   Admission Date 6/8/2018   Admission Diagnosis Shock ([address]   Consults IP CONSULT TO HOSPITALIST  IP CONSULT TO HEMATOLOGY  IP CONSULT TO NEPHROLOGY  IP CONSULT TO INFECTIOUS DISEASES  IP CONSULT TO GENERAL SURGERY      Cardiac Monitoring [x] Yes [] No      Purposeful Hourly Rounding [x] Yes    Kierra Score Total Score: 3   Kierra score 3 or > [x] Bed Alarm [] Avasys [] 1:1 sitter [] Patient refused (Signed refusal form in chart)   Raman Score Raman Score: 17   Raman score 14 or < [] PMT consult [] Wound Care consult    []  Specialty bed  [] Nutrition consult      Influenza Vaccine Received Flu Vaccine for Current Season (usually Sept-March): Not Flu Season           Oxygen needs? [x] Room air Oxygen @  []1L    []2L    []3L   []4L    []5L   []6L via  NC   Chronic home O2 use?  [] Yes [] No  Perform O2 challenge test and document in progress note using smartphrase (.Homeoxygen)      Last bowel movement Last Bowel Movement Date: 06/14/18      Urinary Catheter [REMOVED] Urinary Catheter 06/09/18 Ghotra - Temperature-Indications for Use: Acute urinary retention/bladder outlet obstruction     [REMOVED] External Female Catheter 06/09/18-Urine Output (mL): 250 ml  [REMOVED] Urinary Catheter 06/09/18 Ghotra - Temperature-Urine Output (mL): 150 ml     LDAs             Venous Access Device port 05/09/18 (Active)      Peripheral IV 06/08/18 Right Antecubital (Active)   Site Assessment Clean, dry, & intact 6/14/2018  7:29 AM   Phlebitis Assessment 0 6/14/2018  7:29 AM   Infiltration Assessment 0 6/14/2018  7:29 AM   Dressing Status Clean;Dry; Intact 6/14/2018  7:29 AM   Dressing Type Tape 6/14/2018  7:29 AM   Hub Color/Line Status Pink 6/14/2018  7:29 AM   Action Taken Open ports on tubing capped 6/10/2018 12:00 PM   Alcohol Cap Used Yes 6/11/2018  4:00 AM       Peripheral IV 06/12/18 Left Antecubital (Active)   Site Assessment Clean, dry, & intact 6/14/2018  7:29 AM   Phlebitis Assessment 0 6/14/2018  7:29 AM   Infiltration Assessment 0 6/14/2018  7:29 AM   Dressing Status Clean, dry, & intact 6/14/2018  7:29 AM   Dressing Type Tape;Transparent 6/14/2018  7:29 AM   Hub Color/Line Status Blue;Patent 6/14/2018  7:29 AM          Rectal Tube (Active)   Site Assessment Clean, dry, & intact 6/14/2018  7:29 AM   Drainage Description Black; Victorine Serve 6/14/2018  7:29 AM   Drainage Chamber Level (ml) 600 ml 6/13/2018  3:02 AM   Output (ml) 400 ml 6/14/2018 11:54 AM                   Readmission Risk Assessment Tool Score Medium Risk            13       Total Score        3 Has Seen PCP in Last 6 Months (Yes=3, No=0)    2 . Living with Significant Other. Assisted Living. LTAC. SNF. or   Rehab    3 Patient Length of Stay (>5 days = 3)    5 Pt.  Coverage (Medicare=5 , Medicaid, or Self-Pay=4)        Criteria that do not apply:    IP Visits Last 12 Months (1-3=4, 4=9, >4=11)    Charlson Comorbidity Score (Age + Comorbid Conditions)       Expected Length of Stay 4d 21h   Actual Length of Stay 5

## 2018-06-14 NOTE — PROGRESS NOTES
Chart reviewed. Patient deferred from PT this morning pending removal of Port. Will follow up this afternoon. Checked on patient this afternoon and she requested PT hold off until tomorrow due to feeling very tired still from earlier sedation. Will continue to follow patient for PT.     Caron Sow, PT

## 2018-06-14 NOTE — PROGRESS NOTES
Spiritual Care Assessment/Progress Note  [facility]      NAME: Kelly Smith      MRN: [id number]  AGE: 79 y.o. SEX: female  Gnosticist Affiliation: Congregational   Language: English     6/14/2018     Total Time (in minutes): 5     Spiritual Assessment begun in [facility] through conversation with:         [x]Patient        [] Family    [] Friend(s)        Reason for Consult: Initial/Spiritual assessment, patient floor     Spiritual beliefs: (Please include comment if needed)     [] Identifies with a blair tradition:     [] Supported by a blair community:      [] Claims no spiritual orientation:      [] Seeking spiritual identity:           [] Adheres to an individual form of spirituality:      [x] Not able to assess:                     Identified resources for coping:      [] Prayer                               [] Music                  [] Guided Imagery     [] Family/friends                 [] Pet visits     [] Devotional reading                         [x] Unknown     [] Other:                                               Interventions offered during this visit: (See comments for more details)    Patient Interventions: Interdisciplinary rounds           Plan of Care:     [] Support spiritual and/or cultural needs    [] Support AMD and/or advance care planning process      [] Support grieving process   [] Coordinate Rites and/or Rituals    [] Coordination with community clergy   [] No spiritual needs identified at this time   [] Detailed Plan of Care below (See Comments)  [] Make referral to Music Therapy  [] Make referral to Pet Therapy     [] Make referral to Addiction services  [] Make referral to [facility]  [] Make referral to Spiritual Care Partner  [] No future visits requested        [] Follow up visits as needed     Comments: Multiple attempts made to complete initial [address]  Patient appeared to be resting during both attempts and did not respond to knock or her name being respectfully called. No family present at bedside during time of attempts. Spiritual Care Services remains available as needed or desired to support patient and/or family. francheska Barfield M.Div.    Paging Service [phone])

## 2018-06-15 LAB
ANION GAP SERPL CALC-SCNC: 10 MMOL/L (ref 5–15)
BASOPHILS # BLD: 0.2 K/UL (ref 0–0.1)
BASOPHILS NFR BLD: 1 % (ref 0–1)
BUN SERPL-MCNC: 9 MG/DL (ref 6–20)
BUN/CREAT SERPL: 14 (ref 12–20)
CALCIUM SERPL-MCNC: 8.1 MG/DL (ref 8.5–10.1)
CHLORIDE SERPL-SCNC: 115 MMOL/L (ref 97–108)
CO2 SERPL-SCNC: 19 MMOL/L (ref 21–32)
CREAT SERPL-MCNC: 0.65 MG/DL (ref 0.55–1.02)
DIFFERENTIAL METHOD BLD: ABNORMAL
EOSINOPHIL # BLD: 0 K/UL (ref 0–0.4)
EOSINOPHIL NFR BLD: 0 % (ref 0–7)
ERYTHROCYTE [DISTWIDTH] IN BLOOD BY AUTOMATED COUNT: 15.3 % (ref 11.5–14.5)
GLUCOSE SERPL-MCNC: 112 MG/DL (ref 65–100)
HCT VFR BLD AUTO: 25.2 % (ref 35–47)
HGB BLD-MCNC: 7.9 G/DL (ref 11.5–16)
IMM GRANULOCYTES # BLD: 0 K/UL (ref 0–0.04)
IMM GRANULOCYTES NFR BLD AUTO: 0 % (ref 0–0.5)
LYMPHOCYTES # BLD: 0.3 K/UL (ref 0.8–3.5)
LYMPHOCYTES NFR BLD: 2 % (ref 12–49)
MCH RBC QN AUTO: 28.4 PG (ref 26–34)
MCHC RBC AUTO-ENTMCNC: 31.3 G/DL (ref 30–36.5)
MCV RBC AUTO: 90.6 FL (ref 80–99)
METAMYELOCYTES NFR BLD MANUAL: 3 %
MONOCYTES # BLD: 0.3 K/UL (ref 0–1)
MONOCYTES NFR BLD: 2 % (ref 5–13)
NEUTS BAND NFR BLD MANUAL: 6 %
NEUTS SEG # BLD: 14.8 K/UL (ref 1.8–8)
NEUTS SEG NFR BLD: 86 % (ref 32–75)
NRBC # BLD: 0 K/UL (ref 0–0.01)
NRBC BLD-RTO: 0 PER 100 WBC
PLATELET # BLD AUTO: 148 K/UL (ref 150–400)
PMV BLD AUTO: 10.2 FL (ref 8.9–12.9)
POTASSIUM SERPL-SCNC: 3.1 MMOL/L (ref 3.5–5.1)
RBC # BLD AUTO: 2.78 M/UL (ref 3.8–5.2)
RBC MORPH BLD: ABNORMAL
SODIUM SERPL-SCNC: 144 MMOL/L (ref 136–145)
WBC # BLD AUTO: 16.1 K/UL (ref 3.6–11)

## 2018-06-15 PROCEDURE — 36415 COLL VENOUS BLD VENIPUNCTURE: CPT | Performed by: INTERNAL MEDICINE

## 2018-06-15 PROCEDURE — 97116 GAIT TRAINING THERAPY: CPT

## 2018-06-15 PROCEDURE — 74011250636 HC RX REV CODE- 250/636: Performed by: INTERNAL MEDICINE

## 2018-06-15 PROCEDURE — 80048 BASIC METABOLIC PNL TOTAL CA: CPT | Performed by: INTERNAL MEDICINE

## 2018-06-15 PROCEDURE — 74011250637 HC RX REV CODE- 250/637: Performed by: INTERNAL MEDICINE

## 2018-06-15 PROCEDURE — 74011000250 HC RX REV CODE- 250: Performed by: INTERNAL MEDICINE

## 2018-06-15 PROCEDURE — 97535 SELF CARE MNGMENT TRAINING: CPT

## 2018-06-15 PROCEDURE — 74011000258 HC RX REV CODE- 258: Performed by: INTERNAL MEDICINE

## 2018-06-15 PROCEDURE — 51798 US URINE CAPACITY MEASURE: CPT

## 2018-06-15 PROCEDURE — 85025 COMPLETE CBC W/AUTO DIFF WBC: CPT | Performed by: INTERNAL MEDICINE

## 2018-06-15 PROCEDURE — 77030038269 HC DRN EXT URIN PURWCK BARD -A

## 2018-06-15 PROCEDURE — 65660000000 HC RM CCU STEPDOWN

## 2018-06-15 RX ORDER — VANCOMYCIN HYDROCHLORIDE
1250
Status: COMPLETED | OUTPATIENT
Start: 2018-06-15 | End: 2018-06-18

## 2018-06-15 RX ADMIN — MUPIROCIN: 20 OINTMENT TOPICAL at 10:58

## 2018-06-15 RX ADMIN — SODIUM CHLORIDE 10 MG: 9 INJECTION INTRAMUSCULAR; INTRAVENOUS; SUBCUTANEOUS at 18:39

## 2018-06-15 RX ADMIN — VANCOMYCIN HYDROCHLORIDE 500 MG: 1 INJECTION, POWDER, LYOPHILIZED, FOR SOLUTION INTRAVENOUS at 03:46

## 2018-06-15 RX ADMIN — SODIUM CHLORIDE 75 ML/HR: 450 INJECTION, SOLUTION INTRAVENOUS at 06:32

## 2018-06-15 RX ADMIN — VANCOMYCIN HYDROCHLORIDE 1250 MG: 10 INJECTION, POWDER, LYOPHILIZED, FOR SOLUTION INTRAVENOUS at 14:44

## 2018-06-15 RX ADMIN — MELATONIN TAB 3 MG 4.5 MG: 3 TAB at 22:34

## 2018-06-15 RX ADMIN — HEPARIN SODIUM 5000 UNITS: 5000 INJECTION, SOLUTION INTRAVENOUS; SUBCUTANEOUS at 22:30

## 2018-06-15 RX ADMIN — Medication 10 ML: at 06:29

## 2018-06-15 RX ADMIN — SODIUM CHLORIDE 10 MG: 9 INJECTION INTRAMUSCULAR; INTRAVENOUS; SUBCUTANEOUS at 10:58

## 2018-06-15 RX ADMIN — METRONIDAZOLE 500 MG: 500 INJECTION, SOLUTION INTRAVENOUS at 03:45

## 2018-06-15 RX ADMIN — VANCOMYCIN HYDROCHLORIDE 500 MG: 1 INJECTION, POWDER, LYOPHILIZED, FOR SOLUTION INTRAVENOUS at 18:41

## 2018-06-15 RX ADMIN — METRONIDAZOLE 500 MG: 500 INJECTION, SOLUTION INTRAVENOUS at 19:54

## 2018-06-15 RX ADMIN — MUPIROCIN: 20 OINTMENT TOPICAL at 21:00

## 2018-06-15 RX ADMIN — HEPARIN SODIUM 5000 UNITS: 5000 INJECTION, SOLUTION INTRAVENOUS; SUBCUTANEOUS at 10:56

## 2018-06-15 RX ADMIN — CEFEPIME HYDROCHLORIDE 2 G: 2 INJECTION, POWDER, FOR SOLUTION INTRAVENOUS at 18:40

## 2018-06-15 RX ADMIN — Medication 10 ML: at 14:48

## 2018-06-15 RX ADMIN — SODIUM CHLORIDE 10 MG: 9 INJECTION INTRAMUSCULAR; INTRAVENOUS; SUBCUTANEOUS at 03:46

## 2018-06-15 RX ADMIN — Medication: at 18:50

## 2018-06-15 RX ADMIN — VANCOMYCIN HYDROCHLORIDE 500 MG: 1 INJECTION, POWDER, LYOPHILIZED, FOR SOLUTION INTRAVENOUS at 10:56

## 2018-06-15 RX ADMIN — CEFEPIME HYDROCHLORIDE 2 G: 2 INJECTION, POWDER, FOR SOLUTION INTRAVENOUS at 06:28

## 2018-06-15 RX ADMIN — METRONIDAZOLE 500 MG: 500 INJECTION, SOLUTION INTRAVENOUS at 10:56

## 2018-06-15 NOTE — PROGRESS NOTES
1919-Bedside shift change report given to AlainaRN by NIKI Blackman. Report included the following information SBAR and Kardex.

## 2018-06-15 NOTE — PROGRESS NOTES
Problem: Self Care Deficits Care Plan (Adult)  Goal: *Acute Goals and Plan of Care (Insert Text)  Occupational Therapy Goals  Initiated 6/11/2018  1. Patient will perform grooming in standing with supervision/set-up within 7 day(s). 2.  Patient will perform upper body dressing and lower body dressing with supervision/set-up within 7 day(s). 3.  Patient will perform simple home management with supervision/set-up within 7 day(s). 4.  Patient will perform toilet transfers with supervision/set-up within 7 day(s). 5.  Patient will perform all aspects of toileting with supervision/set-up within 7 day(s). 6.  Patient will participate in upper extremity therapeutic exercise/activities with supervision/set-up for 5 minutes within 7 day(s). 7.  Patient will utilize energy conservation techniques during functional activities with minimal verbal cues within 7 day(s). 8.  Patient will tolerate at least 8 minutes of standing adls within 7 days. Occupational Therapy TREATMENT  Patient: Brenna Pyle (88 y.o. female)  Date: 6/15/2018  Diagnosis: Shock ([address] <principal problem not specified>       Precautions: Fall  Chart, occupational therapy assessment, plan of care, and goals were reviewed. ASSESSMENT:  Nursing cleared for therapy. Patient agreeable, reports feeling stronger and has been up in the chair earlier today. Provided education on LB dressing techniques for donning socks with CGA at EOB with additional time. Patient had been using bathroom with nursing, denies need at this time. Completed functional mobility in room with CGA with mild sway. Recommend next session to complete standing ADL tasks to maximize standing tolerance.      Recommend with nursing patient to complete as able in order to maintain strength, endurance and independence: ADLs with supervision/setup to min A, OOB to chair 3x/day and mobilizing to the bathroom for toileting with CGA-min assist.     Progression toward goals:  [] Improving appropriately and progressing toward goals  [x]       Improving slowly and progressing toward goals  []       Not making progress toward goals and plan of care will be adjusted     PLAN:  Patient continues to benefit from skilled intervention to address the above impairments. Continue treatment per established plan of care. Discharge Recommendations:  Home Health with support from family  Further Equipment Recommendations for Discharge:  Home with wife     SUBJECTIVE:   Patient stated I am feeling a little stronger.     OBJECTIVE DATA SUMMARY:   Cognitive/Behavioral Status:  Alert, oriented x 4       Functional Mobility and Transfers for ADLs:  Bed Mobility:  Rolling: Contact guard assistance  Supine to Sit: Minimum assistance  Scooting: Stand-by assistance    Transfers:  Sit to Stand: Minimum assistance          Balance:  Sitting: Intact  Standing: Impaired; Without support  Standing - Static: Good;Constant support  Standing - Dynamic : Fair    ADL Intervention:    Ed on sequencing to maximize indep with bed mobility. Provided education on LB dressing techniques for cross leg techniques for donning socks at EOB. Completed with modified crossed leg techniques secondary to body habitus with CGA and additional time. Completed functional mobility in room to prepare for mobility with ADL tasks with CGA with mild sway. Lower Body Dressing Assistance  Socks: Contact guard assistance; Compensatory technique training    Pain:   Denies pain    Activity Tolerance:   Slow pace. Denies dizziness. Mild JACOBO.      After treatment:   [x] Patient left in no apparent distress sitting up in chair  [] Patient left in no apparent distress in bed  [x] Call bell left within reach  [x] Nursing notified  [] Caregiver present  [x] Bed alarm activated    COMMUNICATION/COLLABORATION:   The patients plan of care was discussed with: Physical Therapist, Registered Nurse and Patient    Lorrie Christianson OT  Time Calculation: 16 mins

## 2018-06-15 NOTE — PROGRESS NOTES
Pharmacy Automatic Renal Dosing Protocol - Antimicrobials    Indication for Antimicrobials: Septic shock (CDIFF; Possible other intraabdominal infection) possible bacteremia     Current Regimen of Each Antimicrobial:  Vancomycin 1250 mg every 16 hours (Start Date ; Day # 7)  Cefepime 2 g IV every 12 hours (Start Date ; Day #7)  Metronidazole 500 mg IV every 8 hours (Started ; Day #7)  Vancomycin 500 mg PO every 6 hours (Started ; Day #7)    Previous abx:  Zosyn 3.375g x1 dose  Levaquin 750mg x1 dose    Goal Level: VANCOMYCIN TROUGH GOAL RANGE  Vancomycin Trough: 15 - 20 mcg/mL    Date Dose & Interval Measured (mcg/mL) Extrapolated (mcg/mL)   18 @ 17:59 1250 mg every 16 hours 19.1 19.6                 Significant Cultures:   18 CDIFF: Positive (FINAL)  18 Stool: Negative (FINAL)  18 Blood: NGTD x 6 days (FINAL)  18 Blood: NGTD x 3 days - Prelim    Radiology / Imaging results: (X-ray, CT scan or MRI): NA   X- ray: Right midlung mild subsegmental atelectasis. 18 CT: Thickening of the sigmoid colon. Air-fluid collection in the pelvic cul-de-sac is likely related to a contained perforation from diverticulitis. This is not adjacent to the diverticulum in the transverse colon. Stranding is  seen along the left pelvic sidewall. Paralysis, amputations, malnutrition: NA    Labs:  Recent Labs      06/15/18   0356  18   0251  18   0023   CREA  0.65  0.66  0.78   BUN  9  9  13   WBC  16.1*  19.8*  28.3*     Temp (24hrs), Av.4 °F (36.9 °C), Min:98.2 °F (36.8 °C), Max:98.7 °F (37.1 °C)      Creatinine Clearance (mL/min) or Dialysis: 67 mL/min     Impression/Plan:   · Vancomycin trough resulted as therapeutic however it is at the top of the goal range. Will change the dose to 1250 mg IV every 18 hours for an estimated trough of 16.6 mcg/mL. · Cefepime dosing is appropriate for renal function and indication. Continue current regimen. No stop date on order.   · Vancomycin PO dose adjusted for CDIFF severity. Metronidazole IV dosed appropriately for CDIFF severity. Continue current regimen. No stop date on order  · Antimicrobial stop date: pending (14 days per ID note but not entered yet). Pharmacy will follow daily and adjust medications as appropriate for renal function and/or serum levels. Thank you,  Matthew Tsang, PHARMD    Recommended duration of therapy  [email or web address]/Pharmacy/Clinical%[email or web address]. [email or web address]    Renal Dosing  [email or web address]/Pharmacy/[email or web address]/[email or web address]. pdf

## 2018-06-15 NOTE — PROGRESS NOTES
Anticipated discharge home tomorrow or Sunday. Pt and spouse continue to decline HH, H2H services at this time.      ELTON Loaiza [employer] in Social Work, [address] [place] [address]  [phone]

## 2018-06-15 NOTE — PROGRESS NOTES
Nutrition Assessment:    INTERVENTIONS/RECOMMENDATIONS:   Low fiber diet    ASSESSMENT:   Chart reviewed. Pt is tolerating diet fairly well and consuming ~50% of meals. She did not have any follow-up questions on low fiber diet. Diet Order:  (GI lite)  % Eaten:  Patient Vitals for the past 72 hrs:   % Diet Eaten   06/13/18 1321 100 %   06/13/18 1110 25 %     Pertinent Medications: [x]Reviewed: 1/2 NS, zofran, compazine, flagyl,   Pertinent Labs: [x]Reviewed: K+ 3.1,   Food Allergies: [x]NKFA  []Other   Last BM:  6/15  Edema:      []RUE   []LUE   []RLE   []LLE      Pressure Ulcer:      [] Stage I   [] Stage II   [] Stage III   [] Stage IV      Anthropometrics: Height: 5' 2\" (157.5 cm) Weight: 104 kg (229 lb 4.5 oz)    IBW (%IBW):   ( ) UBW (%UBW):   (  %)    BMI: Body mass index is 41.94 kg/(m^2). This BMI is indicative of:  []Underweight   []Normal   []Overweight   [] Obesity   [x] Extreme Obesity (BMI>40)  Last Weight Metrics:  Weight Loss Metrics 6/9/2018 6/8/2018 5/31/2018 5/31/2018 5/10/2018 5/10/2018 5/9/2018   Today's Wt 229 lb 4.5 oz - 228 lb 226 lb 3.2 oz 231 lb 12.8 oz 231 lb 230 lb   BMI - 41.94 kg/m2 41.7 kg/m2 41.37 kg/m2 42.4 kg/m2 42.25 kg/m2 42.07 kg/m2       Estimated Nutrition Needs (Based on): 1550 Kcals/day (BMR: 1550 x 1 d/t BMI >40) , 85 g (0.8 g/kg) Protein  Carbohydrate: At Least 130 g/day  Fluids: 1550 mL/day or per primary team    NUTRITION DIAGNOSES:   Problem:  Altered GI function      Etiology: related to Acute diverticulitis with contained perforation and C. Diff     Signs/Symptoms: as evidenced by Acute diverticulitis with contained perforation and C. Diff    Previous Nutrition Dx:  [] Resolved  [] Unresolved           [x] Progressing    NUTRITION INTERVENTIONS:  Meals/Snacks:  Other (low fiber diet x 4 weeks)         Initial/Brief Nutrition Education: Purpose of nutrition education, Survival information        GOAL:   consume >50% of meals in 3-5 days    NUTRITION MONITORING AND EVALUATION   Behavioral-Environmental Outcomes: Food/nutrition knowledge  Food/Nutrient Intake Outcomes:  Total energy intake  Physical Signs/Symptoms Outcomes: Weight/weight change, Electrolyte and renal profile, GI    Previous Goal Met:   [] Met              [x] Progressing Towards Goal              [] Not Progressing Towards Goal   Previous Recommendations:   [x] Implemented          [] Not Implemented          [] Not Applicable    LEARNING NEEDS (Diet, Food/Nutrient-Drug Interaction):    [x] None Identified   [] Identified and Education Provided/Documented   [] Identified and Pt declined/was not appropriate     Cultural, Tenriism, OR Ethnic Dietary Needs:    [x] None Identified   [] Identified and Addressed     [x] Interdisciplinary Care Plan Reviewed/Documented    [x] Discharge Planning: Low fiber diet x 4 weeks with slow transition to high fiber diet   [] Participated in Interdisciplinary Rounds    NUTRITION RISK:    [] High              [x] Moderate           []  Low  []  Minimal/Uncompromised      Mari Dudley RDN  Pager [phone]  Weekend Pager [phone]

## 2018-06-15 NOTE — PROGRESS NOTES
Problem: Mobility Impaired (Adult and Pediatric)  Goal: *Acute Goals and Plan of Care (Insert Text)  Physical Therapy Goals  Initiated 6/10/2018  1. Patient will move from supine to sit and sit to supine , scoot up and down and roll side to side in bed with independence within 7 day(s). 2.  Patient will transfer from bed to chair and chair to bed with independence using the least restrictive device within 7 day(s). 3.  Patient will perform sit to stand with independence within 7 day(s). 4.  Patient will ambulate with independence for 200 feet with the least restrictive device within 7 day(s). 5.  Patient will ascend/descend 5 stairs with 1 handrail(s) with modified independence within 7 day(s). physical Therapy TREATMENT  Patient: Florence Schultz (29 y.o. female)  Date: 6/15/2018  Diagnosis: Shock ([address] <principal problem not specified>       Precautions: Fall  Chart, physical therapy assessment, plan of care and goals were reviewed. ASSESSMENT:  Pt cleared by nurse to mobilize. Pt received in bed supine. Pt agreeable to therapy. Pt performed supine to sit at min A for trunk. Pt performed sit to stand transfer at min A. Pt ambulated 60ft within room with no device at CGA. Pt initially unsteady but improved with time then becoming unsteady again due to fatigue. Pt with mild SOB after ambulation but quickly recovering once siting. Pt with improved activity tolerance. Pt reported feeling a little better today. Pt agreeable to sitting up in chair at end of session. Pt will benefit form HHPT to continue to improve strength and endurance for safe mobility. Progression toward goals:  []    Improving appropriately and progressing toward goals  [x]    Improving slowly and progressing toward goals  []    Not making progress toward goals and plan of care will be adjusted     PLAN:  Patient continues to benefit from skilled intervention to address the above impairments.   Continue treatment per established plan of care.  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     OBJECTIVE DATA SUMMARY:   Critical Behavior:  Neurologic State: Alert  Orientation Level: Oriented X4  Cognition: Follows commands  Safety/Judgement: Fall prevention, Awareness of environment  Functional Mobility Training:  Bed Mobility:  Rolling: Contact guard assistance  Supine to Sit: Minimum assistance     Scooting: Stand-by assistance        Transfers:  Sit to Stand: Minimum assistance  Stand to Sit: Contact guard assistance                             Balance:  Sitting: Intact  Standing: Impaired; Without support  Standing - Static: Good;Constant support  Standing - Dynamic : Fair  Ambulation/Gait Training:  Distance (ft): 60 Feet (ft)  Assistive Device: Gait belt  Ambulation - Level of Assistance: Contact guard assistance        Gait Abnormalities: Decreased step clearance;Trunk sway increased        Base of Support: Widened     Speed/Stella: Slow  Step Length: Right shortened;Left shortened     Pain:                    Activity Tolerance:   Pt with improved mobility tolerance.    After treatment:   [x]    Patient left in no apparent distress sitting up in chair  []    Patient left in no apparent distress in bed  [x]    Call bell left within reach  [x]    Nursing notified  []    Caregiver present  [x]    Bed alarm activated    COMMUNICATION/COLLABORATION:   The patients plan of care was discussed with: Registered Nurse    Wu Muñoz   Time Calculation: 17 mins

## 2018-06-15 NOTE — PROGRESS NOTES
Admit Date: 2018    POD * No surgery found *    Procedure:  * No surgery found *    Subjective:     Patient has no complaints. Abdominal pain improved. Jenna GI lite diet  + BMs    Objective:     Blood pressure 145/67, pulse 80, temperature 98.4 °F (36.9 °C), resp. rate 16, height 5' 2\" (1.575 m), weight 229 lb 4.5 oz (104 kg), SpO2 99 %. Temp (24hrs), Av.4 °F (36.9 °C), Min:98.2 °F (36.8 °C), Max:98.7 °F (37.1 °C)      Physical Exam:  GENERAL: alert, cooperative, no distress, appears stated age, LUNG: clear to auscultation bilaterally, HEART: regular rate and rhythm, ABDOMEN: soft, non-tender. Bowel sounds normal. No masses,  no organomegaly, EXTREMITIES:  extremities normal, atraumatic, no cyanosis or edema, SKIN: port removal site intact, clean    Labs:   Recent Results (from the past 24 hour(s))   VANCOMYCIN, TROUGH    Collection Time: 18  5:59 PM   Result Value Ref Range    Vancomycin,trough 19.1 (H) 5.0 - 10.0 ug/mL    Reported dose date: NOT PROVIDED      Reported dose time: NOT PROVIDED      Reported dose: NOT PROVIDED UNITS   CBC WITH AUTOMATED DIFF    Collection Time: 06/15/18  3:56 AM   Result Value Ref Range    WBC 16.1 (H) 3.6 - 11.0 K/uL    RBC 2.78 (L) 3.80 - 5.20 M/uL    HGB 7.9 (L) 11.5 - 16.0 g/dL    HCT 25.2 (L) 35.0 - 47.0 %    MCV 90.6 80.0 - 99.0 FL    MCH 28.4 26.0 - 34.0 PG    MCHC 31.3 30.0 - 36.5 g/dL    RDW 15.3 (H) 11.5 - 14.5 %    PLATELET 113 (L) 139 - 400 K/uL    MPV 10.2 8.9 - 12.9 FL    NRBC 0.0 0  WBC    ABSOLUTE NRBC 0.00 0.00 - 0.01 K/uL    NEUTROPHILS 86 (H) 32 - 75 %    BAND NEUTROPHILS 6 %    LYMPHOCYTES 2 (L) 12 - 49 %    MONOCYTES 2 (L) 5 - 13 %    EOSINOPHILS 0 0 - 7 %    BASOPHILS 1 0 - 1 %    METAMYELOCYTES 3 %    IMMATURE GRANULOCYTES 0 0.0 - 0.5 %    ABS. NEUTROPHILS 14.8 (H) 1.8 - 8.0 K/UL    ABS. LYMPHOCYTES 0.3 (L) 0.8 - 3.5 K/UL    ABS. MONOCYTES 0.3 0.0 - 1.0 K/UL    ABS. EOSINOPHILS 0.0 0.0 - 0.4 K/UL    ABS.  BASOPHILS 0.2 (H) 0.0 - 0.1 K/UL ABS. IMM. GRANS. 0.0 0.00 - 0.04 K/UL    DF MANUAL      RBC COMMENTS ANISOCYTOSIS  1+       METABOLIC PANEL, BASIC    Collection Time: 06/15/18  3:56 AM   Result Value Ref Range    Sodium 144 136 - 145 mmol/L    Potassium 3.1 (L) 3.5 - 5.1 mmol/L    Chloride 115 (H) 97 - 108 mmol/L    CO2 19 (L) 21 - 32 mmol/L    Anion gap 10 5 - 15 mmol/L    Glucose 112 (H) 65 - 100 mg/dL    BUN 9 6 - 20 MG/DL    Creatinine 0.65 0.55 - 1.02 MG/DL    BUN/Creatinine ratio 14 12 - 20      GFR est AA >60 >60 ml/min/1.73m2    GFR est non-AA >60 >60 ml/min/1.73m2    Calcium 8.1 (L) 8.5 - 10.1 MG/DL       Data Review images and reports reviewed    Assessment:     Active Problems:    Breast cancer of upper-outer quadrant of right female breast ([facility] [address] (3/28/2018)      Shock ([address] (6/9/2018)      HTN (hypertension) (6/9/2018)        Plan/Recommendations/Medical Decision Making:     WBC down significantly today to 16  C diff  Transverse colon diverticulitis  Interval CT stable, no IR drainage indicated  Antimicrobial coverage per ID  Advanced diet to GI lite low residue, going well  Should be ready for d/c home soon on po antibiotics regimen. Appreciate Nutritionist consult for dietary teaching for diverticulitis. Port  Removed at bedside yesterday, healing as expected. Roney Bumpers.  Claudene Bon, MD, [facility]

## 2018-06-15 NOTE — PROGRESS NOTES
Hospitalist Progress Note    NAME: Weston Berry   :  1950   MRN:  [id number]     Septic versus hypovolemic shock resolved  Acute diverticulitis with contained perforation  C. difficile colitis history of breast cancer on chemotherapy  She had a port infection and was started on Augmentin on outpatient basis and subsequently started to have diarrhea and presented with septic versus hypovolemic shock on arrival which is now better   She had diverticulitis with contained perforation on CT and managed medically by surgery  She did improve significantly on antibiotics and repeat CT showed stable appearance  Port removal in a.m. tomorrow by surgery and surgery advance diet and possibly will be ready  in next 2 days for discharge              Assessment / Plan:  Septic shock plus hypovolemic  resolved  Acute diverticulitis with contained perforation  C. difficile colitis  Possible port infection in about 1 week ago and was treated with Augmentin which caused C diff   Leucocytosis down trending today form 30--->28k, Follow, improving  Continue vancomycin, cefepime and Flagyl  Continue p.o. vancomycin for C. difficile  Repeat ct done showed stable appearance of perforation with no worsening  ID and Gen sx are following  For port removal in am tomorrow by Dr Luz Elena Washington today  On GI lite diet tolerating but still watery stools  Surgery feels she will ready for discharge soon on po antibiotics  Cbc in am  Await for stool to be formed prior to discharge  Await for CBC to be normal      Acute kidney injury likely prerenal, improved  Creatinine peaked at 5.69 and is currently back to normal   Nephrology signed off    Hypokalemia  Hypophosphatemia   Lytes are now normal    History of breast cancer  Currently on treatment and follows up with Dr. Dwayne Trevino  Oncology following  Leelee Zhu will consider hormonal therapy moving forward  D/w dr Dwayne Trevino and port can be removed    Anemia of chronic disease  Hb is around base line of 8     Body mass index is 41.94 kg/(m^2). Code status: Full  Prophylaxis: Hep SQ  Recommended Disposition: Home w/Family once cleared by surgery in 2 days     Subjective:     Chief Complaint / Reason for Physician Visit  6/15 appetite is increasing but stool still watery  Reports some nausea today but able to tolerate diet. Had a BM today  No abdominal pain    Review of Systems:  Symptom Y/N Comments  Symptom Y/N Comments   Fever/Chills n   Chest Pain n    Poor Appetite    Edema     Cough n   Abdominal Pain     Sputum    Joint Pain     SOB/JACOBO n   Pruritis/Rash     Nausea/vomit    Tolerating PT/OT     Diarrhea    Tolerating Diet     Constipation    Other       Could NOT obtain due to:      Objective:     VITALS:   Last 24hrs VS reviewed since prior progress note. Most recent are:  Patient Vitals for the past 24 hrs:   Temp Pulse Resp BP SpO2   06/15/18 1518 98 °F (36.7 °C) 80 16 134/62 97 %   06/15/18 1054 98.8 °F (37.1 °C) 75 16 137/76 95 %   06/15/18 0818 98.4 °F (36.9 °C) 80 16 145/67 99 %   06/15/18 0332 98.7 °F (37.1 °C) 72 16 143/64 95 %   06/14/18 2325 98.4 °F (36.9 °C) 72 18 143/68 95 %   06/14/18 1948 98.3 °F (36.8 °C) 92 18 149/81 95 %       Intake/Output Summary (Last 24 hours) at 06/15/18 1735  Last data filed at 06/14/18 1822   Gross per 24 hour   Intake                0 ml   Output              700 ml   Net             -700 ml        PHYSICAL EXAM:  General: cooperative, no acute distress    EENT:  EOMI. Anicteric sclerae. MMM  Resp:  CTA bilaterally, no wheezing or rales. No accessory muscle use  CV:  Regular  rhythm,  No edema, port +   GI:  Soft, Non distended, Non tender.  +Bowel sounds  Neurologic:  Alert and awake, normal speech,   Psych:   Not anxious nor agitated  Skin:  No rashes.   No jaundice    Reviewed most current lab test results and cultures  YES  Reviewed most current radiology test results   YES  Review and summation of old records today    NO  Reviewed patient's current orders and MAR    YES  PMH/SH reviewed - no change compared to H&P          Current Facility-Administered Medications:     vancomycin (VANCOCIN) 1250 mg in  ml infusion, 1,250 mg, IntraVENous, Q18H, Gera Lemons MD, Last Rate: 125 mL/hr at 06/15/18 1444, 1,250 mg at 06/15/18 1444    melatonin tablet 4.5 mg, 4.5 mg, Oral, QHS, Vicky Lee MD, 3 mg at 06/14/18 2133    zinc oxide-cod liver oil (DESITIN) 40 % paste, , Topical, PRN, Sharmin Law MD    0.45% sodium chloride infusion, 75 mL/hr, IntraVENous, CONTINUOUS, Alesha Martinez MD, Last Rate: 75 mL/hr at 06/15/18 0632, 75 mL/hr at 06/15/18 9420    vancomycin 50 mg/mL oral solution (compounded) 500 mg, 500 mg, Oral, Q6H, Judythe Severs, MD, 500 mg at 06/15/18 1056    cefepime (MAXIPIME) 2 g in 0.9% sodium chloride (MBP/ADV) 100 mL, 2 g, IntraVENous, Q12H, Judythe Severs, MD, Last Rate: 200 mL/hr at 06/15/18 0628, 2 g at 06/15/18 0628    mupirocin (BACTROBAN) 2 % ointment, , Both Nostrils, Q12H, Sharmin Lwa MD    metroNIDAZOLE (FLAGYL) IVPB premix 500 mg, 500 mg, IntraVENous, Q8H, Sathya Reeves MD, Last Rate: 100 mL/hr at 06/15/18 1056, 500 mg at 06/15/18 1056    prochlorperazine (COMPAZINE) with saline injection 10 mg, 10 mg, IntraVENous, Q6H PRN, Scooter Estrada MD, 10 mg at 06/15/18 1058    sodium chloride (NS) flush 5-10 mL, 5-10 mL, IntraVENous, Q8H, Sathya Reeves MD, 10 mL at 06/15/18 1448    sodium chloride (NS) flush 5-10 mL, 5-10 mL, IntraVENous, PRN, Sathya Reeves MD, 10 mL at 06/13/18 0950    ondansetron (ZOFRAN) injection 4 mg, 4 mg, IntraVENous, Q4H PRN, Scooter Estrada MD, 4 mg at 06/13/18 1926    heparin (porcine) injection 5,000 Units, 5,000 Units, SubCUTAneous, Q12H, Sathya Reeves MD, 5,000 Units at 06/15/18 1056    acetaminophen (TYLENOL) suppository 650 mg, 650 mg, Rectal, Q4H PRN, Ramos Oconnor MD    sodium chloride (NS) flush 5-10 mL, 5-10 mL, IntraVENous, PRN, Hubert Guzmán, MD  ________________________________________________________________________  Care Plan discussed with:    Comments   Patient y    Family  y  bedside   RN y    Care Manager     Consultant  y surgeon                     Multidiciplinary team rounds were held today with , nursing, pharmacist and clinical coordinator. Patient's plan of care was discussed; medications were reviewed and discharge planning was addressed. ________________________________________________________________________  Total NON critical care TIME:  25  Minutes    Total CRITICAL CARE TIME Spent:   Minutes non procedure based      Comments   >50% of visit spent in counseling and coordination of care     ________________________________________________________________________  Saima Cortez MD     Procedures: see electronic medical records for all procedures/Xrays and details which were not copied into this note but were reviewed prior to creation of Plan. LABS:  I reviewed today's most current labs and imaging studies.   Pertinent labs include:  Recent Labs      06/15/18   0356  06/14/18   0251  06/13/18   0023   WBC  16.1*  19.8*  28.3*   HGB  7.9*  7.5*  7.9*   HCT  25.2*  23.9*  25.2*   PLT  148*  155  197     Recent Labs      06/15/18   0356  06/14/18   0251  06/13/18   0023   NA  144  145  144   K  3.1*  3.2*  3.7   CL  115*  116*  115*   CO2  19*  20*  19*   GLU  112*  102*  85   BUN  9  9  13   CREA  0.65  0.66  0.78   CA  8.1*  7.8*  8.2*       Signed: Saima Cortez MD

## 2018-06-16 LAB
ANION GAP SERPL CALC-SCNC: 12 MMOL/L (ref 5–15)
BUN SERPL-MCNC: 7 MG/DL (ref 6–20)
BUN/CREAT SERPL: 11 (ref 12–20)
CALCIUM SERPL-MCNC: 8.1 MG/DL (ref 8.5–10.1)
CHLORIDE SERPL-SCNC: 112 MMOL/L (ref 97–108)
CO2 SERPL-SCNC: 19 MMOL/L (ref 21–32)
CREAT SERPL-MCNC: 0.66 MG/DL (ref 0.55–1.02)
GLUCOSE SERPL-MCNC: 139 MG/DL (ref 65–100)
POTASSIUM SERPL-SCNC: 2.9 MMOL/L (ref 3.5–5.1)
SODIUM SERPL-SCNC: 143 MMOL/L (ref 136–145)

## 2018-06-16 PROCEDURE — 74011000258 HC RX REV CODE- 258: Performed by: INTERNAL MEDICINE

## 2018-06-16 PROCEDURE — 74011250636 HC RX REV CODE- 250/636: Performed by: INTERNAL MEDICINE

## 2018-06-16 PROCEDURE — 74011000250 HC RX REV CODE- 250: Performed by: INTERNAL MEDICINE

## 2018-06-16 PROCEDURE — 74011250637 HC RX REV CODE- 250/637: Performed by: INTERNAL MEDICINE

## 2018-06-16 PROCEDURE — 65660000000 HC RM CCU STEPDOWN

## 2018-06-16 PROCEDURE — 77030038269 HC DRN EXT URIN PURWCK BARD -A

## 2018-06-16 PROCEDURE — 80048 BASIC METABOLIC PNL TOTAL CA: CPT | Performed by: INTERNAL MEDICINE

## 2018-06-16 PROCEDURE — 36415 COLL VENOUS BLD VENIPUNCTURE: CPT | Performed by: INTERNAL MEDICINE

## 2018-06-16 RX ORDER — POTASSIUM CHLORIDE 20 MEQ/1
20 TABLET, EXTENDED RELEASE ORAL 2 TIMES DAILY
Status: DISCONTINUED | OUTPATIENT
Start: 2018-06-16 | End: 2018-06-19 | Stop reason: HOSPADM

## 2018-06-16 RX ORDER — POTASSIUM CHLORIDE AND SODIUM CHLORIDE 900; 300 MG/100ML; MG/100ML
INJECTION, SOLUTION INTRAVENOUS CONTINUOUS
Status: DISCONTINUED | OUTPATIENT
Start: 2018-06-16 | End: 2018-06-17

## 2018-06-16 RX ADMIN — HEPARIN SODIUM 5000 UNITS: 5000 INJECTION, SOLUTION INTRAVENOUS; SUBCUTANEOUS at 10:03

## 2018-06-16 RX ADMIN — VANCOMYCIN HYDROCHLORIDE 1250 MG: 10 INJECTION, POWDER, LYOPHILIZED, FOR SOLUTION INTRAVENOUS at 10:03

## 2018-06-16 RX ADMIN — SODIUM CHLORIDE AND POTASSIUM CHLORIDE: 9; 2.98 INJECTION, SOLUTION INTRAVENOUS at 23:28

## 2018-06-16 RX ADMIN — HEPARIN SODIUM 5000 UNITS: 5000 INJECTION, SOLUTION INTRAVENOUS; SUBCUTANEOUS at 22:41

## 2018-06-16 RX ADMIN — SODIUM CHLORIDE 75 ML/HR: 450 INJECTION, SOLUTION INTRAVENOUS at 01:41

## 2018-06-16 RX ADMIN — POTASSIUM CHLORIDE 20 MEQ: 20 TABLET, EXTENDED RELEASE ORAL at 10:03

## 2018-06-16 RX ADMIN — METRONIDAZOLE 500 MG: 500 INJECTION, SOLUTION INTRAVENOUS at 12:33

## 2018-06-16 RX ADMIN — SODIUM CHLORIDE 10 MG: 9 INJECTION INTRAMUSCULAR; INTRAVENOUS; SUBCUTANEOUS at 05:47

## 2018-06-16 RX ADMIN — SODIUM CHLORIDE AND POTASSIUM CHLORIDE: 9; 2.98 INJECTION, SOLUTION INTRAVENOUS at 09:21

## 2018-06-16 RX ADMIN — METRONIDAZOLE 500 MG: 500 INJECTION, SOLUTION INTRAVENOUS at 03:05

## 2018-06-16 RX ADMIN — VANCOMYCIN HYDROCHLORIDE 500 MG: 1 INJECTION, POWDER, LYOPHILIZED, FOR SOLUTION INTRAVENOUS at 13:47

## 2018-06-16 RX ADMIN — Medication 10 ML: at 05:55

## 2018-06-16 RX ADMIN — VANCOMYCIN HYDROCHLORIDE 500 MG: 1 INJECTION, POWDER, LYOPHILIZED, FOR SOLUTION INTRAVENOUS at 00:59

## 2018-06-16 RX ADMIN — POTASSIUM CHLORIDE 20 MEQ: 20 TABLET, EXTENDED RELEASE ORAL at 18:24

## 2018-06-16 RX ADMIN — CEFEPIME HYDROCHLORIDE 2 G: 2 INJECTION, POWDER, FOR SOLUTION INTRAVENOUS at 16:48

## 2018-06-16 RX ADMIN — SODIUM CHLORIDE 10 MG: 9 INJECTION INTRAMUSCULAR; INTRAVENOUS; SUBCUTANEOUS at 12:31

## 2018-06-16 RX ADMIN — VANCOMYCIN HYDROCHLORIDE 500 MG: 1 INJECTION, POWDER, LYOPHILIZED, FOR SOLUTION INTRAVENOUS at 06:01

## 2018-06-16 RX ADMIN — VANCOMYCIN HYDROCHLORIDE 500 MG: 1 INJECTION, POWDER, LYOPHILIZED, FOR SOLUTION INTRAVENOUS at 18:55

## 2018-06-16 RX ADMIN — CEFEPIME HYDROCHLORIDE 2 G: 2 INJECTION, POWDER, FOR SOLUTION INTRAVENOUS at 05:57

## 2018-06-16 RX ADMIN — METRONIDAZOLE 500 MG: 500 INJECTION, SOLUTION INTRAVENOUS at 18:55

## 2018-06-16 RX ADMIN — Medication 10 ML: at 13:54

## 2018-06-16 RX ADMIN — SODIUM CHLORIDE 10 MG: 9 INJECTION INTRAMUSCULAR; INTRAVENOUS; SUBCUTANEOUS at 18:24

## 2018-06-16 NOTE — PROGRESS NOTES
Infectious Disease Progress Note        IMPRESSION:   · S/p removal of aj cath  · Improved WBC count , now down to 16.1   · Acute diverticulitis with improved CT appearance () -no drainable collection/ thickening of sigmoid colon / perforated diverticulum from transverse colon with adjacent inflammatory change stable   · Moderately severe C.diff colitis on po Vanc/ IV Flagyl   · H/o aj cath infection( swelling, erythema, fever) after chemotherapy on   · Leucocytosis , persistent probably multifactorial - Acute C.diffcolitis ,also acute diverticulitis ( to lesser degree) effect of peg filgrastin (administered ), Decadron po ( pt had 3 doses ,,)   · MIL resolved  · Infiltrating ductal Ca R/breast  s/p lumpectomy ,s/p  adjuvant chemotherapy 2nd round           PLAN:       · Continue Vancomycin / Cefepime IV x 10 days ( today is D7)  · Dc planning on po Flagyl , levaquin  X 4 more days( after completion of 10 days of IV therapy in hospital). This would  cover aj cath infection & acute diverticulitis   · Po vancomycin x 14 days, if not tolerated extend course of flagyl to 10 days po on discharge & DC po vancomycin   · Advance diet as tolerated  · No more imodium  · Increase po fluid intake   ·  Outpatient f/u as needed , call [phone] for appointment          Subjective:     Pt seen . Bowel movements still + more formed & less , rectal tube off .  at bedside    Review of Systems:  A comprehensive review of systems was negative except for that written in the History of Present Illness. Objective:     Blood pressure 143/64, pulse 77, temperature 98.4 °F (36.9 °C), resp. rate 18, height 5' 2\" (1.575 m), weight 104 kg (229 lb 4.5 oz), SpO2 94 %.   Temp (24hrs), Av.4 °F (36.9 °C), Min:98 °F (36.7 °C), Max:98.8 °F (37.1 °C)      Patient Vitals for the past 24 hrs:   Temp Pulse Resp BP SpO2   06/15/18 2239 98.4 °F (36.9 °C) 77 18 143/64 94 %   06/15/18 2026 98.4 °F (36.9 °C) 80 20 147/67 95 %   06/15/18 1518 98 °F (36.7 °C) 80 16 134/62 97 %   06/15/18 1054 98.8 °F (37.1 °C) 75 16 137/76 95 %   06/15/18 0818 98.4 °F (36.9 °C) 80 16 145/67 99 %   06/15/18 0332 98.7 °F (37.1 °C) 72 16 143/64 95 %   06/14/18 2325 98.4 °F (36.9 °C) 72 18 143/68 95 %         Lines:  Peripheral IV:            Physical Exam:   General:  Resting,   Eyes:  Sclera anicteric. Pupils equally round and reactive to light. Mouth/Throat: Mucous membranes , oral pharynx clear   Neck: Supple   Lungs:   Clear to auscultation bilaterally    CV:  Regular rate and rhythm,no murmur, click, rub or gallop   Abdomen:   Soft, non-tender. bowel sounds +. non-distended   Extremities: No  edema   Skin: Skin color, texture, turgor normal. no  rash / lesions   Lymph nodes: Cervical and supraclavicular normal   Lines/Devices:  Intact, no erythema, drainage or tenderness B/L UE         Studies:      Lab Results   Component Value Date/Time    Culture result: NO GROWTH 3 DAYS 06/12/2018 01:11 PM    Culture result:  06/08/2018 10:15 PM     NO ROUTINE ENTERIC PATHOGENS ISOLATED INCLUDING SALMONELLA, SHIGELLA, YERSINIA, VIBRIO OR SHIGA TOXIN PRODUCING E. COLI    Culture result: NO GROWTH 6 DAYS 06/08/2018 09:22 PM    Culture result: NO GROWTH 6 DAYS 06/08/2018 09:22 PM    Culture result:  06/04/2013 03:43 PM     MRSA NOT PRESENT      Screening of patient nares for MRSA is for surveillance purposes and, if positive, to facilitate isolation considerations in high risk settings. It is not intended for automatic decolonization interventions per se as regimens are not sufficiently effective to warrant routine use. XR Results (most recent):    Results from Hospital Encounter encounter on 06/08/18   XR CHEST PORT   Narrative Clinical indication: Shortness of breath. Portable AP semierect view of the chest is obtained compared to June 8. Central  line tip is at this time in the expected position of the right atrium.  The lung  fields are clear.         Impression impression: No acute changes. Support devices as above. Patient Active Problem List   Diagnosis Code    Obesity, morbid ([place] [address] E66.01    Breast cancer of upper-outer quadrant of right female breast ([facility] C50.411    S/P lumpectomy, right breast Z98.890    Malignant neoplasm of upper-outer quadrant of right breast in female, estrogen receptor positive ([place] [address] C50.411, Z17.0    Shock ([facility] R57.9    HTN (hypertension) I10         ICD-10-CM ICD-9-CM    1. Septic shock (HCC) A41.9 038.9     R65.21 785.52      995.92    2. Infection due to port-a-cath, initial encounter T80.219A 999.31    3. Diarrhea, unspecified type R19.7 787.91    4. C. difficile diarrhea A04.72 008.45    5. Diverticulitis of large intestine with perforation and abscess without bleeding K57.20 562.11    6. Malignant neoplasm of upper-outer quadrant of right breast in female, estrogen receptor positive ([facility] C50.411 174.4     Z17.0 V86.0    7. Hypotension, unspecified hypotension type I95.9 458.9    8. Hypovolemic shock (HCC) R57.1 785.59    9. Acute renal failure, unspecified acute renal failure type ([facility] N17.9 584.9    10. Non-intractable vomiting with nausea, unspecified vomiting type R11.2 787.01    11. Diarrhea of presumed infectious origin R19.7 009.3    12. Acute hypokalemia E87.6 276.8    13. Hypomagnesemia E83.42 275.2    14. Metabolic acidosis V77.8 375.7        I have discussed the diagnosis with the patient and the intended plan as seen in the above orders. I have discussed medication side effects and warnings with the patient as well.     Reviewed test results at length with patient    Anti-infectives:     Vancomycin IV 6/9   Cefepime IV 6/9   Flagyl IV 6/9   po Vancomycin 6/9     Chuy Marcelino MD FAC

## 2018-06-16 NOTE — PROGRESS NOTES
Bedside shift change report given to Fortino Alpers, RN (oncoming nurse). Report included the following information SBAR, Kardex, ED Summary, Intake/Output, MAR and Recent Results. SHIFT SUMMARY:  0700: Bedside shift change report given to Bakari Chacon RN (oncoming nurse) by Fortino Alpers, RN (offgoing nurse). Report included the following information SBAR, Kardex, ED Summary, Intake/Output, MAR and Recent Results. 1325: Pt currently stating her L hand appears more swollen. IVF stopped and line flushed. Will attempt new PIV access    1355: PIV access in L hand -- IVF and IV abx resumed. Patient did not have a bowel movement from 7PM to 7PM, voiding well and ambulating to the bathroom       [facility] NURSING NOTE   Admission Date 6/8/2018   Admission Diagnosis Shock ([address]   Consults IP CONSULT TO HOSPITALIST  IP CONSULT TO HEMATOLOGY  IP CONSULT TO NEPHROLOGY  IP CONSULT TO INFECTIOUS DISEASES  IP CONSULT TO GENERAL SURGERY      Cardiac Monitoring [x] Yes [] No      Purposeful Hourly Rounding [x] Yes    Kierra Score Total Score: 3   Kierra score 3 or > [x] Bed Alarm [] Avasys [] 1:1 sitter [] Patient refused (Signed refusal form in chart)   Raman Score Raman Score: 17   Raman score 14 or < [] PMT consult [] Wound Care consult    []  Specialty bed  [] Nutrition consult      Influenza Vaccine Received Flu Vaccine for Current Season (usually Sept-March): Not Flu Season           Oxygen needs? [x] Room air Oxygen @  []1L    []2L    []3L   []4L    []5L   []6L via  NC   Chronic home O2 use?  [] Yes [] No  Perform O2 challenge test and document in progress note using smartphrase (.Homeoxygen)      Last bowel movement Last Bowel Movement Date: 06/16/18      Urinary Catheter [REMOVED] Urinary Catheter 06/09/18 Ghotra - Temperature-Indications for Use: Acute urinary retention/bladder outlet obstruction     [REMOVED] External Female Catheter 06/09/18-Urine Output (mL): 250 ml  [REMOVED] Urinary Catheter 06/09/18 Ghotra - Temperature-Urine Output (mL): 150 ml     LDAs             Venous Access Device port 05/09/18 (Active)      Peripheral IV 06/12/18 Left Antecubital (Active)   Site Assessment Clean, dry, & intact 6/16/2018  2:30 PM   Phlebitis Assessment 0 6/16/2018  2:30 PM   Infiltration Assessment 0 6/16/2018  2:30 PM   Dressing Status Clean, dry, & intact 6/16/2018  2:30 PM   Dressing Type Transparent;Tape 6/16/2018  2:30 PM   Hub Color/Line Status Blue;Flushed 6/16/2018  2:30 PM       Peripheral IV 06/16/18 Left Hand (Active)   Site Assessment Clean, dry, & intact 6/16/2018  1:55 PM   Phlebitis Assessment 0 6/16/2018  1:55 PM   Infiltration Assessment 0 6/16/2018  1:55 PM   Dressing Status Clean, dry, & intact 6/16/2018  1:55 PM   Dressing Type Tape;Transparent 6/16/2018  1:55 PM   Hub Color/Line Status Blue; Infusing 6/16/2018  1:55 PM                         Readmission Risk Assessment Tool Score Medium Risk            13       Total Score        3 Has Seen PCP in Last 6 Months (Yes=3, No=0)    2 . Living with Significant Other. Assisted Living. LTAC. SNF. or   Rehab    3 Patient Length of Stay (>5 days = 3)    5 Pt.  Coverage (Medicare=5 , Medicaid, or Self-Pay=4)        Criteria that do not apply:    IP Visits Last 12 Months (1-3=4, 4=9, >4=11)    Charlson Comorbidity Score (Age + Comorbid Conditions)       Expected Length of Stay 4d 21h   Actual Length of Stay 7

## 2018-06-16 NOTE — PROGRESS NOTES
Admit Date: 2018    POD * No surgery found *    Procedure:  * No surgery found *    Subjective:     Patient has no complaints. Abdominal pain improved. Jenna GI lite diet  + BMs. Not eating much. Objective:     Blood pressure 147/63, pulse 71, temperature 98.1 °F (36.7 °C), resp. rate 18, height 5' 2\" (1.575 m), weight 229 lb 4.5 oz (104 kg), SpO2 95 %. Temp (24hrs), Av.3 °F (36.8 °C), Min:98 °F (36.7 °C), Max:98.4 °F (36.9 °C)      Physical Exam:  GENERAL: alert, cooperative, no distress, appears stated age, LUNG: clear to auscultation bilaterally, HEART: regular rate and rhythm, ABDOMEN: soft, non-tender. Bowel sounds normal. No masses,  no organomegaly, EXTREMITIES:  extremities normal, atraumatic, no cyanosis or edema, SKIN: port removal site intact, clean    Labs:   Recent Results (from the past 24 hour(s))   METABOLIC PANEL, BASIC    Collection Time: 18  1:15 AM   Result Value Ref Range    Sodium 143 136 - 145 mmol/L    Potassium 2.9 (L) 3.5 - 5.1 mmol/L    Chloride 112 (H) 97 - 108 mmol/L    CO2 19 (L) 21 - 32 mmol/L    Anion gap 12 5 - 15 mmol/L    Glucose 139 (H) 65 - 100 mg/dL    BUN 7 6 - 20 MG/DL    Creatinine 0.66 0.55 - 1.02 MG/DL    BUN/Creatinine ratio 11 (L) 12 - 20      GFR est AA >60 >60 ml/min/1.73m2    GFR est non-AA >60 >60 ml/min/1.73m2    Calcium 8.1 (L) 8.5 - 10.1 MG/DL       Data Review images and reports reviewed    Assessment:     Active Problems:    Breast cancer of upper-outer quadrant of right female breast ([facility] (3/28/2018)      Shock ([address] (2018)      HTN (hypertension) (2018)        Plan/Recommendations/Medical Decision Making:     C diff  Transverse colon diverticulitis  Interval CT stable, no IR drainage indicated  Antimicrobial coverage per ID  Advanced diet to GI lite low residue, changed to regular low residue to encourage pt compliance  Should be ready for d/c home soon on po antibiotics regimen.   Appreciate Nutritionist consult for dietary teaching for diverticulitis. Port  Removed at bedside, healing as expected. Raghu Salinas.  Edgar Turner MD, [facility]

## 2018-06-16 NOTE — PROGRESS NOTES
0730  Report received from Vani Meza [facility]. SBAR, Kardex, ED Summary, Procedure Summary, Intake/Output, MAR, Accordion, Recent Results, Med Rec Status and Cardiac Rhythm NSR were discussed. Arlin Falk       Bedside shift change report given to Constance Crow RN (oncoming nurse). Report included the following information SBAR, Kardex, ED Summary, Procedure Summary, Intake/Output, MAR, Accordion, Recent Results, Med Rec Status and Cardiac Rhythm NSR.     SHIFT SUMMARY:    Patient stayed in bed the majority of the day. Worked with therapy and ambulated around the room and sat in chair briefly before getting back in bed. Some nausea and vomiting today after eating food that she finds distasteful. Administered compazine prior to each oral vanc dose due to patient feeling nauseous after taking the medication. Patient had two forming stools today. [facility] NURSING NOTE   Admission Date 6/8/2018   Admission Diagnosis Shock ([address]   Consults IP CONSULT TO HOSPITALIST  IP CONSULT TO HEMATOLOGY  IP CONSULT TO NEPHROLOGY  IP CONSULT TO INFECTIOUS DISEASES  IP CONSULT TO GENERAL SURGERY      Cardiac Monitoring [x] Yes [] No      Purposeful Hourly Rounding [x] Yes    Kierra Score Total Score: 3   Kierra score 3 or > [x] Bed Alarm [] Avasys [] 1:1 sitter [] Patient refused (Signed refusal form in chart)   Raman Score Raman Score: 17   Raman score 14 or < [] PMT consult [] Wound Care consult    []  Specialty bed  [] Nutrition consult      Influenza Vaccine Received Flu Vaccine for Current Season (usually Sept-March): Not Flu Season           Oxygen needs? [x] Room air Oxygen @  []1L    []2L    []3L   []4L    []5L   []6L via  NC   Chronic home O2 use?  [] Yes [] No  Perform O2 challenge test and document in progress note using smartphCTIC [employer] (.Homeoxygen)      Last bowel movement Last Bowel Movement Date: 06/15/18      Urinary Catheter [REMOVED] Urinary Catheter 06/09/18 Ghotra - Temperature-Indications for Use: Acute urinary retention/bladder outlet obstruction     [REMOVED] External Female Catheter 06/09/18-Urine Output (mL): 250 ml  [REMOVED] Urinary Catheter 06/09/18 Ghotra - Temperature-Urine Output (mL): 150 ml     LDAs             Venous Access Device port 05/09/18 (Active)      Peripheral IV 06/08/18 Right Antecubital (Active)   Site Assessment Clean, dry, & intact 6/15/2018  8:00 AM   Phlebitis Assessment 0 6/15/2018  8:00 AM   Infiltration Assessment 0 6/15/2018  8:00 AM   Dressing Status Clean, dry, & intact 6/15/2018  8:00 AM   Dressing Type Tape;Transparent 6/15/2018  8:00 AM   Hub Color/Line Status Pink;Flushed 6/15/2018  8:00 AM   Action Taken Open ports on tubing capped 6/14/2018  8:00 PM   Alcohol Cap Used Yes 6/14/2018  8:00 PM       Peripheral IV 06/12/18 Left Antecubital (Active)   Site Assessment Clean, dry, & intact 6/15/2018  8:00 AM   Phlebitis Assessment 0 6/15/2018  8:00 AM   Infiltration Assessment 0 6/15/2018  8:00 AM   Dressing Status Clean, dry, & intact 6/15/2018  8:00 AM   Dressing Type Tape;Transparent 6/15/2018  8:00 AM   Hub Color/Line Status Blue; Infusing 6/15/2018  8:00 AM                         Readmission Risk Assessment Tool Score Medium Risk            13       Total Score        3 Has Seen PCP in Last 6 Months (Yes=3, No=0)    2 . Living with Significant Other. Assisted Living. LTAC. SNF. or   Rehab    3 Patient Length of Stay (>5 days = 3)    5 Pt.  Coverage (Medicare=5 , Medicaid, or Self-Pay=4)        Criteria that do not apply:    IP Visits Last 12 Months (1-3=4, 4=9, >4=11)    Charlson Comorbidity Score (Age + Comorbid Conditions)       Expected Length of Stay 4d 21h   Actual Length of Stay 6

## 2018-06-16 NOTE — PROGRESS NOTES
Hospitalist Progress Note    NAME: Aristeo Adams   :  1950   MRN:  [id number]     Septic versus hypovolemic shock resolved  Acute diverticulitis with contained perforation  C. difficile colitis history of breast cancer on chemotherapy  She had a port infection and was started on Augmentin on outpatient basis and subsequently started to have diarrhea and presented with septic versus hypovolemic shock on arrival which is now better   She had diverticulitis with contained perforation on CT and managed medically by surgery  She did improve significantly on antibiotics and repeat CT showed stable appearance  Port removal in a.m. tomorrow by surgery and surgery advance diet and possibly will be ready  in next 2 days for discharge              Assessment / Plan:  Septic shock plus hypovolemic  resolved  Acute diverticulitis with contained perforation  C. difficile colitis, improving diarrhea  Possible port infection in about 1 week ago and was treated with Augmentin which caused C diff   Leucocytosis down trending today form 30--->28k, Follow, improving  Continue vancomycin, cefepime and Flagyl  Continue p.o. vancomycin for C. difficile  Repeat ct done showed stable appearance of perforation with no worsening  ID and Gen sx are following  For port removal in am tomorrow by Dr Nery La today  On GI lite diet tolerating but still watery stools  Surgery feels she will ready for discharge soon on po antibiotics  Cbc in am  Await for stool to be formed prior to discharge  Await for CBC to be normal      Acute kidney injury likely prerenal, improved  Creatinine peaked at 5.69 and is currently back to normal   Nephrology signed off    Hypokalemia, 2.9 today  Replete and check Mg and Phos in am  Hypophosphatemia   Lytes are now normal    History of breast cancer  Currently on treatment and follows up with Dr. Ivan Comer  Oncology following  Onc will consider hormonal therapy moving forward  D/w dr Ivan Comer and port can be removed    Anemia of chronic disease  Hb is around base line of 8           Body mass index is 41.94 kg/(m^2). Code status: Full  Prophylaxis: Hep SQ  Recommended Disposition: Home w/Family once cleared by surgery in 2 days     Subjective:     Chief Complaint / Reason for Physician Visit  6/16  Eating better and stools are 'trying to form\"  6/15 appetite is increasing but stool still watery  Reports some nausea today but able to tolerate diet. Had a BM today  No abdominal pain    Review of Systems:  Symptom Y/N Comments  Symptom Y/N Comments   Fever/Chills n   Chest Pain n    Poor Appetite    Edema     Cough n   Abdominal Pain     Sputum    Joint Pain     SOB/JACOBO n   Pruritis/Rash     Nausea/vomit    Tolerating PT/OT     Diarrhea    Tolerating Diet     Constipation    Other       Could NOT obtain due to:      Objective:     VITALS:   Last 24hrs VS reviewed since prior progress note. Most recent are:  Patient Vitals for the past 24 hrs:   Temp Pulse Resp BP SpO2   06/16/18 1048 98.1 °F (36.7 °C) 71 18 147/63 95 %   06/16/18 0739 98.3 °F (36.8 °C) 72 20 148/76 95 %   06/16/18 0433 98.3 °F (36.8 °C) 68 18 139/62 93 %   06/15/18 2239 98.4 °F (36.9 °C) 77 18 143/64 94 %   06/15/18 2026 98.4 °F (36.9 °C) 80 20 147/67 95 %   06/15/18 1518 98 °F (36.7 °C) 80 16 134/62 97 %       Intake/Output Summary (Last 24 hours) at 06/16/18 1126  Last data filed at 06/16/18 0739   Gross per 24 hour   Intake                0 ml   Output              650 ml   Net             -650 ml        PHYSICAL EXAM:  General: cooperative, no acute distress    EENT:  EOMI. Anicteric sclerae. MMM  Resp:  CTA bilaterally, no wheezing or rales. No accessory muscle use  CV:  Regular  rhythm,  No edema, port +   GI:  Soft, Non distended, Non tender.  +Bowel sounds  Neurologic:  Alert and awake, normal speech,   Psych:   Not anxious nor agitated  Skin:  No rashes.   No jaundice    Reviewed most current lab test results and cultures  YES  Reviewed most current radiology test results   YES  Review and summation of old records today    NO  Reviewed patient's current orders and MAR    YES  PMH/SH reviewed - no change compared to H&P          Current Facility-Administered Medications:     0.9% sodium chloride with KCl 40 mEq/L infusion, , IntraVENous, CONTINUOUS, Jose Lovelace MD, Last Rate: 75 mL/hr at 06/16/18 0921    potassium chloride (K-DUR, KLOR-CON) SR tablet 20 mEq, 20 mEq, Oral, BID, Jose Lovelace MD, 20 mEq at 06/16/18 1003    vancomycin (VANCOCIN) 1250 mg in  ml infusion, 1,250 mg, IntraVENous, Q18H, Felisa Neri MD, Last Rate: 125 mL/hr at 06/16/18 1003, 1,250 mg at 06/16/18 1003    melatonin tablet 4.5 mg, 4.5 mg, Oral, QHS, Jose Lovelace MD, 4.5 mg at 06/15/18 2234    zinc oxide-cod liver oil (DESITIN) 40 % paste, , Topical, PRN, Jillian Thomas MD    vancomycin 50 mg/mL oral solution (compounded) 500 mg, 500 mg, Oral, Q6H, Angelina Ferrer MD, 500 mg at 06/16/18 0601    cefepime (MAXIPIME) 2 g in 0.9% sodium chloride (MBP/ADV) 100 mL, 2 g, IntraVENous, Q12H, Angelina Ferrer MD, Last Rate: 200 mL/hr at 06/16/18 0557, 2 g at 06/16/18 0557    metroNIDAZOLE (FLAGYL) IVPB premix 500 mg, 500 mg, IntraVENous, Q8H, Sathya Reeves MD, Last Rate: 100 mL/hr at 06/16/18 0305, 500 mg at 06/16/18 0305    prochlorperazine (COMPAZINE) with saline injection 10 mg, 10 mg, IntraVENous, Q6H PRN, Nora Saucedo MD, 10 mg at 06/16/18 0547    sodium chloride (NS) flush 5-10 mL, 5-10 mL, IntraVENous, Q8H, Sathya Reeevs MD, 10 mL at 06/16/18 0555    sodium chloride (NS) flush 5-10 mL, 5-10 mL, IntraVENous, PRN, Nora Saucedo MD, 10 mL at 06/13/18 0950    ondansetron (ZOFRAN) injection 4 mg, 4 mg, IntraVENous, Q4H PRN, Nora Saucedo MD, 4 mg at 06/13/18 1926    heparin (porcine) injection 5,000 Units, 5,000 Units, SubCUTAneous, Q12H, Nora Saucedo MD, 5,000 Units at 06/16/18 1003    acetaminophen (TYLENOL) suppository 650 mg, 650 mg, Rectal, Q4H PRN, Zulema Davison MD    sodium chloride (NS) flush 5-10 mL, 5-10 mL, IntraVENous, PRN, Nancy Ritchie MD  ________________________________________________________________________  Care Plan discussed with:    Comments   Patient y    Family  y  bedside   RN y    Care Manager     Consultant  y surgeon                     Multidiciplinary team rounds were held today with , nursing, pharmacist and clinical coordinator. Patient's plan of care was discussed; medications were reviewed and discharge planning was addressed. ________________________________________________________________________  Total NON critical care TIME:  25  Minutes    Total CRITICAL CARE TIME Spent:   Minutes non procedure based      Comments   >50% of visit spent in counseling and coordination of care     ________________________________________________________________________  Nadya Allen MD     Procedures: see electronic medical records for all procedures/Xrays and details which were not copied into this note but were reviewed prior to creation of Plan. LABS:  I reviewed today's most current labs and imaging studies.   Pertinent labs include:  Recent Labs      06/15/18   0356  06/14/18   0251   WBC  16.1*  19.8*   HGB  7.9*  7.5*   HCT  25.2*  23.9*   PLT  148*  155     Recent Labs      06/16/18   0115  06/15/18   0356  06/14/18   0251   NA  143  144  145   K  2.9*  3.1*  3.2*   CL  112*  115*  116*   CO2  19*  19*  20*   GLU  139*  112*  102*   BUN  7  9  9   CREA  0.66  0.65  0.66   CA  8.1*  8.1*  7.8*       Signed: Nadya Allen MD

## 2018-06-17 LAB
ALBUMIN SERPL-MCNC: 1.7 G/DL (ref 3.5–5)
ALBUMIN/GLOB SERPL: 0.4 {RATIO} (ref 1.1–2.2)
ALP SERPL-CCNC: 54 U/L (ref 45–117)
ALT SERPL-CCNC: 15 U/L (ref 12–78)
ANION GAP SERPL CALC-SCNC: 10 MMOL/L (ref 5–15)
AST SERPL-CCNC: 22 U/L (ref 15–37)
BASOPHILS # BLD: 0.1 K/UL (ref 0–0.1)
BASOPHILS NFR BLD: 1 % (ref 0–1)
BILIRUB SERPL-MCNC: 0.3 MG/DL (ref 0.2–1)
BUN SERPL-MCNC: 5 MG/DL (ref 6–20)
BUN/CREAT SERPL: 8 (ref 12–20)
CALCIUM SERPL-MCNC: 7.7 MG/DL (ref 8.5–10.1)
CHLORIDE SERPL-SCNC: 117 MMOL/L (ref 97–108)
CO2 SERPL-SCNC: 20 MMOL/L (ref 21–32)
CREAT SERPL-MCNC: 0.59 MG/DL (ref 0.55–1.02)
DIFFERENTIAL METHOD BLD: ABNORMAL
EOSINOPHIL # BLD: 0.1 K/UL (ref 0–0.4)
EOSINOPHIL NFR BLD: 1 % (ref 0–7)
ERYTHROCYTE [DISTWIDTH] IN BLOOD BY AUTOMATED COUNT: 15.8 % (ref 11.5–14.5)
GLOBULIN SER CALC-MCNC: 3.9 G/DL (ref 2–4)
GLUCOSE SERPL-MCNC: 128 MG/DL (ref 65–100)
HCT VFR BLD AUTO: 23.5 % (ref 35–47)
HGB BLD-MCNC: 7.2 G/DL (ref 11.5–16)
IMM GRANULOCYTES # BLD: 0 K/UL
IMM GRANULOCYTES NFR BLD AUTO: 0 %
LYMPHOCYTES # BLD: 0.6 K/UL (ref 0.8–3.5)
LYMPHOCYTES NFR BLD: 4 % (ref 12–49)
MAGNESIUM SERPL-MCNC: 1 MG/DL (ref 1.6–2.4)
MCH RBC QN AUTO: 28.7 PG (ref 26–34)
MCHC RBC AUTO-ENTMCNC: 30.6 G/DL (ref 30–36.5)
MCV RBC AUTO: 93.6 FL (ref 80–99)
METAMYELOCYTES NFR BLD MANUAL: 3 %
MONOCYTES # BLD: 0.3 K/UL (ref 0–1)
MONOCYTES NFR BLD: 2 % (ref 5–13)
NEUTS BAND NFR BLD MANUAL: 5 % (ref 0–6)
NEUTS SEG # BLD: 12.8 K/UL (ref 1.8–8)
NEUTS SEG NFR BLD: 84 % (ref 32–75)
NRBC # BLD: 0 K/UL (ref 0–0.01)
NRBC BLD-RTO: 0 PER 100 WBC
PLATELET # BLD AUTO: 179 K/UL (ref 150–400)
PMV BLD AUTO: 10.3 FL (ref 8.9–12.9)
POTASSIUM SERPL-SCNC: 3.5 MMOL/L (ref 3.5–5.1)
PROT SERPL-MCNC: 5.6 G/DL (ref 6.4–8.2)
RBC # BLD AUTO: 2.51 M/UL (ref 3.8–5.2)
RBC MORPH BLD: ABNORMAL
SODIUM SERPL-SCNC: 147 MMOL/L (ref 136–145)
WBC # BLD AUTO: 14.4 K/UL (ref 3.6–11)

## 2018-06-17 PROCEDURE — 77030018846 HC SOL IRR STRL H20 ICUM -A

## 2018-06-17 PROCEDURE — 74011250636 HC RX REV CODE- 250/636: Performed by: INTERNAL MEDICINE

## 2018-06-17 PROCEDURE — 36415 COLL VENOUS BLD VENIPUNCTURE: CPT | Performed by: INTERNAL MEDICINE

## 2018-06-17 PROCEDURE — 80053 COMPREHEN METABOLIC PANEL: CPT | Performed by: INTERNAL MEDICINE

## 2018-06-17 PROCEDURE — 74011250637 HC RX REV CODE- 250/637: Performed by: INTERNAL MEDICINE

## 2018-06-17 PROCEDURE — 83735 ASSAY OF MAGNESIUM: CPT | Performed by: INTERNAL MEDICINE

## 2018-06-17 PROCEDURE — 74011000250 HC RX REV CODE- 250: Performed by: INTERNAL MEDICINE

## 2018-06-17 PROCEDURE — 85025 COMPLETE CBC W/AUTO DIFF WBC: CPT | Performed by: INTERNAL MEDICINE

## 2018-06-17 PROCEDURE — 80202 ASSAY OF VANCOMYCIN: CPT | Performed by: INTERNAL MEDICINE

## 2018-06-17 PROCEDURE — 65660000000 HC RM CCU STEPDOWN

## 2018-06-17 PROCEDURE — 74011000258 HC RX REV CODE- 258: Performed by: INTERNAL MEDICINE

## 2018-06-17 PROCEDURE — 77030038269 HC DRN EXT URIN PURWCK BARD -A

## 2018-06-17 RX ORDER — POTASSIUM CHLORIDE AND SODIUM CHLORIDE 450; 150 MG/100ML; MG/100ML
INJECTION, SOLUTION INTRAVENOUS CONTINUOUS
Status: DISCONTINUED | OUTPATIENT
Start: 2018-06-17 | End: 2018-06-19

## 2018-06-17 RX ADMIN — HEPARIN SODIUM 5000 UNITS: 5000 INJECTION, SOLUTION INTRAVENOUS; SUBCUTANEOUS at 09:26

## 2018-06-17 RX ADMIN — CEFEPIME HYDROCHLORIDE 2 G: 2 INJECTION, POWDER, FOR SOLUTION INTRAVENOUS at 09:26

## 2018-06-17 RX ADMIN — Medication 10 ML: at 14:14

## 2018-06-17 RX ADMIN — VANCOMYCIN HYDROCHLORIDE 500 MG: 1 INJECTION, POWDER, LYOPHILIZED, FOR SOLUTION INTRAVENOUS at 18:15

## 2018-06-17 RX ADMIN — MELATONIN TAB 3 MG 4.5 MG: 3 TAB at 21:30

## 2018-06-17 RX ADMIN — SODIUM CHLORIDE AND POTASSIUM CHLORIDE: 4.5; 1.49 INJECTION, SOLUTION INTRAVENOUS at 14:11

## 2018-06-17 RX ADMIN — METRONIDAZOLE 500 MG: 500 INJECTION, SOLUTION INTRAVENOUS at 11:19

## 2018-06-17 RX ADMIN — SODIUM CHLORIDE 10 MG: 9 INJECTION INTRAMUSCULAR; INTRAVENOUS; SUBCUTANEOUS at 13:01

## 2018-06-17 RX ADMIN — Medication 10 ML: at 00:40

## 2018-06-17 RX ADMIN — POTASSIUM CHLORIDE 20 MEQ: 20 TABLET, EXTENDED RELEASE ORAL at 09:28

## 2018-06-17 RX ADMIN — HEPARIN SODIUM 5000 UNITS: 5000 INJECTION, SOLUTION INTRAVENOUS; SUBCUTANEOUS at 21:34

## 2018-06-17 RX ADMIN — VANCOMYCIN HYDROCHLORIDE 500 MG: 1 INJECTION, POWDER, LYOPHILIZED, FOR SOLUTION INTRAVENOUS at 06:43

## 2018-06-17 RX ADMIN — METRONIDAZOLE 500 MG: 500 INJECTION, SOLUTION INTRAVENOUS at 06:38

## 2018-06-17 RX ADMIN — CEFEPIME HYDROCHLORIDE 2 G: 2 INJECTION, POWDER, FOR SOLUTION INTRAVENOUS at 00:34

## 2018-06-17 RX ADMIN — VANCOMYCIN HYDROCHLORIDE 1250 MG: 10 INJECTION, POWDER, LYOPHILIZED, FOR SOLUTION INTRAVENOUS at 02:11

## 2018-06-17 RX ADMIN — Medication 10 ML: at 22:00

## 2018-06-17 RX ADMIN — ONDANSETRON 4 MG: 2 INJECTION INTRAMUSCULAR; INTRAVENOUS at 18:11

## 2018-06-17 RX ADMIN — SODIUM CHLORIDE 10 MG: 9 INJECTION INTRAMUSCULAR; INTRAVENOUS; SUBCUTANEOUS at 00:29

## 2018-06-17 RX ADMIN — METRONIDAZOLE 500 MG: 500 INJECTION, SOLUTION INTRAVENOUS at 18:06

## 2018-06-17 RX ADMIN — SODIUM CHLORIDE 10 MG: 9 INJECTION INTRAMUSCULAR; INTRAVENOUS; SUBCUTANEOUS at 06:32

## 2018-06-17 RX ADMIN — VANCOMYCIN HYDROCHLORIDE 500 MG: 1 INJECTION, POWDER, LYOPHILIZED, FOR SOLUTION INTRAVENOUS at 00:39

## 2018-06-17 RX ADMIN — VANCOMYCIN HYDROCHLORIDE 500 MG: 1 INJECTION, POWDER, LYOPHILIZED, FOR SOLUTION INTRAVENOUS at 13:00

## 2018-06-17 RX ADMIN — VANCOMYCIN HYDROCHLORIDE 1250 MG: 10 INJECTION, POWDER, LYOPHILIZED, FOR SOLUTION INTRAVENOUS at 21:34

## 2018-06-17 RX ADMIN — CEFEPIME HYDROCHLORIDE 2 G: 2 INJECTION, POWDER, FOR SOLUTION INTRAVENOUS at 17:08

## 2018-06-17 RX ADMIN — Medication 10 ML: at 06:43

## 2018-06-17 NOTE — PROGRESS NOTES
Admit Date: 2018    POD * No surgery found *    Procedure:  * No surgery found *    Subjective:     Patient has no complaints. Abdominal pain improved. Jenna GI lite diet  + BMs. Eating better. Objective:     Blood pressure 137/80, pulse 95, temperature 98.2 °F (36.8 °C), resp. rate 18, height 5' 2\" (1.575 m), weight 229 lb 4.5 oz (104 kg), SpO2 97 %. Temp (24hrs), Av.1 °F (36.7 °C), Min:97.7 °F (36.5 °C), Max:98.4 °F (36.9 °C)      Physical Exam:  GENERAL: alert, cooperative, no distress, appears stated age, LUNG: clear to auscultation bilaterally, HEART: regular rate and rhythm, ABDOMEN: soft, non-tender. Bowel sounds normal. No masses,  no organomegaly, EXTREMITIES:  Marked left upper arm swelling and tenderness, SKIN: port removal site intact, clean    Labs:   Recent Results (from the past 24 hour(s))   METABOLIC PANEL, COMPREHENSIVE    Collection Time: 18  1:07 AM   Result Value Ref Range    Sodium 147 (H) 136 - 145 mmol/L    Potassium 3.5 3.5 - 5.1 mmol/L    Chloride 117 (H) 97 - 108 mmol/L    CO2 20 (L) 21 - 32 mmol/L    Anion gap 10 5 - 15 mmol/L    Glucose 128 (H) 65 - 100 mg/dL    BUN 5 (L) 6 - 20 MG/DL    Creatinine 0.59 0.55 - 1.02 MG/DL    BUN/Creatinine ratio 8 (L) 12 - 20      GFR est AA >60 >60 ml/min/1.73m2    GFR est non-AA >60 >60 ml/min/1.73m2    Calcium 7.7 (L) 8.5 - 10.1 MG/DL    Bilirubin, total 0.3 0.2 - 1.0 MG/DL    ALT (SGPT) 15 12 - 78 U/L    AST (SGOT) 22 15 - 37 U/L    Alk.  phosphatase 54 45 - 117 U/L    Protein, total 5.6 (L) 6.4 - 8.2 g/dL    Albumin 1.7 (L) 3.5 - 5.0 g/dL    Globulin 3.9 2.0 - 4.0 g/dL    A-G Ratio 0.4 (L) 1.1 - 2.2     CBC WITH AUTOMATED DIFF    Collection Time: 18  1:07 AM   Result Value Ref Range    WBC 14.4 (H) 3.6 - 11.0 K/uL    RBC 2.51 (L) 3.80 - 5.20 M/uL    HGB 7.2 (L) 11.5 - 16.0 g/dL    HCT 23.5 (L) 35.0 - 47.0 %    MCV 93.6 80.0 - 99.0 FL    MCH 28.7 26.0 - 34.0 PG    MCHC 30.6 30.0 - 36.5 g/dL    RDW 15.8 (H) 11.5 - 14.5 % PLATELET 756 960 - 003 K/uL    MPV 10.3 8.9 - 12.9 FL    NRBC 0.0 0  WBC    ABSOLUTE NRBC 0.00 0.00 - 0.01 K/uL    NEUTROPHILS 84 (H) 32 - 75 %    BAND NEUTROPHILS 5 0 - 6 %    LYMPHOCYTES 4 (L) 12 - 49 %    MONOCYTES 2 (L) 5 - 13 %    EOSINOPHILS 1 0 - 7 %    BASOPHILS 1 0 - 1 %    METAMYELOCYTES 3 (H) 0 %    IMMATURE GRANULOCYTES 0 %    ABS. NEUTROPHILS 12.8 (H) 1.8 - 8.0 K/UL    ABS. LYMPHOCYTES 0.6 (L) 0.8 - 3.5 K/UL    ABS. MONOCYTES 0.3 0.0 - 1.0 K/UL    ABS. EOSINOPHILS 0.1 0.0 - 0.4 K/UL    ABS. BASOPHILS 0.1 0.0 - 0.1 K/UL    ABS. IMM. GRANS. 0.0 K/UL    DF MANUAL      RBC COMMENTS ANISOCYTOSIS  1+       MAGNESIUM    Collection Time: 06/17/18  1:07 AM   Result Value Ref Range    Magnesium 1.0 (L) 1.6 - 2.4 mg/dL       Data Review images and reports reviewed    Assessment:     Active Problems:    Breast cancer of upper-outer quadrant of right female breast ([facility] [address] (3/28/2018)      Shock ([address] (6/9/2018)      HTN (hypertension) (6/9/2018)        Plan/Recommendations/Medical Decision Making:     C diff  Transverse colon diverticulitis  Interval CT stable, no IR drainage indicated  Antimicrobial coverage per ID  Advanced diet to GI lite low residue, changed to regular low residue to encourage pt compliance  Should be ready for d/c home soon on po antibiotics regimen. Appreciate Nutritionist consult for dietary teaching for diverticulitis. Port  Removed at bedside, healing as expected. Check upper extremity duplex to exclude DVT    Rex Dodson MD, [facility]

## 2018-06-17 NOTE — PROGRESS NOTES
0700: Bedside shift change report given to Jaspal Alvarez RN (oncoming nurse) by [facility], RN (offgoing nurse). Report included the following information SBAR and Kardex. 1300: Placed heating pad on patient LUE for swelling. 1900:   Bedside shift change report given to Elba Pablo RN (oncoming nurse). Report included the following information SBAR and Kardex. SHIFT SUMMARY:            [facility] NURSING NOTE   Admission Date 6/8/2018   Admission Diagnosis Shock ([address]   Consults IP CONSULT TO HOSPITALIST  IP CONSULT TO HEMATOLOGY  IP CONSULT TO NEPHROLOGY  IP CONSULT TO INFECTIOUS DISEASES  IP CONSULT TO GENERAL SURGERY      Cardiac Monitoring [x] Yes [] No      Purposeful Hourly Rounding [x] Yes    Kierra Score Total Score: 3   Kierra score 3 or > [x] Bed Alarm [] Avasys [] 1:1 sitter [] Patient refused (Signed refusal form in chart)   Raman Score Raman Score: 17   Raman score 14 or < [] PMT consult [] Wound Care consult    []  Specialty bed  [] Nutrition consult      Influenza Vaccine Received Flu Vaccine for Current Season (usually Sept-March): Not Flu Season           Oxygen needs? [x] Room air Oxygen @  []1L    []2L    []3L   []4L    []5L   []6L via  NC   Chronic home O2 use?  [] Yes [x] No  Perform O2 challenge test and document in progress note using smartphrase (.Homeoxygen)      Last bowel movement Last Bowel Movement Date: 06/17/18      Urinary Catheter [REMOVED] Urinary Catheter 06/09/18 Ghotra - Temperature-Indications for Use: Acute urinary retention/bladder outlet obstruction     [REMOVED] External Female Catheter 06/09/18-Urine Output (mL): 250 ml  [REMOVED] Urinary Catheter 06/09/18 Ghotra - Temperature-Urine Output (mL): 150 ml     LDAs             Venous Access Device port 05/09/18 (Active)      Peripheral IV 06/16/18 Left Hand (Active)   Site Assessment Clean, dry, & intact 6/17/2018  2:00 PM   Phlebitis Assessment 0 6/17/2018  2:00 PM   Infiltration Assessment 0 6/17/2018  2:00 PM   Dressing Status Clean, dry, & intact 6/17/2018  2:00 PM   Dressing Type Transparent 6/17/2018  2:00 PM   Hub Color/Line Status Blue; Infusing;Patent; Flushed 6/17/2018  2:00 PM                         Readmission Risk Assessment Tool Score Medium Risk            13       Total Score        3 Has Seen PCP in Last 6 Months (Yes=3, No=0)    2 . Living with Significant Other. Assisted Living. LTAC. SNF. or   Rehab    3 Patient Length of Stay (>5 days = 3)    5 Pt.  Coverage (Medicare=5 , Medicaid, or Self-Pay=4)        Criteria that do not apply:    IP Visits Last 12 Months (1-3=4, 4=9, >4=11)    Charlson Comorbidity Score (Age + Comorbid Conditions)       Expected Length of Stay 4d 21h   Actual Length of Stay 8

## 2018-06-17 NOTE — PROGRESS NOTES
Hospitalist Progress Note    NAME: Chema Irene   :  1950   MRN:  [id number]     Septic versus hypovolemic shock resolved  Acute diverticulitis with contained perforation  C. difficile colitis history of breast cancer on chemotherapy  She had a port infection and was started on Augmentin on outpatient basis and subsequently started to have diarrhea and presented with septic versus hypovolemic shock on arrival which is now better   She had diverticulitis with contained perforation on CT and managed medically by surgery  She did improve significantly on antibiotics and repeat CT showed stable appearance  Port removal in a.m. tomorrow by surgery and surgery advance diet and possibly will be ready  in next 2 days for discharge              Assessment / Plan:  Septic shock plus hypovolemic  resolved  Acute diverticulitis with contained perforation  C. difficile colitis, improving diarrhea  Possible port infection in about 1 week ago and was treated with Augmentin which caused C diff   Continue IV cefepime and Flagyl   Continue p.o. vancomycin for C. difficile  Repeat ct done showed stable appearance of perforation with no worsening  ID and Gen sx are following  For port removal in am tomorrow by Dr Maida Chand today  On GI lite diet tolerating but still watery stools  Surgery feels she will ready for discharge soon on po antibiotics  Cbc in am  Await for stool to be formed prior to discharge  Await for CBC to be normal      Acute kidney injury likely prerenal, improved  Creatinine peaked at 5.69 and is currently back to normal   Nephrology signed off    Hypokalemia, 2.9 today  Replete and check Mg and Phos in am  Hypophosphatemia   Lytes are now normal    History of breast cancer  Currently on treatment and follows up with Dr. Linda Lowry  Oncology following  Minal Steiner will consider hormonal therapy moving forward  D/w dr Linda Lowry and port can be removed    Anemia of chronic disease  Hb is around base line of 8 Body mass index is 41.94 kg/(m^2). Code status: Full  Prophylaxis: Hep SQ  Recommended Disposition: Home w/Family once cleared by surgery in 2 days     Subjective:  She is entering [address] [place] [address] Rd / Reason for Physician Visit  6/17 had 2 really watery diarrhea this morning,  Yesterday only 1 bm semiformed  6/16  Eating better and stools are 'trying to form\"  6/15 appetite is increasing but stool still watery  Reports some nausea today but able to tolerate diet. Had a BM today  No abdominal pain    Review of Systems:  Symptom Y/N Comments  Symptom Y/N Comments   Fever/Chills n   Chest Pain n    Poor Appetite    Edema     Cough n   Abdominal Pain     Sputum    Joint Pain     SOB/JACOBO n   Pruritis/Rash     Nausea/vomit    Tolerating PT/OT     Diarrhea y   Tolerating Diet     Constipation    Other       Could NOT obtain due to:      Objective:     VITALS:   Last 24hrs VS reviewed since prior progress note. Most recent are:  Patient Vitals for the past 24 hrs:   Temp Pulse Resp BP SpO2   06/17/18 1101 98.2 °F (36.8 °C) 95 18 137/80 97 %   06/17/18 0733 98 °F (36.7 °C) (!) 115 18 146/77 97 %   06/17/18 0410 97.7 °F (36.5 °C) 79 20 144/73 95 %   06/16/18 2255 98.3 °F (36.8 °C) 85 18 142/80 95 %   06/16/18 1929 98 °F (36.7 °C) 82 18 149/74 93 %   06/16/18 1646 98.4 °F (36.9 °C) 73 17 148/75 94 %       Intake/Output Summary (Last 24 hours) at 06/17/18 1203  Last data filed at 06/16/18 1648   Gross per 24 hour   Intake                0 ml   Output              600 ml   Net             -600 ml        PHYSICAL EXAM:  General: cooperative, no acute distress    EENT:  EOMI. Anicteric sclerae. MMM  Resp:  CTA bilaterally, no wheezing or rales. No accessory muscle use  CV:  Regular  rhythm,  No edema, port +   GI:  Soft, Non distended, Non tender.  +Bowel sounds  Neurologic:  Alert and awake, normal speech,   Psych:   Not anxious nor agitated  Skin:  No rashes.   No jaundice    Reviewed most current lab test results and cultures  YES  Reviewed most current radiology test results   YES  Review and summation of old records today    NO  Reviewed patient's current orders and MAR    YES  PMH/SH reviewed - no change compared to H&P          Current Facility-Administered Medications:     0.45% sodium chloride with KCl 20 mEq/L infusion, , IntraVENous, CONTINUOUS, Sam Horner MD    potassium chloride (K-DUR, KLOR-CON) SR tablet 20 mEq, 20 mEq, Oral, BID, Hemant Fung MD, 20 mEq at 06/17/18 0928    cefepime (MAXIPIME) 2 g in 0.9% sodium chloride (MBP/ADV) 100 mL, 2 g, IntraVENous, Q8H, Graciela Gates MD, Last Rate: 200 mL/hr at 06/17/18 0926, 2 g at 06/17/18 0926    vancomycin (VANCOCIN) 1250 mg in  ml infusion, 1,250 mg, IntraVENous, Q18H, Graciela Gates MD, Last Rate: 125 mL/hr at 06/17/18 0211, 1,250 mg at 06/17/18 0211    melatonin tablet 4.5 mg, 4.5 mg, Oral, QHS, Sam Horner MD, 4.5 mg at 06/15/18 2234    zinc oxide-cod liver oil (DESITIN) 40 % paste, , Topical, PRN, Christian De La Paz MD    vancomycin 50 mg/mL oral solution (compounded) 500 mg, 500 mg, Oral, Q6H, Chinmay Hinojosa MD, 500 mg at 06/17/18 1877    metroNIDAZOLE (FLAGYL) IVPB premix 500 mg, 500 mg, IntraVENous, Q8H, Sathya Reeves MD, Last Rate: 100 mL/hr at 06/17/18 1119, 500 mg at 06/17/18 1119    prochlorperazine (COMPAZINE) with saline injection 10 mg, 10 mg, IntraVENous, Q6H PRN, Chin Angelo MD, 10 mg at 06/17/18 0700    sodium chloride (NS) flush 5-10 mL, 5-10 mL, IntraVENous, Q8H, Sathya Reeves MD, 10 mL at 06/17/18 0643    sodium chloride (NS) flush 5-10 mL, 5-10 mL, IntraVENous, PRN, Sathya Reeves MD, 10 mL at 06/13/18 0950    ondansetron (ZOFRAN) injection 4 mg, 4 mg, IntraVENous, Q4H PRN, Chin Angelo MD, 4 mg at 06/13/18 1926    heparin (porcine) injection 5,000 Units, 5,000 Units, SubCUTAneous, Q12H, Chin Angelo MD, 5,000 Units at 06/17/18 0926    acetaminophen (TYLENOL) suppository 650 mg, 650 mg, Rectal, Q4H PRN, Summer Gorman MD    sodium chloride (NS) flush 5-10 mL, 5-10 mL, IntraVENous, PRN, Rajiv White MD  ________________________________________________________________________  Care Plan discussed with:    Comments   Patient y    Family  y  bedside   RN y    Care Manager     Consultant  y                      Multidiciplinary team rounds were held today with , nursing, pharmacist and clinical coordinator. Patient's plan of care was discussed; medications were reviewed and discharge planning was addressed. ________________________________________________________________________  Total NON critical care TIME:  20  Minutes    Total CRITICAL CARE TIME Spent:   Minutes non procedure based      Comments   >50% of visit spent in counseling and coordination of care     ________________________________________________________________________  Reggie Brush MD     Procedures: see electronic medical records for all procedures/Xrays and details which were not copied into this note but were reviewed prior to creation of Plan. LABS:  I reviewed today's most current labs and imaging studies.   Pertinent labs include:  Recent Labs      06/17/18   0107  06/15/18   0356   WBC  14.4*  16.1*   HGB  7.2*  7.9*   HCT  23.5*  25.2*   PLT  179  148*     Recent Labs      06/17/18   0107  06/16/18   0115  06/15/18   0356   NA  147*  143  144   K  3.5  2.9*  3.1*   CL  117*  112*  115*   CO2  20*  19*  19*   GLU  128*  139*  112*   BUN  5*  7  9   CREA  0.59  0.66  0.65   CA  7.7*  8.1*  8.1*   MG  1.0*   --    --    ALB  1.7*   --    --    TBILI  0.3   --    --    SGOT  22   --    --    ALT  15   --    --        Signed: Reggie rBush MD

## 2018-06-18 LAB
ANION GAP SERPL CALC-SCNC: 6 MMOL/L (ref 5–15)
BACTERIA SPEC CULT: NORMAL
BASOPHILS # BLD: 0.1 K/UL (ref 0–0.1)
BASOPHILS NFR BLD: 1 % (ref 0–1)
BUN SERPL-MCNC: 5 MG/DL (ref 6–20)
BUN/CREAT SERPL: 9 (ref 12–20)
CALCIUM SERPL-MCNC: 7.7 MG/DL (ref 8.5–10.1)
CHLORIDE SERPL-SCNC: 117 MMOL/L (ref 97–108)
CO2 SERPL-SCNC: 22 MMOL/L (ref 21–32)
CREAT SERPL-MCNC: 0.57 MG/DL (ref 0.55–1.02)
DATE LAST DOSE: ABNORMAL
DIFFERENTIAL METHOD BLD: ABNORMAL
EOSINOPHIL # BLD: 0 K/UL (ref 0–0.4)
EOSINOPHIL NFR BLD: 0 % (ref 0–7)
ERYTHROCYTE [DISTWIDTH] IN BLOOD BY AUTOMATED COUNT: 16 % (ref 11.5–14.5)
GLUCOSE SERPL-MCNC: 112 MG/DL (ref 65–100)
HCT VFR BLD AUTO: 22.4 % (ref 35–47)
HGB BLD-MCNC: 7.1 G/DL (ref 11.5–16)
IMM GRANULOCYTES # BLD: 0 K/UL (ref 0–0.04)
IMM GRANULOCYTES NFR BLD AUTO: 0 % (ref 0–0.5)
LYMPHOCYTES # BLD: 1 K/UL (ref 0.8–3.5)
LYMPHOCYTES NFR BLD: 7 % (ref 12–49)
MAGNESIUM SERPL-MCNC: 1 MG/DL (ref 1.6–2.4)
MCH RBC QN AUTO: 29.1 PG (ref 26–34)
MCHC RBC AUTO-ENTMCNC: 31.7 G/DL (ref 30–36.5)
MCV RBC AUTO: 91.8 FL (ref 80–99)
METAMYELOCYTES NFR BLD MANUAL: 3 %
MONOCYTES # BLD: 0.7 K/UL (ref 0–1)
MONOCYTES NFR BLD: 5 % (ref 5–13)
NEUTS BAND NFR BLD MANUAL: 2 %
NEUTS SEG # BLD: 11.4 K/UL (ref 1.8–8)
NEUTS SEG NFR BLD: 82 % (ref 32–75)
NRBC # BLD: 0.02 K/UL (ref 0–0.01)
NRBC BLD-RTO: 0.1 PER 100 WBC
PLATELET # BLD AUTO: 232 K/UL (ref 150–400)
PMV BLD AUTO: 10.1 FL (ref 8.9–12.9)
POTASSIUM SERPL-SCNC: 3.4 MMOL/L (ref 3.5–5.1)
RBC # BLD AUTO: 2.44 M/UL (ref 3.8–5.2)
RBC MORPH BLD: ABNORMAL
REPORTED DOSE,DOSE: ABNORMAL UNITS
REPORTED DOSE/TIME,TMG: ABNORMAL
SERVICE CMNT-IMP: NORMAL
SODIUM SERPL-SCNC: 145 MMOL/L (ref 136–145)
VANCOMYCIN TROUGH SERPL-MCNC: 14.4 UG/ML (ref 5–10)
WBC # BLD AUTO: 13.6 K/UL (ref 3.6–11)

## 2018-06-18 PROCEDURE — 97116 GAIT TRAINING THERAPY: CPT

## 2018-06-18 PROCEDURE — 74011250636 HC RX REV CODE- 250/636: Performed by: INTERNAL MEDICINE

## 2018-06-18 PROCEDURE — 74011000250 HC RX REV CODE- 250: Performed by: INTERNAL MEDICINE

## 2018-06-18 PROCEDURE — 83735 ASSAY OF MAGNESIUM: CPT | Performed by: INTERNAL MEDICINE

## 2018-06-18 PROCEDURE — 65660000000 HC RM CCU STEPDOWN

## 2018-06-18 PROCEDURE — 97530 THERAPEUTIC ACTIVITIES: CPT

## 2018-06-18 PROCEDURE — 74011250637 HC RX REV CODE- 250/637: Performed by: INTERNAL MEDICINE

## 2018-06-18 PROCEDURE — 80048 BASIC METABOLIC PNL TOTAL CA: CPT | Performed by: INTERNAL MEDICINE

## 2018-06-18 PROCEDURE — 36415 COLL VENOUS BLD VENIPUNCTURE: CPT | Performed by: INTERNAL MEDICINE

## 2018-06-18 PROCEDURE — 74011000258 HC RX REV CODE- 258: Performed by: INTERNAL MEDICINE

## 2018-06-18 PROCEDURE — 77030038269 HC DRN EXT URIN PURWCK BARD -A

## 2018-06-18 PROCEDURE — 93971 EXTREMITY STUDY: CPT

## 2018-06-18 PROCEDURE — 85025 COMPLETE CBC W/AUTO DIFF WBC: CPT | Performed by: INTERNAL MEDICINE

## 2018-06-18 RX ORDER — LEVOFLOXACIN 750 MG/1
750 TABLET ORAL EVERY 24 HOURS
Status: DISCONTINUED | OUTPATIENT
Start: 2018-06-19 | End: 2018-06-19

## 2018-06-18 RX ORDER — ATENOLOL 25 MG/1
25 TABLET ORAL DAILY
Status: DISCONTINUED | OUTPATIENT
Start: 2018-06-19 | End: 2018-06-19 | Stop reason: HOSPADM

## 2018-06-18 RX ADMIN — VANCOMYCIN HYDROCHLORIDE 250 MG: 1 INJECTION, POWDER, LYOPHILIZED, FOR SOLUTION INTRAVENOUS at 14:21

## 2018-06-18 RX ADMIN — SODIUM CHLORIDE AND POTASSIUM CHLORIDE: 4.5; 1.49 INJECTION, SOLUTION INTRAVENOUS at 23:36

## 2018-06-18 RX ADMIN — POTASSIUM CHLORIDE 20 MEQ: 20 TABLET, EXTENDED RELEASE ORAL at 18:30

## 2018-06-18 RX ADMIN — HEPARIN SODIUM 5000 UNITS: 5000 INJECTION, SOLUTION INTRAVENOUS; SUBCUTANEOUS at 09:12

## 2018-06-18 RX ADMIN — HEPARIN SODIUM 5000 UNITS: 5000 INJECTION, SOLUTION INTRAVENOUS; SUBCUTANEOUS at 21:38

## 2018-06-18 RX ADMIN — ONDANSETRON 4 MG: 2 INJECTION INTRAMUSCULAR; INTRAVENOUS at 00:57

## 2018-06-18 RX ADMIN — VANCOMYCIN HYDROCHLORIDE 1250 MG: 10 INJECTION, POWDER, LYOPHILIZED, FOR SOLUTION INTRAVENOUS at 14:24

## 2018-06-18 RX ADMIN — Medication 10 ML: at 21:39

## 2018-06-18 RX ADMIN — METRONIDAZOLE 500 MG: 500 INJECTION, SOLUTION INTRAVENOUS at 12:27

## 2018-06-18 RX ADMIN — ONDANSETRON 4 MG: 2 INJECTION INTRAMUSCULAR; INTRAVENOUS at 06:13

## 2018-06-18 RX ADMIN — Medication 10 ML: at 06:13

## 2018-06-18 RX ADMIN — VANCOMYCIN HYDROCHLORIDE 250 MG: 1 INJECTION, POWDER, LYOPHILIZED, FOR SOLUTION INTRAVENOUS at 18:28

## 2018-06-18 RX ADMIN — SODIUM CHLORIDE 10 MG: 9 INJECTION INTRAMUSCULAR; INTRAVENOUS; SUBCUTANEOUS at 18:43

## 2018-06-18 RX ADMIN — CEFEPIME HYDROCHLORIDE 2 G: 2 INJECTION, POWDER, FOR SOLUTION INTRAVENOUS at 09:07

## 2018-06-18 RX ADMIN — VANCOMYCIN HYDROCHLORIDE 500 MG: 1 INJECTION, POWDER, LYOPHILIZED, FOR SOLUTION INTRAVENOUS at 01:16

## 2018-06-18 RX ADMIN — POTASSIUM CHLORIDE 20 MEQ: 20 TABLET, EXTENDED RELEASE ORAL at 09:11

## 2018-06-18 RX ADMIN — VANCOMYCIN HYDROCHLORIDE 250 MG: 1 INJECTION, POWDER, LYOPHILIZED, FOR SOLUTION INTRAVENOUS at 23:37

## 2018-06-18 RX ADMIN — ONDANSETRON 4 MG: 2 INJECTION INTRAMUSCULAR; INTRAVENOUS at 14:20

## 2018-06-18 RX ADMIN — CEFEPIME HYDROCHLORIDE 2 G: 2 INJECTION, POWDER, FOR SOLUTION INTRAVENOUS at 00:58

## 2018-06-18 RX ADMIN — VANCOMYCIN HYDROCHLORIDE 500 MG: 1 INJECTION, POWDER, LYOPHILIZED, FOR SOLUTION INTRAVENOUS at 06:13

## 2018-06-18 RX ADMIN — METRONIDAZOLE 500 MG: 500 INJECTION, SOLUTION INTRAVENOUS at 20:21

## 2018-06-18 RX ADMIN — MELATONIN TAB 3 MG 4.5 MG: 3 TAB at 21:39

## 2018-06-18 RX ADMIN — METRONIDAZOLE 500 MG: 500 INJECTION, SOLUTION INTRAVENOUS at 03:11

## 2018-06-18 RX ADMIN — CEFEPIME HYDROCHLORIDE 2 G: 2 INJECTION, POWDER, FOR SOLUTION INTRAVENOUS at 18:23

## 2018-06-18 RX ADMIN — ONDANSETRON 4 MG: 2 INJECTION INTRAMUSCULAR; INTRAVENOUS at 23:37

## 2018-06-18 RX ADMIN — Medication 10 ML: at 14:27

## 2018-06-18 NOTE — PROGRESS NOTES
[address] [place] includes the [facility]  Preliminary Report:  Venous Duplex Arm    Left arm venous duplex was performed. Cephalic Vein segment is Non-Compressible with Absent Doppler signals, suggesting Occlusive venous obstruction of the aforementioned vessel(s).    ++++ Right internal jugular vein is non compressible with absent doppler signal, suggesting occlusive venous obstruction. Final report to follow.

## 2018-06-18 NOTE — PROCEDURES
[facility]  *** FINAL REPORT ***    Name: Tanner Fofana  MRN: [id number]    Inpatient  : 1950  HIS Order #: [id number]  [id number] [facility] Visit #: [id number]  Date: 2018    TYPE OF TEST: Peripheral Venous Testing    REASON FOR TEST  Limb swelling    Left Arm:-  Deep venous thrombosis:           No  Superficial venous thrombosis:    Yes      INTERPRETATION/FINDINGS  PROCEDURE:  Venous duplex examination using B-mode, color flow and  spectral Doppler of the upper extremity veins. Left arm :  1. Deep vein(s) visualized include the internal jugular, subclavian,  axillary, brachial, radial and ulnar veins. 2. No evidence of deep venous thrombosis detected in the veins  visualized. 3. Superficial vein(s) visualized include the basilic (upper arm),  basilic (forearm), cephalic (upper arm) and cephalic (forearm) veins. 4. Superficial venous thrombosis identified in the cephalic (upper  arm), median cubital and cephalic (forearm) veins. Right arm:  1. Deep venous thrombosis detected in the right internal jugular  vein. ADDITIONAL COMMENTS    I have personally reviewed the data relevant to the interpretation of  this  study. TECHNOLOGIST: Romina Perez.  MARY Leyva, RADHA  Signed: 2018 10:41 AM    PHYSICIAN: Ovidio Rosario MD  Signed: 2018 09:10 AM

## 2018-06-18 NOTE — PROGRESS NOTES
0700: Bedside shift change report given to Kolby Krause RN (oncoming nurse) by Lilian Patel RN (offgoing nurse). Report included the following information SBAR and Kardex. 1900:   Bedside shift change report given to Sasha Bear RN (oncoming nurse). Report included the following information SBAR and Kardex. SHIFT SUMMARY:            [address] G[address] Jonathan NURSING NOTE   Admission Date 6/8/2018   Admission Diagnosis Shock ([address]   Consults IP CONSULT TO HOSPITALIST  IP CONSULT TO HEMATOLOGY  IP CONSULT TO NEPHROLOGY  IP CONSULT TO INFECTIOUS DISEASES  IP CONSULT TO GENERAL SURGERY      Cardiac Monitoring [x] Yes [] No      Purposeful Hourly Rounding [x] Yes    Kierra Score Total Score: 3   Kierra score 3 or > [x] Bed Alarm [] Avasys [] 1:1 sitter [] Patient refused (Signed refusal form in chart)   Raman Score Raman Score: 17   Raman score 14 or < [] PMT consult [] Wound Care consult    []  Specialty bed  [] Nutrition consult      Influenza Vaccine Received Flu Vaccine for Current Season (usually Sept-March): Not Flu Season           Oxygen needs? [x] Room air Oxygen @  []1L    []2L    []3L   []4L    []5L   []6L via  NC   Chronic home O2 use?  [] Yes [x] No  Perform O2 challenge test and document in progress note using [employer])      Last bowel movement Last Bowel Movement Date: 06/18/18      Urinary Catheter [REMOVED] Urinary Catheter 06/09/18 Ghotra - Temperature-Indications for Use: Acute urinary retention/bladder outlet obstruction     [REMOVED] External Female Catheter 06/09/18-Urine Output (mL): 250 ml  [REMOVED] Urinary Catheter 06/09/18 Ghotra - Temperature-Urine Output (mL): 150 ml     LDAs             Venous Access Device port 05/09/18 (Active)      Peripheral IV 06/16/18 Left Hand (Active)   Site Assessment Clean, dry, & intact 6/18/2018  2:00 PM   Phlebitis Assessment 0 6/18/2018  2:00 PM   Infiltration Assessment 0 6/18/2018  2:00 PM   Dressing Status Clean, dry, & intact 6/18/2018  2:00 PM   Dressing Type Transparent 6/18/2018  2:00 PM   Hub Color/Line Status Blue; Infusing;Flushed 6/18/2018  2:00 PM   Action Taken Wrapped 6/17/2018  8:34 PM                         Readmission Risk Assessment Tool Score Medium Risk            13       Total Score        3 Has Seen PCP in Last 6 Months (Yes=3, No=0)    2 . Living with Significant Other. Assisted Living. LTAC. SNF. or   Rehab    3 Patient Length of Stay (>5 days = 3)    5 Pt.  Coverage (Medicare=5 , Medicaid, or Self-Pay=4)        Criteria that do not apply:    IP Visits Last 12 Months (1-3=4, 4=9, >4=11)    Charlson Comorbidity Score (Age + Comorbid Conditions)       Expected Length of Stay 4d 21h   Actual Length of Stay 9

## 2018-06-18 NOTE — PROGRESS NOTES
Infectious Disease Progress Note        IMPRESSION:   · S/p removal of aj cath   · Improved WBC count , now down to 13.6   · Acute diverticulitis with improved CT appearance () -no drainable collection/ thickening of sigmoid colon / perforated diverticulum from transverse colon with adjacent inflammatory change stable   · Moderately severe C.diff colitis on po Vanc/ IV Flagyl , now down to 2 BMs daily  · H/o aj cath infection( swelling, erythema, fever) after chemotherapy on   · Leucocytosis , persistent probably multifactorial - Acute C.diffcolitis ,also acute diverticulitis ( to lesser degree) effect of peg filgrastin (administered ), Decadron po ( pt had 3 doses ,,)   · MIL resolved  · Infiltrating ductal Ca R/breast  s/p lumpectomy ,s/p  adjuvant chemotherapy 2nd round           PLAN:       · Continue Vancomycin / Cefepime IV x 10 days ( today is D10, DC after today )  · Dc planning on po Flagyl , levaquin  X 4 more days ( after completion of 10 days of IV therapy in hospital). This would  cover acute diverticulitis   · Po vancomycin x 14 days, do Vancomycin taper as follows:Vancomycin 125 mg every 6 hours x 3 days , 125 mg q 8h x 3 days , Vancomycin 125 mg po bid 8 days . · Advance diet as tolerated  · No more imodium  · Increase po fluid intake   ·  Outpatient f/u 2 week after discharge as needed , call [phone] for appointment          Subjective:     Pt seen . Bowel movements formed  About twice daily  .  at bedside    Review of Systems:  A comprehensive review of systems was negative except for that written in the History of Present Illness. Objective:     Blood pressure 148/82, pulse 77, temperature 98.6 °F (37 °C), resp. rate 20, height 5' 2\" (1.575 m), weight 104 kg (229 lb 4.5 oz), SpO2 94 %.   Temp (24hrs), Av.6 °F (37 °C), Min:98.2 °F (36.8 °C), Max:98.9 °F (37.2 °C)      Patient Vitals for the past 24 hrs:   Temp Pulse Resp BP SpO2   18 0712 98.6 °F (37 °C) 77 20 148/82 94 %   06/18/18 0314 98.4 °F (36.9 °C) 81 20 133/63 93 %   06/17/18 2306 98.7 °F (37.1 °C) 93 20 150/77 93 %   06/17/18 1956 98.9 °F (37.2 °C) 87 20 152/80 94 %   06/17/18 1548 98.9 °F (37.2 °C) 87 20 (!) 153/91 94 %   06/17/18 1101 98.2 °F (36.8 °C) 95 18 137/80 97 %         Lines:  Peripheral IV:            Physical Exam:   General:  Resting,   Eyes:  Sclera anicteric. Pupils equally round and reactive to light. Mouth/Throat: Mucous membranes , oral pharynx clear   Neck: Supple   Lungs:   Clear to auscultation bilaterally    CV:  Regular rate and rhythm,no murmur, click, rub or gallop   Abdomen:   Soft, non-tender. bowel sounds +. non-distended   Extremities: No  edema   Skin: Skin color, texture, turgor normal. no  rash / lesions   Lymph nodes: Cervical and supraclavicular normal   Lines/Devices:  Intact, no erythema, drainage or tenderness B/L UE         Studies:      Lab Results   Component Value Date/Time    Culture result: NO GROWTH 6 DAYS 06/12/2018 01:11 PM    Culture result:  06/08/2018 10:15 PM     NO ROUTINE ENTERIC PATHOGENS ISOLATED INCLUDING SALMONELLA, SHIGELLA, YERSINIA, VIBRIO OR SHIGA TOXIN PRODUCING E. COLI    Culture result: NO GROWTH 6 DAYS 06/08/2018 09:22 PM    Culture result: NO GROWTH 6 DAYS 06/08/2018 09:22 PM    Culture result:  06/04/2013 03:43 PM     MRSA NOT PRESENT      Screening of patient nares for MRSA is for surveillance purposes and, if positive, to facilitate isolation considerations in high risk settings. It is not intended for automatic decolonization interventions per se as regimens are not sufficiently effective to warrant routine use. XR Results (most recent):    Results from Hospital Encounter encounter on 06/08/18   XR CHEST PORT   Narrative Clinical indication: Shortness of breath. Portable AP semierect view of the chest is obtained compared to June 8. Central  line tip is at this time in the expected position of the right atrium. The lung  fields are clear. Impression impression: No acute changes. Support devices as above. Patient Active Problem List   Diagnosis Code    Obesity, morbid ([place] [address].) E66.01    Breast cancer of upper-outer quadrant of right female breast ([facility] C50.411    S/P lumpectomy, right breast Z98.890    Malignant neoplasm of upper-outer quadrant of right breast in female, estrogen receptor positive ([facility] C50.411, Z17.0    Shock ([facility] R57.9    HTN (hypertension) I10         ICD-10-CM ICD-9-CM    1. Septic shock (HCC) A41.9 038.9     R65.21 785.52      995.92    2. Infection due to port-a-cath, initial encounter T80.219A 999.31    3. Diarrhea, unspecified type R19.7 787.91    4. C. difficile diarrhea A04.72 008.45    5. Diverticulitis of large intestine with perforation and abscess without bleeding K57.20 562.11    6. Malignant neoplasm of upper-outer quadrant of right breast in female, estrogen receptor positive ([facility] C50.411 174.4     Z17.0 V86.0    7. Hypotension, unspecified hypotension type I95.9 458.9    8. Hypovolemic shock (HCC) R57.1 785.59    9. Acute renal failure, unspecified acute renal failure type ([facility] N17.9 584.9    10. Non-intractable vomiting with nausea, unspecified vomiting type R11.2 787.01    11. Diarrhea of presumed infectious origin R19.7 009.3    12. Acute hypokalemia E87.6 276.8    13. Hypomagnesemia E83.42 275.2    14. Metabolic acidosis J13.7 291.7        I have discussed the diagnosis with the patient and the intended plan as seen in the above orders. I have discussed medication side effects and warnings with the patient as well.     Reviewed test results at length with patient    Anti-infectives:     Vancomycin IV 6/9   Cefepime IV 6/9   Flagyl IV 6/9   po Vancomycin 6/9     Chuy Marcelino MD FAC

## 2018-06-18 NOTE — PROGRESS NOTES
Cardiopulmonary Care Interdisciplinary rounds were held today to discuss patient's plan of care and outcomes. The following members were present: NP/Physician, Pharmacy, Nursing and Case Management. Expected Length of Stay:  4d 21h    Plan of Care: Continue current treatment plan  Need to Discuss ABX dosing with ID, poss.  D/c tomorrow

## 2018-06-18 NOTE — PROGRESS NOTES
Pharmacy Automatic Renal Dosing Protocol - Antimicrobials    Indication for Antimicrobials: Septic shock (CDIFF; Possible other intraabdominal infection) possible bacteremia     Current Regimen of Each Antimicrobial:  Vancomycin 1250 mg every 18 hours (Start Date ; Day # 10   Cefepime 2 g IV every 8 hours (Start Date ; Day # 10   Metronidazole 500 mg IV every 8 hours (Started /; Day # 10   Vancomycin 500 mg PO every 6 hours (Started ; Day #10    Previous abx:  Zosyn 3.375g x1 dose  Levaquin 750mg x1 dose    Goal Level: VANCOMYCIN TROUGH GOAL RANGE  Vancomycin Trough: 15 - 20 mcg/mL    Date Dose & Interval Measured (mcg/mL) Extrapolated (mcg/mL)   18 @ 17:59 1250 mg every 16 hours 19.1 19.6   18 @ 21:17 1250 mg every 18 hours 14.4 14.1           Significant Cultures:   18 CDIFF: Positive (FINAL)  18 Stool: Negative (FINAL)  18 Blood: NGTD x 6 days (FINAL)  18 Blood: NGTD x 5 days - Prelim    Radiology / Imaging results: (X-ray, CT scan or MRI): NA   X- ray: Right midlung mild subsegmental atelectasis. 18 CT: Thickening of the sigmoid colon. Air-fluid collection in the pelvic cul-de-sac is likely related to a contained perforation from diverticulitis. This is not adjacent to the diverticulum in the transverse colon. Stranding is  seen along the left pelvic sidewall. Paralysis, amputations, malnutrition: NA    Labs:  Recent Labs      18   0100  18   0107  18   0115   CREA  0.57  0.59  0.66   BUN  5*  5*  7   WBC  13.6*  14.4*   --      Temp (24hrs), Av.6 °F (37 °C), Min:98.2 °F (36.8 °C), Max:98.9 °F (37.2 °C)    Creatinine Clearance (mL/min) or Dialysis: 77 mL/min     Impression/Plan:   · Today's vancomycin trough of 14.4 mcg/mL extrapolated to 14.1 mcg/mL is slightly subtherapeutic with trough goal of 15-20 mcg/mL. However, today is day 10 of vancomycin and per ID's progress note, would like a 10 day total therapy.  Added stop date for today after clarifying with ID. · Cefepime dosing is appropriate for renal function and indication. Continue current regimen. Today is day 10 of 10 per ID, added stop date for today after clarifying with ID  · Vancomycin PO dose adjusted for CDIFF severity. Metronidazole IV dosed appropriately for CDIFF severity. Continue current regimen. No stop date on order  · Antimicrobial stop date: 10 days for vanc and cefepime and 14 days for PO vanc/metronidazole     Pharmacy will follow daily and adjust medications as appropriate for renal function and/or serum levels. Thank you,  Ailyn Crandall PHARMD    Recommended duration of therapy  http://[facility]/Pharmacy/Clinical%20Companion/Duration%20of%20ABX%20therapy. docx    Renal Dosing  http://[facility]/[place]/[facility]/Pharmacy/Clinical%20Companion/Renal%[email or web address]. pdf

## 2018-06-18 NOTE — PROGRESS NOTES
Occupational Therapy Note:    Chart reviewed. Spoke with pt's nurse who requested hold of therapy today due to results of UE peripheral venous testing. Will continue to follow and attempt tomorrow. Thank you.     Aston Wang OTR/L

## 2018-06-18 NOTE — PROGRESS NOTES
Surgery      ID note reviewed    Antibx plan reviewed  ? Discharge tomorrow    Diverticulitis, pt had colonoscopy 2 years ago and is scheduled for follow up in one year which should be kept. Right arm prelim US report indicate superficial thrombophlebitis. Elevating and applying warm compresses, defer to Heme/Onc regarding ? Of Nsaids, presently on heparin. Cont present [employer]  miryam Pitts MD, [facility]

## 2018-06-18 NOTE — PROGRESS NOTES
Hospitalist Progress Note    NAME: Zac De La Rosa   :  1950   MRN:  [id number]     Septic versus hypovolemic shock resolved  Acute diverticulitis with contained perforation  C. difficile colitis history of breast cancer on chemotherapy  She had a port infection and was started on Augmentin on outpatient basis and subsequently started to have diarrhea and presented with septic versus hypovolemic shock on arrival which is now better   She had diverticulitis with contained perforation on CT and managed medically by surgery ( was on Cmtx and Decadron)   She did improve significantly on antibiotics and repeat CT showed stable appearance  Port removal in   tomorrow by surgery and surgery advance diet and possibly will be ready  in next 1- 2 days for discharge              Assessment / Plan:  Septic shock plus hypovolemic  resolved  Acute diverticulitis with contained perforation      (Also received Decadron 3 days around Chemotx)  C. difficile colitis, improving diarrhea  Possible port infection in about 1 week POA and was treated with Augmentin which caused C diff . PORT REMOVED   Continue IV cefepime and Flagyl   Continue p.o. vancomycin for C. difficile  Repeat ct done showed stable appearance of perforation with no worsening  ID and Gen sx are following  For port removal in am tomorrow by Dr Angel Handley today  On GI lite diet tolerating but still watery stools  YESTERDAY still 2 WATERY STOOL.  TOday 2 forming   Surgery feels she will ready for discharge soon on po antibiotics  ID has OUTLINED  Antibiotic Regimen  Cbc in am    Acute kidney injury likely prerenal, improved  Creatinine peaked at 5.69 and is currently back to normal   Nephrology signed off    Hypokalemia, persists due to LOW PO intake       Continue to HOld Zestoretic on Discharge  Replete and check Mg and Phos in am  Hypophosphatemia   Lytes are now normal    HTN      restarte Atenolol 25 mg daily    History of breast cancer  LAST CMTX  Was 1 week POA so now in [place]  Currently on treatment and follows up with Dr. Khurram Ackerman following  Corrinne Span will consider hormonal therapy moving forward  D/w dr Jazmyn Mejia and port can be removed    Anemia of chronic disease  Hb is around base line of 8           Body mass index is 41.94 kg/(m^2). Code status: Full  Prophylaxis: Hep SQ  Recommended Disposition: Home w/Family once cleared by surgery in 2 days     Subjective:  She is entering [address] [place] [address] Rd / Reason for Physician Visit  6/17 had 2 really watery diarrhea this morning,  Yesterday only 1 bm semiformed  6/16  Eating better and stools are 'trying to form\"  6/15 appetite is increasing but stool still watery  Reports some nausea today but able to tolerate diet. Had a BM today  No abdominal pain    Review of Systems:  Symptom Y/N Comments  Symptom Y/N Comments   Fever/Chills n   Chest Pain n    Poor Appetite    Edema     Cough n   Abdominal Pain     Sputum    Joint Pain     SOB/JACOBO n   Pruritis/Rash     Nausea/vomit    Tolerating PT/OT     Diarrhea y   Tolerating Diet     Constipation    Other       Could NOT obtain due to:      Objective:     VITALS:   Last 24hrs VS reviewed since prior progress note. Most recent are:  Patient Vitals for the past 24 hrs:   Temp Pulse Resp BP SpO2   06/18/18 1446 98.4 °F (36.9 °C) 90 22 142/68 94 %   06/18/18 1116 98.5 °F (36.9 °C) 81 18 150/76 96 %   06/18/18 0712 98.6 °F (37 °C) 77 20 148/82 94 %   06/18/18 0314 98.4 °F (36.9 °C) 81 20 133/63 93 %   06/17/18 2306 98.7 °F (37.1 °C) 93 20 150/77 93 %   06/17/18 1956 98.9 °F (37.2 °C) 87 20 152/80 94 %       Intake/Output Summary (Last 24 hours) at 06/18/18 1827  Last data filed at 06/18/18 1430   Gross per 24 hour   Intake              720 ml   Output              850 ml   Net             -130 ml        PHYSICAL EXAM:  General: cooperative, no acute distress    EENT:  EOMI. Anicteric sclerae. MMM  Resp:  CTA bilaterally, no wheezing or rales.   No accessory muscle use  CV:  Regular  rhythm,  No edema, port +   GI:  Soft, Non distended, Non tender.  +Bowel sounds  Neurologic:  Alert and awake, normal speech,   Psych:   Not anxious nor agitated  Skin:  No rashes.   No jaundice    Reviewed most current lab test results and cultures  YES  Reviewed most current radiology test results   YES  Review and summation of old records today    NO  Reviewed patient's current orders and MAR    YES  PMH/SH reviewed - no change compared to H&P          Current Facility-Administered Medications:     [START ON 6/19/2018] levoFLOXacin (LEVAQUIN) tablet 750 mg, 750 mg, Oral, Q24H, Bipin Mancia MD    vancomycin 50 mg/mL oral solution (compounded) 250 mg, 250 mg, Oral, Q6H, Bipin Mancia MD, 250 mg at 06/18/18 1421    [START ON 6/19/2018] vancomycin 50 mg/mL oral solution (compounded) 125 mg, 125 mg, Oral, Q6H **FOLLOWED BY** [START ON 6/22/2018] vancomycin 50 mg/mL oral solution (compounded) 125 mg, 125 mg, Oral, Q8H **FOLLOWED BY** [START ON 6/25/2018] vancomycin 50 mg/mL oral solution (compounded) 125 mg, 125 mg, Oral, BID, Bipin Mancia MD  [address] [place] [address]  [START ON 6/19/2018] atenolol (TENORMIN) tablet 25 mg, 25 mg, Oral, DAILY, Syd Boo MD    0.45% sodium chloride with KCl 20 mEq/L infusion, , IntraVENous, CONTINUOUS, Syd Boo MD, Last Rate: 75 mL/hr at 06/17/18 1411    potassium chloride (K-DUR, KLOR-CON) SR tablet 20 mEq, 20 mEq, Oral, BID, Syd Boo MD, 20 mEq at 06/18/18 0911    cefepime (MAXIPIME) 2 g in 0.9% sodium chloride (MBP/ADV) 100 mL, 2 g, IntraVENous, Q8H, Bipin Mancia MD, Last Rate: 200 mL/hr at 06/18/18 0907, 2 g at 06/18/18 0907    melatonin tablet 4.5 mg, 4.5 mg, Oral, QHS, Syd Boo MD, 4.5 mg at 06/17/18 2130    zinc oxide-cod liver oil (DESITIN) 40 % paste, , Topical, PRN, Padmini Elizabeth MD    metroNIDAZOLE (FLAGYL) IVPB premix 500 mg, 500 mg, IntraVENous, Q8H, Bipin Mancia MD, Last Rate: 100 mL/hr at 06/18/18 1227, 500 mg at 06/18/18 1227    prochlorperazine (COMPAZINE) with saline injection 10 mg, 10 mg, IntraVENous, Q6H PRN, Chin Angelo MD, 10 mg at 06/17/18 1301    sodium chloride (NS) flush 5-10 mL, 5-10 mL, IntraVENous, Q8H, Sathya Reeves MD, 10 mL at 06/18/18 1427    sodium chloride (NS) flush 5-10 mL, 5-10 mL, IntraVENous, PRN, Chin Angelo MD, 10 mL at 06/13/18 0950    ondansetron (ZOFRAN) injection 4 mg, 4 mg, IntraVENous, Q4H PRN, Chin Angelo MD, 4 mg at 06/18/18 1420    heparin (porcine) injection 5,000 Units, 5,000 Units, SubCUTAneous, Q12H, Chin Angelo MD, 5,000 Units at 06/18/18 0912    acetaminophen (TYLENOL) suppository 650 mg, 650 mg, Rectal, Q4H PRN, Alexsandra Ventura MD    sodium chloride (NS) flush 5-10 mL, 5-10 mL, IntraVENous, PRN, Chan Foote MD  ________________________________________________________________________  Care Plan discussed with:    Comments   Patient y    Family  y  bedside   RN y    Care Manager     Consultant  y                      Multidiciplinary team rounds were held today with , nursing, pharmacist and clinical coordinator. Patient's plan of care was discussed; medications were reviewed and discharge planning was addressed. ________________________________________________________________________  Total NON critical care TIME:  20  Minutes    Total CRITICAL CARE TIME Spent:   Minutes non procedure based      Comments   >50% of visit spent in counseling and coordination of care     ________________________________________________________________________  Sam Horner MD     Procedures: see electronic medical records for all procedures/Xrays and details which were not copied into this note but were reviewed prior to creation of Plan. LABS:  I reviewed today's most current labs and imaging studies.   Pertinent labs include:  Recent Labs      06/18/18   0100  06/17/18   0107   WBC  13.6*  14.4*   HGB  7.1*  7.2*   HCT 22.4*  23.5*   PLT  232  179     Recent Labs      06/18/18   0100  06/17/18   0107  06/16/18   0115   NA  145  147*  143   K  3.4*  3.5  2.9*   CL  117*  117*  112*   CO2  22  20*  19*   GLU  112*  128*  139*   BUN  5*  5*  7   CREA  0.57  0.59  0.66   CA  7.7*  7.7*  8.1*   MG  1.0*  1.0*   --    ALB   --   1.7*   --    TBILI   --   0.3   --    SGOT   --   22   --    ALT   --   15   --        Signed: Karen Chapin MD

## 2018-06-18 NOTE — PROGRESS NOTES
Problem: Mobility Impaired (Adult and Pediatric)  Goal: *Acute Goals and Plan of Care (Insert Text)  Physical Therapy Goals  Initiated 6/10/2018  1. Patient will move from supine to sit and sit to supine , scoot up and down and roll side to side in bed with independence within 7 day(s). 2.  Patient will transfer from bed to chair and chair to bed with independence using the least restrictive device within 7 day(s). 3.  Patient will perform sit to stand with independence within 7 day(s). 4.  Patient will ambulate with independence for 200 feet with the least restrictive device within 7 day(s). 5.  Patient will ascend/descend 5 stairs with 1 handrail(s) with modified independence within 7 day(s). physical Therapy TREATMENT  Patient: Franklin Carr (66 y.o. female)  Date: 6/18/2018  Diagnosis: Shock ([address] <principal problem not specified>       Precautions: Fall  Chart, physical therapy assessment, plan of care and goals were reviewed. ASSESSMENT:  Patient lying in bed when PT arrived and agreed to therapy. Cleared by nursing. Currently needs min a for supine to sit transfers and cg assistance for sit to stand transfers. She ambulated with cg assist for 45 feet total, but needed to use toilet due to loose stools. Toilet hygiene needed total assistance to clean patient while she stood with grab bar support. Gait pattern has decreased speed, wide step width with increased trunk sway. Returned to bed with mod a x 1 and patient continued to have loose stools - cleaned patient in bed. All her needs were met when PT session ended  Progression toward goals:  []    Improving appropriately and progressing toward goals  [x]    Improving slowly and progressing toward goals  []    Not making progress toward goals and plan of care will be adjusted     PLAN:  Patient continues to benefit from skilled intervention to address the above impairments. Continue treatment per established plan of care.   Discharge Recommendations:  Home Health  Further Equipment Recommendations for Discharge:  rolling walker     SUBJECTIVE:   Patient stated Won Aguirre may be going home in the next couple days! Guilherme Eid    OBJECTIVE DATA SUMMARY:   Critical Behavior:  Neurologic State: Alert  Orientation Level: Oriented X4  Cognition: Appropriate decision making, Appropriate safety awareness, Appropriate for age attention/concentration  Safety/Judgement: Fall prevention, Awareness of environment  Functional Mobility Training:  Bed Mobility:  Rolling: Modified independent  Supine to Sit: Minimum assistance     Scooting: Minimum assistance        Transfers:                                   Balance:  Sitting: Intact  Standing: Impaired  Standing - Static: Good;Constant support  Standing - Dynamic : Fair  Ambulation/Gait Training:  Distance (ft): 45 Feet (ft)  Assistive Device: Gait belt  Ambulation - Level of Assistance: Contact guard assistance        Gait Abnormalities: Decreased step clearance;Trunk sway increased; Path deviations        Base of Support: Widened     Speed/Stella: Slow;Shuffled  Step Length: Left shortened;Right shortened               Pain:  Pain Scale 1: Numeric (0 - 10)  Pain Intensity 1: 0              Activity Tolerance:   Fair. Please refer to the flowsheet for vital signs taken during this treatment.   After treatment:   []    Patient left in no apparent distress sitting up in chair  [x]    Patient left in no apparent distress in bed  [x]    Call bell left within reach  [x]    Nursing notified  [x]    Caregiver present  []    Bed alarm activated    COMMUNICATION/COLLABORATION:   The patients plan of care was discussed with: Registered Nurse    Caron Sow, PT   Time Calculation: 26 mins

## 2018-06-19 ENCOUNTER — HOME HEALTH ADMISSION (OUTPATIENT)
Dept: HOME HEALTH SERVICES | Facility: HOME HEALTH | Age: 68
End: 2018-06-19
Payer: MEDICARE

## 2018-06-19 VITALS
HEART RATE: 101 BPM | OXYGEN SATURATION: 94 % | HEIGHT: 62 IN | SYSTOLIC BLOOD PRESSURE: 138 MMHG | BODY MASS INDEX: 42.19 KG/M2 | DIASTOLIC BLOOD PRESSURE: 79 MMHG | RESPIRATION RATE: 18 BRPM | WEIGHT: 229.28 LBS | TEMPERATURE: 98.5 F

## 2018-06-19 LAB
BASOPHILS # BLD: 0 K/UL (ref 0–0.1)
BASOPHILS NFR BLD: 0 % (ref 0–1)
DIFFERENTIAL METHOD BLD: ABNORMAL
EOSINOPHIL # BLD: 0 K/UL (ref 0–0.4)
EOSINOPHIL NFR BLD: 0 % (ref 0–7)
ERYTHROCYTE [DISTWIDTH] IN BLOOD BY AUTOMATED COUNT: 16.6 % (ref 11.5–14.5)
HCT VFR BLD AUTO: 23.6 % (ref 35–47)
HGB BLD-MCNC: 7.3 G/DL (ref 11.5–16)
IMM GRANULOCYTES # BLD: 0 K/UL (ref 0–0.04)
IMM GRANULOCYTES NFR BLD AUTO: 0 % (ref 0–0.5)
LYMPHOCYTES # BLD: 1 K/UL (ref 0.8–3.5)
LYMPHOCYTES NFR BLD: 8 % (ref 12–49)
MCH RBC QN AUTO: 28.5 PG (ref 26–34)
MCHC RBC AUTO-ENTMCNC: 30.9 G/DL (ref 30–36.5)
MCV RBC AUTO: 92.2 FL (ref 80–99)
METAMYELOCYTES NFR BLD MANUAL: 1 %
MONOCYTES # BLD: 0.4 K/UL (ref 0–1)
MONOCYTES NFR BLD: 3 % (ref 5–13)
NEUTS BAND NFR BLD MANUAL: 3 %
NEUTS SEG # BLD: 11.2 K/UL (ref 1.8–8)
NEUTS SEG NFR BLD: 85 % (ref 32–75)
NRBC # BLD: 0 K/UL (ref 0–0.01)
NRBC BLD-RTO: 0 PER 100 WBC
PLATELET # BLD AUTO: 265 K/UL (ref 150–400)
PMV BLD AUTO: 9.9 FL (ref 8.9–12.9)
RBC # BLD AUTO: 2.56 M/UL (ref 3.8–5.2)
RBC MORPH BLD: ABNORMAL
RBC MORPH BLD: ABNORMAL
WBC # BLD AUTO: 12.7 K/UL (ref 3.6–11)

## 2018-06-19 PROCEDURE — 74011250637 HC RX REV CODE- 250/637: Performed by: INTERNAL MEDICINE

## 2018-06-19 PROCEDURE — 74011250636 HC RX REV CODE- 250/636: Performed by: INTERNAL MEDICINE

## 2018-06-19 PROCEDURE — 36415 COLL VENOUS BLD VENIPUNCTURE: CPT | Performed by: INTERNAL MEDICINE

## 2018-06-19 PROCEDURE — 74011000250 HC RX REV CODE- 250: Performed by: INTERNAL MEDICINE

## 2018-06-19 PROCEDURE — 85025 COMPLETE CBC W/AUTO DIFF WBC: CPT | Performed by: INTERNAL MEDICINE

## 2018-06-19 RX ORDER — MAGNESIUM SULFATE HEPTAHYDRATE 40 MG/ML
2 INJECTION, SOLUTION INTRAVENOUS ONCE
Status: COMPLETED | OUTPATIENT
Start: 2018-06-19 | End: 2018-06-19

## 2018-06-19 RX ORDER — LEVOFLOXACIN 750 MG/1
750 TABLET ORAL EVERY 24 HOURS
Status: DISCONTINUED | OUTPATIENT
Start: 2018-06-20 | End: 2018-06-19 | Stop reason: HOSPADM

## 2018-06-19 RX ORDER — LEVOFLOXACIN 750 MG/1
750 TABLET ORAL
Status: DISCONTINUED | OUTPATIENT
Start: 2018-06-19 | End: 2018-06-19

## 2018-06-19 RX ORDER — MAGNESIUM SULFATE HEPTAHYDRATE 40 MG/ML
2 INJECTION, SOLUTION INTRAVENOUS ONCE
Status: DISCONTINUED | OUTPATIENT
Start: 2018-06-19 | End: 2018-06-19

## 2018-06-19 RX ORDER — METRONIDAZOLE 500 MG/1
500 TABLET ORAL 3 TIMES DAILY
Qty: 12 TAB | Refills: 0 | Status: SHIPPED | OUTPATIENT
Start: 2018-06-19 | End: 2018-06-23

## 2018-06-19 RX ORDER — ONDANSETRON 4 MG/1
4 TABLET, ORALLY DISINTEGRATING ORAL
Qty: 30 TAB | Refills: 0 | Status: ON HOLD | OUTPATIENT
Start: 2018-06-19 | End: 2019-07-27

## 2018-06-19 RX ORDER — METRONIDAZOLE 250 MG/1
500 TABLET ORAL 3 TIMES DAILY
Status: DISCONTINUED | OUTPATIENT
Start: 2018-06-19 | End: 2018-06-19 | Stop reason: HOSPADM

## 2018-06-19 RX ORDER — CIPROFLOXACIN 250 MG/1
500 TABLET, FILM COATED ORAL 2 TIMES DAILY
Qty: 16 TAB | Refills: 0 | Status: SHIPPED | OUTPATIENT
Start: 2018-06-19 | End: 2018-06-23

## 2018-06-19 RX ORDER — LEVOFLOXACIN 750 MG/1
750 TABLET ORAL
Qty: 4 TAB | Refills: 0 | Status: SHIPPED | OUTPATIENT
Start: 2018-06-19 | End: 2018-06-19

## 2018-06-19 RX ADMIN — METRONIDAZOLE 500 MG: 500 INJECTION, SOLUTION INTRAVENOUS at 04:45

## 2018-06-19 RX ADMIN — HEPARIN SODIUM 5000 UNITS: 5000 INJECTION, SOLUTION INTRAVENOUS; SUBCUTANEOUS at 10:41

## 2018-06-19 RX ADMIN — VANCOMYCIN HYDROCHLORIDE 250 MG: 1 INJECTION, POWDER, LYOPHILIZED, FOR SOLUTION INTRAVENOUS at 07:44

## 2018-06-19 RX ADMIN — MAGNESIUM SULFATE HEPTAHYDRATE 2 G: 40 INJECTION, SOLUTION INTRAVENOUS at 10:42

## 2018-06-19 RX ADMIN — Medication 10 ML: at 13:33

## 2018-06-19 RX ADMIN — ONDANSETRON 4 MG: 2 INJECTION INTRAMUSCULAR; INTRAVENOUS at 13:32

## 2018-06-19 RX ADMIN — VANCOMYCIN HYDROCHLORIDE 125 MG: 1 INJECTION, POWDER, LYOPHILIZED, FOR SOLUTION INTRAVENOUS at 13:33

## 2018-06-19 RX ADMIN — Medication 10 ML: at 07:44

## 2018-06-19 RX ADMIN — DIBASIC SODIUM PHOSPHATE, MONOBASIC POTASSIUM PHOSPHATE AND MONOBASIC SODIUM PHOSPHATE 1 TABLET: 852; 155; 130 TABLET ORAL at 13:32

## 2018-06-19 RX ADMIN — ATENOLOL 25 MG: 25 TABLET ORAL at 10:41

## 2018-06-19 RX ADMIN — ONDANSETRON 4 MG: 2 INJECTION INTRAMUSCULAR; INTRAVENOUS at 07:43

## 2018-06-19 RX ADMIN — LEVOFLOXACIN 750 MG: 750 TABLET, FILM COATED ORAL at 07:43

## 2018-06-19 RX ADMIN — POTASSIUM CHLORIDE 20 MEQ: 20 TABLET, EXTENDED RELEASE ORAL at 10:41

## 2018-06-19 NOTE — PROGRESS NOTES
Pharmacist Discharge Medication Reconciliation    Significant PMH:   Past Medical History:   Diagnosis Date    Arthritis     Breast cancer, right breast ([address]     Depression     GERD (gastroesophageal reflux disease)     Hypercholesterolemia     Hypertension     Other ill-defined conditions(799.89) 2012    dog bite dec 2012 needing blood transfusion     Chief Complaint for this Admission:   Chief Complaint   Patient presents with    Altered mental status     started yesterday per . patient is currently on chemo for breast cancer     Diarrhea     started about 1 week ago patient was on antibiotics for possible infection of port on right chest     Dizziness     per family started today      Allergies: Nasacort [triamcinolone acetonide] and Sulfa (sulfonamide antibiotics)    Discharge Medications:   Current Discharge Medication List        START taking these medications    Details   phosphorus (K PHOS NEUTRAL) 250 mg tablet Take 1 Tab by mouth two (2) times a day for 14 days. Qty: 28 Tab, Refills: 0      vancomycin 50 mg/mL oral solution (compounded) Take 125mg po QID for 12 doses, then take 125mg po QID for 9 doses, then take 125mg po BID for 16 doses  Qty: 93 mL, Refills: 0      apixaban (ELIQUIS) 5 mg (74 tabs) starter pack Take 10 mg (two 5 mg tablets) by mouth twice a day for 7 days   Followed by 5 mg (one 5 mg tablet) by mouth twice a day  Qty: 1 Dose Pack, Refills: 0      metroNIDAZOLE (FLAGYL) 500 mg tablet Take 1 Tab by mouth three (3) times daily for 4 days. Qty: 12 Tab, Refills: 0      ciprofloxacin HCl (CIPRO) 250 mg tablet Take 2 Tabs by mouth two (2) times a day for 4 days. Qty: 16 Tab, Refills: 0           CONTINUE these medications which have CHANGED    Details   ondansetron (ZOFRAN ODT) 4 mg disintegrating tablet Take 1 Tab by mouth every eight (8) hours as needed for Nausea.   Qty: 30 Tab, Refills: 0           CONTINUE these medications which have NOT CHANGED    Details   raNITIdine (ZANTAC) 150 mg tablet Take 150 mg by mouth two (2) times a day. Indications: Heartburn, PREVENTION OF STRESS ULCER      lidocaine-prilocaine (EMLA) topical cream Apply  to affected area as needed for Pain. Qty: 30 g, Refills: 0    Associated Diagnoses: Malignant neoplasm of upper-outer quadrant of right breast in female, estrogen receptor positive ([address] CINV (chemotherapy-induced nausea and vomiting); Vitamin D deficiency      Cholecalciferol, Vitamin D3, (VITAMIN D3) 1,000 unit cap Take 1 Tab by mouth daily. atenolol (TENORMIN) 50 mg tablet Take 25 mg by mouth daily. prochlorperazine (COMPAZINE) 10 mg tablet Take 10 mg by mouth every six (6) hours as needed. garlic 1 mg cap Take  by mouth daily. oxyCODONE-acetaminophen (PERCOCET) 5-325 mg per tablet Take 1 Tab by mouth every four (4) hours as needed for Pain. Max Daily Amount: 6 Tabs. Qty: 40 Tab, Refills: 0    Associated Diagnoses: Malignant neoplasm of right breast in female, estrogen receptor positive, unspecified site of breast (HCC)      ferrous sulfate (IRON) 325 mg (65 mg iron) tablet Take 325 mg by mouth Daily (before breakfast).            STOP taking these medications       amoxicillin-clavulanate (AUGMENTIN) 500-125 mg per tablet Comments:   Reason for Stopping:         dexamethasone (DECADRON) 4 mg tablet Comments:   Reason for Stopping:         lisinopril-hydroCHLOROthiazide (PRINZIDE, ZESTORETIC) 20-25 mg per tablet Comments:   Reason for Stopping:         calcium-cholecalciferol, d3, (CALCIUM 600 + D) 600-125 mg-unit Tab Comments:   Reason for Stopping:         ibuprofen (MOTRIN) 200 mg tablet Comments:   Reason for Stopping:         etodolac (LODINE) 400 mg tablet Comments:   Reason for Stopping:               The patient's chart, MAR and AVS were reviewed by Sierra Lozano, PHARMD.    Discharging Provider: Dr. Fabienne Douglas      Thank You,     KRISTEN Rankin

## 2018-06-19 NOTE — DISCHARGE SUMMARY
Hospitalist Discharge Summary     Patient ID:  Liana Leonard  [id number]  79 y.o.  1950    PCP on record: Kristin Jackson MD    Admit date: 6/8/2018  Discharge date and time: 6/19/2018      DISCHARGE DIAGNOSIS:  Septic shock plus hypovolemic, resolved  Acute diverticulitis with contained perforation  C. difficile colitis, improved  Acute R IJ DVT (port-associated) with superficial thrombosis of the LUE  Acute kidney injury, resolved  Hypokalemia, hypomagnesemia, hypophosphatemia  HTN, benign/essential  Infiltrating ductal ca R/breast s/p lumpectomy and chemotherapy  Anemia of chronic disease  Morbid obesity (Body mass index is 41.94 kg/(m^2)      CONSULTATIONS:  IP CONSULT TO HOSPITALIST  IP CONSULT TO HEMATOLOGY  IP CONSULT TO NEPHROLOGY  IP CONSULT TO INFECTIOUS DISEASES  IP CONSULT TO GENERAL SURGERY      Excerpted HPI from H&P of Desmond Reynolds MD:  Liana Leonard is a 79 y.o.  female who presents with nausea, vomiting and diarrhea. As per patient, pt is on chemotherapy and a week ago she noted redness around the port and she was started on PO abx. The day she started abx, redness around the port got better but pt started to have diarrhea, nausea and vomiting. She claims diarrhea started on the same day of starting abx. Pt reported fever upto 100.4 when there was redness around the port but got better after abx. Pt reported, diarrhea is constant and unable to tell number of episodes a day. Nausea is also constant but had been having 1 episode of vomiting a day. Pt reports lower abdominal cramps with diarrhea but denies any abdominal pain, chest pain, cough, problems urination. In ED pt noted to be in shock and didn't respond to IVF and started on levophed.     We were asked to admit for work up and evaluation of the above problems.    ______________________________________________________________________  DISCHARGE SUMMARY/HOSPITAL COURSE:  for full details see H&P, daily progress notes, labs, consult notes. Hospital course:  Septic and hypovolemic shock (resolved) due to acute diverticulitis with contained perforation, C difficile colitis, and possible port infection, all POA in setting of breast ca on chemotherapy: CT A/P 6/9 demonstrated inflamed diverticula in the transverse colon likely related to acute diverticulitis. Thickening of the sigmoid colon. Air-fluid collection in the pelvic cul-de-sac is likely related to a contained perforation from diverticulitis. This is not adjacent to the diverticulum in the transverse colon. Stranding is seen along the left pelvic sidewall. Pt was treated with vancomycin (IV and po) cefepime and flagyl for 10 days while here. She had her chemo port removed 6/14. Blood cultures were negative. Stool cultures were positive for C Diff 6/8. Repeat CT A/P on 6/11 demonstrated perforated diverticulum from the transverse colon with adjacent inflammatory change is stable. Thickening of the sigmoid colon with adjacent inflammatory change is stable. There is no drainable abscess at this point. She was seen by ID and recommended for po flagyl, levaquin  X 4 more days (after completion of 10 days of IV therapy in hospital) as well as po vancomycin x 14 days, with a vancomycin taper as follows: vancomycin 125 mg every 6 hours x 3 days , 125 mg q 8h x 3 days , vancomycin 125 mg po bid 8 days. She was seen by general surgery without additional recommendations. Pt still having 2 BMs daily on discharge but felt that this was a level that she could manage at home. Acute R IJ DVT (port-associated) with superficial thrombosis of the LUE:  Doppler US demonstrated left-sided superficial venous thrombosis identified in the cephalic (upper arm), median cubital and cephalic (forearm) veins. There was also deep venous thrombosis detected in the right internal jugular vein.   Due to her ongoing need for antibiotics as well at her highly variable po intake and ongoing loose stools, it was determined that she should initially start with eliquis for anticoagulation. She has been given prescription care for 1 month of free medication. If her infections are treated and oral intake is stable, she can transition to oral coumadin at that point as she does not have insurance coverage for NOACs. Goal for her is 3 months of anticoagulation for \"provoked\" DVT if she is able to tolerate it. Pt should have Hg check in 1 week. Acute kidney injury likely prerenal, improved: Cr peaked at 5.69 and is currently back to normal.  No further recommendations from nephrology. I have counseled patient and her family regarding strategies to prevent dehydration. Hypokalemia, hypomagnesemia, hypophosphatemia: replaced. Pt discharged on oral neutra phos for the next 2 weeks as her diarrhea continues to improve. She should have electrolytes checked with her PCP in 1-2 weeks.   HTN: restarted atenolol prior to discharge. Her lisinopril-HCT was stopped. Infiltrating ductal Ca R/breast s/p lumpectomy and s/p adjuvant chemotherapy 2nd round 5/31:  Pt seen by oncology while here. She has chosen no further treatment for her cancer at this time. Her chemo port was removed 6/14 as discussed above. She can f/u with Dr. Juana Zamudio as needed. I spoke with his nurse practitioner and his office has recommended 3 months of anticoagulation as above for the DVT.   Anemia of chronic disease: baseline of 8  Morbid obesity (Body mass index is 41.94 kg/(m^2)      _______________________________________________________________________  Patient seen and examined by me on discharge day. Pertinent Findings:  Gen: obese, awake, appropriate, NAD  HEENT: cl tylor, no lesions  Chest: CTA bilaterally, no crackles or wheezes. L anticubital edema/induration with mild warmth and erythema.   Cv: RRR, no murmur, pedal edema  Abd: soft, NT, moderately distended, BS+, no mass  Neuro: CN intact  _______________________________________________________________________  DISCHARGE MEDICATIONS:   Current Discharge Medication List      START taking these medications    Details   phosphorus (K PHOS NEUTRAL) 250 mg tablet Take 1 Tab by mouth two (2) times a day for 14 days. Qty: 28 Tab, Refills: 0      vancomycin 50 mg/mL oral solution (compounded) Take 125mg po QID for 12 doses, then take 125mg po QID for 9 doses, then take 125mg po BID for 16 doses  Qty: 93 mL, Refills: 0      apixaban (ELIQUIS) 5 mg (74 tabs) starter pack Take 10 mg (two 5 mg tablets) by mouth twice a day for 7 days   Followed by 5 mg (one 5 mg tablet) by mouth twice a day  Qty: 1 Dose Pack, Refills: 0      levoFLOXacin (LEVAQUIN) 750 mg tablet Take 1 Tab by mouth nightly for 4 days. Qty: 4 Tab, Refills: 0      metroNIDAZOLE (FLAGYL) 500 mg tablet Take 1 Tab by mouth three (3) times daily for 4 days. Qty: 12 Tab, Refills: 0         CONTINUE these medications which have CHANGED    Details   ondansetron (ZOFRAN ODT) 4 mg disintegrating tablet Take 1 Tab by mouth every eight (8) hours as needed for Nausea. Qty: 30 Tab, Refills: 0         CONTINUE these medications which have NOT CHANGED    Details   raNITIdine (ZANTAC) 150 mg tablet Take 150 mg by mouth two (2) times a day. Indications: Heartburn, PREVENTION OF STRESS ULCER      lidocaine-prilocaine (EMLA) topical cream Apply  to affected area as needed for Pain. Qty: 30 g, Refills: 0    Associated Diagnoses: Malignant neoplasm of upper-outer quadrant of right breast in female, estrogen receptor positive ([address] CINV (chemotherapy-induced nausea and vomiting); Vitamin D deficiency      Cholecalciferol, Vitamin D3, (VITAMIN D3) 1,000 unit cap Take 1 Tab by mouth daily. atenolol (TENORMIN) 50 mg tablet Take 25 mg by mouth daily. prochlorperazine (COMPAZINE) 10 mg tablet Take 10 mg by mouth every six (6) hours as needed. garlic 1 mg cap Take  by mouth daily. oxyCODONE-acetaminophen (PERCOCET) 5-325 mg per tablet Take 1 Tab by mouth every four (4) hours as needed for Pain. Max Daily Amount: 6 Tabs. Qty: 40 Tab, Refills: 0    Associated Diagnoses: Malignant neoplasm of right breast in female, estrogen receptor positive, unspecified site of breast (HCC)      ferrous sulfate (IRON) 325 mg (65 mg iron) tablet Take 325 mg by mouth Daily (before breakfast). STOP taking these medications       amoxicillin-clavulanate (AUGMENTIN) 500-125 mg per tablet Comments:   Reason for Stopping:         dexamethasone (DECADRON) 4 mg tablet Comments:   Reason for Stopping:         lisinopril-hydroCHLOROthiazide (PRINZIDE, ZESTORETIC) 20-25 mg per tablet Comments:   Reason for Stopping:         calcium-cholecalciferol, d3, (CALCIUM 600 + D) 600-125 mg-unit Tab Comments:   Reason for Stopping:         ibuprofen (MOTRIN) 200 mg tablet Comments:   Reason for Stopping:         etodolac (LODINE) 400 mg tablet Comments:   Reason for Stopping:               My Recommended Diet, Activity, Wound Care, and follow-up labs are listed in the patient's Discharge Insturctions which I have personally completed and reviewed.     ______________________________________________________________________    Risk of deterioration: High    Condition at Discharge:  Stable  ______________________________________________________________________    Disposition  Home with family, no needs  ______________________________________________________________________    Care Plan discussed with:   Patient, Family, RN, Care Manager    ______________________________________________________________________    Code Status: Full Code  ______________________________________________________________________      Follow up with:   PCP : Kristin Jackson MD  Follow-up Information     Follow up With Details Comments Contact Info    Kristin Jackson MD In 1 week hospital follow up with blood work to recheck your electrolytes and hemoglobin [facility] [address]  [phone]      [address] [place] [address]  This is your post-acute home healthcare provider. If you do not hear from them within 24-48 hours, please give them a call.   [address] [facility]  [address] [facility] MD Stephen  As needed [address]  [address]      Osei Swenson MD In 2 weeks or sooner if needed as follow up for your diarrhea [phone]                Total time in minutes spent coordinating this discharge (includes going over instructions, follow-up, prescriptions, and preparing report for sign off to her PCP) :  35 minutes    Signed:  Avelino Pedroza MD

## 2018-06-19 NOTE — PROGRESS NOTES
CM spoke with pt and spouse regarding therapy recommendations for [facility] services and a RW at discharge. Pt's spouse declined [facility] PT and RW, reported \"I don't think she needs it\" and that they have access to a RW. However, they are agreeable to [facility] SN services at this time. FOC offered. [facility] SN selected. FOC completed and placed on bedside chart. Referral sent via CC, awaiting acceptance. CM called and cancelled PCP appointment scheduled for tomorrow. Will reschedule once have confirmed discharge. 11:30AM UPDATE  CM informed by MD that looking into Eliquis at discharge for 3 months. However, informed pt has no prescription drug coverage at this time. MD to discuss with Dr. Reyna Sanchez for options due to high cost of med. CM to check pricing for PO Vancomycin at pt's preferred Rx.     11:45AM UPDATE  CM contacted pt's preferred Rx ([facility]). CM informed they do not carry the vancomycin solution, just the capsules. CM called [facility], informed they also do not have the solution, only capsules. CM clarified with MD, needs to be solution not capsules. CM called [address] [place] [address] to get pricing check for the following:       Oral Vancomycin Solution (total of 37 doses) = $110.04           125 mg QID x 12 doses           125 mg TID x 9 doses           125 mg BID x 16 doses     12:10PM UPDATE  CM spoke with MD regarding anticoagulant therapy. MD requested CM contact pt's PCP office to see if would be willing to follow pt for Coumadin therapy INR checks 2-3x/week until pt is at a therapeutic level, as she has not been on it since in the hospital. CM called Nicholas Rory Dean ([phone]) to clarify if pt can use free-month trial card with no prescription drug coverage. CM informed pt is able to use free trial card, even without med coverage. Therefore, pt will not need INR checks with PCP at this time. CM updated MD - pt will have routine hospital follow-up with PCP to discuss long-term plan.     2:00PM UPDATE  Pt to discharge home by private vehicle with spouse today. Pt's family to assist with transport to follow-up care appointments. Eliquis Free-Month Trial Card placed on bedside chart with prescriptions. CM discuss with pt's spouse who reported they did not need financial assistance with medications at this time. Verbalized understanding that will need to go to [address] [place] [address] to get Vancomycin medication as they have it in solution form. [facility] SN arranged. PCP and ID f/u appointments scheduled. Pt to schedule Oncology f/u appointment as needed. All information entered into pt AVS.     Pt has no additional CM needs at this time. Floor nurse notified. 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

## 2018-06-19 NOTE — DISCHARGE INSTRUCTIONS
HOSPITALIST DISCHARGE INSTRUCTIONS    NAME: Christy Mendez   :  1950   MRN:  [id number]     Date/Time:  2018 1:18 PM    ADMIT DATE: 2018   DISCHARGE DATE: 2018     Attending Physician: Marilou Fleischer, MD    DISCHARGE DIAGNOSIS:  Acute diverticulitis with contained perforation  C. difficile colitis  Acute kidney injury, resolved  Low potassium, low magnesium  Hypertension  Breast cancer  Anemia of chronic disease    MEDICATIONS:  See above    · It is important that you take the medication exactly as they are prescribed. · Keep your medication in the bottles provided by the pharmacist and keep a list of the medication names, dosages, and times to be taken in your wallet. · Do not take other medications without consulting your doctor. Pain Management: per above medications    What to do at [address]:  [address] [facility] [address] for the next week, then slowly resume your usual diet. Maintain good fluid intake to prevent dehydration. Recommended activity: Activity as tolerated    If you have questions regarding the hospital related prescriptions or hospital related issues please call [facility] at . You can always direct your questions to your primary care doctor if you are unable to reach your hospital physician; your PCP works as an extension of your hospital doctor just like your hospital doctor is an extension of your PCP for your time at [facility]. If you experience any of the following symptoms then please call your primary care physician or return to the emergency room if you cannot get hold of your doctor:  Fever, chills, nausea, vomiting, diarrhea, change in mentation, falling, bleeding, shortness of breath    Additional Instructions:    I recommend that you take an over-the-counter probiotic (such as Align, Culturelle, or store generic) while you are on antibiotics. Bring these papers with you to your follow up appointments.  The papers will help your doctors be sure to continue the care plan from the hospital.              Information obtained by :  I understand that if any problems occur once I am at home I am to contact my physician. I understand and acknowledge receipt of the instructions indicated above. Physician's or R.N.'s Signature                                                                  Date/Time                                                                                                                                              Patient or Representative Signature                                                          Date/Time       Porter Decker Diet: Care Instructions  Your Care Instructions    A bland diet is used when you need food that is easy to chew, swallow, and digest. You will need to choose soft foods that are low in spices and seasonings. You will need to avoid high-fat foods, as well as caffeine and alcohol. Your doctor or dietitian can help you plan a bland diet based on your health and what you prefer to eat. Ask your doctor how long you should stay on this diet. As you get better, you will probably be able to go back to a regular diet. Talk with your doctor or dietitian before you make changes in your diet. Follow-up care is a key part of your treatment and safety. Be sure to make and go to all appointments, and call your doctor if you are having problems. It's also a good idea to know your test results and keep a list of the medicines you take. How can you care for yourself at home? · Choose foods that are easy to chew and swallow. Good choices are mashed potatoes, soft breads and rolls, cream soups, oatmeal, and Cream of Wheat. · Choose soft, well-cooked vegetables and soft or canned fruits.  Good choices are applesauce, ripe bananas, and non-citrus fruit juice.  · Try milk, yogurt, or other milk products, if you can digest dairy without too many problems. Your doctor may limit milk and milk products for a while. If so, he or she may recommend a calcium and vitamin D supplement. · Choose soft protein foods such as eggs, tofu, steamed fish, chicken, and turkey. Slow-cooking methods, such as stewing, will help soften meat. Chopping meat in a  or  also will make it easier to eat. · Avoid nuts, raw vegetables, hard crackers, tough meats, and prunes and prune juice. · Avoid foods that are very spicy, such as foods seasoned with black pepper, chili peppers, horseradish, or hot sauce. · Avoid highly acidic foods such as citrus fruits, citrus fruit juices, and tomato-based foods. · Avoid high-fat foods such as fried meat, chips, and rich desserts. · Check with your doctor before you drink alcohol or beverages that have caffeine, such as coffee, tea, and cola beverages. Where can you learn more? Go to http://sowmya-colin.info/. Enter O464 in the search box to learn more about \"Soft-Textured, Mari Dials Diet: Care Instructions. \"  Current as of: May 12, 2017  Content Version: 11.4  © 5781-0380 [employer]. Care instructions adapted under license by [employer] (which disclaims liability or warranty for this information). If you have questions about a medical condition or this instruction, always ask your healthcare professional. [place] [address] any warranty or liability for your use of this information. Clostridium Difficile Colitis: Care Instructions  Your Care Instructions    Clostridium difficile (also called C. difficile) are bacteria that can cause swelling and irritation of the large intestine, or colon. This inflammation is also called colitis. It can cause diarrhea, fever, and belly cramps. You may get C. difficile colitis if you take antibiotics.  The infection is most common in people who are taking antibiotics while in the hospital. It is also common in older people in hospitals and nursing homes. Severe disease could cause the colon to swell to many times its normal size (toxic megacolon). This can cause death and needs emergency treatment. You may have a swollen belly that is painful or tender, a rapid heartbeat, and a fever. Follow-up care is a key part of your treatment and safety. Be sure to make and go to all appointments, and call your doctor if you are having problems. It's also a good idea to know your test results and keep a list of the medicines you take. How can you care for yourself at home? · Your doctor may give you antibiotics to treat C. difficile colitis. If your doctor prescribes an antibiotic, he or she will give you a different antibiotic than the one that caused your infection. Take your antibiotics as directed. Do not stop taking them just because you feel better. You need to take the full course of antibiotics. · To prevent dehydration, drink plenty of fluids, enough so that your urine is light yellow or clear like water. Choose water and other caffeine-free clear liquids until you feel better. If you have kidney, heart, or liver disease and have to limit fluids, talk with your doctor before you increase the amount of fluids you drink. · Begin eating small amounts of mild foods, if you feel like it. Try yogurt that has live cultures of lactobacillus (check the label). ¨ Avoid spicy foods, fruits, alcohol, and caffeine until 48 hours after all symptoms go away. ¨ Avoid chewing gum that contains sorbitol. ¨ Avoid dairy products (except for yogurt with lactobacillus) while you have diarrhea and for 3 days after symptoms go away. · To prevent the spread of C. difficile, practice good hygiene. Keep your hands clean by washing them well and often with soap and clean, running water. Alcohol-based hand sanitizers do not kill C. difficile.   When should you call for help? Call 911 if:  ? · You passed out (lost consciousness). ?Call your doctor now or seek immediate medical care if:  ? · You have a fever over 101°F or shaking chills. ? · You feel lightheaded or have a fast heart rate. ? · You pass stools that are almost always bloody. ? · You have signs of needing more fluids. You have sunken eyes and a dry mouth, and you pass only a little dark urine. ? · You have severe belly pain with or without bloating. ? · You have severe vomiting and cannot keep down liquids. ? · You are not passing any stools or gas. ? Watch closely for changes in your health, and be sure to contact your doctor if:  ? · You do not get better as expected. Where can you learn more? Go to http://sowmya-colin.info/. Enter ([phone] [phone] in the search box to learn more about \"Clostridium Difficile Colitis: Care Instructions. \"  Current as of: March 3, 2017  Content Version: 11.4  © 6796-3453 [employer]. Care instructions adapted under license by [employer] (which disclaims liability or warranty for this information). If you have questions about a medical condition or this instruction, always ask your healthcare professional. [address] any warranty or liability for your use of this information.

## 2018-06-19 NOTE — PROGRESS NOTES
PCP GABY appt scheduled with Dr. Connor Ayers on 6/26/2018 at 2:00pm. Appt added to [address] [facility], [address] Specialist

## 2018-06-19 NOTE — HOME CARE
Home Health Care Discharge Planning: [facility]  Face to Face Encounter      NAME: Aristeo Adams   :  1950   MRN:  [id number]     Primary Diagnosis:   Acute diverticulitis with contained perforation  C. difficile colitis  Acute kidney injury, resolved  Low potassium, low magnesium  Hypertension  Breast cancer  Anemia of chronic disease    Date of Face to Face:  2018 1:23 PM                                  Face to Face Encounter findings are related to primary reason for home care:   YES    1. I certify that the patient needs intermittent skilled nursing care, physical therapy and/or speech therapy. I will not be following this patient in the Community and Dr. Lakia Snyder MD will be responsible for signing the [address] [facility] [address] 2. Initial Orders for Care: Skilled Nursing    3. I certify that this patient is homebound because of illness or injury, need the aid of supportive devices such as crutches, canes, wheelchairs, and walkers; the use of special transportation; or the assistance of another person in order to leave their place of residence. There exists a normal inability to leave home and leaving home requires a considerable and taxing effort. 4. I certify that this patient is under my care and that I had a Face-to-Face Encounter that meets the physician Face-to-Face Encounter requirements. Document the physical findings from the Face-to-Face Encounter that support the need for skilled services: Has new diagnosis that requires skilled nursing teaching and intervention , Has new medications that requires skilled nursing teaching and monitoring for understanding and compliance.      Shilo Bustamante MD  Discharging Physician  Office: [phone]  Fax:   [phone]

## 2018-06-19 NOTE — PROGRESS NOTES
0730  Report received from [facility]. SBAR, Kardex, ED Summary, Procedure Summary, Intake/Output, MAR, Accordion, Recent Results, Med Rec Status and Cardiac Rhythm NSR were discussed. Jovan Delong at bedside. Discontinue IV metronidazole she will change to PO. Plan is for patient discharge, no phosphorus lab needed.

## 2018-06-19 NOTE — PROGRESS NOTES
Discharged patient. Reviewed all discharge orders with patient and her . Included all new medications, follow-up appointments, diet and c-diff instructions. Patient and her  verbalized understanding of all instructions. IV and tele removed.  running home to  clothes for patient and patient will discharge.

## 2018-06-20 NOTE — PROGRESS NOTES
Infectious Disease Progress Note        IMPRESSION:   · S/p removal of aj cath   · Improved WBC count , now down to 13.6   · Acute diverticulitis with improved CT appearance () -no drainable collection/ thickening of sigmoid colon / perforated diverticulum from transverse colon with adjacent inflammatory change stable   · Moderately severe C.diff colitis on po Vanc/ IV Flagyl , now down to 2 BMs daily  · H/o aj cath infection( swelling, erythema, fever) after chemotherapy on   · Leucocytosis , persistent probably multifactorial - Acute C.diffcolitis ,also acute diverticulitis ( to lesser degree) effect of peg filgrastin (administered ), Decadron po ( pt had 3 doses ,,)   · MIL resolved  · Infiltrating ductal Ca R/breast  s/p lumpectomy ,s/p  adjuvant chemotherapy 2nd round           PLAN:       · S/p Vancomycin / Cefepime IV x 10 days   · Dc planning on po Flagyl, levaquin  X 4 more days . This would  cover acute diverticulitis   · Po vancomycin x 14 days, do Vancomycin taper as follows:Vancomycin 125 mg every 6 hours x 3 days , 125 mg q 8h x 3 days , Vancomycin 125 mg po bid 8 days. · Advance diet as tolerated  · No more imodium  · Increase po fluid intake   · Outpatient f/u 2 week after discharge as needed , call [phone] for appointment . Pt advised to call me if diarrhea recurred after completion of Vancomycin po. Subjective:     Pt seen . Bowel movements less, about twice daily  .  at bedside, ready for discharge    Review of Systems:  A comprehensive review of systems was negative except for that written in the History of Present Illness. Objective:     Blood pressure 138/79, pulse (!) 101, temperature 98.5 °F (36.9 °C), resp. rate 18, height 5' 2\" (1.575 m), weight 229 lb 4.5 oz (104 kg), SpO2 94 %.   Temp (24hrs), Av.4 °F (36.9 °C), Min:98.3 °F (36.8 °C), Max:98.5 °F (36.9 °C)      Patient Vitals for the past 24 hrs:   Temp Pulse Resp BP SpO2 06/19/18 1117 98.5 °F (36.9 °C) - 18 138/79 94 %   06/19/18 0739 98.3 °F (36.8 °C) (!) 101 18 128/89 93 %         Lines:  Peripheral IV:            Physical Exam:   General:  Resting,   Eyes:  Sclera anicteric. Pupils equally round and reactive to light. Mouth/Throat: Mucous membranes , oral pharynx clear   Neck: Supple   Lungs:   Clear to auscultation bilaterally    CV:  Regular rate and rhythm,no murmur, click, rub or gallop   Abdomen:   Soft, non-tender. bowel sounds +. non-distended   Extremities: No  edema   Skin: Skin color, texture, turgor normal. no  rash / lesions   Lymph nodes: Cervical and supraclavicular normal   Lines/Devices:  Intact, no erythema, drainage or tenderness B/L UE         Studies:      Lab Results   Component Value Date/Time    Culture result: NO GROWTH 6 DAYS 06/12/2018 01:11 PM    Culture result:  06/08/2018 10:15 PM     NO ROUTINE ENTERIC PATHOGENS ISOLATED INCLUDING SALMONELLA, SHIGELLA, YERSINIA, VIBRIO OR SHIGA TOXIN PRODUCING E. COLI    Culture result: NO GROWTH 6 DAYS 06/08/2018 09:22 PM    Culture result: NO GROWTH 6 DAYS 06/08/2018 09:22 PM    Culture result:  06/04/2013 03:43 PM     MRSA NOT PRESENT      Screening of patient nares for MRSA is for surveillance purposes and, if positive, to facilitate isolation considerations in high risk settings. It is not intended for automatic decolonization interventions per se as regimens are not sufficiently effective to warrant routine use. XR Results (most recent):    Results from Hospital Encounter encounter on 06/08/18   XR CHEST PORT   Narrative Clinical indication: Shortness of breath. Portable AP semierect view of the chest is obtained compared to June 8. Central  line tip is at this time in the expected position of the right atrium. The lung  fields are clear. Impression impression: No acute changes. Support devices as above.         Patient Active Problem List   Diagnosis Code    Obesity, morbid ([facility] [address] E66.01  Breast cancer of upper-outer quadrant of right female breast ([facility] C50.411    S/P lumpectomy, right breast Z98.890    Malignant neoplasm of upper-outer quadrant of right breast in female, estrogen receptor positive ([facility] C50.411, Z17.0    Shock ([facility] R57.9    HTN (hypertension) I10         ICD-10-CM ICD-9-CM    1. Septic shock (HCC) A41.9 038.9     R65.21 785.52      995.92    2. Infection due to port-a-cath, initial encounter T80.219A 999.31    3. Diarrhea, unspecified type R19.7 787.91    4. C. difficile diarrhea A04.72 008.45    5. Diverticulitis of large intestine with perforation and abscess without bleeding K57.20 562.11    6. Malignant neoplasm of upper-outer quadrant of right breast in female, estrogen receptor positive ([facility] C50.411 174.4     Z17.0 V86.0    7. Hypotension, unspecified hypotension type I95.9 458.9    8. Hypovolemic shock (HCC) R57.1 785.59    9. Acute renal failure, unspecified acute renal failure type ([facility] N17.9 584.9    10. Non-intractable vomiting with nausea, unspecified vomiting type R11.2 787.01    11. Diarrhea of presumed infectious origin R19.7 009.3    12. Acute hypokalemia E87.6 276.8    13. Hypomagnesemia E83.42 275.2    14. Metabolic acidosis Z12.1 973.9        I have discussed the diagnosis with the patient and the intended plan as seen in the above orders. I have discussed medication side effects and warnings with the patient as well.     Reviewed test results at length with patient    Anti-infectives:     Vancomycin IV 6/9   Cefepime IV 6/9   Flagyl IV 6/9   po Vancomycin 6/9     Eleuterio Champagne MD FACP

## 2018-06-21 ENCOUNTER — HOME CARE VISIT (OUTPATIENT)
Dept: SCHEDULING | Facility: HOME HEALTH | Age: 68
End: 2018-06-21
Payer: MEDICARE

## 2018-06-21 ENCOUNTER — DOCUMENTATION ONLY (OUTPATIENT)
Dept: FAMILY MEDICINE CLINIC | Age: 68
End: 2018-06-21

## 2018-06-21 ENCOUNTER — HOSPITAL ENCOUNTER (OUTPATIENT)
Dept: INFUSION THERAPY | Age: 68
End: 2018-06-21
Payer: MEDICARE

## 2018-06-21 ENCOUNTER — TELEPHONE (OUTPATIENT)
Dept: FAMILY MEDICINE CLINIC | Age: 68
End: 2018-06-21

## 2018-06-21 PROCEDURE — 3331090001 HH PPS REVENUE CREDIT

## 2018-06-21 PROCEDURE — 3331090002 HH PPS REVENUE DEBIT

## 2018-06-21 PROCEDURE — G0299 HHS/HOSPICE OF RN EA 15 MIN: HCPCS

## 2018-06-21 PROCEDURE — 400013 HH SOC

## 2018-06-21 NOTE — PROGRESS NOTES
I spoke to patients  who called . He would like to increase the vancomycin po , she is now on 1/2 tsp. Taking antibiotics , also potassium . No increase in frequency , but BM still loose . Advised him to increase to 1 tsp, same frequency , call us back Monday to  Let us know how he is doing.    If she does not call, please F/u Monday

## 2018-06-21 NOTE — TELEPHONE ENCOUNTER
----- Message from [facility] sent at 6/21/2018  8:11 AM EDT -----  Regarding: Dr. Kajal Mera  Pt  Jeison Urrutia [phone] would like to give Dr. Glenda Cabot an update on the pt and ask a few questions.

## 2018-06-22 ENCOUNTER — APPOINTMENT (OUTPATIENT)
Dept: INFUSION THERAPY | Age: 68
End: 2018-06-22
Payer: MEDICARE

## 2018-06-22 VITALS
DIASTOLIC BLOOD PRESSURE: 68 MMHG | OXYGEN SATURATION: 99 % | SYSTOLIC BLOOD PRESSURE: 122 MMHG | TEMPERATURE: 98.4 F | HEART RATE: 66 BPM | RESPIRATION RATE: 18 BRPM

## 2018-06-22 PROCEDURE — 3331090002 HH PPS REVENUE DEBIT

## 2018-06-22 PROCEDURE — 3331090001 HH PPS REVENUE CREDIT

## 2018-06-23 ENCOUNTER — HOME CARE VISIT (OUTPATIENT)
Dept: SCHEDULING | Facility: HOME HEALTH | Age: 68
End: 2018-06-23
Payer: MEDICARE

## 2018-06-23 VITALS
DIASTOLIC BLOOD PRESSURE: 102 MMHG | OXYGEN SATURATION: 96 % | TEMPERATURE: 98 F | SYSTOLIC BLOOD PRESSURE: 188 MMHG | RESPIRATION RATE: 16 BRPM | HEART RATE: 65 BPM

## 2018-06-23 PROCEDURE — 3331090001 HH PPS REVENUE CREDIT

## 2018-06-23 PROCEDURE — G0299 HHS/HOSPICE OF RN EA 15 MIN: HCPCS

## 2018-06-23 PROCEDURE — 3331090002 HH PPS REVENUE DEBIT

## 2018-06-24 ENCOUNTER — HOME CARE VISIT (OUTPATIENT)
Dept: SCHEDULING | Facility: HOME HEALTH | Age: 68
End: 2018-06-24
Payer: MEDICARE

## 2018-06-24 PROCEDURE — 3331090002 HH PPS REVENUE DEBIT

## 2018-06-24 PROCEDURE — G0300 HHS/HOSPICE OF LPN EA 15 MIN: HCPCS

## 2018-06-24 PROCEDURE — 3331090001 HH PPS REVENUE CREDIT

## 2018-06-25 ENCOUNTER — TELEPHONE (OUTPATIENT)
Dept: FAMILY MEDICINE CLINIC | Age: 68
End: 2018-06-25

## 2018-06-25 PROCEDURE — 3331090002 HH PPS REVENUE DEBIT

## 2018-06-25 PROCEDURE — 3331090001 HH PPS REVENUE CREDIT

## 2018-06-25 NOTE — TELEPHONE ENCOUNTER
----- Message from Joel Wright sent at 6/25/2018  4:51 PM EDT -----  Regarding: Dr. Verdugo Tongan: [phone]  Mr. Miguel Moreau, patient , requesting to speak with Dr. Floria Frankel.

## 2018-06-26 ENCOUNTER — HOME CARE VISIT (OUTPATIENT)
Dept: HOME HEALTH SERVICES | Facility: HOME HEALTH | Age: 68
End: 2018-06-26
Payer: MEDICARE

## 2018-06-26 VITALS
OXYGEN SATURATION: 97 % | TEMPERATURE: 98.4 F | HEART RATE: 80 BPM | SYSTOLIC BLOOD PRESSURE: 150 MMHG | DIASTOLIC BLOOD PRESSURE: 80 MMHG | RESPIRATION RATE: 17 BRPM

## 2018-06-26 PROCEDURE — 3331090002 HH PPS REVENUE DEBIT

## 2018-06-26 PROCEDURE — 3331090001 HH PPS REVENUE CREDIT

## 2018-06-27 PROCEDURE — 3331090001 HH PPS REVENUE CREDIT

## 2018-06-27 PROCEDURE — 3331090002 HH PPS REVENUE DEBIT

## 2018-06-27 NOTE — TELEPHONE ENCOUNTER
Called and spoke with . He states that patient is starting to have formed formed stools about 2 times daily. He states that it is not completely formed yet. Patient is taking 3 times daily and will go down to 2 times daily tomorrow. Fatigue is getting better slowly. Denies any fevers or chills. Eating better.

## 2018-06-28 ENCOUNTER — HOME CARE VISIT (OUTPATIENT)
Dept: HOME HEALTH SERVICES | Facility: HOME HEALTH | Age: 68
End: 2018-06-28
Payer: MEDICARE

## 2018-06-28 ENCOUNTER — OFFICE VISIT (OUTPATIENT)
Dept: ONCOLOGY | Age: 68
End: 2018-06-28

## 2018-06-28 VITALS
BODY MASS INDEX: 41.3 KG/M2 | TEMPERATURE: 98.6 F | DIASTOLIC BLOOD PRESSURE: 87 MMHG | OXYGEN SATURATION: 94 % | HEART RATE: 77 BPM | HEIGHT: 62 IN | SYSTOLIC BLOOD PRESSURE: 138 MMHG | WEIGHT: 224.4 LBS

## 2018-06-28 DIAGNOSIS — A04.72 C. DIFFICILE DIARRHEA: ICD-10-CM

## 2018-06-28 DIAGNOSIS — T45.1X5A ANEMIA ASSOCIATED WITH CHEMOTHERAPY: ICD-10-CM

## 2018-06-28 DIAGNOSIS — R57.9 SHOCK (HCC): ICD-10-CM

## 2018-06-28 DIAGNOSIS — Z17.0 MALIGNANT NEOPLASM OF UPPER-OUTER QUADRANT OF RIGHT BREAST IN FEMALE, ESTROGEN RECEPTOR POSITIVE (HCC): Primary | ICD-10-CM

## 2018-06-28 DIAGNOSIS — C50.411 MALIGNANT NEOPLASM OF UPPER-OUTER QUADRANT OF RIGHT BREAST IN FEMALE, ESTROGEN RECEPTOR POSITIVE (HCC): Primary | ICD-10-CM

## 2018-06-28 DIAGNOSIS — D64.81 ANEMIA ASSOCIATED WITH CHEMOTHERAPY: ICD-10-CM

## 2018-06-28 PROCEDURE — 3331090002 HH PPS REVENUE DEBIT

## 2018-06-28 PROCEDURE — 3331090001 HH PPS REVENUE CREDIT

## 2018-06-28 RX ORDER — POTASSIUM CHLORIDE 20 MEQ/1
20 TABLET, EXTENDED RELEASE ORAL DAILY
Refills: 0 | Status: ON HOLD | COMMUNITY
Start: 2018-06-27 | End: 2019-07-27

## 2018-06-28 RX ORDER — LISINOPRIL 20 MG/1
40 TABLET ORAL DAILY
Refills: 2 | COMMUNITY
Start: 2018-06-26

## 2018-06-29 ENCOUNTER — DOCUMENTATION ONLY (OUTPATIENT)
Dept: ONCOLOGY | Age: 68
End: 2018-06-29

## 2018-06-29 PROCEDURE — 3331090001 HH PPS REVENUE CREDIT

## 2018-06-29 PROCEDURE — 3331090002 HH PPS REVENUE DEBIT

## 2018-06-29 NOTE — PROGRESS NOTES
Spoke to Jair Ayon in radiation, Dr Marylee Lipoma is booked here at [facility] until August, she has left a message for Pt and contacted Nakul Hidalgo who will try to get Pt in at the [facility] office\".

## 2018-06-30 PROCEDURE — 3331090001 HH PPS REVENUE CREDIT

## 2018-06-30 PROCEDURE — 3331090002 HH PPS REVENUE DEBIT

## 2018-06-30 NOTE — PROGRESS NOTES
Oncology Consultation Note        Patient: Esther Seugra MRN: [id number]  SSN: [id number]    YOB: 1950  Age: 79 y.o. Sex: female        Diagnosis:     1. Right breast carcinoma:  T2 N0 M0 (Stage IIA) infiltrating ductal carcinoma, Tumor size 3.8 cm, LN -ve, grade 2, %, [place] 95%, Her 2 -ve. Treatment:     1. Adjuvant chemotherapy   Taxotere, Cyclophosphamide - s/p 2 cycles   Therapy d/c'd due to gr IV toxicity  2. Right sided lumpectomy on 03/08/2018. HPI:      Esther Segura is a 79 y.o. female with breast cancer. She received 2 cycles of adjuvant chemotherapy. She received two cycles of adjuvant chemotherapy. She developed Gr IV side effects and was admitted to the hospital with sepsis and C Diff diarrhea. A diagnosis of breast cancer was made with help of an annual mammogram. Bopsies were performed on both the RIGHT and LEFT breast. The LEFT breast was benign and the RIGHT breast was positive for IDC. The patient denies any nipple inversion or discharge. She was seen by Dr. Alonso Moya. She underwent right sided lumpectomy on 03/08/2018. The tumor is 3.8 cm in size, LN -ve, %, NC 95%, Her 2 -ve. Genomic profile by mammaprint reveals a high risk luminal B gene signature in the tumor.          Review of Systems:  Per HPI    Constitutional: negative  Eyes: negative  Ears, Nose, Mouth, Throat, and Face: negative  Respiratory: negative  Cardiovascular: negative  Gastrointestinal: negative  Genitourinary:negative  Integument/Breast: negative  Hematologic/Lymphatic: negative  Musculoskeletal:negative  Neurological: negative        Past Medical History:   Diagnosis Date    Arthritis     Breast cancer, right breast ([facility] [address]     Depression     GERD (gastroesophageal reflux disease)     Hypercholesterolemia     Hypertension     Other ill-defined conditions(799.89) 2012    dog bite dec 2012 needing blood transfusion     Past Surgical History:   Procedure Laterality Date    HX BREAST LUMPECTOMY Right 3/8/2018    RIGHT BREAST LUMPECTOMY WITH ULTRASOUND performed by Sintia Lema MD [facility] OR    HX COLONOSCOPY      HX KNEE REPLACEMENT Right 06/2013    HX TONSILLECTOMY        Family History   Problem Relation Age of Onset    Adopted: Yes    Breast Cancer Mother     Stroke Mother     Hypertension Mother     Cancer Mother      skin    Cancer Father      skin    Pulmonary Fibrosis Father     Cancer Sister 79     colon    Breast Cancer Maternal Aunt     Breast Cancer Paternal Aunt     Breast Cancer Maternal Aunt      ovarian and colon cancer    Breast Cancer Maternal Aunt      ovarian and colon cancer    Breast Cancer Maternal Aunt     Breast Cancer Paternal Aunt     Breast Cancer Maternal Aunt     Cancer Maternal Grandmother      liver/ovarian     Social History   Substance Use Topics    Smoking status: Former Smoker     Packs/day: 0.25     Years: 2.00     Quit date: 6/4/1972    Smokeless tobacco: Never Used    Alcohol use No      Prior to Admission medications    Medication Sig Start Date End Date Taking? Authorizing Provider   potassium chloride (K-DUR, KLOR-CON) 20 mEq tablet Take 20 mEq by mouth daily. 6/27/18  Yes Historical Provider   phosphorus (K PHOS NEUTRAL) 250 mg tablet Take 1 Tab by mouth two (2) times a day for 14 days. 6/19/18 7/3/18 Yes Sarah Muñoz MD   apixaban (ELIQUIS) 5 mg (74 tabs) starter pack Take 10 mg (two 5 mg tablets) by mouth twice a day for 7 days   Followed by 5 mg (one 5 mg tablet) by mouth twice a day 6/19/18  Yes Sarah Muñoz MD   vancomycin 50 mg/mL oral solution (compounded) Take 125mg po QID for 12 doses, then take 125mg po TID for 9 doses, then take 125mg po BID for 16 doses 6/19/18  Yes Sarah Muñoz MD   atenolol (TENORMIN) 50 mg tablet Take 25 mg by mouth daily. Yes Historical Provider   lisinopril (PRINIVIL, ZESTRIL) 20 mg tablet Take 20 mg by mouth daily.  6/26/18   Historical Provider   lisinopril-hydroCHLOROthiazide (PRINZIDE, ZESTORETIC) 20-25 mg per tablet Take 0.5 Tabs by mouth daily. Take 1/2 tab daily until seen by Dr. Colletta Kapur on Tuesday 6/26/18. Indications: hypertension    Historical Provider   ondansetron (ZOFRAN ODT) 4 mg disintegrating tablet Take 1 Tab by mouth every eight (8) hours as needed for Nausea. 6/19/18   Mikhail Hernandez MD   raNITIdine (ZANTAC) 150 mg tablet Take 150 mg by mouth two (2) times a day. Indications: Heartburn, PREVENTION OF STRESS ULCER    Historical Provider   prochlorperazine (COMPAZINE) 10 mg tablet Take 10 mg by mouth every six (6) hours as needed. Shelton Serna MD   lidocaine-prilocaine (EMLA) topical cream Apply  to affected area as needed for Pain. 4/20/18   Lance Bowman NP   garlic 1 mg cap Take  by mouth daily. Historical Provider   oxyCODONE-acetaminophen (PERCOCET) 5-325 mg per tablet Take 1 Tab by mouth every four (4) hours as needed for Pain. Max Daily Amount: 6 Tabs. 3/8/18   Leonora Karen Knapp MD   ferrous sulfate (IRON) 325 mg (65 mg iron) tablet Take 325 mg by mouth Daily (before breakfast). Historical Provider   Cholecalciferol, Vitamin D3, (VITAMIN D3) 1,000 unit cap Take 1 Tab by mouth daily. Historical Provider              Allergies   Allergen Reactions    Nasacort [Triamcinolone Acetonide] Other (comments)     headache    Sulfa (Sulfonamide Antibiotics) Rash           Objective:     Vitals:    06/28/18 1435   BP: 138/87   Pulse: 77   Temp: 98.6 °F (37 °C)   TempSrc: Oral   SpO2: 94%   Weight: 224 lb 6.4 oz (101.8 kg)   Height: 5' 2\" (1.575 m)            Physical Exam:    GENERAL: alert, cooperative, in good spirits, morbid obesity  EYE: negative  THROAT & NECK: supple  Lymph nodes: no cervical, axillary, inguinal adenopathy  LUNG: clear to auscultation bilaterally  HEART: regular rate and rhythm  ABDOMEN: soft, non-tender  EXTREMITIES:  no edema  SKIN: Normal.  NEUROLOGIC: no sensory or motor deficits.  Cranial nerves function is normal. Higher mental function is normal        Lab Results   Component Value Date/Time    WBC 12.7 (H) 06/19/2018 04:42 AM    HGB 7.3 (L) 06/19/2018 04:42 AM    HCT 23.6 (L) 06/19/2018 04:42 AM    PLATELET 692 44/50/5821 04:42 AM    MCV 92.2 06/19/2018 04:42 AM         Lab Results   Component Value Date/Time    Sodium 145 06/18/2018 01:00 AM    Potassium 3.4 (L) 06/18/2018 01:00 AM    Chloride 117 (H) 06/18/2018 01:00 AM    CO2 22 06/18/2018 01:00 AM    Anion gap 6 06/18/2018 01:00 AM    Glucose 112 (H) 06/18/2018 01:00 AM    BUN 5 (L) 06/18/2018 01:00 AM    Creatinine 0.57 06/18/2018 01:00 AM    BUN/Creatinine ratio 9 (L) 06/18/2018 01:00 AM    GFR est AA >60 06/18/2018 01:00 AM    GFR est non-AA >60 06/18/2018 01:00 AM    Calcium 7.7 (L) 06/18/2018 01:00 AM    Bilirubin, total 0.3 06/17/2018 01:07 AM    AST (SGOT) 22 06/17/2018 01:07 AM    Alk. phosphatase 54 06/17/2018 01:07 AM    Protein, total 5.6 (L) 06/17/2018 01:07 AM    Albumin 1.7 (L) 06/17/2018 01:07 AM    Globulin 3.9 06/17/2018 01:07 AM    A-G Ratio 0.4 (L) 06/17/2018 01:07 AM    ALT (SGPT) 15 06/17/2018 01:07 AM           Assessment:     1. Right breast carcinoma:  T2 N0 M0 (Stage IIA) infiltrating ductal carcinoma, Tumor size 3.8 cm, LN -ve, grade 2, %, [place] 95%, Her 2 -ve. Mammaprint shows a luminal B gene signature. ECOG PS 0  Intent of Treatment - curative     Prognosis - excellent    S/P right breast lumpectomy and sentinel LN excision. Received adjuvant chemotherapy   Taxotere, Cyclophosphamide - s/p 2 Cycles    Developed Gr 4 toxicity with sepsis, C Diff diarrhea, diverticulitis leading to a prolonged hospitalization  Discontinue chemotherapy. Start hormonal therapy after adjuvant radiation      2. C Diff diarrhea     Resolved       3. Sepsis    Resolved      4.  Anemia from chemotherapy    Observation      Plan:       > Discontinue chemotherapy  > Refer to Radiation Oncology for adjuvant breast radiation  > Start Letrozole after completion of adjuvant radiation  > Return in 3 months      Signed by: Price Guadalupe MD                     2018         CC. Beth Benjamin MD  CC. Houston Mccain MD  CC.  Mahogany Benjamin MD

## 2018-07-01 PROCEDURE — 3331090002 HH PPS REVENUE DEBIT

## 2018-07-01 PROCEDURE — 3331090001 HH PPS REVENUE CREDIT

## 2018-07-02 PROCEDURE — 3331090001 HH PPS REVENUE CREDIT

## 2018-07-02 PROCEDURE — 3331090002 HH PPS REVENUE DEBIT

## 2018-07-03 ENCOUNTER — HOME CARE VISIT (OUTPATIENT)
Dept: SCHEDULING | Facility: HOME HEALTH | Age: 68
End: 2018-07-03
Payer: MEDICARE

## 2018-07-03 VITALS
TEMPERATURE: 98.6 F | SYSTOLIC BLOOD PRESSURE: 148 MMHG | HEART RATE: 73 BPM | RESPIRATION RATE: 16 BRPM | OXYGEN SATURATION: 97 % | DIASTOLIC BLOOD PRESSURE: 80 MMHG

## 2018-07-03 PROCEDURE — 3331090001 HH PPS REVENUE CREDIT

## 2018-07-03 PROCEDURE — G0300 HHS/HOSPICE OF LPN EA 15 MIN: HCPCS

## 2018-07-03 PROCEDURE — 3331090002 HH PPS REVENUE DEBIT

## 2018-07-04 PROCEDURE — 3331090002 HH PPS REVENUE DEBIT

## 2018-07-04 PROCEDURE — 3331090001 HH PPS REVENUE CREDIT

## 2018-07-05 PROCEDURE — 3331090002 HH PPS REVENUE DEBIT

## 2018-07-05 PROCEDURE — 3331090001 HH PPS REVENUE CREDIT

## 2018-07-06 PROCEDURE — 3331090002 HH PPS REVENUE DEBIT

## 2018-07-06 PROCEDURE — 3331090001 HH PPS REVENUE CREDIT

## 2018-07-07 PROCEDURE — 3331090002 HH PPS REVENUE DEBIT

## 2018-07-07 PROCEDURE — 3331090001 HH PPS REVENUE CREDIT

## 2018-07-08 PROCEDURE — 3331090002 HH PPS REVENUE DEBIT

## 2018-07-08 PROCEDURE — 3331090001 HH PPS REVENUE CREDIT

## 2018-07-09 PROCEDURE — 3331090001 HH PPS REVENUE CREDIT

## 2018-07-09 PROCEDURE — 3331090002 HH PPS REVENUE DEBIT

## 2018-07-10 PROCEDURE — 3331090002 HH PPS REVENUE DEBIT

## 2018-07-10 PROCEDURE — 3331090001 HH PPS REVENUE CREDIT

## 2018-07-11 ENCOUNTER — HOME CARE VISIT (OUTPATIENT)
Dept: SCHEDULING | Facility: HOME HEALTH | Age: 68
End: 2018-07-11
Payer: MEDICARE

## 2018-07-11 VITALS
WEIGHT: 227 LBS | TEMPERATURE: 97.8 F | RESPIRATION RATE: 18 BRPM | SYSTOLIC BLOOD PRESSURE: 130 MMHG | HEIGHT: 63 IN | OXYGEN SATURATION: 98 % | DIASTOLIC BLOOD PRESSURE: 65 MMHG | BODY MASS INDEX: 40.22 KG/M2 | HEART RATE: 58 BPM

## 2018-07-11 PROCEDURE — G0299 HHS/HOSPICE OF RN EA 15 MIN: HCPCS

## 2018-07-11 PROCEDURE — 3331090002 HH PPS REVENUE DEBIT

## 2018-07-11 PROCEDURE — 3331090001 HH PPS REVENUE CREDIT

## 2018-07-12 ENCOUNTER — APPOINTMENT (OUTPATIENT)
Dept: INFUSION THERAPY | Age: 68
End: 2018-07-12

## 2018-07-12 PROCEDURE — 3331090001 HH PPS REVENUE CREDIT

## 2018-07-12 PROCEDURE — 3331090002 HH PPS REVENUE DEBIT

## 2018-07-13 ENCOUNTER — APPOINTMENT (OUTPATIENT)
Dept: INFUSION THERAPY | Age: 68
End: 2018-07-13

## 2018-07-13 PROCEDURE — 3331090001 HH PPS REVENUE CREDIT

## 2018-07-13 PROCEDURE — 3331090002 HH PPS REVENUE DEBIT

## 2018-07-14 PROCEDURE — 3331090002 HH PPS REVENUE DEBIT

## 2018-07-14 PROCEDURE — 3331090001 HH PPS REVENUE CREDIT

## 2018-07-15 PROCEDURE — 3331090002 HH PPS REVENUE DEBIT

## 2018-07-15 PROCEDURE — 3331090001 HH PPS REVENUE CREDIT

## 2018-07-16 PROCEDURE — 3331090002 HH PPS REVENUE DEBIT

## 2018-07-16 PROCEDURE — 3331090001 HH PPS REVENUE CREDIT

## 2018-07-17 ENCOUNTER — HOME CARE VISIT (OUTPATIENT)
Dept: SCHEDULING | Facility: HOME HEALTH | Age: 68
End: 2018-07-17
Payer: MEDICARE

## 2018-07-17 PROCEDURE — 3331090001 HH PPS REVENUE CREDIT

## 2018-07-17 PROCEDURE — 3331090002 HH PPS REVENUE DEBIT

## 2018-07-17 PROCEDURE — G0299 HHS/HOSPICE OF RN EA 15 MIN: HCPCS

## 2018-07-18 VITALS
HEART RATE: 70 BPM | RESPIRATION RATE: 18 BRPM | SYSTOLIC BLOOD PRESSURE: 122 MMHG | OXYGEN SATURATION: 98 % | DIASTOLIC BLOOD PRESSURE: 76 MMHG | TEMPERATURE: 98 F

## 2018-07-18 PROCEDURE — 3331090001 HH PPS REVENUE CREDIT

## 2018-07-18 PROCEDURE — 3331090002 HH PPS REVENUE DEBIT

## 2018-08-22 ENCOUNTER — TELEPHONE (OUTPATIENT)
Dept: ONCOLOGY | Age: 68
End: 2018-08-22

## 2018-08-22 NOTE — TELEPHONE ENCOUNTER
Call placed to pt. Patient returned writers call. HIPPA verified. Patient stated she is taking Eliquis once daily. Patient informed we will refill her Eliquis per NP. Patient instructed to take Eliquis 5 mg twice a day. Patient verbalized understanding.

## 2018-08-30 NOTE — TELEPHONE ENCOUNTER
vorb Dr Evangelist Estes, refill Eliquis 5 mg, take one tab by mouth twice daily, disp #60 refills x3

## 2018-09-25 ENCOUNTER — DOCUMENTATION ONLY (OUTPATIENT)
Dept: ONCOLOGY | Age: 68
End: 2018-09-25

## 2018-09-25 NOTE — PROGRESS NOTES
Called pt to reschedule 9/28/18 appt due to Dr Shala Hansen being out of the office.  Left message for pt to call office

## 2018-10-10 ENCOUNTER — OFFICE VISIT (OUTPATIENT)
Dept: ONCOLOGY | Age: 68
End: 2018-10-10

## 2018-10-10 ENCOUNTER — HOSPITAL ENCOUNTER (OUTPATIENT)
Dept: LAB | Age: 68
Discharge: HOME | End: 2018-10-10
Payer: MEDICARE

## 2018-10-10 VITALS
SYSTOLIC BLOOD PRESSURE: 124 MMHG | HEIGHT: 63 IN | TEMPERATURE: 98.4 F | BODY MASS INDEX: 37.99 KG/M2 | RESPIRATION RATE: 18 BRPM | WEIGHT: 214.4 LBS | OXYGEN SATURATION: 97 % | HEART RATE: 54 BPM | DIASTOLIC BLOOD PRESSURE: 82 MMHG

## 2018-10-10 DIAGNOSIS — Z17.0 MALIGNANT NEOPLASM OF UPPER-OUTER QUADRANT OF RIGHT BREAST IN FEMALE, ESTROGEN RECEPTOR POSITIVE (HCC): Primary | ICD-10-CM

## 2018-10-10 DIAGNOSIS — C50.411 MALIGNANT NEOPLASM OF UPPER-OUTER QUADRANT OF RIGHT BREAST IN FEMALE, ESTROGEN RECEPTOR POSITIVE (HCC): Primary | ICD-10-CM

## 2018-10-10 DIAGNOSIS — E55.9 VITAMIN D DEFICIENCY: ICD-10-CM

## 2018-10-10 PROCEDURE — 83550 IRON BINDING TEST: CPT

## 2018-10-10 PROCEDURE — 82728 ASSAY OF FERRITIN: CPT

## 2018-10-10 PROCEDURE — 85025 COMPLETE CBC W/AUTO DIFF WBC: CPT

## 2018-10-10 PROCEDURE — 82306 VITAMIN D 25 HYDROXY: CPT

## 2018-10-10 PROCEDURE — 36415 COLL VENOUS BLD VENIPUNCTURE: CPT

## 2018-10-10 RX ORDER — LETROZOLE 2.5 MG/1
2.5 TABLET, FILM COATED ORAL DAILY
Qty: 30 TAB | Refills: 6 | Status: SHIPPED | OUTPATIENT
Start: 2018-10-10 | End: 2019-06-07 | Stop reason: SDUPTHER

## 2018-10-10 NOTE — PROGRESS NOTES
Progress Note        Patient: Rommel Paniagua MRN: [id number]  SSN: [id number]    YOB: 1950  Age: 79 y.o. Sex: female        Diagnosis:     1. Right breast carcinoma:  T2 N0 M0 (Stage IIA) infiltrating ductal carcinoma, Tumor size 3.8 cm, LN -ve, grade 2, %, [place] 95%, Her 2 -ve. Treatment:     1. Adjuvant Letrozole  2. Completed adjuvant radiation   3. Adjuvant chemotherapy   Taxotere, Cyclophosphamide - s/p 2 cycles   Therapy d/c'd due to gr IV toxicity  4. Right sided lumpectomy on 03/08/2018    HPI:      Rommel Paniagua is a 79 y.o. female with breast cancer. She received 2 cycles of adjuvant chemotherapy. She received two cycles of adjuvant chemotherapy. She developed Gr IV side effects and was admitted to the hospital with sepsis and C Diff diarrhea. A diagnosis of breast cancer was made with help of an annual mammogram. Bopsies were performed on both the RIGHT and LEFT breast. The LEFT breast was benign and the RIGHT breast was positive for IDC. The patient denies any nipple inversion or discharge. She was seen by Dr. Brenda Orantes. She underwent right sided lumpectomy on 03/08/2018. The tumor is 3.8 cm in size, LN -ve, %, WA 95%, Her 2 -ve. Genomic profile by mammaprint reveals a high risk luminal B gene signature in the tumor. Adjuvant chemotherapy was discontinued after 2 cycles d/t grade 4 toxicity leading to prolonged hospitalization. She completed adjuvant radiation and is here today to discuss hormonal therapy. She feels well with no complaints.        Review of Systems:      Constitutional: negative  Eyes: negative  Ears, Nose, Mouth, Throat, and Face: negative  Respiratory: negative  Cardiovascular: negative  Gastrointestinal: negative  Genitourinary:negative  Integument/Breast: negative  Hematologic/Lymphatic: negative  Musculoskeletal:negative  Neurological: negative        Past Medical History:   Diagnosis Date    Arthritis     Breast cancer, right breast ([facility] [address]     Depression     GERD (gastroesophageal reflux disease)     Hypercholesterolemia     Hypertension     Other ill-defined conditions(799.89) 2012    dog bite dec 2012 needing blood transfusion     Past Surgical History:   Procedure Laterality Date    HX BREAST LUMPECTOMY Right 3/8/2018    RIGHT BREAST LUMPECTOMY WITH ULTRASOUND performed by Idris Feng [facility] at [facility] AM[facility]UL[facility]    HX COLONOSCOPY      HX KNEE REPLACEMENT Right 06/2013    HX TONSILLECTOMY        Family History   Problem Relation Age of Onset    Adopted: Yes    Breast Cancer Mother     Stroke Mother     Hypertension Mother     Cancer Mother      skin    Cancer Father      skin    Pulmonary Fibrosis Father     Cancer Sister 79     colon    Breast Cancer Maternal Aunt     Breast Cancer Paternal Aunt     Breast Cancer Maternal Aunt      ovarian and colon cancer    Breast Cancer Maternal Aunt      ovarian and colon cancer    Breast Cancer Maternal Aunt     Breast Cancer Paternal Aunt     Breast Cancer Maternal Aunt     Cancer Maternal Grandmother      liver/ovarian     Social History   Substance Use Topics    Smoking status: Former Smoker     Packs/day: 0.25     Years: 2.00     Quit date: 6/4/1972    Smokeless tobacco: Never Used    Alcohol use No      Prior to Admission medications    Medication Sig Start Date End Date Taking? Authorizing Provider   apixaban (ELIQUIS) 5 mg tablet Take 1 Tab by mouth two (2) times a day. 8/30/18  Yes Kavita Munson MD   lisinopril (PRINIVIL, ZESTRIL) 20 mg tablet Take 20 mg by mouth daily. 6/26/18  Yes Historical Provider   lisinopril-hydroCHLOROthiazide (PRINZIDE, ZESTORETIC) 20-25 mg per tablet Take 0.5 Tabs by mouth daily. Take 1/2 tab daily until seen by Dr. Mee Munoz on Tuesday 6/26/18. Indications: hypertension   Yes Historical Provider   ferrous sulfate (IRON) 325 mg (65 mg iron) tablet Take 325 mg by mouth Daily (before breakfast).    Yes Historical Provider   atenolol (TENORMIN) 50 mg tablet Take 25 mg by mouth daily. Yes Historical Provider   potassium chloride (K-DUR, KLOR-CON) 20 mEq tablet Take 20 mEq by mouth daily. 6/27/18   Historical Provider   ondansetron (ZOFRAN ODT) 4 mg disintegrating tablet Take 1 Tab by mouth every eight (8) hours as needed for Nausea. 6/19/18   Pascale Colon MD   vancomycin 50 mg/mL oral solution (compounded) Take 125mg po QID for 12 doses, then take 125mg po TID for 9 doses, then take 125mg po BID for 16 doses 6/19/18   Pascale Colon MD   raNITIdine (ZANTAC) 150 mg tablet Take 150 mg by mouth two (2) times a day. Indications: Heartburn, PREVENTION OF STRESS ULCER    Historical Provider   prochlorperazine (COMPAZINE) 10 mg tablet Take 10 mg by mouth every six (6) hours as needed. Shelton Serna MD   lidocaine-prilocaine (EMLA) topical cream Apply  to affected area as needed for Pain. 4/20/18   Hue Gardner NP   garlic 1 mg cap Take  by mouth daily. Historical Provider   oxyCODONE-acetaminophen (PERCOCET) 5-325 mg per tablet Take 1 Tab by mouth every four (4) hours as needed for Pain. Max Daily Amount: 6 Tabs. 3/8/18   Sunita Barton MD   Cholecalciferol, Vitamin D3, (VITAMIN D3) 1,000 unit cap Take 1 Tab by mouth daily.     Historical Provider              Allergies   Allergen Reactions    Nasacort [Triamcinolone Acetonide] Other (comments)     headache    Sulfa (Sulfonamide Antibiotics) Rash           Objective:     Vitals:    10/10/18 1530   BP: 124/82   Pulse: (!) 54   Resp: 18   Temp: 98.4 °F (36.9 °C)   TempSrc: Oral   SpO2: 97%   Weight: 214 lb 6.4 oz (97.3 kg)   Height: 5' 3\" (1.6 m)            Physical Exam:    GENERAL: alert, cooperative, in good spirits, morbid obesity  EYE: negative  THROAT & NECK: supple  Lymph nodes: no cervical, axillary, inguinal adenopathy  LUNG: clear to auscultation bilaterally  HEART: regular rate and rhythm  ABDOMEN: soft, non-tender  EXTREMITIES:  no edema  SKIN: Normal.  NEUROLOGIC: no sensory or motor deficits        Lab Results   Component Value Date/Time    WBC 12.7 (H) 06/19/2018 04:42 AM    HGB 7.3 (L) 06/19/2018 04:42 AM    HCT 23.6 (L) 06/19/2018 04:42 AM    PLATELET 377 28/49/9081 04:42 AM    MCV 92.2 06/19/2018 04:42 AM         Lab Results   Component Value Date/Time    Sodium 145 06/18/2018 01:00 AM    Potassium 3.4 (L) 06/18/2018 01:00 AM    Chloride 117 (H) 06/18/2018 01:00 AM    CO2 22 06/18/2018 01:00 AM    Anion gap 6 06/18/2018 01:00 AM    Glucose 112 (H) 06/18/2018 01:00 AM    BUN 5 (L) 06/18/2018 01:00 AM    Creatinine 0.57 06/18/2018 01:00 AM    BUN/Creatinine ratio 9 (L) 06/18/2018 01:00 AM    GFR est AA >60 06/18/2018 01:00 AM    GFR est non-AA >60 06/18/2018 01:00 AM    Calcium 7.7 (L) 06/18/2018 01:00 AM    Bilirubin, total 0.3 06/17/2018 01:07 AM    AST (SGOT) 22 06/17/2018 01:07 AM    Alk. phosphatase 54 06/17/2018 01:07 AM    Protein, total 5.6 (L) 06/17/2018 01:07 AM    Albumin 1.7 (L) 06/17/2018 01:07 AM    Globulin 3.9 06/17/2018 01:07 AM    A-G Ratio 0.4 (L) 06/17/2018 01:07 AM    ALT (SGPT) 15 06/17/2018 01:07 AM           Assessment:     1. Right breast carcinoma:  T2 N0 M0 (Stage IIA) infiltrating ductal carcinoma, Tumor size 3.8 cm, LN -ve, grade 2, %, [place] 95%, Her 2 -ve. Mammaprint shows a luminal B gene signature. ECOG PS 0  Intent of Treatment - curative     Prognosis - excellent    S/P right breast lumpectomy and sentinel LN excision. Received adjuvant chemotherapy   Taxotere, Cyclophosphamide - s/p 2 Cycles    Chemotherapy discontinued d/t Gr 4 toxicity with sepsis, C Diff diarrhea, diverticulitis leading to a prolonged hospitalization    S/P adjuvant radiation with Dr. Dala Alison  Start hormonal therapy with Letrozole 2.5 mg daily    Patient was counseled regarding hormonal therapy. Discussion included side effects, toxicity, benefit and risks. She understood the expected side effects which may include fatigue, hot flashes, arthralgias among other things.  After weighing the benefits and risks, she agreed to proceed. Symptom management form reviewed with patient. 2. Anemia    On ferrous sulfate 325 mg po daily  > Check CBC, iron profile, ferritin      Plan:       > Start Letrozole  > Check Vitamin D  > Check CBC, iron profile, ferritin  > Obtain DEXA scan results from [facility]  > Follow-up in 6 months        Signed by: Brad Rucker MD                     October 11, 2018        CC. Debbi Rizvi MD  CC. Michael Stevens MD  CC.  Husam Stone MD

## 2018-10-10 NOTE — PROGRESS NOTES
Sena Howell is a 79 y.o. female here today for right sided breast cancer f/u. S/P chemo x 2 cycles. S/P Rads. Patient taking Eliquis. Low pulse noted; other VS stable. Patient denies pain.     Visit Vitals    /82    Pulse (!) 54    Temp 98.4 °F (36.9 °C) (Oral)    Resp 18    Ht 5' 3\" (1.6 m)    Wt 214 lb 6.4 oz (97.3 kg)    SpO2 97%    BMI 37.98 kg/m2

## 2018-10-11 LAB
25(OH)D3+25(OH)D2 SERPL-MCNC: 32.9 NG/ML (ref 30–100)
BASOPHILS # BLD AUTO: 0 X10E3/UL (ref 0–0.2)
BASOPHILS NFR BLD AUTO: 0 %
EOSINOPHIL # BLD AUTO: 0.3 X10E3/UL (ref 0–0.4)
EOSINOPHIL NFR BLD AUTO: 6 %
ERYTHROCYTE [DISTWIDTH] IN BLOOD BY AUTOMATED COUNT: 14.1 % (ref 12.3–15.4)
FERRITIN SERPL-MCNC: 185 NG/ML (ref 15–150)
HCT VFR BLD AUTO: 36.9 % (ref 34–46.6)
HGB BLD-MCNC: 12 G/DL (ref 11.1–15.9)
IMM GRANULOCYTES # BLD: 0 X10E3/UL (ref 0–0.1)
IMM GRANULOCYTES NFR BLD: 0 %
IRON SATN MFR SERPL: 13 % (ref 15–55)
IRON SERPL-MCNC: 53 UG/DL (ref 27–139)
LYMPHOCYTES # BLD AUTO: 0.7 X10E3/UL (ref 0.7–3.1)
LYMPHOCYTES NFR BLD AUTO: 16 %
MCH RBC QN AUTO: 27.6 PG (ref 26.6–33)
MCHC RBC AUTO-ENTMCNC: 32.5 G/DL (ref 31.5–35.7)
MCV RBC AUTO: 85 FL (ref 79–97)
MONOCYTES # BLD AUTO: 0.4 X10E3/UL (ref 0.1–0.9)
MONOCYTES NFR BLD AUTO: 10 %
NEUTROPHILS # BLD AUTO: 3 X10E3/UL (ref 1.4–7)
NEUTROPHILS NFR BLD AUTO: 68 %
PLATELET # BLD AUTO: 246 X10E3/UL (ref 150–379)
RBC # BLD AUTO: 4.34 X10E6/UL (ref 3.77–5.28)
TIBC SERPL-MCNC: 406 UG/DL (ref 250–450)
UIBC SERPL-MCNC: 353 UG/DL (ref 118–369)
WBC # BLD AUTO: 4.4 X10E3/UL (ref 3.4–10.8)

## 2018-10-17 NOTE — PROGRESS NOTES
Late Entry from 10/16/18: Patient returned writers. HIPPA verified. Patient informed CBC and iron levels ar normal and to stop taking the oral iron supplement.

## 2019-03-11 ENCOUNTER — HOSPITAL ENCOUNTER (OUTPATIENT)
Dept: ULTRASOUND IMAGING | Age: 69
Discharge: HOME | End: 2019-03-11
Attending: RADIOLOGY
Payer: MEDICARE

## 2019-03-11 ENCOUNTER — HOSPITAL ENCOUNTER (OUTPATIENT)
Dept: MAMMOGRAPHY | Age: 69
Discharge: HOME | End: 2019-03-11
Attending: RADIOLOGY
Payer: MEDICARE

## 2019-03-11 DIAGNOSIS — Z85.3 PERSONAL HISTORY OF MALIGNANT NEOPLASM OF BREAST: ICD-10-CM

## 2019-03-11 PROCEDURE — 77066 DX MAMMO INCL CAD BI: CPT

## 2019-04-10 ENCOUNTER — OFFICE VISIT (OUTPATIENT)
Dept: ONCOLOGY | Age: 69
End: 2019-04-10

## 2019-04-10 ENCOUNTER — HOSPITAL ENCOUNTER (OUTPATIENT)
Dept: LAB | Age: 69
Discharge: HOME | End: 2019-04-10
Payer: MEDICARE

## 2019-04-10 VITALS
OXYGEN SATURATION: 95 % | WEIGHT: 224.8 LBS | RESPIRATION RATE: 18 BRPM | BODY MASS INDEX: 39.83 KG/M2 | HEART RATE: 51 BPM | TEMPERATURE: 98.2 F | HEIGHT: 63 IN | DIASTOLIC BLOOD PRESSURE: 81 MMHG | SYSTOLIC BLOOD PRESSURE: 170 MMHG

## 2019-04-10 DIAGNOSIS — Z17.0 MALIGNANT NEOPLASM OF UPPER-OUTER QUADRANT OF RIGHT BREAST IN FEMALE, ESTROGEN RECEPTOR POSITIVE (HCC): Primary | ICD-10-CM

## 2019-04-10 DIAGNOSIS — C50.411 MALIGNANT NEOPLASM OF UPPER-OUTER QUADRANT OF RIGHT BREAST IN FEMALE, ESTROGEN RECEPTOR POSITIVE (HCC): Primary | ICD-10-CM

## 2019-04-10 DIAGNOSIS — E55.9 VITAMIN D DEFICIENCY: ICD-10-CM

## 2019-04-10 PROCEDURE — 36415 COLL VENOUS BLD VENIPUNCTURE: CPT

## 2019-04-10 PROCEDURE — 82306 VITAMIN D 25 HYDROXY: CPT

## 2019-04-10 RX ORDER — ASPIRIN 81 MG/1
TABLET ORAL DAILY
COMMUNITY

## 2019-04-10 NOTE — PROGRESS NOTES
Jasper Avina is a 79 y.o. female here today for right sided breast cancer f/u. S/P chemo x 2 cycles. S/P Rads. Patient taking low dose ASA. Low pulse noted; pt reports taking B/P medications today; pt advised to call PCP to update on B/P and pulse; other VS stable. Patient denies pain. Good appetite. Patient denies N/V/D and constipation. Patient denies numbness and tingling. Patient denies mouth ulcers. Patient denies cough. Patient denies SOB. Visit Vitals  /81 (BP 1 Location: Left arm, BP Patient Position: Sitting)   Pulse (!) 51   Temp 98.2 °F (36.8 °C) (Oral)   Resp 18   Ht 5' 3\" (1.6 m)   Wt 224 lb 12.8 oz (102 kg)   SpO2 95%   BMI 39.82 kg/m²       Pain Scale: 0 - No pain/10  Pain Location:     1. Have you been to the ER, urgent care clinic since your last visit? Hospitalized since your last visit? No    2. Have you seen or consulted any other health care providers outside of the [address] [place] [address] since your last visit? Include any pap smears or colon screening. No     Health Maintenance Review: Patient reminded of \"due or due soon\" health maintenance.  I have asked the patient to contact his/her primary care provider (PCP) for follow-up on his/her health maintenance.

## 2019-04-10 NOTE — PROGRESS NOTES
[address], [address] [place] [address] [place], 200 [facility]  [phone]        Progress Note        Patient: Rajan Pina MRN: [id number]  SSN: [id number]    YOB: 1950  Age: 81 y.o. Sex: female        Diagnosis:     1. Right breast carcinoma:  T2 N0 M0 (Stage IIA) infiltrating ductal carcinoma, Tumor size 3.8 cm, LN -ve, grade 2, %, VT 95%, Her 2 -ve. Treatment:     1. Adjuvant Letrozole  2. Completed adjuvant radiation - Dr. Libertad Rollins - 10/2018  3. Adjuvant chemotherapy   Taxotere, Cyclophosphamide - s/p 2 cycles   Therapy d/c'd due to gr IV toxicity  4. Right sided lumpectomy on 03/08/2018    HPI:      Rajan Pina is a 81 y.o. female with breast cancer. She received 2 cycles of adjuvant chemotherapy. She received two cycles of adjuvant chemotherapy. She developed Gr IV side effects and was admitted to the hospital with sepsis and C Diff diarrhea. A diagnosis of breast cancer was made with help of an annual mammogram. Bopsies were performed on both the RIGHT and LEFT breast. The LEFT breast was benign and the RIGHT breast was positive for IDC. The patient denies any nipple inversion or discharge. She was seen by Dr. Halima Hackett. She underwent right sided lumpectomy on 03/08/2018. The tumor is 3.8 cm in size, LN -ve, %, VT 95%, Her 2 -ve. Genomic profile by mammaprint reveals a high risk luminal B gene signature in the tumor. Adjuvant chemotherapy was discontinued after 2 cycles d/t grade 4 toxicity leading to prolonged hospitalization. She completed adjuvant radiation and is taking letrozole. She does not verbalize any new complaints.        Review of Systems:      Constitutional: negative  Eyes: negative  Ears, Nose, Mouth, Throat, and Face: negative  Respiratory: negative  Cardiovascular: negative  Gastrointestinal: negative  Genitourinary:negative  Integument/Breast: negative  Hematologic/Lymphatic: negative  Musculoskeletal:negative  Neurological: negative        Past Medical History:   Diagnosis Date    Arthritis     Breast cancer, right breast ([address]     Depression     GERD (gastroesophageal reflux disease)     Hypercholesterolemia     Hypertension     Other ill-defined conditions(799.89)     dog bite dec 2012 needing blood transfusion     Past Surgical History:   Procedure Laterality Date    HX BREAST LUMPECTOMY Right 3/8/2018    RIGHT BREAST LUMPECTOMY WITH ULTRASOUND performed [facility]    HX COLONOSCOPY      HX KNEE REPLACEMENT Right 2013    HX TONSILLECTOMY        Family History   Adopted: Yes   Problem Relation Age of Onset    Breast Cancer Mother     Stroke Mother     Hypertension Mother     Cancer Mother         skin    Cancer Father         skin    Pulmonary Fibrosis Father     Cancer Sister 79        colon    Breast Cancer Maternal Aunt     Breast Cancer Paternal Aunt     Breast Cancer Maternal Aunt         ovarian and colon cancer    Breast Cancer Maternal Aunt         ovarian and colon cancer    Breast Cancer Maternal Aunt     Breast Cancer Paternal Aunt     Breast Cancer Maternal Aunt     Cancer Maternal Grandmother         liver/ovarian     Social History     Tobacco Use    Smoking status: Former Smoker     Packs/day: 0.25     Years: 2.00     Pack years: 0.50     Last attempt to quit: 1972     Years since quittin.8    Smokeless tobacco: Never Used   Substance Use Topics    Alcohol use: No      Prior to Admission medications    Medication Sig Start Date End Date Taking? Authorizing Provider   acetaminophen (TYLENOL 8 HOUR PO) Take  by mouth as needed. Yes Provider, Historical   aspirin delayed-release 81 mg tablet Take  by mouth daily. Yes Provider, Historical   letrozole (FEMARA) 2.5 mg tablet Take 1 Tab by mouth daily.  10/10/18  Yes Shaista George MD   lisinopril (PRINIVIL, ZESTRIL) 20 mg tablet Take 20 mg by mouth daily. 6/26/18  Yes Provider, Historical   atenolol (TENORMIN) 50 mg tablet Take 25 mg by mouth daily. Yes Provider, Historical   apixaban (ELIQUIS) 5 mg tablet Take 1 Tab by mouth two (2) times a day. 8/30/18   Ruy Copeland MD   potassium chloride (K-DUR, KLOR-CON) 20 mEq tablet Take 20 mEq by mouth daily. 6/27/18   Provider, Historical   lisinopril-hydroCHLOROthiazide (PRINZIDE, ZESTORETIC) 20-25 mg per tablet Take 0.5 Tabs by mouth daily. Take 1/2 tab daily until seen by Dr. Elle Freeman on Tuesday 6/26/18. Indications: hypertension    Provider, Historical   ondansetron (ZOFRAN ODT) 4 mg disintegrating tablet Take 1 Tab by mouth every eight (8) hours as needed for Nausea. 6/19/18   Eric Pfeiffer MD   vancomycin 50 mg/mL oral solution (compounded) Take 125mg po QID for 12 doses, then take 125mg po TID for 9 doses, then take 125mg po BID for 16 doses 6/19/18   Eric Pfeiffer MD   raNITIdine (ZANTAC) 150 mg tablet Take 150 mg by mouth two (2) times a day. Indications: Heartburn, PREVENTION OF STRESS ULCER    Provider, Historical   prochlorperazine (COMPAZINE) 10 mg tablet Take 10 mg by mouth every six (6) hours as needed. Other, MD Shelton   lidocaine-prilocaine (EMLA) topical cream Apply  to affected area as needed for Pain. 4/20/18   Mauro Melgoza NP   garlic 1 mg cap Take  by mouth daily. Provider, Historical   oxyCODONE-acetaminophen (PERCOCET) 5-325 mg per tablet Take 1 Tab by mouth every four (4) hours as needed for Pain. Max Daily Amount: 6 Tabs. 3/8/18   Jennifer Caceres MD   ferrous sulfate (IRON) 325 mg (65 mg iron) tablet Take 325 mg by mouth Daily (before breakfast). Provider, Historical   Cholecalciferol, Vitamin D3, (VITAMIN D3) 1,000 unit cap Take 1 Tab by mouth daily.     Provider, Historical              Allergies   Allergen Reactions    Nasacort [Triamcinolone Acetonide] Other (comments)     headache    Sulfa (Sulfonamide Antibiotics) Rash Objective:     Vitals:    04/10/19 1006   BP: 170/81   Pulse: (!) 51   Resp: 18   Temp: 98.2 °F (36.8 °C)   TempSrc: Oral   SpO2: 95%   Weight: 224 lb 12.8 oz (102 kg)   Height: 5' 3\" (1.6 m)            Physical Exam:    GENERAL: alert, cooperative, in good spirits, morbid obesity  EYE: negative  THROAT & NECK: supple  Lymph nodes: no cervical, axillary, inguinal adenopathy  LUNG: clear to auscultation bilaterally  HEART: regular rate and rhythm, systolic murmur  ABDOMEN: soft, non-tender  EXTREMITIES:  no edema  SKIN: Normal.  NEUROLOGIC: no sensory or motor deficits        Lab Results   Component Value Date/Time    WBC 4.4 10/10/2018 04:08 PM    HGB 12.0 10/10/2018 04:08 PM    HCT 36.9 10/10/2018 04:08 PM    PLATELET 427 20/66/4394 04:08 PM    MCV 85 10/10/2018 04:08 PM         Lab Results   Component Value Date/Time    Sodium 145 06/18/2018 01:00 AM    Potassium 3.4 (L) 06/18/2018 01:00 AM    Chloride 117 (H) 06/18/2018 01:00 AM    CO2 22 06/18/2018 01:00 AM    Anion gap 6 06/18/2018 01:00 AM    Glucose 112 (H) 06/18/2018 01:00 AM    BUN 5 (L) 06/18/2018 01:00 AM    Creatinine 0.57 06/18/2018 01:00 AM    BUN/Creatinine ratio 9 (L) 06/18/2018 01:00 AM    GFR est AA >60 06/18/2018 01:00 AM    GFR est non-AA >60 06/18/2018 01:00 AM    Calcium 7.7 (L) 06/18/2018 01:00 AM    Bilirubin, total 0.3 06/17/2018 01:07 AM    AST (SGOT) 22 06/17/2018 01:07 AM    Alk. phosphatase 54 06/17/2018 01:07 AM    Protein, total 5.6 (L) 06/17/2018 01:07 AM    Albumin 1.7 (L) 06/17/2018 01:07 AM    Globulin 3.9 06/17/2018 01:07 AM    A-G Ratio 0.4 (L) 06/17/2018 01:07 AM    ALT (SGPT) 15 06/17/2018 01:07 AM           Assessment:     1. Right breast carcinoma:  T2 N0 M0 (Stage IIA) infiltrating ductal carcinoma, Tumor size 3.8 cm, LN -ve, grade 2, %, DE 95%, Her 2 -ve. Mammaprint shows a luminal B gene signature.      ECOG PS 0  Intent of Treatment - curative     Prognosis - excellent    S/P right breast lumpectomy and sentinel LN excision. Received adjuvant chemotherapy   Taxotere, Cyclophosphamide - s/p 2 Cycles    Chemotherapy discontinued d/t Gr 4 toxicity with sepsis, C Diff diarrhea, diverticulitis leading to a prolonged hospitalization    S/P adjuvant radiation with Dr. Lois Iverson - completed  Currently on Letrozole 2.5 mg daily  Tolerating well  Asymptomatic  In remission    Dexa 2/4/2018 - normal    Symptom management form reviewed with patient. 2. Anemia    On ferrous sulfate 325 mg po daily      Plan:       > Continue Letrozole  > Check Vitamin D - lab slip given to patient  > Follow-up in 6 months  > 2-D Echo per PCP (for systolic murmur)      Signed by: Neftaly Grace MD                     April 10, 2019        CC. Shabbir Jon MD  CC. Lois Iverson MD  CC.  Diony Vasquez MD

## 2019-04-11 LAB — 25(OH)D3+25(OH)D2 SERPL-MCNC: 30.1 NG/ML (ref 30–100)

## 2019-04-11 NOTE — PROGRESS NOTES
Vit D level is normal. If she is taking once a week, decrease frequency to once a month. If on the other hand she is taking it once a month, continue.

## 2019-04-25 ENCOUNTER — TELEPHONE (OUTPATIENT)
Dept: ONCOLOGY | Age: 69
End: 2019-04-25

## 2019-04-25 NOTE — TELEPHONE ENCOUNTER
Call placed to pt. Pt's spouse answered. PHI and HIPPA verified. Pt's spouse informed that the pt needs to continue taking the Vitamin D 1,000 units daily instead of monthly since it's such a low dose of vitamin D per provider. Patient's spouse verbalized understanding.

## 2019-06-07 DIAGNOSIS — Z17.0 MALIGNANT NEOPLASM OF UPPER-OUTER QUADRANT OF RIGHT BREAST IN FEMALE, ESTROGEN RECEPTOR POSITIVE (HCC): ICD-10-CM

## 2019-06-07 DIAGNOSIS — C50.411 MALIGNANT NEOPLASM OF UPPER-OUTER QUADRANT OF RIGHT BREAST IN FEMALE, ESTROGEN RECEPTOR POSITIVE (HCC): ICD-10-CM

## 2019-06-07 RX ORDER — LETROZOLE 2.5 MG/1
2.5 TABLET, FILM COATED ORAL DAILY
Qty: 30 TAB | Refills: 6 | Status: SHIPPED | OUTPATIENT
Start: 2019-06-07 | End: 2020-02-06 | Stop reason: SDUPTHER

## 2019-06-07 NOTE — TELEPHONE ENCOUNTER
PER STEPH from Dr. Jacob Shell LETROZOLE 2.5MG [facility]) ONE TAB ONCE A DAY QUANTITY 30 REFILL 6.

## 2019-07-26 ENCOUNTER — HOSPITAL ENCOUNTER (INPATIENT)
Age: 69
LOS: 4 days | Discharge: HOME | DRG: 389 | End: 2019-07-30
Attending: EMERGENCY MEDICINE | Admitting: HOSPITALIST
Payer: MEDICARE

## 2019-07-26 ENCOUNTER — HOSPITAL ENCOUNTER (OUTPATIENT)
Dept: CT IMAGING | Age: 69
Discharge: HOME | DRG: 389 | End: 2019-07-26
Attending: NURSE PRACTITIONER
Payer: MEDICARE

## 2019-07-26 DIAGNOSIS — E87.6 HYPOKALEMIA: ICD-10-CM

## 2019-07-26 DIAGNOSIS — R10.84 ABDOMINAL PAIN, GENERALIZED: Primary | ICD-10-CM

## 2019-07-26 DIAGNOSIS — E83.51 HYPOCALCEMIA: ICD-10-CM

## 2019-07-26 DIAGNOSIS — E86.0 DEHYDRATION: ICD-10-CM

## 2019-07-26 DIAGNOSIS — E83.39 HYPOPHOSPHATEMIA: ICD-10-CM

## 2019-07-26 DIAGNOSIS — K56.7 ILEUS (HCC): ICD-10-CM

## 2019-07-26 DIAGNOSIS — E83.42 HYPOMAGNESEMIA: ICD-10-CM

## 2019-07-26 DIAGNOSIS — R10.32 LLQ ABDOMINAL PAIN: ICD-10-CM

## 2019-07-26 DIAGNOSIS — R11.2 NAUSEA AND VOMITING, INTRACTABILITY OF VOMITING NOT SPECIFIED, UNSPECIFIED VOMITING TYPE: ICD-10-CM

## 2019-07-26 PROBLEM — R10.9 ABDOMINAL PAIN: Status: ACTIVE | Noted: 2019-07-26

## 2019-07-26 LAB
ALBUMIN SERPL-MCNC: 2.1 G/DL (ref 3.5–5)
ALBUMIN/GLOB SERPL: 0.7 {RATIO} (ref 1.1–2.2)
ALP SERPL-CCNC: 55 U/L (ref 45–117)
ALT SERPL-CCNC: 8 U/L (ref 12–78)
ANION GAP SERPL CALC-SCNC: 16 MMOL/L (ref 5–15)
APPEARANCE UR: CLEAR
AST SERPL-CCNC: 20 U/L (ref 15–37)
BACTERIA URNS QL MICRO: NEGATIVE /HPF
BASOPHILS # BLD: 0 K/UL (ref 0–0.1)
BASOPHILS NFR BLD: 0 % (ref 0–1)
BILIRUB DIRECT SERPL-MCNC: <0.1 MG/DL (ref 0–0.2)
BILIRUB SERPL-MCNC: 0.8 MG/DL (ref 0.2–1)
BILIRUB UR QL: NEGATIVE
BUN SERPL-MCNC: 23 MG/DL (ref 6–20)
BUN/CREAT SERPL: 49 (ref 12–20)
CALCIUM SERPL-MCNC: 5.9 MG/DL (ref 8.5–10.1)
CHLORIDE SERPL-SCNC: 109 MMOL/L (ref 97–108)
CO2 SERPL-SCNC: 15 MMOL/L (ref 21–32)
COLOR UR: ABNORMAL
CREAT BLD-MCNC: 0.8 MG/DL (ref 0.6–1.3)
CREAT SERPL-MCNC: 0.47 MG/DL (ref 0.55–1.02)
DIFFERENTIAL METHOD BLD: ABNORMAL
EOSINOPHIL # BLD: 0 K/UL (ref 0–0.4)
EOSINOPHIL NFR BLD: 0 % (ref 0–7)
EPITH CASTS URNS QL MICRO: ABNORMAL /LPF
ERYTHROCYTE [DISTWIDTH] IN BLOOD BY AUTOMATED COUNT: 14.7 % (ref 11.5–14.5)
GLOBULIN SER CALC-MCNC: 3.1 G/DL (ref 2–4)
GLUCOSE SERPL-MCNC: 93 MG/DL (ref 65–100)
GLUCOSE UR STRIP.AUTO-MCNC: NEGATIVE MG/DL
HCT VFR BLD AUTO: 40.9 % (ref 35–47)
HGB BLD-MCNC: 13.3 G/DL (ref 11.5–16)
HGB UR QL STRIP: ABNORMAL
IMM GRANULOCYTES # BLD AUTO: 0 K/UL
IMM GRANULOCYTES NFR BLD AUTO: 0 %
KETONES UR QL STRIP.AUTO: 15 MG/DL
LEUKOCYTE ESTERASE UR QL STRIP.AUTO: NEGATIVE
LIPASE SERPL-CCNC: 52 U/L (ref 73–393)
LYMPHOCYTES # BLD: 0.7 K/UL (ref 0.8–3.5)
LYMPHOCYTES NFR BLD: 10 % (ref 12–49)
MAGNESIUM SERPL-MCNC: 1.1 MG/DL (ref 1.6–2.4)
MCH RBC QN AUTO: 27.4 PG (ref 26–34)
MCHC RBC AUTO-ENTMCNC: 32.5 G/DL (ref 30–36.5)
MCV RBC AUTO: 84.2 FL (ref 80–99)
MONOCYTES # BLD: 0.4 K/UL (ref 0–1)
MONOCYTES NFR BLD: 6 % (ref 5–13)
NEUTS BAND NFR BLD MANUAL: 9 % (ref 0–6)
NEUTS SEG # BLD: 6 K/UL (ref 1.8–8)
NEUTS SEG NFR BLD: 75 % (ref 32–75)
NITRITE UR QL STRIP.AUTO: NEGATIVE
NRBC # BLD: 0 K/UL (ref 0–0.01)
NRBC BLD-RTO: 0 PER 100 WBC
PH UR STRIP: 5.5 [PH] (ref 5–8)
PHOSPHATE SERPL-MCNC: 2.1 MG/DL (ref 2.6–4.7)
PLATELET # BLD AUTO: 280 K/UL (ref 150–400)
PMV BLD AUTO: 10.7 FL (ref 8.9–12.9)
POTASSIUM SERPL-SCNC: 3 MMOL/L (ref 3.5–5.1)
PROT SERPL-MCNC: 5.2 G/DL (ref 6.4–8.2)
PROT UR STRIP-MCNC: 30 MG/DL
RBC # BLD AUTO: 4.86 M/UL (ref 3.8–5.2)
RBC #/AREA URNS HPF: ABNORMAL /HPF (ref 0–5)
RBC MORPH BLD: ABNORMAL
RBC MORPH BLD: ABNORMAL
SODIUM SERPL-SCNC: 140 MMOL/L (ref 136–145)
SP GR UR REFRACTOMETRY: >1.03 (ref 1–1.03)
UR CULT HOLD, URHOLD: NORMAL
UROBILINOGEN UR QL STRIP.AUTO: 1 EU/DL (ref 0.2–1)
WBC # BLD AUTO: 7.1 K/UL (ref 3.6–11)
WBC URNS QL MICRO: ABNORMAL /HPF (ref 0–4)

## 2019-07-26 PROCEDURE — 85025 COMPLETE CBC W/AUTO DIFF WBC: CPT

## 2019-07-26 PROCEDURE — 74011000258 HC RX REV CODE- 258: Performed by: EMERGENCY MEDICINE

## 2019-07-26 PROCEDURE — 99283 EMERGENCY DEPT VISIT LOW MDM: CPT

## 2019-07-26 PROCEDURE — 74011250636 HC RX REV CODE- 250/636: Performed by: HOSPITALIST

## 2019-07-26 PROCEDURE — 74177 CT ABD & PELVIS W/CONTRAST: CPT

## 2019-07-26 PROCEDURE — 83690 ASSAY OF LIPASE: CPT

## 2019-07-26 PROCEDURE — 96361 HYDRATE IV INFUSION ADD-ON: CPT

## 2019-07-26 PROCEDURE — 83735 ASSAY OF MAGNESIUM: CPT

## 2019-07-26 PROCEDURE — 74011250637 HC RX REV CODE- 250/637: Performed by: EMERGENCY MEDICINE

## 2019-07-26 PROCEDURE — 81001 URINALYSIS AUTO W/SCOPE: CPT

## 2019-07-26 PROCEDURE — 36415 COLL VENOUS BLD VENIPUNCTURE: CPT

## 2019-07-26 PROCEDURE — 96375 TX/PRO/DX INJ NEW DRUG ADDON: CPT

## 2019-07-26 PROCEDURE — 65660000000 HC RM CCU STEPDOWN

## 2019-07-26 PROCEDURE — 82565 ASSAY OF CREATININE: CPT

## 2019-07-26 PROCEDURE — 84100 ASSAY OF PHOSPHORUS: CPT

## 2019-07-26 PROCEDURE — 74011250636 HC RX REV CODE- 250/636: Performed by: EMERGENCY MEDICINE

## 2019-07-26 PROCEDURE — 80076 HEPATIC FUNCTION PANEL: CPT

## 2019-07-26 PROCEDURE — 74011250636 HC RX REV CODE- 250/636

## 2019-07-26 PROCEDURE — 74011636320 HC RX REV CODE- 636/320

## 2019-07-26 PROCEDURE — 80048 BASIC METABOLIC PNL TOTAL CA: CPT

## 2019-07-26 PROCEDURE — 96374 THER/PROPH/DIAG INJ IV PUSH: CPT

## 2019-07-26 RX ORDER — MORPHINE SULFATE 10 MG/ML
4 INJECTION, SOLUTION INTRAMUSCULAR; INTRAVENOUS ONCE
Status: COMPLETED | OUTPATIENT
Start: 2019-07-26 | End: 2019-07-26

## 2019-07-26 RX ORDER — ONDANSETRON 2 MG/ML
4 INJECTION INTRAMUSCULAR; INTRAVENOUS
Status: COMPLETED | OUTPATIENT
Start: 2019-07-26 | End: 2019-07-26

## 2019-07-26 RX ORDER — NALOXONE HYDROCHLORIDE 0.4 MG/ML
0.4 INJECTION, SOLUTION INTRAMUSCULAR; INTRAVENOUS; SUBCUTANEOUS AS NEEDED
Status: DISCONTINUED | OUTPATIENT
Start: 2019-07-26 | End: 2019-07-30 | Stop reason: HOSPADM

## 2019-07-26 RX ORDER — POTASSIUM CHLORIDE 14.9 MG/ML
10 INJECTION INTRAVENOUS
Status: COMPLETED | OUTPATIENT
Start: 2019-07-26 | End: 2019-07-27

## 2019-07-26 RX ORDER — METRONIDAZOLE 500 MG/100ML
500 INJECTION, SOLUTION INTRAVENOUS EVERY 8 HOURS
Status: DISCONTINUED | OUTPATIENT
Start: 2019-07-26 | End: 2019-07-26

## 2019-07-26 RX ORDER — MAGNESIUM SULFATE 1 G/100ML
1 INJECTION INTRAVENOUS ONCE
Status: COMPLETED | OUTPATIENT
Start: 2019-07-26 | End: 2019-07-26

## 2019-07-26 RX ORDER — METRONIDAZOLE 500 MG/100ML
500 INJECTION, SOLUTION INTRAVENOUS EVERY 12 HOURS
Status: DISCONTINUED | OUTPATIENT
Start: 2019-07-26 | End: 2019-07-27

## 2019-07-26 RX ORDER — ONDANSETRON 2 MG/ML
4 INJECTION INTRAMUSCULAR; INTRAVENOUS
Status: DISCONTINUED | OUTPATIENT
Start: 2019-07-26 | End: 2019-07-30 | Stop reason: HOSPADM

## 2019-07-26 RX ORDER — SODIUM CHLORIDE 0.9 % (FLUSH) 0.9 %
10 SYRINGE (ML) INJECTION ONCE
Status: COMPLETED | OUTPATIENT
Start: 2019-07-26 | End: 2019-07-26

## 2019-07-26 RX ORDER — SODIUM CHLORIDE 9 MG/ML
50 INJECTION, SOLUTION INTRAVENOUS
Status: COMPLETED | OUTPATIENT
Start: 2019-07-26 | End: 2019-07-26

## 2019-07-26 RX ORDER — MORPHINE SULFATE 10 MG/ML
4 INJECTION, SOLUTION INTRAMUSCULAR; INTRAVENOUS
Status: DISCONTINUED | OUTPATIENT
Start: 2019-07-26 | End: 2019-07-27

## 2019-07-26 RX ORDER — SODIUM CHLORIDE 0.9 % (FLUSH) 0.9 %
5-40 SYRINGE (ML) INJECTION AS NEEDED
Status: DISCONTINUED | OUTPATIENT
Start: 2019-07-26 | End: 2019-07-30 | Stop reason: HOSPADM

## 2019-07-26 RX ORDER — SODIUM CHLORIDE 0.9 % (FLUSH) 0.9 %
5-40 SYRINGE (ML) INJECTION EVERY 8 HOURS
Status: DISCONTINUED | OUTPATIENT
Start: 2019-07-26 | End: 2019-07-30 | Stop reason: HOSPADM

## 2019-07-26 RX ORDER — ACETAMINOPHEN 325 MG/1
650 TABLET ORAL
Status: DISCONTINUED | OUTPATIENT
Start: 2019-07-26 | End: 2019-07-30 | Stop reason: HOSPADM

## 2019-07-26 RX ORDER — POTASSIUM CHLORIDE 750 MG/1
30 TABLET, FILM COATED, EXTENDED RELEASE ORAL
Status: COMPLETED | OUTPATIENT
Start: 2019-07-26 | End: 2019-07-26

## 2019-07-26 RX ADMIN — IOPAMIDOL 100 ML: 755 INJECTION, SOLUTION INTRAVENOUS at 18:53

## 2019-07-26 RX ADMIN — METRONIDAZOLE 500 MG: 500 INJECTION, SOLUTION INTRAVENOUS at 22:12

## 2019-07-26 RX ADMIN — POTASSIUM CHLORIDE 30 MEQ: 750 TABLET, EXTENDED RELEASE ORAL at 21:01

## 2019-07-26 RX ADMIN — IOHEXOL 50 ML: 240 INJECTION, SOLUTION INTRATHECAL; INTRAVASCULAR; INTRAVENOUS; ORAL at 18:53

## 2019-07-26 RX ADMIN — Medication 10 ML: at 18:53

## 2019-07-26 RX ADMIN — CALCIUM GLUCONATE 1 G: 98 INJECTION, SOLUTION INTRAVENOUS at 20:55

## 2019-07-26 RX ADMIN — DIBASIC SODIUM PHOSPHATE, MONOBASIC POTASSIUM PHOSPHATE AND MONOBASIC SODIUM PHOSPHATE 1 TABLET: 852; 155; 130 TABLET ORAL at 21:00

## 2019-07-26 RX ADMIN — ONDANSETRON 4 MG: 2 INJECTION INTRAMUSCULAR; INTRAVENOUS at 20:01

## 2019-07-26 RX ADMIN — MAGNESIUM SULFATE HEPTAHYDRATE 1 G: 1 INJECTION, SOLUTION INTRAVENOUS at 22:11

## 2019-07-26 RX ADMIN — SODIUM CHLORIDE 50 ML/HR: 900 INJECTION, SOLUTION INTRAVENOUS at 18:54

## 2019-07-26 RX ADMIN — SODIUM CHLORIDE 1000 ML: 900 INJECTION, SOLUTION INTRAVENOUS at 22:13

## 2019-07-26 RX ADMIN — SODIUM CHLORIDE 1000 ML: 900 INJECTION, SOLUTION INTRAVENOUS at 19:58

## 2019-07-26 RX ADMIN — MORPHINE SULFATE 4 MG: 10 INJECTION INTRAVENOUS at 20:01

## 2019-07-26 NOTE — ED PROVIDER NOTES
82-year-old white female presents to the emergency department with abdominal pain. Patient reports that she is had issues with diverticulitis on and off over the last several months. She also had diverticulitis about a year ago that had her admitted into the hospital.  She reports that the abdominal pain started again 3 days ago. She saw her primary doctor yesterday who ordered an outpatient CT scan today. The CT scan was just completed and she was sent over by the radiologist for further evaluation. Patient reports mid abdominal pain. Pain is moderate in severity. Pain does not radiate. She has been vomiting over the past 2 days. No diarrhea. No fevers. Patient denies dysuria or hematuria. No chest pain or shortness of breath. No previous abdominal surgeries. Patient denies alcohol or tobacco use.            Past Medical History:   Diagnosis Date    Arthritis     Breast cancer, right breast ([address]     Depression     GERD (gastroesophageal reflux disease)     Hypercholesterolemia     Hypertension     Other ill-defined conditions(799.89) 2012    dog bite dec 2012 needing blood transfusion       Past Surgical History:   Procedure Laterality Date    HX BREAST LUMPECTOMY Right 3/8/2018    RIGHT BREAST LUMPECTOMY WITH ULTRASOUND performed by Terell Pride MD at [facility] AM[facility]ULATORY OR    HX COLONOSCOPY      HX KNEE REPLACEMENT Right 06/2013    HX TONSILLECTOMY           Family History:   Adopted: Yes   Problem Relation Age of Onset    Breast Cancer Mother     Stroke Mother     Hypertension Mother     Cancer Mother         skin    Cancer Father         skin    Pulmonary Fibrosis Father     Cancer Sister 79        colon    Breast Cancer Maternal Aunt     Breast Cancer Paternal Aunt     Breast Cancer Maternal Aunt         ovarian and colon cancer    Breast Cancer Maternal Aunt         ovarian and colon cancer    Breast Cancer Maternal Aunt     Breast Cancer Paternal Aunt     Breast Cancer Maternal Aunt     Cancer Maternal Grandmother         liver/ovarian       Social History     Socioeconomic History    Marital status:      Spouse name: Not on file    Number of children: Not on file    Years of education: Not on file    Highest education level: Not on file   Occupational History    Not on file   Social Needs    Financial resource strain: Not on file    Food insecurity:     Worry: Not on file     Inability: Not on file    Transportation needs:     Medical: Not on file     Non-medical: Not on file   Tobacco Use    Smoking status: Former Smoker     Packs/day: 0.25     Years: 2.00     Pack years: 0.50     Last attempt to quit: 1972     Years since quittin.1    Smokeless tobacco: Never Used   Substance and Sexual Activity    Alcohol use: No    Drug use: No    Sexual activity: Not on file   Lifestyle    Physical activity:     Days per week: Not on file     Minutes per session: Not on file    Stress: Not on file   Relationships    Social connections:     Talks on phone: Not on file     Gets together: Not on file     Attends Baptism service: Not on file     Active member of club or organization: Not on file     Attends meetings of clubs or organizations: Not on file     Relationship status: Not on file    Intimate partner violence:     Fear of current or ex partner: Not on file     Emotionally abused: Not on file     Physically abused: Not on file     Forced sexual activity: Not on file   Other Topics Concern    Not on file   Social History Narrative    Not on file         ALLERGIES: Nasacort [triamcinolone acetonide] and Sulfa (sulfonamide antibiotics)    Review of Systems   Constitutional: Negative for fever. HENT: Negative for facial swelling. Eyes: Negative for pain. Respiratory: Negative for cough, chest tightness and shortness of breath. Cardiovascular: Negative for chest pain and leg swelling.    Gastrointestinal: Positive for abdominal pain and vomiting. Negative for abdominal distention and diarrhea. Genitourinary: Negative for difficulty urinating and flank pain. Musculoskeletal: Negative for arthralgias and back pain. Skin: Negative for color change. Neurological: Negative for dizziness and headaches. Hematological: Does not bruise/bleed easily. Psychiatric/Behavioral: Negative for confusion. All other systems reviewed and are negative. There were no vitals filed for this visit. Physical Exam   Constitutional: She is oriented to person, place, and time. She appears well-developed and well-nourished. HENT:   Head: Normocephalic and atraumatic. Right Ear: External ear normal.   Left Ear: External ear normal.   Nose: Nose normal.   Mouth/Throat: Oropharynx is clear and moist.   Eyes: Pupils are equal, round, and reactive to light. EOM are normal. No scleral icterus. Neck: Normal range of motion. Neck supple. No JVD present. No tracheal deviation present. No thyromegaly present. Cardiovascular: Normal rate, regular rhythm, normal heart sounds and intact distal pulses. Exam reveals no friction rub. No murmur heard. Pulmonary/Chest: Effort normal and breath sounds normal. No stridor. No respiratory distress. She has no wheezes. She has no rales. She exhibits no tenderness. Abdominal: Soft. Bowel sounds are normal. She exhibits no distension. There is tenderness. There is no rebound and no guarding. Patient has mid abdominal tenderness. No lower abdominal pain, no upper abdominal pain. No distention. No rebound or guarding. Musculoskeletal: Normal range of motion. She exhibits no edema or tenderness. Lymphadenopathy:     She has no cervical adenopathy. Neurological: She is alert and oriented to person, place, and time. She has normal reflexes. No cranial nerve deficit. Coordination normal.   Skin: Skin is warm and dry. No rash noted. No erythema. Psychiatric: She has a normal mood and affect.  Her behavior is normal. Judgment and thought content normal.   Nursing note and vitals reviewed. MDM  Number of Diagnoses or Management Options  Abdominal pain, generalized:   Dehydration:   Hypocalcemia:   Hypokalemia:   Ileus ([address]):   Nausea and vomiting, intractability of vomiting not specified, unspecified vomiting type:   Diagnosis management comments: Patient has vague mid abdominal pain and vomiting. Her CT scan was borderline which could be enteritis versus obstruction. Will give her IV fluids and reassess after treatment. Her disposition will depend on how she feels as well as how her labs look. Patient agrees. Amount and/or Complexity of Data Reviewed  Clinical lab tests: ordered and reviewed  Tests in the radiology section of CPT®: reviewed  Tests in the medicine section of CPT®: ordered  Discussion of test results with the performing providers: yes  Decide to obtain previous medical records or to obtain history from someone other than the patient: yes  Obtain history from someone other than the patient: yes  Review and summarize past medical records: yes  Discuss the patient with other providers: yes  Independent visualization of images, tracings, or specimens: yes    Risk of Complications, Morbidity, and/or Mortality  Presenting problems: high  Diagnostic procedures: high  Management options: high    Critical Care  Total time providing critical care: 30-74 minutes (Total critical care time spent exclusive of procedures:  30 min)         Procedures    7:40 PM  The radiologist called me who read her CT. He said that her CT looks like probably earlier viral enteritis. He said it could also be an early obstruction. He said that it would depend on how her labs look and how she looks clinically. He said the CT was likely a viral enteritis.     8:25 PM  Labs are abnormal  CO2 is low at 15  Ca is low  K is low    IVF are running    Will admit for further treatment    Hospitalist Maricel for Admission  8:26 PM    ED Room Number: SER05/05  Patient Name and age:  Krishan Perry 81 y.o.  female  Working Diagnosis:   1. Abdominal pain, generalized    2. Nausea and vomiting, intractability of vomiting not specified, unspecified vomiting type    3. Dehydration    4. Ileus ([address]    5. Hypokalemia    6.  Hypocalcemia      Readmission: no  Isolation Requirements:  no  Recommended Level of Care:  telemetry  Code Status:  Full Code  Other:  Abd pain, vomiting, dehydrated, ileus on CT  Department:[place] [facility] - [phone]     8:29 PM  I charlene textbrigitte Aguirre from [facility] who accepts admission    8:46 PM  Mag and Phos are low, will replace both in ED

## 2019-07-27 LAB
ALBUMIN SERPL-MCNC: 3.6 G/DL (ref 3.5–5)
ALBUMIN/GLOB SERPL: 0.8 {RATIO} (ref 1.1–2.2)
ALP SERPL-CCNC: 91 U/L (ref 45–117)
ALT SERPL-CCNC: 12 U/L (ref 12–78)
ANION GAP SERPL CALC-SCNC: 8 MMOL/L (ref 5–15)
AST SERPL-CCNC: 11 U/L (ref 15–37)
BASOPHILS # BLD: 0.1 K/UL (ref 0–0.1)
BASOPHILS NFR BLD: 1 % (ref 0–1)
BILIRUB SERPL-MCNC: 0.6 MG/DL (ref 0.2–1)
BUN SERPL-MCNC: 29 MG/DL (ref 6–20)
BUN/CREAT SERPL: 31 (ref 12–20)
CALCIUM SERPL-MCNC: 9.5 MG/DL (ref 8.5–10.1)
CHLORIDE SERPL-SCNC: 107 MMOL/L (ref 97–108)
CO2 SERPL-SCNC: 23 MMOL/L (ref 21–32)
CREAT SERPL-MCNC: 0.94 MG/DL (ref 0.55–1.02)
DIFFERENTIAL METHOD BLD: ABNORMAL
EOSINOPHIL # BLD: 0 K/UL (ref 0–0.4)
EOSINOPHIL NFR BLD: 0 % (ref 0–7)
ERYTHROCYTE [DISTWIDTH] IN BLOOD BY AUTOMATED COUNT: 14.7 % (ref 11.5–14.5)
GLOBULIN SER CALC-MCNC: 4.4 G/DL (ref 2–4)
GLUCOSE SERPL-MCNC: 102 MG/DL (ref 65–100)
HCT VFR BLD AUTO: 43.3 % (ref 35–47)
HGB BLD-MCNC: 13.1 G/DL (ref 11.5–16)
IMM GRANULOCYTES # BLD AUTO: 0 K/UL
IMM GRANULOCYTES NFR BLD AUTO: 0 %
LACTATE SERPL-SCNC: 0.9 MMOL/L (ref 0.4–2)
LACTATE SERPL-SCNC: 2.1 MMOL/L (ref 0.4–2)
LYMPHOCYTES # BLD: 0.7 K/UL (ref 0.8–3.5)
LYMPHOCYTES NFR BLD: 13 % (ref 12–49)
MCH RBC QN AUTO: 27.2 PG (ref 26–34)
MCHC RBC AUTO-ENTMCNC: 30.3 G/DL (ref 30–36.5)
MCV RBC AUTO: 90 FL (ref 80–99)
METAMYELOCYTES NFR BLD MANUAL: 1 %
MONOCYTES # BLD: 0.4 K/UL (ref 0–1)
MONOCYTES NFR BLD: 7 % (ref 5–13)
NEUTS BAND NFR BLD MANUAL: 13 % (ref 0–6)
NEUTS SEG # BLD: 4.1 K/UL (ref 1.8–8)
NEUTS SEG NFR BLD: 65 % (ref 32–75)
NRBC # BLD: 0 K/UL (ref 0–0.01)
NRBC BLD-RTO: 0 PER 100 WBC
PLATELET # BLD AUTO: 318 K/UL (ref 150–400)
PMV BLD AUTO: 10.9 FL (ref 8.9–12.9)
POTASSIUM SERPL-SCNC: 3.6 MMOL/L (ref 3.5–5.1)
PROT SERPL-MCNC: 8 G/DL (ref 6.4–8.2)
RBC # BLD AUTO: 4.81 M/UL (ref 3.8–5.2)
RBC MORPH BLD: ABNORMAL
RBC MORPH BLD: ABNORMAL
SODIUM SERPL-SCNC: 138 MMOL/L (ref 136–145)
WBC # BLD AUTO: 5.3 K/UL (ref 3.6–11)
WBC MORPH BLD: ABNORMAL

## 2019-07-27 PROCEDURE — 74011250637 HC RX REV CODE- 250/637: Performed by: HOSPITALIST

## 2019-07-27 PROCEDURE — 74011250636 HC RX REV CODE- 250/636: Performed by: EMERGENCY MEDICINE

## 2019-07-27 PROCEDURE — 74011250636 HC RX REV CODE- 250/636: Performed by: HOSPITALIST

## 2019-07-27 PROCEDURE — 65660000000 HC RM CCU STEPDOWN

## 2019-07-27 PROCEDURE — 36415 COLL VENOUS BLD VENIPUNCTURE: CPT

## 2019-07-27 PROCEDURE — 74011250636 HC RX REV CODE- 250/636: Performed by: FAMILY MEDICINE

## 2019-07-27 PROCEDURE — 83605 ASSAY OF LACTIC ACID: CPT

## 2019-07-27 PROCEDURE — 74011000258 HC RX REV CODE- 258: Performed by: HOSPITALIST

## 2019-07-27 PROCEDURE — 80053 COMPREHEN METABOLIC PANEL: CPT

## 2019-07-27 PROCEDURE — 85025 COMPLETE CBC W/AUTO DIFF WBC: CPT

## 2019-07-27 RX ORDER — MORPHINE SULFATE 2 MG/ML
1 INJECTION, SOLUTION INTRAMUSCULAR; INTRAVENOUS
Status: DISCONTINUED | OUTPATIENT
Start: 2019-07-27 | End: 2019-07-30 | Stop reason: HOSPADM

## 2019-07-27 RX ORDER — ASPIRIN 81 MG/1
81 TABLET ORAL DAILY
Status: DISCONTINUED | OUTPATIENT
Start: 2019-07-28 | End: 2019-07-30 | Stop reason: HOSPADM

## 2019-07-27 RX ORDER — ATENOLOL 25 MG/1
12.5 TABLET ORAL DAILY
Status: DISCONTINUED | OUTPATIENT
Start: 2019-07-27 | End: 2019-07-30 | Stop reason: HOSPADM

## 2019-07-27 RX ORDER — HYDROMORPHONE HYDROCHLORIDE 1 MG/ML
0.5 INJECTION, SOLUTION INTRAMUSCULAR; INTRAVENOUS; SUBCUTANEOUS
Status: DISCONTINUED | OUTPATIENT
Start: 2019-07-27 | End: 2019-07-27

## 2019-07-27 RX ORDER — SODIUM CHLORIDE 9 MG/ML
75 INJECTION, SOLUTION INTRAVENOUS CONTINUOUS
Status: DISCONTINUED | OUTPATIENT
Start: 2019-07-27 | End: 2019-07-30 | Stop reason: HOSPADM

## 2019-07-27 RX ORDER — IBUPROFEN 200 MG
200 TABLET ORAL
Status: DISCONTINUED | OUTPATIENT
Start: 2019-07-27 | End: 2019-07-30 | Stop reason: HOSPADM

## 2019-07-27 RX ORDER — ATENOLOL 25 MG/1
25 TABLET ORAL DAILY
Status: DISCONTINUED | OUTPATIENT
Start: 2019-07-28 | End: 2019-07-27

## 2019-07-27 RX ORDER — LETROZOLE 2.5 MG/1
2.5 TABLET, FILM COATED ORAL DAILY
Status: DISCONTINUED | OUTPATIENT
Start: 2019-07-28 | End: 2019-07-30 | Stop reason: HOSPADM

## 2019-07-27 RX ORDER — SODIUM CHLORIDE 0.9 % (FLUSH) 0.9 %
5-40 SYRINGE (ML) INJECTION EVERY 8 HOURS
Status: DISCONTINUED | OUTPATIENT
Start: 2019-07-27 | End: 2019-07-30 | Stop reason: HOSPADM

## 2019-07-27 RX ORDER — SODIUM CHLORIDE 0.9 % (FLUSH) 0.9 %
5-40 SYRINGE (ML) INJECTION AS NEEDED
Status: DISCONTINUED | OUTPATIENT
Start: 2019-07-27 | End: 2019-07-30 | Stop reason: HOSPADM

## 2019-07-27 RX ADMIN — Medication 10 ML: at 22:41

## 2019-07-27 RX ADMIN — CEFTRIAXONE SODIUM 2 G: 2 INJECTION, POWDER, FOR SOLUTION INTRAMUSCULAR; INTRAVENOUS at 00:17

## 2019-07-27 RX ADMIN — SODIUM CHLORIDE 125 ML/HR: 900 INJECTION, SOLUTION INTRAVENOUS at 06:58

## 2019-07-27 RX ADMIN — POTASSIUM CHLORIDE 10 MEQ: 200 INJECTION, SOLUTION INTRAVENOUS at 00:18

## 2019-07-27 RX ADMIN — MORPHINE SULFATE 1 MG: 2 INJECTION, SOLUTION INTRAMUSCULAR; INTRAVENOUS at 14:38

## 2019-07-27 RX ADMIN — Medication 10 ML: at 14:49

## 2019-07-27 RX ADMIN — ONDANSETRON 4 MG: 2 INJECTION INTRAMUSCULAR; INTRAVENOUS at 22:40

## 2019-07-27 RX ADMIN — Medication 10 ML: at 00:18

## 2019-07-27 RX ADMIN — ATENOLOL 12.5 MG: 25 TABLET ORAL at 18:28

## 2019-07-27 RX ADMIN — HYDROMORPHONE HYDROCHLORIDE 0.5 MG: 1 INJECTION, SOLUTION INTRAMUSCULAR; INTRAVENOUS; SUBCUTANEOUS at 02:24

## 2019-07-27 RX ADMIN — METRONIDAZOLE 500 MG: 500 INJECTION, SOLUTION INTRAVENOUS at 08:50

## 2019-07-27 RX ADMIN — Medication 10 ML: at 14:50

## 2019-07-27 RX ADMIN — SODIUM CHLORIDE 75 ML/HR: 900 INJECTION, SOLUTION INTRAVENOUS at 16:30

## 2019-07-27 RX ADMIN — ONDANSETRON 4 MG: 2 INJECTION INTRAMUSCULAR; INTRAVENOUS at 01:14

## 2019-07-27 RX ADMIN — ONDANSETRON 4 MG: 2 INJECTION INTRAMUSCULAR; INTRAVENOUS at 14:44

## 2019-07-27 RX ADMIN — MORPHINE SULFATE 1 MG: 2 INJECTION, SOLUTION INTRAMUSCULAR; INTRAVENOUS at 22:37

## 2019-07-27 NOTE — ED NOTES
Report to [facility] crew. [facility] 4th floor aware of 30 min e.t.a..

## 2019-07-27 NOTE — CONSULTS
Chief Complaint:  Abdominal pain    HPI:  80 yo woman with hx right breast cancer s/p lumpectomy and chemotherapy, HTN, morbid obesity, sepsis from infected port consulted for abdominal pain. Pt reported pain has been ongoing since May 2019. Pain has been in LLQ but has now migrated more to mid abdomen. Pt reported one episode of emesis. No fevers or chills. CT scan suggested jejunal enteritis and possible sigmoid diverticulitis. Past Medical History:   Diagnosis Date    Arthritis     Breast cancer, right breast ([address]     Depression     GERD (gastroesophageal reflux disease)     Hypercholesterolemia     Hypertension     Other ill-defined conditions(799.89) 2012    dog bite dec 2012 needing blood transfusion       Past Surgical History:   Procedure Laterality Date    HX BREAST LUMPECTOMY Right 3/8/2018    RIGHT BREAST LUMPECTOMY WITH ULTRASOUND performed by Manoj Dodson MD at [facility] AMBULATORY OR    HX COLONOSCOPY      HX KNEE REPLACEMENT Right 06/2013    HX TONSILLECTOMY         Current Facility-Administered Medications on File Prior to Encounter   Medication Dose Route Frequency Provider Last Rate Last Dose    [COMPLETED] 0.9% sodium chloride infusion  50 mL/hr IntraVENous RAD ONCE Bshsi, Not On File 50 mL/hr at 07/26/19 1854 50 mL/hr at 07/26/19 1854    [COMPLETED] sodium chloride (NS) flush 10 mL  10 mL IntraVENous ONCE Bshsi, Not On File   10 mL at 07/26/19 1853    [COMPLETED] iopamidol (ISOVUE-370) 76 % injection 100 mL  100 mL IntraVENous ONCE Bshsi, Not On File   100 mL at 07/26/19 1853    [COMPLETED] iohexol (OMNIPAQUE) 240 mg iodine/mL solution 50 mL  50 mL Oral ONCE Bshsi, Not On File   50 mL at 07/26/19 1853     Current Outpatient Medications on File Prior to Encounter   Medication Sig Dispense Refill    letrozole (FEMARA) 2.5 mg tablet Take 1 Tab by mouth daily. 30 Tab 6    acetaminophen (TYLENOL 8 HOUR PO) Take  by mouth as needed.       aspirin delayed-release 81 mg tablet Take  by mouth daily.  apixaban (ELIQUIS) 5 mg tablet Take 1 Tab by mouth two (2) times a day. 60 Tab 3    lisinopril (PRINIVIL, ZESTRIL) 20 mg tablet Take 20 mg by mouth daily. 2    potassium chloride (K-DUR, KLOR-CON) 20 mEq tablet Take 20 mEq by mouth daily. 0    lisinopril-hydroCHLOROthiazide (PRINZIDE, ZESTORETIC) 20-25 mg per tablet Take 0.5 Tabs by mouth daily. Take 1/2 tab daily until seen by Dr. Prudence Goodwin on Tuesday 6/26/18. Indications: hypertension      ondansetron (ZOFRAN ODT) 4 mg disintegrating tablet Take 1 Tab by mouth every eight (8) hours as needed for Nausea. 30 Tab 0    vancomycin 50 mg/mL oral solution (compounded) Take 125mg po QID for 12 doses, then take 125mg po TID for 9 doses, then take 125mg po BID for 16 doses 93 mL 0    raNITIdine (ZANTAC) 150 mg tablet Take 150 mg by mouth two (2) times a day. Indications: Heartburn, PREVENTION OF STRESS ULCER      prochlorperazine (COMPAZINE) 10 mg tablet Take 10 mg by mouth every six (6) hours as needed.  lidocaine-prilocaine (EMLA) topical cream Apply  to affected area as needed for Pain. 30 g 0    garlic 1 mg cap Take  by mouth daily.  oxyCODONE-acetaminophen (PERCOCET) 5-325 mg per tablet Take 1 Tab by mouth every four (4) hours as needed for Pain. Max Daily Amount: 6 Tabs. 40 Tab 0    ferrous sulfate (IRON) 325 mg (65 mg iron) tablet Take 325 mg by mouth Daily (before breakfast).  Cholecalciferol, Vitamin D3, (VITAMIN D3) 1,000 unit cap Take 1 Tab by mouth daily.  atenolol (TENORMIN) 50 mg tablet Take 25 mg by mouth daily.          Allergies   Allergen Reactions    Nasacort [Triamcinolone Acetonide] Other (comments)     headache    Sulfa (Sulfonamide Antibiotics) Rash       Review of Systems - General ROS: negative  Psychological ROS: negative  Respiratory ROS: negative  Cardiovascular ROS: negative  Gastrointestinal ROS: positive for - abdominal pain and nausea/vomiting    Visit Vitals  /65 (BP 1 Location: Left arm, BP Patient Position: At rest)   Pulse 66   Temp 98.4 °F (36.9 °C)   Resp 16   Ht 5' 2\" (1.575 m)   Wt 220 lb 10.9 oz (100.1 kg)   SpO2 97%   BMI 40.36 kg/m²         Physical Exam:    Gen:  NAD  Pulm:  Unlabored  Abd:  S/mildly distended/mild TTP without guarding or rebound    Recent Results (from the past 24 hour(s))   POC CREATININE    Collection Time: 07/26/19  6:34 PM   Result Value Ref Range    Creatinine (POC) 0.8 0.6 - 1.3 mg/dL    GFRAA, POC >60 >60 ml/min/1.73m2    GFRNA, POC >60 >60 ml/min/1.73m2   CBC WITH AUTOMATED DIFF    Collection Time: 07/26/19  7:49 PM   Result Value Ref Range    WBC 7.1 3.6 - 11.0 K/uL    RBC 4.86 3.80 - 5.20 M/uL    HGB 13.3 11.5 - 16.0 g/dL    HCT 40.9 35.0 - 47.0 %    MCV 84.2 80.0 - 99.0 FL    MCH 27.4 26.0 - 34.0 PG    MCHC 32.5 30.0 - 36.5 g/dL    RDW 14.7 (H) 11.5 - 14.5 %    PLATELET 920 323 - 724 K/uL    MPV 10.7 8.9 - 12.9 FL    NRBC 0.0 0  WBC    ABSOLUTE NRBC 0.00 0.00 - 0.01 K/uL    NEUTROPHILS 75 32 - 75 %    BAND NEUTROPHILS 9 (H) 0 - 6 %    LYMPHOCYTES 10 (L) 12 - 49 %    MONOCYTES 6 5 - 13 %    EOSINOPHILS 0 0 - 7 %    BASOPHILS 0 0 - 1 %    IMMATURE GRANULOCYTES 0 %    ABS. NEUTROPHILS 6.0 1.8 - 8.0 K/UL    ABS. LYMPHOCYTES 0.7 (L) 0.8 - 3.5 K/UL    ABS. MONOCYTES 0.4 0.0 - 1.0 K/UL    ABS. EOSINOPHILS 0.0 0.0 - 0.4 K/UL    ABS. BASOPHILS 0.0 0.0 - 0.1 K/UL    ABS. IMM.  GRANS. 0.0 K/UL    DF MANUAL      RBC COMMENTS ANISOCYTOSIS  1+        RBC COMMENTS OVALOCYTES  PRESENT       METABOLIC PANEL, BASIC    Collection Time: 07/26/19  7:49 PM   Result Value Ref Range    Sodium 140 136 - 145 mmol/L    Potassium 3.0 (L) 3.5 - 5.1 mmol/L    Chloride 109 (H) 97 - 108 mmol/L    CO2 15 (LL) 21 - 32 mmol/L    Anion gap 16 (H) 5 - 15 mmol/L    Glucose 93 65 - 100 mg/dL    BUN 23 (H) 6 - 20 MG/DL    Creatinine 0.47 (L) 0.55 - 1.02 MG/DL    BUN/Creatinine ratio 49 (H) 12 - 20      GFR est AA >60 >60 ml/min/1.73m2    GFR est non-AA >60 >60 ml/min/1.73m2    Calcium 5.9 (LL) 8.5 - 10.1 MG/DL   HEPATIC FUNCTION PANEL    Collection Time: 07/26/19  7:49 PM   Result Value Ref Range    Protein, total 5.2 (L) 6.4 - 8.2 g/dL    Albumin 2.1 (L) 3.5 - 5.0 g/dL    Globulin 3.1 2.0 - 4.0 g/dL    A-G Ratio 0.7 (L) 1.1 - 2.2      Bilirubin, total 0.8 0.2 - 1.0 MG/DL    Bilirubin, direct <0.1 0.0 - 0.2 MG/DL    Alk. phosphatase 55 45 - 117 U/L    AST (SGOT) 20 15 - 37 U/L    ALT (SGPT) 8 (L) 12 - 78 U/L   LIPASE    Collection Time: 07/26/19  7:49 PM   Result Value Ref Range    Lipase 52 (L) 73 - 393 U/L   MAGNESIUM    Collection Time: 07/26/19  7:49 PM   Result Value Ref Range    Magnesium 1.1 (L) 1.6 - 2.4 mg/dL   PHOSPHORUS    Collection Time: 07/26/19  7:49 PM   Result Value Ref Range    Phosphorus 2.1 (L) 2.6 - 4.7 MG/DL   URINALYSIS W/MICROSCOPIC    Collection Time: 07/26/19 10:37 PM   Result Value Ref Range    Color YELLOW/STRAW      Appearance CLEAR CLEAR      Specific gravity >1.030 (H) 1.003 - 1.030    pH (UA) 5.5 5.0 - 8.0      Protein 30 (A) NEG mg/dL    Glucose NEGATIVE  NEG mg/dL    Ketone 15 (A) NEG mg/dL    Bilirubin NEGATIVE  NEG      Blood MODERATE (A) NEG      Urobilinogen 1.0 0.2 - 1.0 EU/dL    Nitrites NEGATIVE  NEG      Leukocyte Esterase NEGATIVE  NEG      WBC 0-4 0 - 4 /hpf    RBC 5-10 0 - 5 /hpf    Epithelial cells MODERATE (A) FEW /lpf    Bacteria NEGATIVE  NEG /hpf   URINE CULTURE HOLD SAMPLE    Collection Time: 07/26/19 10:37 PM   Result Value Ref Range    Urine culture hold        URINE ON HOLD IN MICROBIOLOGY DEPT FOR 3 DAYS. IF UNPRESERVED URINE IS SUBMITTED, IT CANNOT BE USED FOR ADDITIONAL TESTING AFTER 24 HRS, RECOLLECTION WILL BE REQUIRED.    LACTIC ACID    Collection Time: 07/27/19  5:38 AM   Result Value Ref Range    Lactic acid 2.1 (HH) 0.4 - 2.0 MMOL/L   CBC WITH AUTOMATED DIFF    Collection Time: 07/27/19  5:38 AM   Result Value Ref Range    WBC 5.3 3.6 - 11.0 K/uL    RBC 4.81 3.80 - 5.20 M/uL    HGB 13.1 11.5 - 16.0 g/dL    HCT 43.3 35.0 - 47.0 %    MCV 90.0 80.0 - 99.0 FL    MCH 27.2 26.0 - 34.0 PG    MCHC 30.3 30.0 - 36.5 g/dL    RDW 14.7 (H) 11.5 - 14.5 %    PLATELET 918 085 - 047 K/uL    MPV 10.9 8.9 - 12.9 FL    NRBC 0.0 0  WBC    ABSOLUTE NRBC 0.00 0.00 - 0.01 K/uL    NEUTROPHILS PENDING %    LYMPHOCYTES PENDING %    MONOCYTES PENDING %    EOSINOPHILS PENDING %    BASOPHILS PENDING %    IMMATURE GRANULOCYTES PENDING %    ABS. NEUTROPHILS PENDING K/UL    ABS. LYMPHOCYTES PENDING K/UL    ABS. MONOCYTES PENDING K/UL    ABS. EOSINOPHILS PENDING K/UL    ABS. BASOPHILS PENDING K/UL    ABS. IMM. GRANS. PENDING K/UL    DF PENDING    METABOLIC PANEL, COMPREHENSIVE    Collection Time: 07/27/19  5:38 AM   Result Value Ref Range    Sodium 138 136 - 145 mmol/L    Potassium 3.6 3.5 - 5.1 mmol/L    Chloride 107 97 - 108 mmol/L    CO2 23 21 - 32 mmol/L    Anion gap 8 5 - 15 mmol/L    Glucose 102 (H) 65 - 100 mg/dL    BUN 29 (H) 6 - 20 MG/DL    Creatinine 0.94 0.55 - 1.02 MG/DL    BUN/Creatinine ratio 31 (H) 12 - 20      GFR est AA >60 >60 ml/min/1.73m2    GFR est non-AA 59 (L) >60 ml/min/1.73m2    Calcium 9.5 8.5 - 10.1 MG/DL    Bilirubin, total 0.6 0.2 - 1.0 MG/DL    ALT (SGPT) 12 12 - 78 U/L    AST (SGOT) 11 (L) 15 - 37 U/L    Alk. phosphatase 91 45 - 117 U/L    Protein, total 8.0 6.4 - 8.2 g/dL    Albumin 3.6 3.5 - 5.0 g/dL    Globulin 4.4 (H) 2.0 - 4.0 g/dL    A-G Ratio 0.8 (L) 1.1 - 2.2         AP: 80 yo woman consulted for abdominal pain    - Abdominal pain:  No acute indication for operation. Pain has been ongoing for several months and CT does not suggest an obstruction  - Clear liquids and advance as tolerated  - Recommend GI consult for EGD/colonoscopy if pain worsened  - out of bed.   Encourage ambulation  - Labs in am  - Repeat AXR in am

## 2019-07-27 NOTE — PROGRESS NOTES
On Call general surgeon Dr. Nusrat Paula paged about the patient and new consult to the group. Dr. Aguilar Jeramie number given to Dr. Nusrat Paula.

## 2019-07-27 NOTE — PROGRESS NOTES
Patient c/c pain in abdominal mid area, Morphine given. Also c/c dry heaving, Zofran administered. Patient requested for a wet wash cloth and applied on her forehead. Also c/c of back discomfort while laying, repositioned, and moved up in bed. Patient stated she felt better.

## 2019-07-27 NOTE — PROGRESS NOTES
Bedside and Verbal shift change report given to Jean-Pierre Boone RN (oncoming nurse) by Douglas Howell RN (offgoing nurse).  Report included the following information SBAR, Kardex, Intake/Output, MAR and Recent Results.

## 2019-07-27 NOTE — PROGRESS NOTES
1156H   Contacted Infection control regarding patient's isolation status, nursing left voice message to call back. Patient has no active bowel movement, last BM was on 7/25 per patient. 1230H   Patient reported she just had a normal bowel movement.

## 2019-07-27 NOTE — H&P
[facility]  HISTORY AND PHYSICAL    Name:  Dio Ivory  MR#:  [id number]  :  1950  ACCOUNT #:  [de-identified]  ADMIT DATE:  2019    CHIEF COMPLAINT:  Abdominal pain. HISTORY OF PRESENT ILLNESS:  A 79-year-old white female with past medical history of right breast cancer, depression, GERD, hypertension, hyperlipidemia, presented as a direct admission/transfer from [facility] Emergency Department to [facility] with a chief complaint of abdominal pain. The patient reportedly had onset of symptoms starting approximately 4 days ago what was described as a left lower quadrant, left flank abdominal pain, non-radiating, sharp in character, rated as a 9/10 without specific alleviating factors, exacerbated with palpation and movement, constant. The patient also reportedly had episodes of nausea, vomiting, nonbloody, nonbilious emesis. She reports her last bowel movement was normal approximately 1 day ago. She has a history of last hospitalization from 2018 to 2018, diagnosed with septic shock, hypovolemia, acute diverticulitis with bowel perforation, C. difficile colitis, acute right IJ DVT, acute kidney injury, hypokalemia, hypomagnesemia, hypophosphatemia and anemia. During that hospitalization, she had been treated with vancomycin, cefepime, Flagyl, seen by general surgeon in consultation and subsequently discharged to home. She has a history of infiltrating ductal right breast carcinoma for which she has undergone prior right breast lumpectomy, status post adjuvant chemotherapy x2 cycles and radiation therapy with adjuvant treatment with letrozole.   On arrival to [place] [facility] on 2019, the patient underwent a workup, which included CT abdomen and pelvis with IV contrast showed distal jejunal and diffuse ileal dilation without a transition point with findings felt to favor enteritis over low-grade obstruction with trace ascites, nonspecific secondary findings felt to possibly represent gluten/gliadin hypersensitivity with midsigmoid wall thickening in the location of a previous diverticular abscess seen back in 06/2018 exam with obliteration of the fat planes between the uterus, left adnexa and sigmoid colon. Per the ED, the patient was given morphine 4 mg IV, 0.9% normal saline with 1000 mL IV fluid bolus x2, Zofran 4 mg IV, potassium chloride 10 mEq IV, potassium chloride 30 mEq p.o., calcium gluconate 1 g IV, started on ceftriaxone 1 g IV and Flagyl 500 mg IV. Some of the infusions are currently infusing at the time of transfer to [facility].  Of note, the patient has low potassium of 3.0, phosphorus 2.1, magnesium is 1.1, calcium is 5.9, albumin is 2.1, serum CO2 of 15 with elevated anion gap of 16. As mentioned, the patient will be transferred to [facility], is now seen on a current bedside evaluation. When asked if she is having any pain, she rates her pain as a 0/10 where it had been a 9/10 at most severe point. She does not complain of any dizziness, lightheadedness, focal weakness, numbness, paresthesias, slurred speech, facial droop, headache, neck pain, back pain, chest pain, shortness of breath, cough, congestion, diarrhea, melena, dysuria, hematuria, calf pain, swelling, edema, fever, chills or rash. PAST MEDICAL HISTORY:  1. Septic shock - acute diverticular perforation with possible abscess. 2.  Right IJ DVT. 3.  Superficial thrombosis of left upper extremity. 4.  Hypertension. 5. Infiltrating ductal right breast carcinoma, status post lumpectomy, adjuvant chemotherapy radiation and adjuvant letrozole. 6.  Anemia of chronic disease. 7.  Morbid obesity. 8.  Arthritis. 9.  Depression. 10.  GERD. 11.  Hypertension. 12.  Hypercholesterolemia. PAST SURGICAL HISTORY:  1. Tonsillectomy. 2.  Colonoscopy. 3.  Right total knee replacement.   4.  Right breast lumpectomy, 03/08/2018. MEDICATIONS:  Medication list reviewed, noted on chart records. ALLERGIES:  NASACORT, SULFA DRUGS. SOCIAL HISTORY:  Former smoker of cigarettes, reportedly quit in 1972. No reports of alcohol or illicit drugs. FAMILY HISTORY:  Pulmonary fibrosis, father. Skin cancer, father. Breast cancer, maternal aunt. Ovarian cancer, maternal aunt. Colon cancer, maternal aunt. Ovarian cancer and liver cancer, maternal grandmother. Breast cancer, mother and paternal aunt. Colon cancer, sister. Hypertension in mother. Stroke, mother. REVIEW OF SYSTEMS:  Pertinent positives were as noted in HPI. All other systems were reviewed and negative. PHYSICAL EXAMINATION:  VITAL SIGNS:  Temperature 98.0 degrees Fahrenheit, blood pressure 139/74, heart rate of 60, respiratory rate of 16, O2 saturation 99% on room air. Recorded weight 220 pounds (100.1 kg). Recorded height of 5 feet 2 inches tall. BMI 40.2. GENERAL:  Obese female in no acute respiratory distress. PSYCHIATRIC:  The patient is awake, alert and oriented x3. Flat affect. NEUROLOGIC:  GCS of 15. Moves extremities x4. Sensation is grossly intact. No slurred speech. No facial droop. HEENT:  Normocephalic, atraumatic. PERRLA. EOMs intact. Sclerae anicteric. Conjunctivae clear. Nares are patent. Oropharynx is clear. Tongue is midline, not edematous. NECK:  Supple without lymphadenopathy, JVD, carotid bruits, or thyromegaly. LYMPHATIC:  Negative for cervical or supraclavicular adenopathy. RESPIRATORY:  Lungs are clear to auscultation bilaterally. CVS:  Heart regular rate and rhythm. Normal S1 and S2 without murmurs, rubs, or gallops. GI:  Abdomen is soft, tender on light percussion, palpation with voluntary guarding. No rebound, no guarding. No auscultated abdominal bruits. No palpable abdominal mass. Hyperactive bowel sounds. BACK:  No CVA tenderness. No step-off deformity.   MUSCULOSKELETAL:  No acute palpable bony deformity. Negative for calf tenderness. VASCULAR:  2+ radial and 1+ dorsalis pedis pulses without cyanosis, clubbing, or edema. SKIN:  Warm and dry with a linear scar wound on the anterior surface of the left leg. LABORATORY DATA:  Labs are reviewed as follows, sodium 140, potassium 3.0, chloride 109, CO2 of 15, BUN of 23, creatinine 0.47, glucose 93, anion gap of 16, calcium of 5.9, phosphorus 2.1, magnesium is 1.1, GFR is greater than 60, total bilirubin 0.8, direct bilirubin is less than 0.1, total protein 5.2, albumin is 2.1, ALT 8, AST of 20, alkaline phosphatase 65, lipase of 52. WBC of 7.1, hemoglobin 13.3, hematocrit of 40.9, platelets of 850, neutrophils of 75%, bands are 9%. Urinalysis, leukocyte esterase negative, nitrites negative, urobilinogen is 1.0, bilirubin negative, blood moderate, ketones 15, glucose negative, protein 30, pH of 5.5, specific gravity greater than 1.030, wbc's 0-4, rbc's 5-10, bacteria negative. CT of the abdomen and pelvis with IV contrast, results are reviewed and noted in HPI. IMPRESSION AND PLAN:  1. Generalized abdominal pain - possible enteritis versus small bowel obstruction. Admit the patient to telemetry floor. Keep n.p.o. and IV fluids. Provide pain management, supportive care. 2.  Nausea and vomiting. Order Zofran 4 mg IV q.6 hours p.r.n.  3.  Possible sigmoid colitis was noted. Midsigmoid colon wall thickening. The patient has already been started on ceftriaxone and Flagyl for antibiotics. May continue with Flagyl, however, may change ceftriaxone to another antibiotic possibly Levaquin. Consult with General Surgery as the patient had had a prior history of diverticular perforation and abscess per chart records. The patient currently does not appear to be septic as she does not meet criteria for the same. Continue to monitor. Continue resuscitation. Plan as noted above. 4.  High anion gap metabolic acidosis.   Continue with IV fluid hydration, resuscitation. Order lactic acid level this morning. Repeat the metabolic panel. 5.  Acute hypokalemia. Repeat potassium level post replacement. Provide additional potassium if indicated. 6.  Acute hypophosphatemia. Recheck phosphorus level and provide replacements accordingly. 7.  Hypomagnesemia. Check magnesium level post replacement. Provide additional replacement as needed. 8.  Hypocalcemia. As above, provide calcium replacement post replacement if needed. 9.  Hypertension. Monitor blood pressure closely. Provide antihypertensive medications as needed. 10.  Obesity. Will need eventual weight loss, heart healthy diet, lifestyle modifications. 11.  Possible gluten/gliadin hypersensitivity. Consider for same. Consider for gastroenterology consultation. 12.  History of infiltrating ductal right breast carcinoma. Consider Hematology/Oncology consultation. 13.  Venous thromboembolism prophylaxis. Sequential compression devices to bilateral lower extremities. CODE STATUS:  Full code. FUNCTIONAL STATUS:  Ambulates unassisted.       Patricia Reynolds MD MP/MARIA DOLORES_I/[email or web address]_P  D:  07/27/2019 1:17  T:  07/27/2019 3:12  JOB #:  [id number]

## 2019-07-27 NOTE — ED NOTES
TRANSFER - OUT REPORT:    Verbal report given to [facility]) on Eulis Day  being transferred to [place] [id number](unit) for routine progression of care       Report consisted of patients Situation, Background, Assessment and   Recommendations(SBAR). Information from the following report(s) ED Summary was reviewed with the receiving nurse. Lines:   Venous Access Device port 05/09/18 (Active)       Peripheral IV 07/26/19 Left Hand (Active)   Site Assessment Clean, dry, & intact 7/26/2019  7:45 PM   Phlebitis Assessment 0 7/26/2019  7:45 PM   Infiltration Assessment 0 7/26/2019  7:45 PM   Dressing Status Clean, dry, & intact 7/26/2019  7:45 PM   Hub Color/Line Status Blue 7/26/2019  7:45 PM   Action Taken Blood drawn 7/26/2019  7:45 PM   Alcohol Cap Used Yes 7/26/2019  7:45 PM        Opportunity for questions and clarification was provided.       Patient transported with:   Monitor I.v. meds

## 2019-07-27 NOTE — PROGRESS NOTES
Bedside shift change report given to Katherine Collado (oncoming nurse) by Bon Servin (offgoing nurse).  Report included the following information SBAR, Intake/Output, MAR, Recent Results and Cardiac Rhythm SR.

## 2019-07-27 NOTE — PROGRESS NOTES
Patient states that she has chronic abdominal pain since may but worsening in the last few days. She had nausea/vomiting for the last 2-3 days. CT showed Distal jejunal and diffuse ileal dilation without transition point. Enteritis is favored over low-grade obstruction. Gen surg following. She does not have any diarrhea,whit count wnl, no obvious source of bacterial infection  -will discontinue abx. Cautious use of abx with h/o  c diff. Continue IVF, clear liquid diet.  Prn Iv morphine for pain

## 2019-07-28 ENCOUNTER — APPOINTMENT (OUTPATIENT)
Dept: GENERAL RADIOLOGY | Age: 69
DRG: 389 | End: 2019-07-28
Attending: SURGERY
Payer: MEDICARE

## 2019-07-28 LAB
ANION GAP SERPL CALC-SCNC: 7 MMOL/L (ref 5–15)
BASOPHILS # BLD: 0.1 K/UL (ref 0–0.1)
BASOPHILS NFR BLD: 1 % (ref 0–1)
BUN SERPL-MCNC: 21 MG/DL (ref 6–20)
BUN/CREAT SERPL: 32 (ref 12–20)
CALCIUM SERPL-MCNC: 9.1 MG/DL (ref 8.5–10.1)
CEA SERPL-MCNC: 1.8 NG/ML
CHLORIDE SERPL-SCNC: 111 MMOL/L (ref 97–108)
CO2 SERPL-SCNC: 22 MMOL/L (ref 21–32)
CREAT SERPL-MCNC: 0.65 MG/DL (ref 0.55–1.02)
DIFFERENTIAL METHOD BLD: ABNORMAL
EOSINOPHIL # BLD: 0 K/UL (ref 0–0.4)
EOSINOPHIL NFR BLD: 0 % (ref 0–7)
ERYTHROCYTE [DISTWIDTH] IN BLOOD BY AUTOMATED COUNT: 14.6 % (ref 11.5–14.5)
GLUCOSE SERPL-MCNC: 75 MG/DL (ref 65–100)
HCT VFR BLD AUTO: 39 % (ref 35–47)
HGB BLD-MCNC: 11.7 G/DL (ref 11.5–16)
IMM GRANULOCYTES # BLD AUTO: 0 K/UL
IMM GRANULOCYTES NFR BLD AUTO: 0 %
LYMPHOCYTES # BLD: 0.7 K/UL (ref 0.8–3.5)
LYMPHOCYTES NFR BLD: 13 % (ref 12–49)
MAGNESIUM SERPL-MCNC: 2 MG/DL (ref 1.6–2.4)
MCH RBC QN AUTO: 27.1 PG (ref 26–34)
MCHC RBC AUTO-ENTMCNC: 30 G/DL (ref 30–36.5)
MCV RBC AUTO: 90.5 FL (ref 80–99)
MONOCYTES # BLD: 0.4 K/UL (ref 0–1)
MONOCYTES NFR BLD: 7 % (ref 5–13)
NEUTS BAND NFR BLD MANUAL: 1 % (ref 0–6)
NEUTS SEG # BLD: 4 K/UL (ref 1.8–8)
NEUTS SEG NFR BLD: 78 % (ref 32–75)
NRBC # BLD: 0 K/UL (ref 0–0.01)
NRBC BLD-RTO: 0 PER 100 WBC
PLATELET # BLD AUTO: 227 K/UL (ref 150–400)
PMV BLD AUTO: 11 FL (ref 8.9–12.9)
POTASSIUM SERPL-SCNC: 3.8 MMOL/L (ref 3.5–5.1)
RBC # BLD AUTO: 4.31 M/UL (ref 3.8–5.2)
RBC MORPH BLD: ABNORMAL
RBC MORPH BLD: ABNORMAL
SODIUM SERPL-SCNC: 140 MMOL/L (ref 136–145)
WBC # BLD AUTO: 5.2 K/UL (ref 3.6–11)

## 2019-07-28 PROCEDURE — 83735 ASSAY OF MAGNESIUM: CPT

## 2019-07-28 PROCEDURE — 74011250636 HC RX REV CODE- 250/636: Performed by: HOSPITALIST

## 2019-07-28 PROCEDURE — 82378 CARCINOEMBRYONIC ANTIGEN: CPT

## 2019-07-28 PROCEDURE — 85025 COMPLETE CBC W/AUTO DIFF WBC: CPT

## 2019-07-28 PROCEDURE — C9113 INJ PANTOPRAZOLE SODIUM, VIA: HCPCS | Performed by: SURGERY

## 2019-07-28 PROCEDURE — 80048 BASIC METABOLIC PNL TOTAL CA: CPT

## 2019-07-28 PROCEDURE — 74011250636 HC RX REV CODE- 250/636: Performed by: SURGERY

## 2019-07-28 PROCEDURE — 74019 RADEX ABDOMEN 2 VIEWS: CPT

## 2019-07-28 PROCEDURE — 86301 IMMUNOASSAY TUMOR CA 19-9: CPT

## 2019-07-28 PROCEDURE — 36415 COLL VENOUS BLD VENIPUNCTURE: CPT

## 2019-07-28 PROCEDURE — 65270000032 HC RM SEMIPRIVATE

## 2019-07-28 PROCEDURE — 74011000250 HC RX REV CODE- 250: Performed by: SURGERY

## 2019-07-28 PROCEDURE — 74011250637 HC RX REV CODE- 250/637: Performed by: HOSPITALIST

## 2019-07-28 RX ORDER — ENOXAPARIN SODIUM 100 MG/ML
40 INJECTION SUBCUTANEOUS EVERY 24 HOURS
Status: DISCONTINUED | OUTPATIENT
Start: 2019-07-28 | End: 2019-07-30 | Stop reason: HOSPADM

## 2019-07-28 RX ADMIN — SODIUM CHLORIDE 40 MG: 9 INJECTION INTRAMUSCULAR; INTRAVENOUS; SUBCUTANEOUS at 11:48

## 2019-07-28 RX ADMIN — Medication 10 ML: at 13:35

## 2019-07-28 RX ADMIN — ENOXAPARIN SODIUM 40 MG: 40 INJECTION SUBCUTANEOUS at 17:33

## 2019-07-28 RX ADMIN — ASPIRIN 81 MG: 81 TABLET ORAL at 08:52

## 2019-07-28 RX ADMIN — Medication 10 ML: at 22:04

## 2019-07-28 RX ADMIN — SODIUM CHLORIDE 75 ML/HR: 900 INJECTION, SOLUTION INTRAVENOUS at 04:23

## 2019-07-28 RX ADMIN — ATENOLOL 12.5 MG: 25 TABLET ORAL at 08:52

## 2019-07-28 RX ADMIN — SODIUM CHLORIDE 75 ML/HR: 900 INJECTION, SOLUTION INTRAVENOUS at 22:04

## 2019-07-28 RX ADMIN — LETROZOLE 2.5 MG: 2.5 TABLET, FILM COATED ORAL at 08:51

## 2019-07-28 NOTE — PROGRESS NOTES
Progress Note    Patient: Vicky Schrader MRN: [id number]  SSN: [id number]    YOB: 1950  Age: 81 y.o. Sex: female      Admit Date: 2019    Partial small bowel obstruction    Subjective:     No acute surgical issues. Pt reported feeling better overall. No nausea or vomiting. She had 2 BM's. Pt tolerating clears but did not like options available. Still some pain is upper mid abdomen.     Objective:     Visit Vitals  /90   Pulse 64   Temp 97.9 °F (36.6 °C)   Resp 16   Ht 5' 2\" (1.575 m)   Wt 220 lb 10.9 oz (100.1 kg)   SpO2 98%   BMI 40.36 kg/m²       Temp (24hrs), Av.3 °F (36.8 °C), Min:97.9 °F (36.6 °C), Max:98.7 °F (37.1 °C)        Physical Exam:    Gen:  NAD  Pulm:  Unlabored  Abd:  S/mildly distended/appropriate TTP      Recent Results (from the past 24 hour(s))   LACTIC ACID    Collection Time: 19 10:56 AM   Result Value Ref Range    Lactic acid 0.9 0.4 - 2.0 MMOL/L   CEA    Collection Time: 19  4:20 AM   Result Value Ref Range    CEA 1.8 ng/mL   METABOLIC PANEL, BASIC    Collection Time: 19  4:20 AM   Result Value Ref Range    Sodium 140 136 - 145 mmol/L    Potassium 3.8 3.5 - 5.1 mmol/L    Chloride 111 (H) 97 - 108 mmol/L    CO2 22 21 - 32 mmol/L    Anion gap 7 5 - 15 mmol/L    Glucose 75 65 - 100 mg/dL    BUN 21 (H) 6 - 20 MG/DL    Creatinine 0.65 0.55 - 1.02 MG/DL    BUN/Creatinine ratio 32 (H) 12 - 20      GFR est AA >60 >60 ml/min/1.73m2    GFR est non-AA >60 >60 ml/min/1.73m2    Calcium 9.1 8.5 - 10.1 MG/DL   MAGNESIUM    Collection Time: 19  4:20 AM   Result Value Ref Range    Magnesium 2.0 1.6 - 2.4 mg/dL   CBC WITH AUTOMATED DIFF    Collection Time: 19  4:20 AM   Result Value Ref Range    WBC 5.2 3.6 - 11.0 K/uL    RBC 4.31 3.80 - 5.20 M/uL    HGB 11.7 11.5 - 16.0 g/dL    HCT 39.0 35.0 - 47.0 %    MCV 90.5 80.0 - 99.0 FL    MCH 27.1 26.0 - 34.0 PG    MCHC 30.0 30.0 - 36.5 g/dL    RDW 14.6 (H) 11.5 - 14.5 %    PLATELET 014 135 - 211 K/uL MPV 11.0 8.9 - 12.9 FL    NRBC 0.0 0  WBC    ABSOLUTE NRBC 0.00 0.00 - 0.01 K/uL    NEUTROPHILS 78 (H) 32 - 75 %    BAND NEUTROPHILS 1 0 - 6 %    LYMPHOCYTES 13 12 - 49 %    MONOCYTES 7 5 - 13 %    EOSINOPHILS 0 0 - 7 %    BASOPHILS 1 0 - 1 %    IMMATURE GRANULOCYTES 0 %    ABS. NEUTROPHILS 4.0 1.8 - 8.0 K/UL    ABS. LYMPHOCYTES 0.7 (L) 0.8 - 3.5 K/UL    ABS. MONOCYTES 0.4 0.0 - 1.0 K/UL    ABS. EOSINOPHILS 0.0 0.0 - 0.4 K/UL    ABS. BASOPHILS 0.1 0.0 - 0.1 K/UL    ABS. IMM. GRANS. 0.0 K/UL    DF MANUAL      RBC COMMENTS ANISOCYTOSIS  1+        RBC COMMENTS JEREMY CELLS  PRESENT             Assessment:     Hospital Problems  Date Reviewed: 4/10/2019          Codes Class Noted POA    Abdominal pain ICD-10-CM: R10.9  ICD-9-CM: 789.00  7/26/2019 Unknown              Plan/Recommendations/Medical Decision Making:     - Small bowel obstruction:  Feeling better but AXR still showed partial SBO. No acute surgical intervention  - Advance to full liquids  - Out of bed.   Encourage ambulation

## 2019-07-28 NOTE — PROGRESS NOTES
Problem: Pain  Goal: *Control of Pain  Outcome: Progressing Towards Goal  Goal: *PALLIATIVE CARE:  Alleviation of Pain  Outcome: Progressing Towards Goal     Problem: General Medical Care Plan  Goal: *Vital signs within specified parameters  Outcome: Progressing Towards Goal  Goal: *Labs within defined limits  Outcome: Progressing Towards Goal  Goal: *Absence of infection signs and symptoms  Outcome: Progressing Towards Goal  Goal: *Optimal pain control at patient's stated goal  Outcome: Progressing Towards Goal  Goal: *Fluid volume balance  Outcome: Progressing Towards Goal  Goal: *Optimize nutritional status  Outcome: Progressing Towards Goal  Goal: *Progressive mobility and function (eg: ADL's)  Outcome: Progressing Towards Goal

## 2019-07-28 NOTE — PROGRESS NOTES
Hospitalist Progress Note  Pastor Hussain MD  Answering service: [phone] OR [phone] from in house phone        Date of Service:  2019  NAME:  Valentino Dove  :  1950  MRN:  [id number]      Admission Summary:   81 y.o F with PMH of C diff infection,rt breast cancer s/p lumpectomy,chemoradiation(chemo was discontinued when became septic last year),HTN,obesity came to the hospital because of abdominal pain,nausea/vomiting. Interval history / Subjective:     Patient states that she is feeling better,abdominal pain has decreased. Denied any nausea/vomiting,had a bowel movement. Assessment & Plan:     #Abdominal pain,nausea/vomiting:improving  #Small bowel obstruction:  -As she states that abdominal pain is decreasing,no more nausea/vomiting -will advance to full liquid diet  -x ray still shows partial SBO.  -receiving 1 mg prn IV morphine q 6 hr prn,received 2 doses yesterday  -continue IVF  -abx discontinued as there is no evidence of infection    #HTN: continue 12.5 mg atenolol for now    #Breast cancer: continue letrozole    #Anion gap metabolic acidosis: resolved    #Hypokalemia,hypomagnesemia-resolved      Code status: full  DVT prophylaxis: lovenox    Care Plan discussed with:patient,nurse  Disposition: home 1-2 days once tolerating diet      Hospital Problems  Date Reviewed: 4/10/2019          Codes Class Noted POA    Abdominal pain ICD-10-CM: R10.9  ICD-9-CM: 789.00  2019 Unknown                Review of Systems:   As per HPI      Vital Signs:    Last 24hrs VS reviewed since prior progress note.  Most recent are:  Visit Vitals  /57 (BP 1 Location: Left arm, BP Patient Position: At rest)   Pulse (!) 58   Temp 98.1 °F (36.7 °C)   Resp 16   Ht 5' 2\" (1.575 m)   Wt 100.1 kg (220 lb 10.9 oz)   SpO2 97%   BMI 40.36 kg/m²       No intake or output data in the 24 hours ending 19 1634     Physical Examination: Constitutional:  No acute distress, cooperative, pleasant    ENT:  Oral mucous moist, oropharynx benign. Neck supple,    Resp:  CTA bilaterally. No wheezing/rhonchi/rales   CV:  Regular rhythm, normal rate    GI:  Soft, non distended,lower abdominal tenderness +, bowel sounds +    Musculoskeletal:  No edema, warm, 2+ pulses throughout    Neurologic:  Moves all extremities. AAOx3,             Data Review:    Review and/or order of clinical lab test  Review and/or order of tests in the radiology section of CPT  Review and/or order of tests in the medicine section of CPT      Labs:     Recent Labs     07/28/19 0420 07/27/19 0538   WBC 5.2 5.3   HGB 11.7 13.1   HCT 39.0 43.3    318     Recent Labs     07/28/19 0420 07/27/19 0538 07/26/19 1949    138 140   K 3.8 3.6 3.0*   * 107 109*   CO2 22 23 15*   BUN 21* 29* 23*   CREA 0.65 0.94 0.47*   GLU 75 102* 93   CA 9.1 9.5 5.9*   MG 2.0  --  1.1*   PHOS  --   --  2.1*     Recent Labs     07/27/19 0538 07/26/19 1949   SGOT 11* 20   ALT 12 8*   AP 91 55   TBILI 0.6 0.8   TP 8.0 5.2*   ALB 3.6 2.1*   GLOB 4.4* 3.1   LPSE  --  52*     No results for input(s): INR, PTP, APTT in the last 72 hours. No lab exists for component: INREXT   No results for input(s): FE, TIBC, PSAT, FERR in the last 72 hours. No results found for: FOL, RBCF   No results for input(s): PH, PCO2, PO2 in the last 72 hours. No results for input(s): CPK, CKNDX, TROIQ in the last 72 hours.     No lab exists for component: CPKMB  No results found for: CHOL, CHOLX, CHLST, CHOLV, HDL, LDL, LDLC, DLDLP, TGLX, TRIGL, TRIGP, CHHD, CHHDX  No results found for: Mireles Alex  Lab Results   Component Value Date/Time    Color YELLOW/STRAW 07/26/2019 10:37 PM    Appearance CLEAR 07/26/2019 10:37 PM    Specific gravity >1.030 (H) 07/26/2019 10:37 PM    pH (UA) 5.5 07/26/2019 10:37 PM    Protein 30 (A) 07/26/2019 10:37 PM    Glucose NEGATIVE  07/26/2019 10:37 PM    Ketone 15 (A) 07/26/2019 10:37 PM    Bilirubin NEGATIVE  07/26/2019 10:37 PM    Urobilinogen 1.0 07/26/2019 10:37 PM    Nitrites NEGATIVE  07/26/2019 10:37 PM    Leukocyte Esterase NEGATIVE  07/26/2019 10:37 PM    Epithelial cells MODERATE (A) 07/26/2019 10:37 PM    Bacteria NEGATIVE  07/26/2019 10:37 PM    WBC 0-4 07/26/2019 10:37 PM    RBC 5-10 07/26/2019 10:37 PM         Medications Reviewed:     Current Facility-Administered Medications   Medication Dose Route Frequency    pantoprazole (PROTONIX) 40 mg in sodium chloride 0.9% 10 mL injection  40 mg IntraVENous Q24H    sodium chloride (NS) flush 5-40 mL  5-40 mL IntraVENous Q8H    sodium chloride (NS) flush 5-40 mL  5-40 mL IntraVENous PRN    0.9% sodium chloride infusion  75 mL/hr IntraVENous CONTINUOUS    ibuprofen (MOTRIN) tablet 200 mg  200 mg Oral Q8H PRN    morphine injection 1 mg  1 mg IntraVENous Q6H PRN    aspirin delayed-release tablet 81 mg  81 mg Oral DAILY    letrozole (FEMARA) tablet 2.5 mg  2.5 mg Oral DAILY    atenolol (TENORMIN) tablet 12.5 mg  12.5 mg Oral DAILY    sodium chloride (NS) flush 5-40 mL  5-40 mL IntraVENous Q8H    sodium chloride (NS) flush 5-40 mL  5-40 mL IntraVENous PRN    naloxone (NARCAN) injection 0.4 mg  0.4 mg IntraVENous PRN    ondansetron (ZOFRAN) injection 4 mg  4 mg IntraVENous Q4H PRN    acetaminophen (TYLENOL) tablet 650 mg  650 mg Oral Q4H PRN     ______________________________________________________________________  EXPECTED LENGTH OF STAY: 2d 14h  ACTUAL LENGTH OF STAY:          2                 Marisol Resendiz MD

## 2019-07-29 ENCOUNTER — APPOINTMENT (OUTPATIENT)
Dept: GENERAL RADIOLOGY | Age: 69
DRG: 389 | End: 2019-07-29
Attending: HOSPITALIST
Payer: MEDICARE

## 2019-07-29 LAB — CANCER AG19-9 SERPL-ACNC: 15 U/ML (ref 0–35)

## 2019-07-29 PROCEDURE — 74011250637 HC RX REV CODE- 250/637: Performed by: HOSPITALIST

## 2019-07-29 PROCEDURE — 74011250636 HC RX REV CODE- 250/636: Performed by: HOSPITALIST

## 2019-07-29 PROCEDURE — 65270000032 HC RM SEMIPRIVATE

## 2019-07-29 PROCEDURE — 74019 RADEX ABDOMEN 2 VIEWS: CPT

## 2019-07-29 RX ADMIN — ENOXAPARIN SODIUM 40 MG: 40 INJECTION SUBCUTANEOUS at 18:06

## 2019-07-29 RX ADMIN — Medication 10 ML: at 21:55

## 2019-07-29 RX ADMIN — ATENOLOL 12.5 MG: 25 TABLET ORAL at 08:53

## 2019-07-29 RX ADMIN — Medication 10 ML: at 06:00

## 2019-07-29 RX ADMIN — Medication 10 ML: at 18:06

## 2019-07-29 RX ADMIN — ASPIRIN 81 MG: 81 TABLET ORAL at 08:53

## 2019-07-29 RX ADMIN — LETROZOLE 2.5 MG: 2.5 TABLET, FILM COATED ORAL at 08:54

## 2019-07-29 NOTE — PROGRESS NOTES
TRANSFER - OUT REPORT:    Verbal report given to Caryl (name) on [place]  being transferred to  (unit) for routine progression of care       Report consisted of patients Situation, Background, Assessment and   Recommendations(SBAR). Information from the following report(s) SBAR, MAR, Recent Results and Cardiac Rhythm SR was reviewed with the receiving nurse. Lines:   Venous Access Device port 05/09/18 (Active)       Peripheral IV 07/26/19 Left Hand (Active)   Site Assessment Clean, dry, & intact 7/28/2019  4:45 PM   Phlebitis Assessment 0 7/28/2019  4:45 PM   Infiltration Assessment 0 7/28/2019  4:45 PM   Dressing Status Clean, dry, & intact 7/28/2019  4:45 PM   Dressing Type Transparent;Tape 7/28/2019  4:45 PM   Hub Color/Line Status Blue; Infusing 7/28/2019  4:45 PM   Action Taken Open ports on tubing capped 7/28/2019  4:45 PM   Alcohol Cap Used Yes 7/27/2019  8:28 PM       Peripheral IV 07/26/19 Left Hand (Active)   Site Assessment Clean, dry, & intact 7/28/2019  4:45 PM   Phlebitis Assessment 0 7/28/2019  4:45 PM   Infiltration Assessment 0 7/28/2019  4:45 PM   Dressing Status Clean, dry, & intact 7/28/2019  4:45 PM   Dressing Type Transparent;Tape 7/28/2019  4:45 PM   Hub Color/Line Status Blue;Capped 7/28/2019  4:45 PM   Action Taken Open ports on tubing capped 7/27/2019  8:28 PM   Alcohol Cap Used Yes 7/28/2019  4:45 PM        Opportunity for questions and clarification was provided.       Patient transported with:

## 2019-07-29 NOTE — ROUTINE PROCESS
Bedside shift change report given to Jose (oncoming nurse) by Beatris Hernández (offgoing nurse). Report included the following information SBAR, Kardex, MAR and Recent Results.

## 2019-07-29 NOTE — PROGRESS NOTES
Bedside and Verbal shift change report given to Caryl (oncoming nurse) by Lucina Rubinstein (offgoing nurse). Report included the following information SBAR, Kardex and MAR.

## 2019-07-30 VITALS
OXYGEN SATURATION: 97 % | TEMPERATURE: 98.2 F | DIASTOLIC BLOOD PRESSURE: 68 MMHG | WEIGHT: 234.57 LBS | SYSTOLIC BLOOD PRESSURE: 139 MMHG | BODY MASS INDEX: 43.17 KG/M2 | RESPIRATION RATE: 16 BRPM | HEIGHT: 62 IN | HEART RATE: 59 BPM

## 2019-07-30 PROCEDURE — 74011250637 HC RX REV CODE- 250/637: Performed by: HOSPITALIST

## 2019-07-30 PROCEDURE — 74011250636 HC RX REV CODE- 250/636: Performed by: HOSPITALIST

## 2019-07-30 RX ORDER — ATENOLOL 25 MG/1
12.5 TABLET ORAL DAILY
Qty: 60 TAB | Refills: 3 | Status: SHIPPED | OUTPATIENT
Start: 2019-07-30

## 2019-07-30 RX ORDER — ONDANSETRON 4 MG/1
4 TABLET, FILM COATED ORAL
Qty: 20 TAB | Refills: 0 | Status: SHIPPED | OUTPATIENT
Start: 2019-07-30 | End: 2021-12-20 | Stop reason: ALTCHOICE

## 2019-07-30 RX ADMIN — ASPIRIN 81 MG: 81 TABLET ORAL at 08:53

## 2019-07-30 RX ADMIN — LETROZOLE 2.5 MG: 2.5 TABLET, FILM COATED ORAL at 09:00

## 2019-07-30 RX ADMIN — ATENOLOL 12.5 MG: 25 TABLET ORAL at 08:53

## 2019-07-30 RX ADMIN — SODIUM CHLORIDE 75 ML/HR: 900 INJECTION, SOLUTION INTRAVENOUS at 04:00

## 2019-07-30 NOTE — PROGRESS NOTES
Hospitalist Progress Note  Tato Aaron MD  Answering service: [phone] OR [phone] from in house phone        Date of Service:  2019  NAME:  Sabrina Kendrick  :  1950  MRN:  [id number]      Admission Summary:   81 y.o F with PMH of C diff infection,rt breast cancer s/p lumpectomy,chemoradiation(chemo was discontinued when became septic last year),HTN,obesity came to the hospital because of abdominal pain,nausea/vomiting. Interval history / Subjective:     She feels symptomatically better. Abdominal pain,nausea/vomiting improving     Assessment & Plan:     #Abdominal pain,nausea/vomiting:improving  #Small bowel obstruction:  -she is symptomatically better. -Repeat X ray again shows persistent obstruction,small bowel distension. --continue IVF  -Advance diet to low fibre diet   -Gen surgery following. #HTN: continue 12.5 mg atenolol for now    #Breast cancer: continue letrozole    #Anion gap metabolic acidosis: resolved    #Hypokalemia,hypomagnesemia-resolved      Code status: full  DVT prophylaxis: lovenox    Care Plan discussed with:patient,nurse  Disposition: home 1-2 days once tolerating diet      Hospital Problems  Date Reviewed: 4/10/2019          Codes Class Noted POA    Abdominal pain ICD-10-CM: R10.9  ICD-9-CM: 789.00  2019 Unknown                Review of Systems:   As per HPI      Vital Signs:    Last 24hrs VS reviewed since prior progress note.  Most recent are:  Visit Vitals  /77 (BP 1 Location: Left arm, BP Patient Position: At rest)   Pulse 62   Temp 98.1 °F (36.7 °C)   Resp 16   Ht 5' 2\" (1.575 m)   Wt 101 kg (222 lb 10.6 oz)   SpO2 95%   BMI 40.73 kg/m²         Intake/Output Summary (Last 24 hours) at 2019  Last data filed at 2019 0853  Gross per 24 hour   Intake 240 ml   Output    Net 240 ml        Physical Examination:             Constitutional:  No acute distress, cooperative, pleasant  ENT:  Oral mucous moist, oropharynx benign. Neck supple,    Resp:  CTA bilaterally. No wheezing/rhonchi/rales   CV:  Regular rhythm, normal rate    GI:  Soft, non distended,lower abdominal tenderness +, bowel sounds +    Musculoskeletal:  No edema, warm, 2+ pulses throughout    Neurologic:  Moves all extremities. AAOx3,             Data Review:    Review and/or order of clinical lab test  Review and/or order of tests in the radiology section of CPT  Review and/or order of tests in the medicine section of [facility]      Labs:     Recent Labs     07/28/19 0420 07/27/19 0538   WBC 5.2 5.3   HGB 11.7 13.1   HCT 39.0 43.3    318     Recent Labs     07/28/19 0420 07/27/19 0538    138   K 3.8 3.6   * 107   CO2 22 23   BUN 21* 29*   CREA 0.65 0.94   GLU 75 102*   CA 9.1 9.5   MG 2.0  --      Recent Labs     07/27/19 0538   SGOT 11*   ALT 12   AP 91   TBILI 0.6   TP 8.0   ALB 3.6   GLOB 4.4*     No results for input(s): INR, PTP, APTT in the last 72 hours. No lab exists for component: INREXT, INREXT   No results for input(s): FE, TIBC, PSAT, FERR in the last 72 hours. No results found for: FOL, RBCF   No results for input(s): PH, PCO2, PO2 in the last 72 hours. No results for input(s): CPK, CKNDX, TROIQ in the last 72 hours.     No lab exists for component: CPKMB  No results found for: CHOL, CHOLX, CHLST, CHOLV, HDL, LDL, LDLC, DLDLP, TGLX, TRIGL, TRIGP, CHHD, CHHDX  No results found for: [facility]  Lab Results   Component Value Date/Time    Color YELLOW/STRAW 07/26/2019 10:37 PM    Appearance CLEAR 07/26/2019 10:37 PM    Specific gravity >1.030 (H) 07/26/2019 10:37 PM    pH (UA) 5.5 07/26/2019 10:37 PM    Protein 30 (A) 07/26/2019 10:37 PM    Glucose NEGATIVE  07/26/2019 10:37 PM    Ketone 15 (A) 07/26/2019 10:37 PM    Bilirubin NEGATIVE  07/26/2019 10:37 PM    Urobilinogen 1.0 07/26/2019 10:37 PM    Nitrites NEGATIVE  07/26/2019 10:37 PM    Leukocyte Esterase NEGATIVE  07/26/2019 10:37 PM Epithelial cells MODERATE (A) 07/26/2019 10:37 PM    Bacteria NEGATIVE  07/26/2019 10:37 PM    WBC 0-4 07/26/2019 10:37 PM    RBC 5-10 07/26/2019 10:37 PM         Medications Reviewed:     Current Facility-Administered Medications   Medication Dose Route Frequency    enoxaparin (LOVENOX) injection 40 mg  40 mg SubCUTAneous Q24H    sodium chloride (NS) flush 5-40 mL  5-40 mL IntraVENous Q8H    sodium chloride (NS) flush 5-40 mL  5-40 mL IntraVENous PRN    0.9% sodium chloride infusion  75 mL/hr IntraVENous CONTINUOUS    ibuprofen (MOTRIN) tablet 200 mg  200 mg Oral Q8H PRN    morphine injection 1 mg  1 mg IntraVENous Q6H PRN    aspirin delayed-release tablet 81 mg  81 mg Oral DAILY    letrozole (FEMARA) tablet 2.5 mg  2.5 mg Oral DAILY    atenolol (TENORMIN) tablet 12.5 mg  12.5 mg Oral DAILY    sodium chloride (NS) flush 5-40 mL  5-40 mL IntraVENous Q8H    sodium chloride (NS) flush 5-40 mL  5-40 mL IntraVENous PRN    naloxone (NARCAN) injection 0.4 mg  0.4 mg IntraVENous PRN    ondansetron (ZOFRAN) injection 4 mg  4 mg IntraVENous Q4H PRN    acetaminophen (TYLENOL) tablet 650 mg  650 mg Oral Q4H PRN     ______________________________________________________________________  EXPECTED LENGTH OF STAY: 2d 14h  ACTUAL LENGTH OF STAY:          3                 Connie Ramirez MD

## 2019-07-30 NOTE — PROGRESS NOTES
Bedside shift change report given to [address] (oncoming nurse) by Aaron Khan RN (offgoing nurse). Report included the following information SBAR, Kardex, MAR and Recent Results.

## 2019-07-30 NOTE — ROUTINE PROCESS
Bedside shift change report given to Jose (oncoming nurse) by Alena Masterson (offgoing nurse). Report included the following information SBAR, Kardex, MAR and Recent Results.

## 2019-07-30 NOTE — DISCHARGE SUMMARY
Hospitalist Discharge Summary     Patient ID:  Kory Lipoma  [id number]  47 y.o.  1950 7/26/2019    PCP on record: Stacey Iglesias MD    Admit date: 7/26/2019  Discharge date and time: 7/30/2019    DISCHARGE DIAGNOSIS:    1) Abdominal pain,nausea/vomiting: Patient has tolerated  diet  Yesterday lunch dinner and today breakfast , she mentioned not having any abdominal pain, no nausea or vomiting. She had a bowel movement. She denies asking for nausea meds or pain meds. She said she is at her baseline and wants to go home. She has already a follow up ashely with [facility] for 8/12. She will also f/u with her PCP in 1 week    2) Small bowel obstruction:  -Resolved as per  signs and symptoms      3) HTN: continue home meds,      4) Hx Breast cancer: continue letrozole     CONSULTATIONS:  IP CONSULT TO GENERAL SURGERY    Excerpted HPI from H&P of Carina Castillo MD:  A 82-year-old white female with past medical history of right breast cancer, depression, GERD, hypertension, hyperlipidemia, presented as a direct admission/transfer from [facility] to [facility] with a chief complaint of abdominal pain. The patient reportedly had onset of symptoms starting approximately 4 days ago what was described as a left lower quadrant, left flank abdominal pain, non-radiating, sharp in character, rated as a 9/10 without specific alleviating factors, exacerbated with palpation and movement, constant. The patient also reportedly had episodes of nausea, vomiting, nonbloody, nonbilious emesis. She reports her last bowel movement was normal approximately 1 day ago. She has a history of last hospitalization from 06/08/2018 to 06/19/2018, diagnosed with septic shock, hypovolemia, acute diverticulitis with bowel perforation, C. difficile colitis, acute right IJ DVT, acute kidney injury, hypokalemia, hypomagnesemia, hypophosphatemia and anemia.   During that hospitalization, she had been treated with vancomycin, cefepime, Flagyl, seen by general surgeon in consultation and subsequently discharged to home. She has a history of infiltrating ductal right breast carcinoma for which she has undergone prior right breast lumpectomy, status post adjuvant chemotherapy x2 cycles and radiation therapy with adjuvant treatment with letrozole. On arrival to [place] [facility] on 07/26/2019, the patient underwent a workup, which included CT abdomen and pelvis with IV contrast showed distal jejunal and diffuse ileal dilation without a transition point with findings felt to favor enteritis over low-grade obstruction with trace ascites, nonspecific secondary findings felt to possibly represent gluten/gliadin hypersensitivity with midsigmoid wall thickening in the location of a previous diverticular abscess seen back in 06/2018 exam with obliteration of the fat planes between the uterus, left adnexa and sigmoid colon. Per the ED, the patient was given morphine 4 mg IV, 0.9% normal saline with 1000 mL IV fluid bolus x2, Zofran 4 mg IV, potassium chloride 10 mEq IV, potassium chloride 30 mEq p.o., calcium gluconate 1 g IV, started on ceftriaxone 1 g IV and Flagyl 500 mg IV. Some of the infusions are currently infusing at the time of transfer to [facility].  Of note, the patient has low potassium of 3.0, phosphorus 2.1, magnesium is 1.1, calcium is 5.9, albumin is 2.1, serum CO2 of 15 with elevated anion gap of 16. As mentioned, the patient will be transferred to [facility], is now seen on a current bedside evaluation. When asked if she is having any pain, she rates her pain as a 0/10 where it had been a 9/10 at most severe point.   She does not complain of any dizziness, lightheadedness, focal weakness, numbness, paresthesias, slurred speech, facial droop, headache, neck pain, back pain, chest pain, shortness of breath, cough, congestion, diarrhea, melena, dysuria, hematuria, calf pain, swelling, edema, fever, chills or rash.    ______________________________________________________________________  DISCHARGE SUMMARY/HOSPITAL COURSE:  for full details see H&P, daily progress notes, labs, consult notes. _______________________________________________________________________  Patient seen and examined by me on discharge day. Pertinent Findings:  Gen:    Not in distress  Chest: Clear lungs  CVS:   Regular rhythm. No edema  Abd:  Soft, not distended, not tender, Post BS  Neuro:  Alert, oriented X3   _______________________________________________________________________  DISCHARGE MEDICATIONS:   Current Discharge Medication List      START taking these medications    Details   ondansetron hcl (ZOFRAN) 4 mg tablet Take 1 Tab by mouth every eight (8) hours as needed for Nausea. Qty: 20 Tab, Refills: 0         CONTINUE these medications which have CHANGED    Details   atenolol (TENORMIN) 25 mg tablet Take 0.5 Tabs by mouth daily. Qty: 60 Tab, Refills: 3         CONTINUE these medications which have NOT CHANGED    Details   letrozole (FEMARA) 2.5 mg tablet Take 1 Tab by mouth daily. Qty: 30 Tab, Refills: 6    Associated Diagnoses: Malignant neoplasm of upper-outer quadrant of right breast in female, estrogen receptor positive (HCC)      acetaminophen (TYLENOL 8 HOUR PO) Take  by mouth as needed. aspirin delayed-release 81 mg tablet Take  by mouth daily. lisinopril (PRINIVIL, ZESTRIL) 20 mg tablet Take 40 mg by mouth daily. Refills: 2      garlic 1 mg cap Take  by mouth daily. Cholecalciferol, Vitamin D3, (VITAMIN D3) 1,000 unit cap Take 1 Tab by mouth daily. Patient Follow Up Instructions:    Activity: ad libid  Diet: regular  Wound Care: n/a    Follow-up with gastroenterology 8/12 as per already scheduled appointment, also with PCP in 1 weeks  Follow-up tests/labs n/a  Follow-up Information     Follow up With Specialties Details Why Contact Info    Janet Carrizales MD Internal Medicine   [id number]  [facility]  [phone]          ________________________________________________________________    Risk of deterioration: low    Condition at Discharge:  Stable  __________________________________________________________________    Disposition  Home    ____________________________________________________________________    Code Status: Full  ___________________________________________________________________      Total time in minutes spent coordinating this discharge (includes going over instructions, follow-up, prescriptions, and preparing report for sign off to her PCP) :  35  minutes    Signed:  Rach Monk MD

## 2019-07-30 NOTE — PROGRESS NOTES
Problem: Pain  Goal: *Control of Pain  Outcome: Resolved/Not Met  Goal: *PALLIATIVE CARE:  Alleviation of Pain  Outcome: Resolved/Not Met     Problem: General Medical Care Plan  Goal: *Vital signs within specified parameters  Outcome: Resolved/Not Met  Goal: *Labs within defined limits  Outcome: Resolved/Not Met  Goal: *Absence of infection signs and symptoms  Outcome: Resolved/Not Met  Goal: *Optimal pain control at patient's stated goal  Outcome: Resolved/Not Met  Goal: *Fluid volume balance  Outcome: Resolved/Not Met  Goal: *Optimize nutritional status  Outcome: Resolved/Not Met  Goal: *Progressive mobility and function (eg: ADL's)  Outcome: Resolved/Not Met     Problem: Falls - Risk of  Goal: *Absence of Falls  Description  Document Kierra Fall Risk and appropriate interventions in the flowsheet.   Outcome: Resolved/Not Met  Note:   Fall Risk Interventions:            Medication Interventions: Teach patient to arise slowly                   Problem: Patient Education: Go to Patient Education Activity  Goal: Patient/Family Education  Outcome: Resolved/Not Met

## 2019-07-30 NOTE — PROGRESS NOTES
Less pain and nausea; now on low-fiber diet. Imaging still with centrally distended small bowel loops but contrast in colon. Will follow tolerance of diet and signs of pain, nausea.

## 2019-09-16 ENCOUNTER — ANESTHESIA EVENT (OUTPATIENT)
Dept: ENDOSCOPY | Age: 69
End: 2019-09-16
Payer: MEDICARE

## 2019-09-17 ENCOUNTER — HOSPITAL ENCOUNTER (OUTPATIENT)
Age: 69
Setting detail: OUTPATIENT SURGERY
Discharge: HOME | End: 2019-09-17
Attending: INTERNAL MEDICINE | Admitting: INTERNAL MEDICINE
Payer: MEDICARE

## 2019-09-17 ENCOUNTER — ANESTHESIA (OUTPATIENT)
Dept: ENDOSCOPY | Age: 69
End: 2019-09-17
Payer: MEDICARE

## 2019-09-17 VITALS
WEIGHT: 212.5 LBS | DIASTOLIC BLOOD PRESSURE: 74 MMHG | HEIGHT: 62 IN | SYSTOLIC BLOOD PRESSURE: 121 MMHG | BODY MASS INDEX: 39.11 KG/M2 | OXYGEN SATURATION: 98 % | TEMPERATURE: 98.1 F | HEART RATE: 69 BPM | RESPIRATION RATE: 17 BRPM

## 2019-09-17 PROCEDURE — 77030021593 HC FCPS BIOP ENDOSC BSC -A: Performed by: INTERNAL MEDICINE

## 2019-09-17 PROCEDURE — 74011000250 HC RX REV CODE- 250: Performed by: NURSE ANESTHETIST, CERTIFIED REGISTERED

## 2019-09-17 PROCEDURE — 74011250636 HC RX REV CODE- 250/636: Performed by: INTERNAL MEDICINE

## 2019-09-17 PROCEDURE — 76060000032 HC ANESTHESIA 0.5 TO 1 HR: Performed by: INTERNAL MEDICINE

## 2019-09-17 PROCEDURE — 88305 TISSUE EXAM BY PATHOLOGIST: CPT

## 2019-09-17 PROCEDURE — 76040000007: Performed by: INTERNAL MEDICINE

## 2019-09-17 PROCEDURE — 74011250636 HC RX REV CODE- 250/636: Performed by: NURSE ANESTHETIST, CERTIFIED REGISTERED

## 2019-09-17 RX ORDER — DEXTROMETHORPHAN/PSEUDOEPHED 2.5-7.5/.8
1.2 DROPS ORAL
Status: DISCONTINUED | OUTPATIENT
Start: 2019-09-17 | End: 2019-09-17 | Stop reason: HOSPADM

## 2019-09-17 RX ORDER — FENTANYL CITRATE 50 UG/ML
25 INJECTION, SOLUTION INTRAMUSCULAR; INTRAVENOUS
Status: DISCONTINUED | OUTPATIENT
Start: 2019-09-17 | End: 2019-09-17 | Stop reason: HOSPADM

## 2019-09-17 RX ORDER — PROPOFOL 10 MG/ML
INJECTION, EMULSION INTRAVENOUS AS NEEDED
Status: DISCONTINUED | OUTPATIENT
Start: 2019-09-17 | End: 2019-09-17 | Stop reason: HOSPADM

## 2019-09-17 RX ORDER — ATROPINE SULFATE 0.1 MG/ML
0.5 INJECTION INTRAVENOUS
Status: DISCONTINUED | OUTPATIENT
Start: 2019-09-17 | End: 2019-09-17 | Stop reason: HOSPADM

## 2019-09-17 RX ORDER — NALOXONE HYDROCHLORIDE 0.4 MG/ML
0.4 INJECTION, SOLUTION INTRAMUSCULAR; INTRAVENOUS; SUBCUTANEOUS
Status: DISCONTINUED | OUTPATIENT
Start: 2019-09-17 | End: 2019-09-17 | Stop reason: HOSPADM

## 2019-09-17 RX ORDER — LIDOCAINE HYDROCHLORIDE 20 MG/ML
INJECTION, SOLUTION EPIDURAL; INFILTRATION; INTRACAUDAL; PERINEURAL AS NEEDED
Status: DISCONTINUED | OUTPATIENT
Start: 2019-09-17 | End: 2019-09-17 | Stop reason: HOSPADM

## 2019-09-17 RX ORDER — SODIUM CHLORIDE 0.9 % (FLUSH) 0.9 %
5-40 SYRINGE (ML) INJECTION AS NEEDED
Status: DISCONTINUED | OUTPATIENT
Start: 2019-09-17 | End: 2019-09-17 | Stop reason: HOSPADM

## 2019-09-17 RX ORDER — PHENYLEPHRINE HCL IN 0.9% NACL 0.4MG/10ML
SYRINGE (ML) INTRAVENOUS AS NEEDED
Status: DISCONTINUED | OUTPATIENT
Start: 2019-09-17 | End: 2019-09-17 | Stop reason: HOSPADM

## 2019-09-17 RX ORDER — MIDAZOLAM HYDROCHLORIDE 1 MG/ML
.25-5 INJECTION, SOLUTION INTRAMUSCULAR; INTRAVENOUS
Status: DISCONTINUED | OUTPATIENT
Start: 2019-09-17 | End: 2019-09-17 | Stop reason: HOSPADM

## 2019-09-17 RX ORDER — FLUMAZENIL 0.1 MG/ML
0.2 INJECTION INTRAVENOUS
Status: DISCONTINUED | OUTPATIENT
Start: 2019-09-17 | End: 2019-09-17 | Stop reason: HOSPADM

## 2019-09-17 RX ORDER — EPINEPHRINE 0.1 MG/ML
1 INJECTION INTRACARDIAC; INTRAVENOUS
Status: DISCONTINUED | OUTPATIENT
Start: 2019-09-17 | End: 2019-09-17 | Stop reason: HOSPADM

## 2019-09-17 RX ORDER — SODIUM CHLORIDE 9 MG/ML
75 INJECTION, SOLUTION INTRAVENOUS CONTINUOUS
Status: DISCONTINUED | OUTPATIENT
Start: 2019-09-17 | End: 2019-09-17 | Stop reason: HOSPADM

## 2019-09-17 RX ORDER — SODIUM CHLORIDE 0.9 % (FLUSH) 0.9 %
5-40 SYRINGE (ML) INJECTION EVERY 8 HOURS
Status: DISCONTINUED | OUTPATIENT
Start: 2019-09-17 | End: 2019-09-17 | Stop reason: HOSPADM

## 2019-09-17 RX ADMIN — PROPOFOL 40 MG: 10 INJECTION, EMULSION INTRAVENOUS at 09:56

## 2019-09-17 RX ADMIN — PROPOFOL 40 MG: 10 INJECTION, EMULSION INTRAVENOUS at 09:50

## 2019-09-17 RX ADMIN — PROPOFOL 20 MG: 10 INJECTION, EMULSION INTRAVENOUS at 09:44

## 2019-09-17 RX ADMIN — LIDOCAINE HYDROCHLORIDE 50 MG: 20 INJECTION, SOLUTION EPIDURAL; INFILTRATION; INTRACAUDAL; PERINEURAL at 09:40

## 2019-09-17 RX ADMIN — PROPOFOL 20 MG: 10 INJECTION, EMULSION INTRAVENOUS at 09:53

## 2019-09-17 RX ADMIN — PROPOFOL 20 MG: 10 INJECTION, EMULSION INTRAVENOUS at 10:09

## 2019-09-17 RX ADMIN — PROPOFOL 20 MG: 10 INJECTION, EMULSION INTRAVENOUS at 10:03

## 2019-09-17 RX ADMIN — PROPOFOL 20 MG: 10 INJECTION, EMULSION INTRAVENOUS at 10:01

## 2019-09-17 RX ADMIN — PROPOFOL 20 MG: 10 INJECTION, EMULSION INTRAVENOUS at 09:42

## 2019-09-17 RX ADMIN — PROPOFOL 20 MG: 10 INJECTION, EMULSION INTRAVENOUS at 10:06

## 2019-09-17 RX ADMIN — PROPOFOL 20 MG: 10 INJECTION, EMULSION INTRAVENOUS at 09:59

## 2019-09-17 RX ADMIN — SODIUM CHLORIDE: 900 INJECTION, SOLUTION INTRAVENOUS at 09:29

## 2019-09-17 RX ADMIN — PROPOFOL 20 MG: 10 INJECTION, EMULSION INTRAVENOUS at 09:46

## 2019-09-17 RX ADMIN — Medication 80 MCG: at 09:48

## 2019-09-17 RX ADMIN — PROPOFOL 40 MG: 10 INJECTION, EMULSION INTRAVENOUS at 09:41

## 2019-09-17 NOTE — PROCEDURES
NAME:  Ruma Morelos   :   1950   MRN:   [id number]     Date/Time:  2019 10:18 AM    Esophagogastroduodenoscopy (EGD) Procedure Note    Procedure: Esophagogastroduodenoscopy with biopsy    Indication:  Abnormal GI CT  Pre-operative Diagnosis: see indication above  Post-operative Diagnosis: see findings below  :  Love Lantigua MD  Referring Provider:   Mikayla Jaffe MD    Exam:  Airway: clear, no airway problems anticipated  Heart: RRR, without gallops or rubs  Lungs: clear bilaterally without wheezes, crackles, or rhonchi  Abdomen: soft, nontender, nondistended, bowel sounds present  Mental Status: awake, alert and oriented to person, place and time     Anethesia/Sedation:  MAC anesthesia Propofol as per colonoscopy  Procedure Details   After informed consent was obtained for the procedure, with all risks and benefits of procedure explained the patient was taken to the endoscopy suite and placed in the left lateral decubitus position. Following sequential administration of sedation as per above, the [id number] gastroscope was inserted into the mouth and advanced under direct vision to third portion of the duodenum. A careful inspection was made as the gastroscope was withdrawn, including a retroflexed view of the proximal stomach; findings and interventions are described below. Findings:    -Normal esophageal mucosa  -Medium-sized 4cm hiatal hernia from 33-37cm  -Normal stomach mucosa; biopsied to exclude inflammation  -Normal duodenal mucosa; biopsied to exclude inflammation    Therapies:  biopsy of stomach; biopsy of duodenal   Specimens: #1 duod; #2 gastric  EBL:  None. Complications:   None; patient tolerated the procedure well.            Impression:    -Normal esophageal mucosa  -Medium-sized 4cm hiatal hernia from 33-37cm  -Normal stomach mucosa; biopsied to exclude inflammation  -Normal duodenal mucosa; biopsied to exclude inflammation    Recommendations:  -Await pathology. , -Follow symptoms.     Discharge disposition:  Home in the company of  when able to ambulate after colonoscopy    Vidhi Kumar MD

## 2019-09-17 NOTE — H&P
Gastroenterology Outpatient History and Physical    Patient: [facility]    Physician: Doc Laird MD    Chief Complaint: LLQ pain and abnl GI CT  History of Present Illness: 72yo F with LLQ pain and abnl GI CT. LAst colonoscopy 3-5 yrs ago.     History:  Past Medical History:   Diagnosis Date    Arthritis     Breast cancer, right breast ([address]     Depression     GERD (gastroesophageal reflux disease)     Hypercholesterolemia     Hypertension     Other ill-defined conditions(799.89)     dog bite dec 2012 needing blood transfusion      Past Surgical History:   Procedure Laterality Date    HX BREAST LUMPECTOMY Right 3/8/2018    RIGHT BREAST LUMPECTOMY WITH ULTRASOUND performed by Evangelina Horn MD at [facility] AM[facility]UL[facility]    HX COLONOSCOPY      HX KNEE REPLACEMENT Right 2013    HX TONSILLECTOMY        Social History     Socioeconomic History    Marital status:      Spouse name: Not on file    Number of children: Not on file    Years of education: Not on file    Highest education level: Not on file   Tobacco Use    Smoking status: Former Smoker     Packs/day: 0.25     Years: 2.00     Pack years: 0.50     Last attempt to quit: 1972     Years since quittin.3    Smokeless tobacco: Never Used   Substance and Sexual Activity    Alcohol use: No    Drug use: No      Family History   Adopted: Yes   Problem Relation Age of Onset    Breast Cancer Mother     Stroke Mother     Hypertension Mother     Cancer Mother         skin    Cancer Father         skin    Pulmonary Fibrosis Father     Cancer Sister 79        colon    Breast Cancer Maternal Aunt     Breast Cancer Paternal Aunt     Breast Cancer Maternal Aunt         ovarian and colon cancer    Breast Cancer Maternal Aunt         ovarian and colon cancer    Breast Cancer Maternal Aunt     Breast Cancer Paternal Aunt     Breast Cancer Maternal Aunt     Cancer Maternal Grandmother         liver/ovarian      Patient Active Problem List   Diagnosis Code    Obesity, morbid ([facility] E66.01    Breast cancer of upper-outer quadrant of right female breast ([facility] C50.411    S/P lumpectomy, right breast Z98.890    Malignant neoplasm of upper-outer quadrant of right breast in female, estrogen receptor positive ([facility] C50.411, Z17.0    Shock ([facility] R57.9    HTN (hypertension) I10    Abdominal pain R10.9       Allergies: Allergies   Allergen Reactions    Nasacort [Triamcinolone Acetonide] Other (comments)     headache    Sulfa (Sulfonamide Antibiotics) Rash     Medications:   Prior to Admission medications    Medication Sig Start Date End Date Taking? Authorizing Provider   atenolol (TENORMIN) 25 mg tablet Take 0.5 Tabs by mouth daily. 7/30/19  Yes Darrell Sebastian MD   ondansetron hcl (ZOFRAN) 4 mg tablet Take 1 Tab by mouth every eight (8) hours as needed for Nausea. 7/30/19  Yes Darrell Sebastian MD   letrozole [facility]) 2.5 mg tablet Take 1 Tab by mouth daily. 6/7/19  Yes Delmis Mann MD   acetaminophen (TYLENOL 8 HOUR PO) Take  by mouth as needed. Yes Provider, Historical   aspirin delayed-release 81 mg tablet Take  by mouth daily. Yes Provider, Historical   lisinopril (PRINIVIL, ZESTRIL) 20 mg tablet Take 40 mg by mouth daily. 6/26/18  Yes Provider, Historical   garlic 1 mg cap Take  by mouth daily. Yes Provider, Historical   Cholecalciferol, Vitamin D3, (VITAMIN D3) 1,000 unit cap Take 1 Tab by mouth daily. Yes Provider, Historical     Physical Exam:   Vital Signs: Blood pressure 133/81, pulse 91, temperature 98.5 °F (36.9 °C), resp. rate 19, height 5' 2\" (1.575 m), weight 96.4 kg (212 lb 8 oz), SpO2 96 %, not currently breastfeeding.   General: well developed, well nourished   HEENT: unremarkable   Heart: regular rhythm no mumur    Lungs: clear   Abdominal:  benign   Neurological: unremarkable   Extremities: no edema     Findings/Diagnosis: LLQ pain and abnl GI CT  Plan of Care/Planned Procedure: EGD/Colonoscopy with conscious/deep sedation    Signed:  Louie Richards MD 9/17/2019

## 2019-09-17 NOTE — DISCHARGE INSTRUCTIONS
Delayne Odor  [id number]  1950    EGD/COLON DISCHARGE INSTRUCTIONS  Discomfort:  Redness at IV site- apply warm compress to area; if redness or soreness persist- contact your physician  There may be a slight amount of blood passed from the rectum  Gaseous discomfort- walking, belching will help relieve any discomfort  You may not operate a vehicle for 12 hours  You may not engage in an occupation involving machinery or appliances for rest of today  You may not drink alcoholic beverages for at least 12 hours  Avoid making any critical decisions for at least 24 hour  DIET:   High fiber diet. - however -  remember your colon is empty and a heavy meal will produce gas. Avoid these foods:  vegetables, fried / greasy foods, carbonated drinks for today  MEDICATION:         ACTIVITY:  You may not resume your normal daily activities until tomorrow AM; it is recommended that you spend the remainder of the day resting -  avoid any strenuous activity. CALL M.D. ANY SIGN OF:   Increasing pain, nausea, vomiting  Abdominal distension (swelling)  New increased bleeding (oral or rectal)  Fever (chills)  Pain in chest area  Bloody discharge from nose or mouth  Shortness of breath    IMPRESSION:  -Normal esophageal mucosa  -Medium-sized 4cm hiatal hernia from 33-37cm  -Normal stomach mucosa; biopsied to exclude inflammation  -Normal duodenal mucosa; biopsied to exclude inflammation  -The quality of preparation was inadequate with retained solid stool and stool adherent to colon wall making it impossible to safely advance scope beyond 40cm in setting of severe angulation  -Severe diverticulosis  -Small grade grade 1 internal hemorrhoids    Follow-up Instructions:  -Call Dr. Min Gonzalez for the results of procedure / biopsy in 7-10 days  -Telephone # [phone]  -My office will contact you to set up repeat attempt at colonoscopy with alternative prep.     Qasim Dukes MD

## 2019-09-17 NOTE — PROCEDURES
NAME:  Girish Young   :   1950   MRN:   [id number]     Date/Time:  2019 10:22 AM    Colonoscopy Operative Report    Procedure Type:   Colonoscopy --diagnostic, incomplete due to inadequate prep    Indications:     Abdominal pain, LLQ, Abnormal GI CT  Pre-operative Diagnosis: see indication above  Post-operative Diagnosis:  See findings below  :  Corazon Downey MD  Referring Provider: Hugo Tomas MD    Exam:  Airway: clear, no airway problems anticipated  Heart: RRR, without gallops or rubs  Lungs: clear bilaterally without wheezes, crackles, or rhonchi  Abdomen: soft, nontender, nondistended, bowel sounds present  Mental Status: awake, alert and oriented to person, place and time    Sedation:  MAC anesthesia Propofol 300mg IV  Procedure Details:  After informed consent was obtained with all risks and benefits of procedure explained and preoperative exam completed, the patient was taken to the endoscopy suite and placed in the left lateral decubitus position. Upon sequential sedation as per above, a digital rectal exam was performed demonstrating internal hemorrhoids. The Olympus videocolonoscope  was inserted in the rectum and carefully advanced to the a distance of 40cm into the mid sigmoid colon. The quality of preparation was inadequate with retained solid stool and stool adherent to colon wall. The colonoscope was slowly withdrawn with careful evaluation between folds. Retroflexion in the rectum was completed demonstrating internal hemorrhoids. Findings:     -The quality of preparation was inadequate with retained solid stool and stool adherent to colon wall making it impossible to safely advance scope beyond 40cm in setting of severe angulation  -Severe diverticulosis  -Small grade grade 1 internal hemorrhoids    Specimen Removed:  None. Complications: None. EBL:  None.     Impression:    -The quality of preparation was inadequate with retained solid stool and stool adherent to colon wall making it impossible to safely advance scope beyond 40cm in setting of severe angulation  -Severe diverticulosis  -Small grade grade 1 internal hemorrhoids    Recommendations: --My office will contact you to set up repeat attempt at colonoscopy with alternative prep. High fiber diet. Resume normal medication(s). You will receive a letter about the biopsy results in about 10 days. You may be asked to call your doctor's office for the results. Discharge Disposition:  Home in the company of a  when able to ambulate.     Joo Hector MD

## 2019-09-17 NOTE — ROUTINE PROCESS
Krishan Orlando  1950  [id number]    Situation:  Verbal report received from: Anatoliy Gregorio RN  Procedure: Procedure(s):  ESOPHAGOGASTRODUODENOSCOPY (EGD)  COLONOSCOPY  ESOPHAGOGASTRODUODENAL (EGD) BIOPSY    Background:    Preoperative diagnosis: LLQ pain  Postoperative diagnosis: EGD: hiatal hernia   Colon: Poor Prep, Diverticulosis    :  Dr. Rajiv Chao  Assistant(s): Endoscopy Technician-1: Noam Wall  Endoscopy RN-1: Saintclair Serve, RN  Endoscopy RN-2: Eddie oCsta RN    Specimens:   ID Type Source Tests Collected by Time Destination   1 : Duodenum BX Preservative Duodenum  Alex Aguilar MD 9/17/2019 4308 Pathology   2 : Gastric BX Preservative Gastric  Alex Aguilar MD 9/17/2019 2293 Pathology     H. Pylori  no    Assessment:  Intra-procedure medications         Anesthesia gave intra-procedure sedation and medications, see anesthesia flow sheet yes    Intravenous fluids: NS@ KVO     Vital signs stable       Abdominal assessment: round and soft       Recommendation:  Discharge patient per MD order  .     Family or Friend  EG  Permission to share finding with family or friend yes

## 2019-09-17 NOTE — ANESTHESIA POSTPROCEDURE EVALUATION
Procedure(s):  ESOPHAGOGASTRODUODENOSCOPY (EGD)  COLONOSCOPY  ESOPHAGOGASTRODUODENAL (EGD) BIOPSY. general, total IV anesthesia    Anesthesia Post Evaluation        Patient location during evaluation: PACU  Note status: Adequate. Level of consciousness: responsive to verbal stimuli and sleepy but conscious  Pain management: satisfactory to patient  Airway patency: patent  Anesthetic complications: no  Cardiovascular status: acceptable  Respiratory status: acceptable  Hydration status: acceptable  Comments: +Post-Anesthesia Evaluation and Assessment    Patient: Zahra Gay MRN: [id number]  SSN: [id number]   YOB: 1950  Age: 81 y.o. Sex: female      Cardiovascular Function/Vital Signs    /47   Pulse 66   Temp 36.7 °C (98.1 °F)   Resp 17   Ht 5' 2\" (1.575 m)   Wt 96.4 kg (212 lb 8 oz)   SpO2 96%   Breastfeeding? No   BMI 38.87 kg/m²     Patient is status post Procedure(s):  ESOPHAGOGASTRODUODENOSCOPY (EGD)  COLONOSCOPY  ESOPHAGOGASTRODUODENAL (EGD) BIOPSY. Nausea/Vomiting: Controlled. Postoperative hydration reviewed and adequate. Pain:  Pain Scale 1: Numeric (0 - 10) (09/17/19 1027)  Pain Intensity 1: 0 (09/17/19 1027)   Managed. Neurological Status: At baseline. Mental Status and Level of Consciousness: Arousable. Pulmonary Status:   O2 Device: Room air (09/17/19 1027)   Adequate oxygenation and airway patent. Complications related to anesthesia: None    Post-anesthesia assessment completed. No concerns. Signed By: Tushar Sosa MD    9/17/2019  Post anesthesia nausea and vomiting:  controlled      Vitals Value Taken Time   /74 9/17/2019 10:40 AM   Temp 36.7 °C (98.1 °F) 9/17/2019 10:27 AM   Pulse 63 9/17/2019 10:43 AM   Resp 22 9/17/2019 10:43 AM   SpO2 96 % 9/17/2019 10:43 AM   Vitals shown include unvalidated device data.

## 2019-09-17 NOTE — PERIOP NOTES
Anesthesia reports 300mg Propofol, 50mg Lidocaine and 500mL NS given during procedure. Received report from anesthesia staff on vital signs and status of patient.

## 2019-09-17 NOTE — PROGRESS NOTES
Patient is in the bathroom getting dressed, with c/o nausea, no emesis.  is with her. 1120  Late entry  Patient without vomiting in bathroom,  Dressed and \"wanting to go home\" and states \"feeling better\". Drank some ginger ale. Education provided regarding post endoscopy diet, constipation, and she is aware that Dr. Shubham Fraire office will contact her and discuss prepping for another colonoscopy.

## 2019-09-17 NOTE — ANESTHESIA PREPROCEDURE EVALUATION
Anesthetic History   No history of anesthetic complications            Review of Systems / Medical History  Patient summary reviewed, nursing notes reviewed and pertinent labs reviewed    Pulmonary  Within defined limits                 Neuro/Psych         Psychiatric history (depression)     Cardiovascular    Hypertension          Hyperlipidemia    Exercise tolerance: >4 METS     GI/Hepatic/Renal     GERD (occasionally)           Endo/Other        Morbid obesity, arthritis and cancer (right breast)     Other Findings              Physical Exam    Airway  Mallampati: II  TM Distance: < 4 cm  Neck ROM: normal range of motion   Mouth opening: Normal     Cardiovascular    Rhythm: regular  Rate: normal      Pertinent negatives: No murmur  Comments: [facility]  No notable dental hx       Pulmonary  Breath sounds clear to auscultation               Abdominal  GI exam deferred       Other Findings            Anesthetic Plan    ASA: 2  Anesthesia type: general and total IV anesthesia          Induction: Intravenous  Anesthetic plan and risks discussed with: Patient and Family      Propofol MAC/Supernova

## 2019-10-09 ENCOUNTER — OFFICE VISIT (OUTPATIENT)
Dept: ONCOLOGY | Age: 69
End: 2019-10-09

## 2019-10-09 VITALS
SYSTOLIC BLOOD PRESSURE: 145 MMHG | OXYGEN SATURATION: 97 % | HEIGHT: 62 IN | TEMPERATURE: 98.8 F | RESPIRATION RATE: 16 BRPM | DIASTOLIC BLOOD PRESSURE: 92 MMHG | BODY MASS INDEX: 39.2 KG/M2 | HEART RATE: 80 BPM | WEIGHT: 213 LBS

## 2019-10-09 DIAGNOSIS — Z79.811 AROMATASE INHIBITOR USE: ICD-10-CM

## 2019-10-09 DIAGNOSIS — C50.411 MALIGNANT NEOPLASM OF UPPER-OUTER QUADRANT OF RIGHT BREAST IN FEMALE, ESTROGEN RECEPTOR POSITIVE (HCC): Primary | ICD-10-CM

## 2019-10-09 DIAGNOSIS — Z17.0 MALIGNANT NEOPLASM OF UPPER-OUTER QUADRANT OF RIGHT BREAST IN FEMALE, ESTROGEN RECEPTOR POSITIVE (HCC): Primary | ICD-10-CM

## 2019-10-09 RX ORDER — POTASSIUM &MAGNESIUM ASPARTATE 250-250 MG
500 CAPSULE ORAL DAILY
COMMUNITY

## 2019-10-09 NOTE — PROGRESS NOTES
Identified pt with two pt identifiers(name and ). Reviewed record in preparation for visit and have obtained necessary documentation. Chief Complaint   Patient presents with    Breast Cancer     f/u        Health Maintenance Due   Topic    Hepatitis C Screening     DTaP/Tdap/Td series (1 - Tdap)    Shingrix Vaccine Age 50> (1 of 2)    FOBT Q 1 YEAR AGE 50-75     GLAUCOMA SCREENING Q2Y     Bone Densitometry (Dexa) Screening     Pneumococcal 65+ years (1 of 2 - PCV13)    MEDICARE YEARLY EXAM     Influenza Age 5 to Adult         Visit Vitals  BP (!) 145/92 (BP 1 Location: Left arm, BP Patient Position: Sitting)   Pulse 80   Temp 98.8 °F (37.1 °C) (Oral)   Resp 16   Ht 5' 2\" (1.575 m)   Wt 213 lb (96.6 kg)   SpO2 97%   BMI 38.96 kg/m²     Pain Scale: /10    Coordination of Care Questionnaire:  :   1. Have you been to the ER, urgent care clinic since your last visit? Hospitalized since your last visit? Yes Where: [facility] -2019    2. Have you seen or consulted any other health care providers outside of the [address] [place] [address] since your last visit? Include any pap smears or colon screening.  No

## 2019-10-09 NOTE — PROGRESS NOTES
[address], [address] [place] [address] [place], 200 [facility]  [phone]        Progress Note        Patient: Milla Rowe MRN: [id number]  SSN: [id number]    YOB: 1950  Age: 81 y.o. Sex: female        Diagnosis:     1. Right breast carcinoma:  T2 N0 M0 (Stage IIA) infiltrating ductal carcinoma, Tumor size 3.8 cm, LN -ve, grade 2, %, MS 95%, Her 2 -ve. Treatment:     1. Adjuvant Letrozole  2. Completed adjuvant radiation - Dr. Get Fisher - 10/2018  3. Adjuvant chemotherapy   Taxotere, Cyclophosphamide - s/p 2 cycles   Therapy d/c'd due to gr IV toxicity  4. Right sided lumpectomy on 03/08/2018    HPI:      Milla Rowe is a 81 y.o. female with breast cancer. She received 2 cycles of adjuvant chemotherapy. She received two cycles of adjuvant chemotherapy. She developed Gr IV side effects and was admitted to the hospital with sepsis and C Diff diarrhea. A diagnosis of breast cancer was made with help of an annual mammogram. Bopsies were performed on both the RIGHT and LEFT breast. The LEFT breast was benign and the RIGHT breast was positive for IDC. The patient denies any nipple inversion or discharge. She was seen by Dr. Jordyn Hernandez. She underwent right sided lumpectomy on 03/08/2018. The tumor is 3.8 cm in size, LN -ve, %, MS 95%, Her 2 -ve. Genomic profile by mammaprint reveals a high risk luminal B gene signature in the tumor. Adjuvant chemotherapy was discontinued after 2 cycles d/t grade 4 toxicity leading to prolonged hospitalization. She completed adjuvant radiation and is taking letrozole. She had a recent colonoscopy by Dr. Jaime Batres.        Review of Systems:      Constitutional: negative  Eyes: negative  Ears, Nose, Mouth, Throat, and Face: negative  Respiratory: negative  Cardiovascular: negative  Gastrointestinal: negative  Genitourinary:negative  Integument/Breast: negative  Hematologic/Lymphatic: negative  Musculoskeletal:negative  Neurological: negative        Past Medical History:   Diagnosis Date    Arthritis     Breast cancer, right breast ([address]     Depression     GERD (gastroesophageal reflux disease)     Hypercholesterolemia     Hypertension     Other ill-defined conditions(799.89)     dog bite dec 2012 needing blood transfusion     Past Surgical History:   Procedure Laterality Date    COLONOSCOPY N/A 2019    COLONOSCOPY performed by Kaylie Collazo MD at \[facility]"" ENDOSCOPY    COLONOSCOPY,DIAGNOSTIC  2019         HX BREAST LUMPECTOMY Right 3/8/2018    RIGHT BREAST LUMPECTOMY WITH ULTRASOUND performed by Betty Durham MD at \[facility]\"" AMBULATORY OR    HX COLONOSCOPY      HX KNEE REPLACEMENT Right 2013    HX TONSILLECTOMY      UPPER GI ENDOSCOPY,BIOPSY  2019           Family History   Adopted: Yes   Problem Relation Age of Onset    Breast Cancer Mother     Stroke Mother     Hypertension Mother     Cancer Mother         skin    Cancer Father         skin    Pulmonary Fibrosis Father     Cancer Sister 79        colon    Breast Cancer Maternal Aunt     Breast Cancer Paternal Aunt     Breast Cancer Maternal Aunt         ovarian and colon cancer    Breast Cancer Maternal Aunt         ovarian and colon cancer    Breast Cancer Maternal Aunt     Breast Cancer Paternal Aunt     Breast Cancer Maternal Aunt     Cancer Maternal Grandmother         liver/ovarian     Social History     Tobacco Use    Smoking status: Former Smoker     Packs/day: 0.25     Years: 2.00     Pack years: 0.50     Last attempt to quit: 1972     Years since quittin.3    Smokeless tobacco: Never Used   Substance Use Topics    Alcohol use: No      Prior to Admission medications    Medication Sig Start Date End Date Taking? Authorizing Provider   cranberry 500 mg capsule Take 500 mg by mouth daily.    Yes Provider, Historical   Lactobac 40-Bifido 3-S.thermop (PROBIOTIC) 100 billion cell cap Take  by mouth. Yes Provider, Historical   polyethylene glycol 3350 (CLEARLAX PO) Take  by mouth. Yes Provider, Historical   atenolol (TENORMIN) 25 mg tablet Take 0.5 Tabs by mouth daily. 7/30/19  Yes Richardson Gillette MD   letrozole Replaced by [facility]) 2.5 mg tablet Take 1 Tab by mouth daily. 6/7/19  Yes Angel Guallpa MD   acetaminophen (TYLENOL 8 HOUR PO) Take  by mouth as needed. Yes Provider, Historical   aspirin delayed-release 81 mg tablet Take  by mouth daily. Yes Provider, Historical   lisinopril (PRINIVIL, ZESTRIL) 20 mg tablet Take 40 mg by mouth daily. 6/26/18  Yes Provider, Historical   garlic 1 mg cap Take  by mouth daily. Yes Provider, Historical   Cholecalciferol, Vitamin D3, (VITAMIN D3) 1,000 unit cap Take 1 Tab by mouth daily. Yes Provider, Historical   ondansetron hcl (ZOFRAN) 4 mg tablet Take 1 Tab by mouth every eight (8) hours as needed for Nausea.  7/30/19   Laureano Collins MD        Allergies   Allergen Reactions    Nasacort [Triamcinolone Acetonide] Other (comments)     headache    Sulfa (Sulfonamide Antibiotics) Rash         Objective:     Vitals:    10/09/19 1117   BP: (!) 145/92   Pulse: 80   Resp: 16   Temp: 98.8 °F (37.1 °C)   TempSrc: Oral   SpO2: 97%   Weight: 213 lb (96.6 kg)   Height: 5' 2\" (1.575 m)            Physical Exam:    GENERAL: alert, cooperative, in good spirits, morbid obesity  EYE: negative  THROAT & NECK: supple  Lymph nodes: no cervical, axillary, inguinal adenopathy  LUNG: clear to auscultation bilaterally  HEART: regular rate and rhythm, systolic murmur  ABDOMEN: soft, non-tender  EXTREMITIES:  no edema  SKIN: Normal.  NEUROLOGIC: no sensory or motor deficits        Lab Results   Component Value Date/Time    WBC 5.2 07/28/2019 04:20 AM    HGB 11.7 07/28/2019 04:20 AM    HCT 39.0 07/28/2019 04:20 AM    PLATELET 519 26/91/7344 04:20 AM    MCV 90.5 07/28/2019 04:20 AM         Lab Results   Component Value Date/Time    Sodium 140 07/28/2019 04:20 AM    Potassium 3.8 07/28/2019 04:20 AM    Chloride 111 (H) 07/28/2019 04:20 AM    CO2 22 07/28/2019 04:20 AM    Anion gap 7 07/28/2019 04:20 AM    Glucose 75 07/28/2019 04:20 AM    BUN 21 (H) 07/28/2019 04:20 AM    Creatinine 0.65 07/28/2019 04:20 AM    BUN/Creatinine ratio 32 (H) 07/28/2019 04:20 AM    GFR est AA >60 07/28/2019 04:20 AM    GFR est non-AA >60 07/28/2019 04:20 AM    Calcium 9.1 07/28/2019 04:20 AM    Bilirubin, total 0.6 07/27/2019 05:38 AM    AST (SGOT) 11 (L) 07/27/2019 05:38 AM    Alk. phosphatase 91 07/27/2019 05:38 AM    Protein, total 8.0 07/27/2019 05:38 AM    Albumin 3.6 07/27/2019 05:38 AM    Globulin 4.4 (H) 07/27/2019 05:38 AM    A-G Ratio 0.8 (L) 07/27/2019 05:38 AM    ALT (SGPT) 12 07/27/2019 05:38 AM         Assessment:     1. Right breast carcinoma:  T2 N0 M0 (Stage IIA) infiltrating ductal carcinoma, Tumor size 3.8 cm, LN -ve, grade 2, %, NE 95%, Her 2 -ve. Mammaprint shows a luminal B gene signature. ECOG PS 0  Intent of Treatment - curative     Prognosis - excellent    S/P right breast lumpectomy and sentinel LN excision. Received adjuvant chemotherapy   Taxotere, Cyclophosphamide - s/p 2 Cycles    Chemotherapy discontinued d/t Gr 4 toxicity with sepsis, C Diff diarrhea, diverticulitis leading to a prolonged hospitalization    S/P adjuvant radiation with Dr. Watson Lombard - completed  Currently on Letrozole 2.5 mg daily  Tolerating well  Asymptomatic  In remission    Dexa 2/4/2018 - normal    Symptom management form reviewed with patient. 2. Anemia - resolved    On ferrous sulfate 325 mg po daily      Plan:       > Continue Letrozole  > Has a follow up with Dr. Bob Alex next week  > Follow-up in 6 months        Signed by: Stefano Fitch MD                     October 12, 2019        CC. Sebastián Ghosh MD  CC. Watson Lombard, MD  CC.  Shanique Cabrera MD

## 2020-02-03 ENCOUNTER — ANESTHESIA EVENT (OUTPATIENT)
Dept: ENDOSCOPY | Age: 70
End: 2020-02-03
Payer: MEDICARE

## 2020-02-03 ENCOUNTER — HOSPITAL ENCOUNTER (OUTPATIENT)
Age: 70
Setting detail: OUTPATIENT SURGERY
Discharge: HOME | End: 2020-02-03
Attending: INTERNAL MEDICINE | Admitting: INTERNAL MEDICINE
Payer: MEDICARE

## 2020-02-03 ENCOUNTER — ANESTHESIA (OUTPATIENT)
Dept: ENDOSCOPY | Age: 70
End: 2020-02-03
Payer: MEDICARE

## 2020-02-03 VITALS
BODY MASS INDEX: 40.48 KG/M2 | DIASTOLIC BLOOD PRESSURE: 79 MMHG | OXYGEN SATURATION: 98 % | HEART RATE: 68 BPM | WEIGHT: 220 LBS | SYSTOLIC BLOOD PRESSURE: 163 MMHG | TEMPERATURE: 97.9 F | RESPIRATION RATE: 20 BRPM | HEIGHT: 62 IN

## 2020-02-03 PROCEDURE — 76040000007: Performed by: INTERNAL MEDICINE

## 2020-02-03 PROCEDURE — 74011250636 HC RX REV CODE- 250/636: Performed by: INTERNAL MEDICINE

## 2020-02-03 PROCEDURE — 76060000032 HC ANESTHESIA 0.5 TO 1 HR: Performed by: INTERNAL MEDICINE

## 2020-02-03 PROCEDURE — 74011000250 HC RX REV CODE- 250: Performed by: REGISTERED NURSE

## 2020-02-03 PROCEDURE — 74011250636 HC RX REV CODE- 250/636: Performed by: REGISTERED NURSE

## 2020-02-03 RX ORDER — FENTANYL CITRATE 50 UG/ML
25 INJECTION, SOLUTION INTRAMUSCULAR; INTRAVENOUS
Status: DISCONTINUED | OUTPATIENT
Start: 2020-02-03 | End: 2020-02-03 | Stop reason: HOSPADM

## 2020-02-03 RX ORDER — FLUMAZENIL 0.1 MG/ML
0.2 INJECTION INTRAVENOUS
Status: DISCONTINUED | OUTPATIENT
Start: 2020-02-03 | End: 2020-02-03 | Stop reason: HOSPADM

## 2020-02-03 RX ORDER — PROPOFOL 10 MG/ML
INJECTION, EMULSION INTRAVENOUS AS NEEDED
Status: DISCONTINUED | OUTPATIENT
Start: 2020-02-03 | End: 2020-02-03 | Stop reason: HOSPADM

## 2020-02-03 RX ORDER — DEXTROMETHORPHAN/PSEUDOEPHED 2.5-7.5/.8
1.2 DROPS ORAL
Status: DISCONTINUED | OUTPATIENT
Start: 2020-02-03 | End: 2020-02-03 | Stop reason: HOSPADM

## 2020-02-03 RX ORDER — SODIUM CHLORIDE 0.9 % (FLUSH) 0.9 %
5-40 SYRINGE (ML) INJECTION AS NEEDED
Status: DISCONTINUED | OUTPATIENT
Start: 2020-02-03 | End: 2020-02-03 | Stop reason: HOSPADM

## 2020-02-03 RX ORDER — EPINEPHRINE 0.1 MG/ML
1 INJECTION INTRACARDIAC; INTRAVENOUS
Status: DISCONTINUED | OUTPATIENT
Start: 2020-02-03 | End: 2020-02-03 | Stop reason: HOSPADM

## 2020-02-03 RX ORDER — NALOXONE HYDROCHLORIDE 0.4 MG/ML
0.4 INJECTION, SOLUTION INTRAMUSCULAR; INTRAVENOUS; SUBCUTANEOUS
Status: DISCONTINUED | OUTPATIENT
Start: 2020-02-03 | End: 2020-02-03 | Stop reason: HOSPADM

## 2020-02-03 RX ORDER — LIDOCAINE HYDROCHLORIDE 20 MG/ML
INJECTION, SOLUTION EPIDURAL; INFILTRATION; INTRACAUDAL; PERINEURAL AS NEEDED
Status: DISCONTINUED | OUTPATIENT
Start: 2020-02-03 | End: 2020-02-03 | Stop reason: HOSPADM

## 2020-02-03 RX ORDER — ATROPINE SULFATE 0.1 MG/ML
0.5 INJECTION INTRAVENOUS
Status: DISCONTINUED | OUTPATIENT
Start: 2020-02-03 | End: 2020-02-03 | Stop reason: HOSPADM

## 2020-02-03 RX ORDER — MIDAZOLAM HYDROCHLORIDE 1 MG/ML
.25-5 INJECTION, SOLUTION INTRAMUSCULAR; INTRAVENOUS
Status: DISCONTINUED | OUTPATIENT
Start: 2020-02-03 | End: 2020-02-03 | Stop reason: HOSPADM

## 2020-02-03 RX ORDER — SODIUM CHLORIDE 0.9 % (FLUSH) 0.9 %
5-40 SYRINGE (ML) INJECTION EVERY 8 HOURS
Status: DISCONTINUED | OUTPATIENT
Start: 2020-02-03 | End: 2020-02-03 | Stop reason: HOSPADM

## 2020-02-03 RX ORDER — SODIUM CHLORIDE 9 MG/ML
75 INJECTION, SOLUTION INTRAVENOUS CONTINUOUS
Status: DISCONTINUED | OUTPATIENT
Start: 2020-02-03 | End: 2020-02-03 | Stop reason: HOSPADM

## 2020-02-03 RX ADMIN — PROPOFOL 80 MG: 10 INJECTION, EMULSION INTRAVENOUS at 08:49

## 2020-02-03 RX ADMIN — PROPOFOL 50 MG: 10 INJECTION, EMULSION INTRAVENOUS at 08:53

## 2020-02-03 RX ADMIN — PROPOFOL 20 MG: 10 INJECTION, EMULSION INTRAVENOUS at 09:01

## 2020-02-03 RX ADMIN — SODIUM CHLORIDE: 900 INJECTION, SOLUTION INTRAVENOUS at 08:48

## 2020-02-03 RX ADMIN — LIDOCAINE HYDROCHLORIDE 40 MG: 20 INJECTION, SOLUTION EPIDURAL; INFILTRATION; INTRACAUDAL; PERINEURAL at 08:49

## 2020-02-03 RX ADMIN — PROPOFOL 50 MG: 10 INJECTION, EMULSION INTRAVENOUS at 09:06

## 2020-02-03 RX ADMIN — PROPOFOL 50 MG: 10 INJECTION, EMULSION INTRAVENOUS at 08:58

## 2020-02-03 NOTE — ANESTHESIA PREPROCEDURE EVALUATION
Anesthetic History   No history of anesthetic complications            Review of Systems / Medical History  Patient summary reviewed, nursing notes reviewed and pertinent labs reviewed    Pulmonary  Within defined limits                 Neuro/Psych         Psychiatric history (depression)     Cardiovascular    Hypertension          Hyperlipidemia    Exercise tolerance: >4 METS     GI/Hepatic/Renal     GERD (occasionally)           Endo/Other        Obesity, arthritis and cancer (right breast)     Other Findings   Comments: Left lower quadrant pain           Physical Exam    Airway  Mallampati: II  TM Distance: < 4 cm  Neck ROM: normal range of motion   Mouth opening: Normal     Cardiovascular    Rhythm: regular  Rate: normal      Pertinent negatives: No murmur  Comments: [facility]  No notable dental hx       Pulmonary  Breath sounds clear to auscultation               Abdominal  GI exam deferred       Other Findings            Anesthetic Plan    ASA: 2  Anesthesia type: total IV anesthesia and MAC          Induction: Intravenous  Anesthetic plan and risks discussed with: Patient and Family      Propofol MAC

## 2020-02-03 NOTE — DISCHARGE INSTRUCTIONS
Otis Benitez  [id number]  1950    COLON DISCHARGE INSTRUCTIONS  Discomfort:  Redness at IV site- apply warm compress to area; if redness or soreness persist- contact your physician  There may be a slight amount of blood passed from the rectum  Gaseous discomfort- walking, belching will help relieve any discomfort  You may not operate a vehicle for 12 hours  You may not engage in an occupation involving machinery or appliances for rest of today  You may not drink alcoholic beverages for at least 12 hours  Avoid making any critical decisions for at least 24 hour  DIET:   High fiber diet. - however -  remember your colon is empty and a heavy meal will produce gas. Avoid these foods:  vegetables, fried / greasy foods, carbonated drinks for today  MEDICATION:         ACTIVITY:  You may not resume your normal daily activities until tomorrow AM; it is recommended that you spend the remainder of the day resting -  avoid any strenuous activity. CALL M.D.   ANY SIGN OF:   Increasing pain, nausea, vomiting  Abdominal distension (swelling)  New increased bleeding (oral or rectal)  Fever (chills)  Pain in chest area  Bloody discharge from nose or mouth  Shortness of breath    IMPRESSION:  -The quality of preparation was poor, requiring extensive lavage to displace retained solid stool and stool adherent to colon wall making it impossible to safely advance scope beyond 65cm into the distal transverse colon in setting of severe angulation and worsening visualization  -Severe diverticulosis  -Small grade grade 1 internal hemorrhoids    Follow-up Instructions:   Call Dr. Diaz Prudent if questions arise regarding your procedure  Telephone # [phone]  Repeat colonoscopy in 5 years    Olena Lomax MD

## 2020-02-03 NOTE — PROGRESS NOTES
[facility]  1950  [id number]    Situation:  Verbal report received from: Chhaya Giraldo RN  Procedure: Procedure(s):  COLONOSCOPY    Background:    Preoperative diagnosis: LOWER LEFT QUADRANT PAIN  Postoperative diagnosis: Severe Diverticulosis, Internal Hemorrhoids, poor prep    :  Dr. Wadsworth  Assistant(s): Endoscopy RN-1: Nancy Zavala RN  Endoscopy RN-2: Lizzeth Keyes    Specimens: * No specimens in log *  H. Pylori  no    Assessment:  Intra-procedure medications   Anesthesia gave intra-procedure sedation and medications, see anesthesia flow sheet yes    Intravenous fluids: NS@ KVO     Vital signs stable      Abdominal assessment: round and soft      Recommendation:  Discharge patient per MD order .   Family or Friend    Permission to share finding with family or friend yes

## 2020-02-03 NOTE — PROCEDURES
NAME:  Lupe Newby   :   1950   MRN:   [id number]     Date/Time:  2/3/2020 9:17 AM    Colonoscopy Operative Report    Procedure Type:   Colonoscopy --diagnostic     Indications:     Abdominal pain, LLQ  Pre-operative Diagnosis: see indication above  Post-operative Diagnosis:  See findings below  :  Andrey Wells MD  Referring Provider:   Cheryle Corwin, MD     Exam:  Airway: clear, no airway problems anticipated  Heart: RRR, without gallops or rubs  Lungs: clear bilaterally without wheezes, crackles, or rhonchi  Abdomen: soft, nontender, nondistended, bowel sounds present  Mental Status: awake, alert and oriented to person, place and time     Sedation:  MAC anesthesia Propofol 250mg IV  Procedure Details:  After informed consent was obtained with all risks and benefits of procedure explained and preoperative exam completed, the patient was taken to the endoscopy suite and placed in the left lateral decubitus position. Upon sequential sedation as per above, a digital rectal exam was performed demonstrating internal hemorrhoids. The Olympus videocolonoscope  was inserted in the rectum and carefully advanced to the a distance of 65cm into the distal transverse colon. The quality of preparation was poor with retained solid stool and stool adherent to colon wall, requiring extensive lavage and conversion to upper endoscope to deal with noted sigmoid angulation. The colonoscope was slowly withdrawn with careful evaluation between folds.  Retroflexion in the rectum was completed demonstrating internal hemorrhoids.      Findings:     -The quality of preparation was poor, requiring extensive lavage to displace retained solid stool and stool adherent to colon wall making it impossible to safely advance scope beyond 65cm into the distal transverse colon in setting of severe angulation and worsening visualization  -Severe diverticulosis  -Small grade grade 1 internal hemorrhoids     Specimen Removed: None.  Complications: None. EBL:  None.     Impression:    -The quality of preparation was poor, requiring extensive lavage to displace retained solid stool and stool adherent to colon wall making it impossible to safely advance scope beyond 65cm into the distal transverse colon in setting of severe angulation and worsening visualization  -Severe diverticulosis  -Small grade grade 1 internal hemorrhoids     Recommendations: --Repeat colonoscopy in 5 years with alternative prep. High fiber diet. Resume normal medication(s).  You may be asked to call your doctor's office for the results.        Discharge Disposition:  Home in the company of a  when able to ambulate      Casandra Munroe MD

## 2020-02-03 NOTE — PERIOP NOTES
Anesthesia reports 250mg Propofol, 40mg Lidocaine and 300mL NS given during procedure. Received report from anesthesia staff on vital signs and status of patient. Endoscope was pre-cleaned at the bedside immediately following procedure by Coralie Cogan, RN.

## 2020-02-03 NOTE — H&P
Gastroenterology Outpatient History and Physical    Patient: [facility]    Physician: Mortimer Peaches, MD    Chief Complaint: LLQ pain  History of Present Illness: 70yo F with LLQ.   Inadequate colon prpe noted 2019    History:  Past Medical History:   Diagnosis Date    Arthritis     Breast cancer, right breast ([address]     Depression     GERD (gastroesophageal reflux disease)     Hypercholesterolemia     Hypertension     Other ill-defined conditions(799.89)     dog bite dec 2012 needing blood transfusion      Past Surgical History:   Procedure Laterality Date    COLONOSCOPY N/A 2019    COLONOSCOPY performed by [facility] Zeng MD at [facility] ENDOSCOPY    COLONOSCOPY,DIAGNOSTIC  2019         HX BREAST LUMPECTOMY Right 3/8/2018    RIGHT BREAST LUMPECTOMY WITH ULTRASOUND performed by Steven Albrecht MD at [facility] AMBULATORY OR    HX COLONOSCOPY      HX KNEE REPLACEMENT Right 2013    HX TONSILLECTOMY      UPPER GI ENDOSCOPY,BIOPSY  2019           Social History     Socioeconomic History    Marital status:      Spouse name: Not on file    Number of children: Not on file    Years of education: Not on file    Highest education level: Not on file   Tobacco Use    Smoking status: Former Smoker     Packs/day: 0.25     Years: 2.00     Pack years: 0.50     Last attempt to quit: 1972     Years since quittin.6    Smokeless tobacco: Never Used   Substance and Sexual Activity    Alcohol use: No    Drug use: No      Family History   Adopted: Yes   Problem Relation Age of Onset    Breast Cancer Mother     Stroke Mother     Hypertension Mother     Cancer Mother         skin    Cancer Father         skin    Pulmonary Fibrosis Father     Cancer Sister 79        colon    Breast Cancer Maternal Aunt     Breast Cancer Paternal Aunt     Breast Cancer Maternal Aunt         ovarian and colon cancer    Breast Cancer Maternal Aunt         ovarian and colon cancer    Breast Cancer Maternal Aunt     Breast Cancer Paternal Aunt     Breast Cancer Maternal Aunt     Cancer Maternal Grandmother         liver/ovarian      Patient Active Problem List   Diagnosis Code    Obesity, morbid ([facility] E66.01    Breast cancer of upper-outer quadrant of right female breast ([facility] C50.411    S/P lumpectomy, right breast Z98.890    Malignant neoplasm of upper-outer quadrant of right breast in female, estrogen receptor positive ([facility] C50.411, Z17.0    Shock ([facility] R57.9    HTN (hypertension) I10    Abdominal pain R10.9       Allergies: Allergies   Allergen Reactions    Nasacort [Triamcinolone Acetonide] Other (comments)     headache    Sulfa (Sulfonamide Antibiotics) Rash     Medications:   Prior to Admission medications    Medication Sig Start Date End Date Taking? Authorizing Provider   cranberry 500 mg capsule Take 500 mg by mouth daily. Yes Provider, Historical   Lactobac 40-Bifido 3-S.thermop (PROBIOTIC) 100 billion cell cap Take  by mouth. Yes Provider, Historical   ondansetron hcl (ZOFRAN) 4 mg tablet Take 1 Tab by mouth every eight (8) hours as needed for Nausea. 7/30/19  Yes Chelly Richardson MD   acetaminophen (TYLENOL 8 HOUR PO) Take  by mouth as needed. Yes Provider, Historical   garlic 1 mg cap Take  by mouth daily. Yes Provider, Historical   polyethylene glycol 3350 (CLEARLAX PO) Take  by mouth. Provider, Historical   atenolol (TENORMIN) 25 mg tablet Take 0.5 Tabs by mouth daily. 7/30/19   Karina, [facility], MD   letrozole [facility]) 2.5 mg tablet Take 1 Tab by mouth daily. 6/7/19   Mary Jane Limon MD   aspirin delayed-release 81 mg tablet Take  by mouth daily. Provider, Historical   lisinopril (PRINIVIL, ZESTRIL) 20 mg tablet Take 40 mg by mouth daily. 6/26/18   Provider, Historical   Cholecalciferol, Vitamin D3, (VITAMIN D3) 1,000 unit cap Take 1 Tab by mouth daily.     Provider, Historical     Physical Exam:   Vital Signs: Blood pressure 127/87, pulse 76, temperature 98.2 °F (36.8 °C), resp. rate 18, height 5' 2\" (1.575 m), weight 99.8 kg (220 lb), SpO2 95 %, not currently breastfeeding.   General: well developed, well nourished   HEENT: unremarkable   Heart: regular rhythm no mumur    Lungs: clear   Abdominal:  benign   Neurological: unremarkable   Extremities: no edema     Findings/Diagnosis: LLQ pain  Plan of Care/Planned Procedure: colonoscopy with conscious/deep sedation    Signed:  Carol Melgar MD 2/3/2020

## 2020-02-03 NOTE — ANESTHESIA POSTPROCEDURE EVALUATION
Procedure(s):  COLONOSCOPY.    total IV anesthesia, general    Anesthesia Post Evaluation        Patient location during evaluation: PACU  Note status: Adequate. Level of consciousness: responsive to verbal stimuli and sleepy but conscious  Pain management: satisfactory to patient  Airway patency: patent  Anesthetic complications: no  Cardiovascular status: acceptable  Respiratory status: acceptable  Hydration status: acceptable  Comments: +Post-Anesthesia Evaluation and Assessment    Patient: Urszula Nicholson MRN: [id number]  SSN: [id number]   YOB: 1950  Age: 81 y.o. Sex: female      Cardiovascular Function/Vital Signs    /79   Pulse 68   Temp 36.6 °C (97.9 °F)   Resp 20   Ht 5' 2\" (1.575 m)   Wt 99.8 kg (220 lb)   SpO2 98%   Breastfeeding No   BMI 40.24 kg/m²     Patient is status post Procedure(s):  COLONOSCOPY. Nausea/Vomiting: Controlled. Postoperative hydration reviewed and adequate. Pain:  Pain Scale 1: Numeric (0 - 10) (02/03/20 0923)  Pain Intensity 1: 0 (02/03/20 0923)   Managed. Neurological Status: At baseline. Mental Status and Level of Consciousness: Arousable. Pulmonary Status:   O2 Device: Room air (02/03/20 9905)   Adequate oxygenation and airway patent. Complications related to anesthesia: None    Post-anesthesia assessment completed. No concerns. Signed By: Nacho López MD    2/3/2020  Post anesthesia nausea and vomiting:  controlled      Vitals Value Taken Time   /79 2/3/2020  9:41 AM   Temp 36.6 °C (97.9 °F) 2/3/2020  9:23 AM   Pulse 71 2/3/2020  9:44 AM   Resp 22 2/3/2020  9:44 AM   SpO2 100 % 2/3/2020  9:44 AM   Vitals shown include unvalidated device data.

## 2020-02-06 ENCOUNTER — TELEPHONE (OUTPATIENT)
Dept: ONCOLOGY | Age: 70
End: 2020-02-06

## 2020-02-06 DIAGNOSIS — Z17.0 MALIGNANT NEOPLASM OF UPPER-OUTER QUADRANT OF RIGHT BREAST IN FEMALE, ESTROGEN RECEPTOR POSITIVE (HCC): ICD-10-CM

## 2020-02-06 DIAGNOSIS — C50.411 MALIGNANT NEOPLASM OF UPPER-OUTER QUADRANT OF RIGHT BREAST IN FEMALE, ESTROGEN RECEPTOR POSITIVE (HCC): ICD-10-CM

## 2020-02-06 RX ORDER — LETROZOLE 2.5 MG/1
2.5 TABLET, FILM COATED ORAL DAILY
Qty: 30 TAB | Refills: 6 | Status: SHIPPED | OUTPATIENT
Start: 2020-02-06 | End: 2020-10-02

## 2020-02-06 NOTE — TELEPHONE ENCOUNTER
Returned a call to pt. Pt says her PCP gave her 3 blue pills and it has gone away. I informed her that is is very unlikely to be from letrozole especially with being on this for a while. She stated understanding. She needs a refill.     PER STEPH from Dr. Noemy Vital LETROZOLE 2.5MG [facility]) ONE TAB ONCE A DAY QUANTITY 30 REFILL 6

## 2020-03-27 ENCOUNTER — HOSPITAL ENCOUNTER (OUTPATIENT)
Dept: MAMMOGRAPHY | Age: 70
Discharge: HOME | End: 2020-03-27
Attending: INTERNAL MEDICINE
Payer: MEDICARE

## 2020-03-27 DIAGNOSIS — C50.411 MALIGNANT NEOPLASM OF UPPER-OUTER QUADRANT OF RIGHT BREAST IN FEMALE, ESTROGEN RECEPTOR POSITIVE (HCC): ICD-10-CM

## 2020-03-27 DIAGNOSIS — Z17.0 MALIGNANT NEOPLASM OF UPPER-OUTER QUADRANT OF RIGHT BREAST IN FEMALE, ESTROGEN RECEPTOR POSITIVE (HCC): ICD-10-CM

## 2020-03-27 PROCEDURE — 77066 DX MAMMO INCL CAD BI: CPT

## 2020-04-08 ENCOUNTER — VIRTUAL VISIT (OUTPATIENT)
Dept: ONCOLOGY | Age: 70
End: 2020-04-08

## 2020-04-08 ENCOUNTER — DOCUMENTATION ONLY (OUTPATIENT)
Dept: ONCOLOGY | Age: 70
End: 2020-04-08

## 2020-04-08 DIAGNOSIS — C50.411 MALIGNANT NEOPLASM OF UPPER-OUTER QUADRANT OF RIGHT BREAST IN FEMALE, ESTROGEN RECEPTOR POSITIVE (HCC): Primary | ICD-10-CM

## 2020-04-08 DIAGNOSIS — Z17.0 MALIGNANT NEOPLASM OF UPPER-OUTER QUADRANT OF RIGHT BREAST IN FEMALE, ESTROGEN RECEPTOR POSITIVE (HCC): Primary | ICD-10-CM

## 2020-04-08 NOTE — PROGRESS NOTES
Attempted to call pt mobile and home phone rings busy, work phone is patient contact phone and was told I got the wrong number when I called.

## 2020-04-12 NOTE — PROGRESS NOTES
2001 [facility]  [address], [address] [facility] [address], [address] [facility]  [phone]        Progress Note        Patient: Arnulfo Iraheta MRN: [id number]  SSN: [id number]    YOB: 1950  Age: 78 y.o. Sex: female        Reason for Visit:   Arnulfo Iraheta is a 78 y.o. female who is seen by real-time audio technology for follow up of right sided breast carcinoma. Diagnosis:     1. Right breast carcinoma:  T2 N0 M0 (Stage IIA) infiltrating ductal carcinoma, Tumor size 3.8 cm, LN -ve, grade 2, %, IL 95%, Her 2 -ve. Treatment:     1. Adjuvant Letrozole  2. Completed adjuvant radiation - Dr. Beatrice Hernández - 10/2018  3. Adjuvant chemotherapy   Taxotere, Cyclophosphamide - s/p 2 cycles   Therapy d/c'd due to gr IV toxicity  4. Right sided lumpectomy on 03/08/2018    HPI:      Arnulfo Iraheta is a 78 y.o. female with breast cancer. She received 2 cycles of adjuvant chemotherapy. She received two cycles of adjuvant chemotherapy. She developed Gr IV side effects and was admitted to the hospital with sepsis and C Diff diarrhea. A diagnosis of breast cancer was made with help of an annual mammogram. Bopsies were performed on both the RIGHT and LEFT breast. The LEFT breast was benign and the RIGHT breast was positive for IDC. The patient denies any nipple inversion or discharge. She was seen by Dr. Ethel Natarajan. She underwent right sided lumpectomy on 03/08/2018. The tumor is 3.8 cm in size, LN -ve, %, IL 95%, Her 2 -ve. Genomic profile by mammaprint reveals a high risk luminal B gene signature in the tumor. Adjuvant chemotherapy was discontinued after 2 cycles d/t grade 4 toxicity leading to prolonged hospitalization. She completed adjuvant radiation and is taking letrozole. She had a recent colonoscopy by Dr. Isaac Balderrama.        Review of Systems:      Constitutional: negative  Eyes: negative  Ears, Nose, Mouth, Throat, and Face: negative  Respiratory: negative  Cardiovascular: negative  Gastrointestinal: negative  Genitourinary:negative  Integument/Breast: negative  Hematologic/Lymphatic: negative  Musculoskeletal:negative  Neurological: negative        Past Medical History:   Diagnosis Date    Arthritis     Breast cancer, right breast ([address]     Depression     GERD (gastroesophageal reflux disease)     Hypercholesterolemia     Hypertension     Other ill-defined conditions(799.89)     dog bite dec 2012 needing blood transfusion     Past Surgical History:   Procedure Laterality Date    COLONOSCOPY N/A 2019    COLONOSCOPY performed by Summer [facility]yes MD at [facility] ENDOSCOPY    COLONOSCOPY N/A 2/3/2020    COL[facility]yes [facility] at [facility]  2019         [facility]  2/3/2020         HX BREAST LUMPECTOMY Right 3/8/2018    RIGHT BREAST LUMPECTOMY WITH ULTRASOUND performed by Fab Diggs MD at [facility] AMBULATORY OR    HX COLONOSCOPY      HX KNEE REPLACEMENT Right 2013    HX TONSILLECTOMY      UPPER GI ENDOSCOPY,BIOPSY  2019           Family History   Adopted: Yes   Problem Relation Age of Onset    Breast Cancer Mother     Stroke Mother     Hypertension Mother     Cancer Mother         skin    Cancer Father         skin    Pulmonary Fibrosis Father     Cancer Sister 79        colon    Breast Cancer Maternal Aunt     Breast Cancer Paternal Aunt     Breast Cancer Maternal Aunt         ovarian and colon cancer    Breast Cancer Maternal Aunt         ovarian and colon cancer    Breast Cancer Maternal Aunt     Breast Cancer Paternal Aunt     Breast Cancer Maternal Aunt     Cancer Maternal Grandmother         liver/ovarian     Social History     Tobacco Use    Smoking status: Former Smoker     Packs/day: 0.25     Years: 2.00     Pack years: 0.50     Last attempt to quit: 1972     Years since quittin.8    Smokeless tobacco: Never Used Substance Use Topics    Alcohol use: No      Prior to Admission medications    Medication Sig Start Date End Date Taking? Authorizing Provider   letrozole [facility]) 2.5 mg tablet Take 1 Tab by mouth daily. 2/6/20   Capri Chowdhury MD   cranberry 500 mg capsule Take 500 mg by mouth daily. Provider, Historical   Lactobac 40-Bifido 3-S.thermop (PROBIOTIC) 100 billion cell cap Take  by mouth. Provider, Historical   polyethylene glycol 3350 (CLEARLAX PO) Take  by mouth. Provider, Historical   atenolol (TENORMIN) 25 mg tablet Take 0.5 Tabs by mouth daily. 7/30/19   Karina [address] [place] [address], MD   ondansetron hcl (ZOFRAN) 4 mg tablet Take 1 Tab by mouth every eight (8) hours as needed for Nausea. 7/30/19   Karina [address] [place] [address], MD   acetaminophen (TYLENOL 8 HOUR PO) Take  by mouth as needed. Provider, Historical   aspirin delayed-release 81 mg tablet Take  by mouth daily. Provider, Historical   lisinopril (PRINIVIL, ZESTRIL) 20 mg tablet Take 40 mg by mouth daily. 6/26/18   Provider, Historical   garlic 1 mg cap Take  by mouth daily. Provider, Historical   Cholecalciferol, Vitamin D3, (VITAMIN D3) 1,000 unit cap Take 1 Tab by mouth daily. Provider, Historical        Allergies   Allergen Reactions    Nasacort [Triamcinolone Acetonide] Other (comments)     headache    Sulfa (Sulfonamide Antibiotics) Rash         Objective:     Since this was a telephone visit, no physical examination was performed          Assessment:     1. Right breast carcinoma:  T2 N0 M0 (Stage IIA) infiltrating ductal carcinoma, Tumor size 3.8 cm, LN -ve, grade 2, %, OR 95%, Her 2 -ve. Mammaprint shows a luminal B gene signature. ECOG PS 0  Intent of Treatment - curative     Prognosis - excellent    S/P right breast lumpectomy and sentinel LN excision.    Received adjuvant chemotherapy   Taxotere, Cyclophosphamide - s/p 2 Cycles    Chemotherapy discontinued d/t Gr 4 toxicity with sepsis, C Diff diarrhea, diverticulitis leading to a prolonged hospitalization    S/P adjuvant radiation with Dr. Mahin Joshi - completed  Currently on Letrozole 2.5 mg daily  Tolerating well  Asymptomatic  In remission    Dexa 2/4/2018 - normal    Symptom management form reviewed with patient. 2. Anemia - resolved    On ferrous sulfate 325 mg po daily      Plan:       > Continue Letrozole  > Follow-up in 6 months        Signed by: Joana Dandy, MD                     April 12, 2020        CC. Angeline Kraus MD  CC. Mahin Joshi MD  CC. Elaine Ivory MD      She and/or her healthcare decision maker is aware that this patient-initiated Telehealth encounter is a billable service, with coverage as determined by her insurance carrier. She is aware that she may receive a bill and has provided verbal consent to proceed: Yes    Pursuant to the emergency declaration under the [address] and the [facility] [address] waiver authority and the [employer] General Act, this Virtual  Visit was conducted, with patient's (and/or legal guardian's) consent, to reduce the patient's risk of exposure to COVID-19 and provide necessary medical care. Services were provided through a video synchronous discussion virtually to substitute for in-person clinic visit.

## 2021-06-11 ENCOUNTER — TRANSCRIBE ORDER (OUTPATIENT)
Dept: SCHEDULING | Age: 71
End: 2021-06-11

## 2021-06-11 DIAGNOSIS — Z85.3 PERSONAL HISTORY OF MALIGNANT NEOPLASM OF BREAST: Primary | ICD-10-CM

## 2021-09-27 DIAGNOSIS — Z17.0 MALIGNANT NEOPLASM OF UPPER-OUTER QUADRANT OF RIGHT BREAST IN FEMALE, ESTROGEN RECEPTOR POSITIVE (HCC): ICD-10-CM

## 2021-09-27 DIAGNOSIS — C50.411 MALIGNANT NEOPLASM OF UPPER-OUTER QUADRANT OF RIGHT BREAST IN FEMALE, ESTROGEN RECEPTOR POSITIVE (HCC): ICD-10-CM

## 2021-10-06 ENCOUNTER — HOSPITAL ENCOUNTER (OUTPATIENT)
Dept: MAMMOGRAPHY | Age: 71
Discharge: HOME | End: 2021-10-06
Attending: RADIOLOGY

## 2021-10-06 DIAGNOSIS — Z85.3 PERSONAL HISTORY OF MALIGNANT NEOPLASM OF BREAST: ICD-10-CM

## 2021-10-13 RX ORDER — LETROZOLE 2.5 MG/1
TABLET, FILM COATED ORAL
Qty: 30 TABLET | Refills: 0 | OUTPATIENT
Start: 2021-10-13

## 2021-10-13 NOTE — TELEPHONE ENCOUNTER
Appointment needed. This RN called pt. No answer. Left detailed VM on self identified line asking for pt to make an appt and also let us know when she has had a mammo done since last one in system was 3/2020. Once we have on the books we can re-order 30 day supply of letrozole.

## 2021-10-14 DIAGNOSIS — Z17.0 MALIGNANT NEOPLASM OF UPPER-OUTER QUADRANT OF RIGHT BREAST IN FEMALE, ESTROGEN RECEPTOR POSITIVE (HCC): ICD-10-CM

## 2021-10-14 DIAGNOSIS — C50.411 MALIGNANT NEOPLASM OF UPPER-OUTER QUADRANT OF RIGHT BREAST IN FEMALE, ESTROGEN RECEPTOR POSITIVE (HCC): ICD-10-CM

## 2021-10-14 RX ORDER — LETROZOLE 2.5 MG/1
2.5 TABLET, FILM COATED ORAL DAILY
Qty: 30 TABLET | Refills: 0 | Status: SHIPPED | OUTPATIENT
Start: 2021-10-14 | End: 2021-11-19 | Stop reason: SDUPTHER

## 2021-10-14 RX ORDER — LETROZOLE 2.5 MG/1
2.5 TABLET, FILM COATED ORAL DAILY
Qty: 30 TABLET | Refills: 5 | Status: CANCELLED | OUTPATIENT
Start: 2021-10-14

## 2021-10-14 NOTE — TELEPHONE ENCOUNTER
Message taken off nurses vm. Pt needs to schedule an appt with us. Says she has her mammogram scheduled for November.  Please call [phone]

## 2021-10-14 NOTE — TELEPHONE ENCOUNTER
Pt is scheduled.   PER VORB from Dr. Melissa Dudley LETROZOLE 2.5MG [facility]) ONE TAB ONCE A DAY QUANTITY 30 REFILL 0

## 2021-10-14 NOTE — TELEPHONE ENCOUNTER
Patient called and confirmed appt . Also needs a refill for letrozole. Requested Prescriptions     Pending Prescriptions Disp Refills    letrozole (FEMARA) 2.5 mg tablet 30 Tablet 5     Sig: Take 1 Tablet by mouth daily.

## 2021-11-01 ENCOUNTER — TELEPHONE (OUTPATIENT)
Dept: ONCOLOGY | Age: 71
End: 2021-11-01

## 2021-11-01 ENCOUNTER — TRANSCRIBE ORDER (OUTPATIENT)
Dept: SCHEDULING | Age: 71
End: 2021-11-01

## 2021-11-01 DIAGNOSIS — R92.8 ABNORMAL MAMMOGRAM: Primary | ICD-10-CM

## 2021-11-01 NOTE — TELEPHONE ENCOUNTER
Patient called. Patient has an apt on 11/9/2021. Patient's mammo was r/s for 11/12/2021. Do you want to r/s patient until after she has mammo?

## 2021-11-12 ENCOUNTER — HOSPITAL ENCOUNTER (OUTPATIENT)
Dept: ULTRASOUND IMAGING | Age: 71
Discharge: HOME | End: 2021-11-12
Attending: RADIOLOGY
Payer: MEDICARE

## 2021-11-12 ENCOUNTER — HOSPITAL ENCOUNTER (OUTPATIENT)
Dept: MAMMOGRAPHY | Age: 71
Discharge: HOME | End: 2021-11-12
Attending: RADIOLOGY
Payer: MEDICARE

## 2021-11-12 DIAGNOSIS — R92.8 ABNORMAL MAMMOGRAM: ICD-10-CM

## 2021-11-12 PROCEDURE — 77062 BREAST TOMOSYNTHESIS BI: CPT

## 2021-11-16 ENCOUNTER — TELEPHONE (OUTPATIENT)
Dept: ONCOLOGY | Age: 71
End: 2021-11-16

## 2021-11-16 NOTE — TELEPHONE ENCOUNTER
Returned call to pt. HIPAA verified by two patient identifiers. She fell and hurt her knee but she says it is getting better but she is worried to walk on it so much. I asked if she is getting this knee looked at, she said no. I advised we will refill the prescription one more time but can not make accomodation again so I urged her to get the knee looked at. She then asked about COVID. I advised if she has been exposed she should quarantine and test if symptomatic.

## 2021-11-16 NOTE — TELEPHONE ENCOUNTER
Pt left a VM with 2 questions. One about covid and other about her cancelling her appointment for tomorrow due to she fell yesterday and her knee is painful to walk on but she needs her prescription refilled. Awaiting a call back.

## 2021-11-19 DIAGNOSIS — C50.411 MALIGNANT NEOPLASM OF UPPER-OUTER QUADRANT OF RIGHT BREAST IN FEMALE, ESTROGEN RECEPTOR POSITIVE (HCC): ICD-10-CM

## 2021-11-19 DIAGNOSIS — Z17.0 MALIGNANT NEOPLASM OF UPPER-OUTER QUADRANT OF RIGHT BREAST IN FEMALE, ESTROGEN RECEPTOR POSITIVE (HCC): ICD-10-CM

## 2021-11-19 RX ORDER — LETROZOLE 2.5 MG/1
2.5 TABLET, FILM COATED ORAL DAILY
Qty: 30 TABLET | Refills: 0 | Status: SHIPPED | OUTPATIENT
Start: 2021-11-19 | End: 2021-12-29

## 2021-11-19 NOTE — TELEPHONE ENCOUNTER
PER STEPH from Dr. Coleman Graft LETROZOLE 2.5MG [facility]) ONE TAB ONCE A DAY QUANTITY 30 REFILL 0

## 2021-12-20 ENCOUNTER — OFFICE VISIT (OUTPATIENT)
Dept: ONCOLOGY | Age: 71
End: 2021-12-20
Payer: MEDICARE

## 2021-12-20 VITALS
DIASTOLIC BLOOD PRESSURE: 89 MMHG | SYSTOLIC BLOOD PRESSURE: 150 MMHG | WEIGHT: 235 LBS | RESPIRATION RATE: 18 BRPM | HEIGHT: 62 IN | HEART RATE: 53 BPM | TEMPERATURE: 98.7 F | OXYGEN SATURATION: 96 % | BODY MASS INDEX: 43.24 KG/M2

## 2021-12-20 DIAGNOSIS — Z79.811 USE OF AROMATASE INHIBITORS: ICD-10-CM

## 2021-12-20 DIAGNOSIS — Z17.0 MALIGNANT NEOPLASM OF UPPER-OUTER QUADRANT OF RIGHT BREAST IN FEMALE, ESTROGEN RECEPTOR POSITIVE (HCC): Primary | ICD-10-CM

## 2021-12-20 DIAGNOSIS — C50.411 MALIGNANT NEOPLASM OF UPPER-OUTER QUADRANT OF RIGHT BREAST IN FEMALE, ESTROGEN RECEPTOR POSITIVE (HCC): Primary | ICD-10-CM

## 2021-12-20 PROCEDURE — 1101F PT FALLS ASSESS-DOCD LE1/YR: CPT | Performed by: INTERNAL MEDICINE

## 2021-12-20 PROCEDURE — G9899 SCRN MAM PERF RSLTS DOC: HCPCS | Performed by: INTERNAL MEDICINE

## 2021-12-20 PROCEDURE — G8400 PT W/DXA NO RESULTS DOC: HCPCS | Performed by: INTERNAL MEDICINE

## 2021-12-20 PROCEDURE — G8536 NO DOC ELDER MAL SCRN: HCPCS | Performed by: INTERNAL MEDICINE

## 2021-12-20 PROCEDURE — G8510 SCR DEP NEG, NO PLAN REQD: HCPCS | Performed by: INTERNAL MEDICINE

## 2021-12-20 PROCEDURE — 99213 OFFICE O/P EST LOW 20 MIN: CPT | Performed by: INTERNAL MEDICINE

## 2021-12-20 PROCEDURE — G8754 DIAS BP LESS 90: HCPCS | Performed by: INTERNAL MEDICINE

## 2021-12-20 PROCEDURE — G8427 DOCREV CUR MEDS BY ELIG CLIN: HCPCS | Performed by: INTERNAL MEDICINE

## 2021-12-20 PROCEDURE — G8753 SYS BP > OR = 140: HCPCS | Performed by: INTERNAL MEDICINE

## 2021-12-20 PROCEDURE — 3017F COLORECTAL CA SCREEN DOC REV: CPT | Performed by: INTERNAL MEDICINE

## 2021-12-20 PROCEDURE — G8417 CALC BMI ABV UP PARAM F/U: HCPCS | Performed by: INTERNAL MEDICINE

## 2021-12-20 PROCEDURE — 1090F PRES/ABSN URINE INCON ASSESS: CPT | Performed by: INTERNAL MEDICINE

## 2021-12-20 RX ORDER — LISINOPRIL 40 MG/1
40 TABLET ORAL DAILY
COMMUNITY
Start: 2021-09-21

## 2021-12-20 NOTE — PROGRESS NOTES
2001 [facility] [address] \[facility] [place] [facility]\"", [address] [place] [address]  [phone]        Progress Note        Patient: Jacklyn Sharma MRN: [id number]  SSN: [id number]    YOB: 1950  Age: 73 y.o. Sex: female        Diagnosis:     1. Right breast carcinoma:  T2 N0 M0 (Stage IIA) infiltrating ductal carcinoma, Tumor size 3.8 cm, LN -ve, grade 2, %, [place] 95%, Her 2 -ve. Treatment:     1. Adjuvant Letrozole  2. Completed adjuvant radiation - Dr. Stanley Hayes - 10/2018  3. Adjuvant chemotherapy   Taxotere, Cyclophosphamide - s/p 2 cycles   Therapy d/c'd due to gr IV toxicity  4. Right sided lumpectomy on 03/08/2018    HPI:      Jacklyn Sharma is a 73 y.o. female with breast cancer. She received 2 cycles of adjuvant chemotherapy. She received two cycles of adjuvant chemotherapy. She developed Gr IV side effects and was admitted to the hospital with sepsis and C Diff diarrhea. A diagnosis of breast cancer was made with help of an annual mammogram. Bopsies were performed on both the RIGHT and LEFT breast. The LEFT breast was benign and the RIGHT breast was positive for IDC. The patient denies any nipple inversion or discharge. She was seen by Dr. Darrion Frank. She underwent right sided lumpectomy on 03/08/2018. The tumor is 3.8 cm in size, LN -ve, %, NH 95%, Her 2 -ve. Genomic profile by mammaprint reveals a high risk luminal B gene signature in the tumor. Adjuvant chemotherapy was discontinued after 2 cycles d/t grade 4 toxicity leading to prolonged hospitalization. She completed adjuvant radiation and is taking letrozole. She is doing well.       Review of Systems:      Constitutional: negative  Eyes: negative  Ears, Nose, Mouth, Throat, and Face: negative  Respiratory: negative  Cardiovascular: negative  Gastrointestinal: negative  Genitourinary:negative  Integument/Breast: negative  Hematologic/Lymphatic: negative  Musculoskeletal:negative  Neurological: negative        Past Medical History:   Diagnosis Date    Arthritis     Breast cancer, right breast ([address]     right    Depression     GERD (gastroesophageal reflux disease)     History of chemotherapy     Hypercholesterolemia     Hypertension     Other ill-defined conditions(799.89)     dog bite dec 2012 needing blood transfusion    Radiation therapy complication      Past Surgical History:   Procedure Laterality Date    COLONOSCOPY N/A 2019    COLONOSCOPY performed by Sudhir Rosales MD at [facility] ENDOSCOPY    COLONOSCOPY N/A 2/3/2020    COLONOSCOPY performed [facility]hir [facility] at [facility]  2019         [facility]  2/3/2020         HX BREAST LUMPECTOMY Right 3/8/2018    RIGHT BREAST LUMPECTOMY WITH ULTRASOUND performed by Kendra Dumont MD at [facility] AMBULATORY OR    HX COLONOSCOPY      HX KNEE REPLACEMENT Right 2013    HX TONSILLECTOMY      UPPER GI ENDOSCOPY,BIOPSY  2019           Family History   Adopted: Yes   Problem Relation Age of Onset    Breast Cancer Mother     Stroke Mother     Hypertension Mother     Cancer Mother         skin    Cancer Father         skin    Pulmonary Fibrosis Father     Cancer Sister 79        colon    Breast Cancer Maternal Aunt     Breast Cancer Paternal Aunt     Breast Cancer Maternal Aunt         ovarian and colon cancer    Breast Cancer Maternal Aunt         ovarian and colon cancer    Breast Cancer Maternal Aunt     Breast Cancer Paternal Aunt     Breast Cancer Maternal Aunt     Cancer Maternal Grandmother         liver/ovarian    Ovarian Cancer Cousin      Social History     Tobacco Use    Smoking status: Former Smoker     Packs/day: 0.25     Years: 2.00     Pack years: 0.50     Quit date: 1972     Years since quittin.5    Smokeless tobacco: Never Used   Substance Use Topics    Alcohol use: No      Prior to Admission medications Medication Sig Start Date End Date Taking? Authorizing Provider   lisinopriL (PRINIVIL, ZESTRIL) 40 mg tablet Take 40 mg by mouth daily. 9/21/21  Yes Provider, Historical   letrozole (FEMARA) 2.5 mg tablet Take 1 Tablet by mouth daily. 11/19/21  Yes Macros Molina MD   Lactobac 40-Bifido 3-S.thermop (PROBIOTIC) 100 billion cell cap Take  by mouth. Yes Provider, Historical   polyethylene glycol 3350 (CLEARLAX PO) Take  by mouth. Yes Provider, Historical   atenolol (TENORMIN) 25 mg tablet Take 0.5 Tabs by mouth daily. 7/30/19  Yes Kelsi Martin MD   acetaminophen (TYLENOL 8 HOUR PO) Take  by mouth as needed. Yes Provider, Historical   aspirin delayed-release 81 mg tablet Take  by mouth daily. Yes Provider, Historical   garlic 1 mg cap Take  by mouth daily. Yes Provider, Historical   Cholecalciferol, Vitamin D3, (VITAMIN D3) 1,000 unit cap Take 1 Tab by mouth daily. Yes Provider, Historical   cranberry 500 mg capsule Take 500 mg by mouth daily. Patient not taking: Reported on 12/20/2021    Provider, Historical   lisinopril (PRINIVIL, ZESTRIL) 20 mg tablet Take 40 mg by mouth daily.   Patient not taking: Reported on 12/20/2021 6/26/18   Provider, Historical        Allergies   Allergen Reactions    Sulfa (Sulfonamide Antibiotics) Rash    Triamcinolone Acetonide Other (comments)     headache  Other reaction(s): Headache         Objective:     Vitals:    12/20/21 1311   BP: (!) 150/89   Pulse: (!) 53   Resp: 18   Temp: 98.7 °F (37.1 °C)   TempSrc: Oral   SpO2: 96%   Weight: 235 lb (106.6 kg)   Height: 5' 2\" (1.575 m)            Physical Exam:    GENERAL: alert, cooperative, in good spirits, morbid obesity  EYE: negative  THROAT & NECK: supple  Lymph nodes: no cervical, axillary, inguinal adenopathy  LUNG: clear to auscultation bilaterally  HEART: regular rate and rhythm, systolic murmur  ABDOMEN: soft, non-tender  EXTREMITIES:  no edema  SKIN: Normal.  NEUROLOGIC: no sensory or motor deficits      Physical exam and ROS has been modified from a prior visit to make it relevant and current          Assessment:     1. Right breast carcinoma:  T2 N0 M0 (Stage IIA) infiltrating ductal carcinoma, Tumor size 3.8 cm, LN -ve, grade 2, %, OR 95%, Her 2 -ve. Mammaprint shows a luminal B gene signature. ECOG PS 0  Intent of Treatment - curative     Prognosis - excellent    S/P right breast lumpectomy and sentinel LN excision. Received adjuvant chemotherapy   Taxotere, Cyclophosphamide - s/p 2 Cycles    Chemotherapy discontinued d/t Gr 4 toxicity with sepsis, C Diff diarrhea, diverticulitis leading to a prolonged hospitalization    S/P adjuvant radiation with Dr. Ragini Juares - completed  Currently on Letrozole 2.5 mg daily  Tolerating well  Asymptomatic  In remission    Dexa repeat    Symptom management form reviewed with patient. Plan:       > Continue Letrozole  > Repeat DEXA  > BCI  > Follow-up in 6 months      Signed by: Chasity Kelsey MD                     December 20, 2021        CC. Gricel Diaz MD  CC. Ragini Juares MD  CC.  Scotty Hope MD

## 2021-12-20 NOTE — LETTER
12/20/2021    Patient: Tyra Guajardo   YOB: 1950   Date of Visit: 12/20/2021     Jenny Cristobal MD  [address]  [address] [address]  Via Fax: [phone]    Dear Jenny Cristobal MD,      Thank you for referring Ms. Yassine Garcia to [address] [place] [address] for evaluation. My notes for this consultation are attached. If you have questions, please do not hesitate to call me. I look forward to following your patient along with you.       Sincerely,    Chasity Kelsey MD

## 2021-12-20 NOTE — PROGRESS NOTES
Chief Complaint   Patient presents with    Breast Cancer     f/u       1. Have you been to the ER, urgent care clinic since your last visit? Hospitalized since your last visit? No    2. Have you seen or consulted any other health care providers outside of the [address] [place] [address] since your last visit? Include any pap smears or colon screening.  No

## 2022-03-02 ENCOUNTER — TRANSCRIBE ORDER (OUTPATIENT)
Dept: SCHEDULING | Age: 72
End: 2022-03-02

## 2022-03-02 DIAGNOSIS — Z12.31 SCREENING MAMMOGRAM FOR HIGH-RISK PATIENT: Primary | ICD-10-CM

## 2022-03-07 ENCOUNTER — TRANSCRIBE ORDER (OUTPATIENT)
Dept: SCHEDULING | Age: 72
End: 2022-03-07

## 2022-03-07 DIAGNOSIS — Z85.3 PERSONAL HISTORY OF MALIGNANT NEOPLASM OF BREAST: Primary | ICD-10-CM

## 2022-03-18 PROBLEM — I10 HTN (HYPERTENSION): Status: ACTIVE | Noted: 2018-06-09

## 2022-03-18 PROBLEM — R57.9 SHOCK (HCC): Status: ACTIVE | Noted: 2018-06-09

## 2022-03-19 PROBLEM — Z17.0 MALIGNANT NEOPLASM OF UPPER-OUTER QUADRANT OF RIGHT BREAST IN FEMALE, ESTROGEN RECEPTOR POSITIVE (HCC): Status: ACTIVE | Noted: 2018-04-20

## 2022-03-19 PROBLEM — C50.411 BREAST CANCER OF UPPER-OUTER QUADRANT OF RIGHT FEMALE BREAST (HCC): Status: ACTIVE | Noted: 2018-03-28

## 2022-03-19 PROBLEM — C50.411 MALIGNANT NEOPLASM OF UPPER-OUTER QUADRANT OF RIGHT BREAST IN FEMALE, ESTROGEN RECEPTOR POSITIVE (HCC): Status: ACTIVE | Noted: 2018-04-20

## 2022-03-19 PROBLEM — E66.01 OBESITY, MORBID (HCC): Status: ACTIVE | Noted: 2018-02-09

## 2022-03-19 PROBLEM — R10.9 ABDOMINAL PAIN: Status: ACTIVE | Noted: 2019-07-26

## 2022-03-19 PROBLEM — Z98.890 S/P LUMPECTOMY, RIGHT BREAST: Status: ACTIVE | Noted: 2018-03-28

## 2022-03-29 ENCOUNTER — HOSPITAL ENCOUNTER (OUTPATIENT)
Dept: MAMMOGRAPHY | Age: 72
Discharge: HOME | End: 2022-03-29
Attending: INTERNAL MEDICINE
Payer: MEDICARE

## 2022-03-29 DIAGNOSIS — Z79.811 USE OF AROMATASE INHIBITORS: ICD-10-CM

## 2022-03-29 DIAGNOSIS — Z17.0 MALIGNANT NEOPLASM OF UPPER-OUTER QUADRANT OF RIGHT BREAST IN FEMALE, ESTROGEN RECEPTOR POSITIVE (HCC): ICD-10-CM

## 2022-03-29 DIAGNOSIS — C50.411 MALIGNANT NEOPLASM OF UPPER-OUTER QUADRANT OF RIGHT BREAST IN FEMALE, ESTROGEN RECEPTOR POSITIVE (HCC): ICD-10-CM

## 2022-03-29 PROCEDURE — 77080 DXA BONE DENSITY AXIAL: CPT

## 2022-12-08 ENCOUNTER — HOSPITAL ENCOUNTER (OUTPATIENT)
Dept: MAMMOGRAPHY | Age: 72
Discharge: HOME | End: 2022-12-08
Payer: MEDICARE

## 2022-12-08 DIAGNOSIS — Z85.3 PERSONAL HISTORY OF MALIGNANT NEOPLASM OF BREAST: ICD-10-CM

## 2022-12-08 PROCEDURE — 77062 BREAST TOMOSYNTHESIS BI: CPT

## 2022-12-21 ENCOUNTER — TRANSCRIBE ORDER (OUTPATIENT)
Dept: SCHEDULING | Age: 72
End: 2022-12-21

## 2022-12-21 DIAGNOSIS — Z85.3 PERSONAL HISTORY OF MALIGNANT NEOPLASM OF BREAST: Primary | ICD-10-CM

## 2022-12-27 NOTE — PROGRESS NOTES
Michel Galarza is a 81 y.o. female here for follow up for right breast carcinoma. She is taking Letrozole. Taking everyday. Pt states she has been well. No concerns brought up. 1. Have you been to the ER, urgent care clinic since your last visit? Hospitalized since your last visit? no    2. Have you seen or consulted any other health care providers outside of the [address] [place] [address] since your last visit? Include any pap smears or colon screening. Dr Eva Hess for Prolapsed bladder.

## 2022-12-30 ENCOUNTER — OFFICE VISIT (OUTPATIENT)
Dept: ONCOLOGY | Age: 72
End: 2022-12-30
Payer: MEDICARE

## 2022-12-30 VITALS
BODY MASS INDEX: 42.33 KG/M2 | SYSTOLIC BLOOD PRESSURE: 150 MMHG | WEIGHT: 230 LBS | HEART RATE: 52 BPM | DIASTOLIC BLOOD PRESSURE: 80 MMHG | OXYGEN SATURATION: 95 % | HEIGHT: 62 IN | TEMPERATURE: 98 F

## 2022-12-30 DIAGNOSIS — C50.411 MALIGNANT NEOPLASM OF UPPER-OUTER QUADRANT OF RIGHT BREAST IN FEMALE, ESTROGEN RECEPTOR POSITIVE (HCC): Primary | ICD-10-CM

## 2022-12-30 DIAGNOSIS — Z17.0 MALIGNANT NEOPLASM OF UPPER-OUTER QUADRANT OF RIGHT BREAST IN FEMALE, ESTROGEN RECEPTOR POSITIVE (HCC): Primary | ICD-10-CM

## 2022-12-30 RX ORDER — LETROZOLE 2.5 MG/1
2.5 TABLET, FILM COATED ORAL DAILY
Qty: 90 TABLET | Refills: 3 | Status: SHIPPED | OUTPATIENT
Start: 2022-12-30 | End: 2023-03-30

## 2022-12-30 RX ORDER — AMLODIPINE BESYLATE 5 MG/1
TABLET ORAL
COMMUNITY
Start: 2022-11-29

## 2022-12-30 RX ORDER — SOLIFENACIN SUCCINATE 5 MG/1
5 TABLET, FILM COATED ORAL DAILY
COMMUNITY

## 2022-12-30 NOTE — PROGRESS NOTES
[address] [facility] [address], [address] [facility], [address]  [phone]        Progress Note        Patient: Javier Paulson MRN: [id number]  SSN: [id number]    YOB: 1950  Age: 81 y.o. Sex: female        Diagnosis:     1. Right breast carcinoma:  T2 N0 M0 (Stage IIA) infiltrating ductal carcinoma, Tumor size 3.8 cm, LN -ve, grade 2, %, [place] 95%, Her 2 -ve. Mammaprint high risk luminal B  BCI - benefit from extended adjuvant AI    Treatment:     1. Adjuvant Letrozole  2. Completed adjuvant radiation - Dr. Kait Archer - 10/2018  3. Adjuvant chemotherapy   Taxotere, Cyclophosphamide - s/p 2 cycles   Therapy d/c'd due to gr IV toxicity  4. Right sided lumpectomy on 03/08/2018    HPI:      Javier Paulson is a 81 y.o. female with breast cancer. She received 2 cycles of adjuvant chemotherapy. She received two cycles of adjuvant chemotherapy. She developed Gr IV side effects and was admitted to the hospital with sepsis and C Diff diarrhea. A diagnosis of breast cancer was made with help of an annual mammogram. Bopsies were performed on both the RIGHT and LEFT breast. The LEFT breast was benign and the RIGHT breast was positive for IDC. The patient denies any nipple inversion or discharge. She was seen by Dr. Cha Jordan. She underwent right sided lumpectomy on 03/08/2018. The tumor is 3.8 cm in size, LN -ve, %, MT 95%, Her 2 -ve. Genomic profile by mammaprint reveals a high risk luminal B gene signature in the tumor. Adjuvant chemotherapy was discontinued after 2 cycles d/t grade 4 toxicity leading to prolonged hospitalization. She completed adjuvant radiation and is taking letrozole. She is doing well.       Review of Systems:      Constitutional: negative  Eyes: negative  Ears, Nose, Mouth, Throat, and Face: negative  Respiratory: negative  Cardiovascular: negative  Gastrointestinal: negative  Genitourinary:negative  Integument/Breast: negative  Hematologic/Lymphatic: negative  Musculoskeletal:negative  Neurological: negative        Past Medical History:   Diagnosis Date    Arthritis     Breast cancer, right breast ([address]     right 3/2018    Depression     GERD (gastroesophageal reflux disease)     History of chemotherapy     Hypercholesterolemia     Hypertension     Other ill-defined conditions(799.89)     dog bite dec 2012 needing blood transfusion    Radiation therapy complication      Past Surgical History:   Procedure Laterality Date    COLONOSCOPY N/A 2019    COLONOSCOPY performed by Gretchen Anaya MD at [facility] ENDOSCOPY    COLONOSCOPY N/A 2020    COLONOSCOPY performed by Gretchen Anaya MD at [facility] ENDOSCOPY    COLONOSCOPY,DIAGNOSTIC  2019         COLONOSCOPY,DIAGNOSTIC  2020         HX BREAST LUMPECTOMY Right 2018    RIGHT BREAST LUMPECTOMY WITH ULTRASOUND performed by Aaron Bruce MD at [facility] AMBULATORY OR    HX COLONOSCOPY      HX KNEE REPLACEMENT Right 2013    HX TONSILLECTOMY      UPPER GI ENDOSCOPY,BIOPSY  2019           Family History   Adopted: Yes   Problem Relation Age of Onset    Breast Cancer Mother     Stroke Mother     Hypertension Mother     Cancer Mother         skin    Cancer Father         skin    Pulmonary Fibrosis Father     Cancer Sister 79        colon    Breast Cancer Maternal Aunt     Breast Cancer Paternal Aunt     Breast Cancer Maternal Aunt         ovarian and colon cancer    Breast Cancer Maternal Aunt         ovarian and colon cancer    Breast Cancer Maternal Aunt     Breast Cancer Paternal Aunt     Breast Cancer Maternal Aunt     Cancer Maternal Grandmother         liver/ovarian    Ovarian Cancer Cousin      Social History     Tobacco Use    Smoking status: Former     Packs/day: 0.25     Years: 2.00     Pack years: 0.50     Types: Cigarettes     Quit date: 1972     Years since quittin.6    Smokeless tobacco: Never Substance Use Topics    Alcohol use: No      Prior to Admission medications    Medication Sig Start Date End Date Taking? Authorizing Provider   amLODIPine (NORVASC) 5 mg tablet TAKE 1 TABLET BY MOUTH ONCE DAILY FOR 90 DAYS 11/29/22  Yes Provider, Historical   solifenacin (VESICARE) 5 mg tablet Take 5 mg by mouth daily. Yes Provider, Historical   DICLOFENAC SODIUM PO Take  by mouth. Yes Provider, Historical   letrozole [facility]) 2.5 mg tablet Take 1 tablet by mouth once daily 12/29/21  Yes Alexander Gambino MD   lisinopriL (PRINIVIL, ZESTRIL) 40 mg tablet Take 40 mg by mouth daily. 9/21/21  Yes Provider, Historical   polyethylene glycol 3350 (CLEARLAX PO) Take  by mouth. Yes Provider, Historical   atenolol (TENORMIN) 25 mg tablet Take 0.5 Tabs by mouth daily. 7/30/19  Yes Angely Maddox MD   acetaminophen (TYLENOL 8 HOUR PO) Take  by mouth as needed. Yes Provider, Historical   aspirin delayed-release 81 mg tablet Take  by mouth daily. Yes Provider, Historical   lisinopril (PRINIVIL, ZESTRIL) 20 mg tablet Take 40 mg by mouth daily. 6/26/18  Yes Provider, Historical   cranberry 500 mg capsule Take 500 mg by mouth daily. Patient not taking: No sig reported    Provider, Historical   Lactobac 40-Bifido 3-S.thermop 100 billion cell cap Take  by mouth. Patient not taking: Reported on 12/30/2022    Provider, Historical   garlic 1 mg cap Take  by mouth daily. Patient not taking: Reported on 12/30/2022    Provider, Historical   cholecalciferol (VITAMIN D3) 25 mcg (1,000 unit) cap Take 1 Tab by mouth daily.   Patient not taking: Reported on 12/30/2022    Provider, Historical        Allergies   Allergen Reactions    Sulfa (Sulfonamide Antibiotics) Rash    Triamcinolone Acetonide Other (comments)     headache  Other reaction(s): Headache         Objective:     Vitals:    12/30/22 1257   BP: (!) 150/80   Pulse: (!) 52   Temp: 98 °F (36.7 °C)   TempSrc: Temporal   SpO2: 95%   Weight: 230 lb (104.3 kg) Height: 5' 2\" (1.575 m)            Physical Exam:    GENERAL: alert, cooperative, in good spirits, morbid obesity  EYE: negative  THROAT & NECK: supple  Lymph nodes: no cervical, axillary, inguinal adenopathy  LUNG: clear to auscultation bilaterally  HEART: regular rate and rhythm, systolic murmur  ABDOMEN: soft, non-tender  EXTREMITIES:  no edema  SKIN: Normal.  NEUROLOGIC: no sensory or motor deficits      Physical exam and ROS has been modified from a prior visit to make it relevant and current          Assessment:     1. Right breast carcinoma:  T2 N0 M0 (Stage IIA) infiltrating ductal carcinoma, Tumor size 3.8 cm, LN -ve, grade 2, %, [place] 95%, Her 2 -ve. Mammaprint shows a luminal B gene signature. ECOG PS 0  Intent of Treatment - curative     Prognosis - excellent    S/P right breast lumpectomy and sentinel LN excision. Received adjuvant chemotherapy   Taxotere, Cyclophosphamide - s/p 2 Cycles    Chemotherapy discontinued d/t Gr 4 toxicity with sepsis, C Diff diarrhea, diverticulitis leading to a prolonged hospitalization    S/P adjuvant radiation with Dr. Cirilo Isbell - completed  Currently on Letrozole 2.5 mg daily  Tolerating well  Asymptomatic  In remission  BCI - continued benefit from extended adjuvant therapy. Thus will continue Letrozole for 10 yrs. Symptom management form reviewed with patient. Plan:       > Continue Letrozole  > Follow-up in 12 months      Signed by: Ori Bauer MD                     December 30, 2022        CC. New Albarado MD  CC. Cirilo Isbell MD  CC.  Manjinder Snyder MD

## 2022-12-30 NOTE — LETTER
12/30/2022    Patient: Burgess Dumont   YOB: 1950   Date of Visit: 12/30/2022     Harjeet Angel MD  [address]  [address] [address]  Via Fax: [phone]    Dear Harjeet Angel MD,      Thank you for referring Ms. Jessica Meyers to [address] [place] [address] for evaluation. My notes for this consultation are attached. If you have questions, please do not hesitate to call me. I look forward to following your patient along with you.       Sincerely,    Anni Hood MD

## 2023-04-22 ENCOUNTER — TRANSCRIBE ORDERS (OUTPATIENT)
Facility: HOSPITAL | Age: 73
End: 2023-04-22

## 2023-04-22 DIAGNOSIS — Z85.3 PERSONAL HISTORY OF MALIGNANT NEOPLASM OF BREAST: Primary | ICD-10-CM

## 2023-04-22 DIAGNOSIS — Z12.31 SCREENING MAMMOGRAM FOR HIGH-RISK PATIENT: Primary | ICD-10-CM

## 2023-07-21 ENCOUNTER — TELEPHONE (OUTPATIENT)
Age: 73
End: 2023-07-21

## 2023-12-11 ENCOUNTER — HOSPITAL ENCOUNTER (OUTPATIENT)
Facility: HOSPITAL | Age: 73
Discharge: HOME | End: 2023-12-14
Payer: MEDICARE

## 2023-12-11 DIAGNOSIS — Z85.3 PERSONAL HISTORY OF MALIGNANT NEOPLASM OF BREAST: ICD-10-CM

## 2023-12-11 PROCEDURE — G0279 TOMOSYNTHESIS, MAMMO: HCPCS

## 2024-02-06 NOTE — PROGRESS NOTES
Gloria Hernandez is a 72 y.o. female here for one year follow up for right breast carcinoma.  She is taking Letrozole.  Pt did have COVID since last visit. Otherwise doing well.     1. Have you been to the ER, urgent care clinic since your last visit?  Hospitalized since your last visit? no    2. Have you seen or consulted any other health care providers outside of the [facility] since your last visit?  Include any pap smears or colon screening.  no

## 2024-02-08 ENCOUNTER — TRANSCRIBE ORDERS (OUTPATIENT)
Facility: HOSPITAL | Age: 74
End: 2024-02-08

## 2024-02-08 ENCOUNTER — OFFICE VISIT (OUTPATIENT)
Age: 74
End: 2024-02-08
Payer: MEDICARE

## 2024-02-08 VITALS
OXYGEN SATURATION: 94 % | BODY MASS INDEX: 43.43 KG/M2 | SYSTOLIC BLOOD PRESSURE: 158 MMHG | HEIGHT: 62 IN | HEART RATE: 56 BPM | WEIGHT: 236 LBS | DIASTOLIC BLOOD PRESSURE: 65 MMHG | TEMPERATURE: 98.2 F

## 2024-02-08 DIAGNOSIS — Z17.0 MALIGNANT NEOPLASM OF UPPER-OUTER QUADRANT OF RIGHT BREAST IN FEMALE, ESTROGEN RECEPTOR POSITIVE (HCC): Primary | ICD-10-CM

## 2024-02-08 DIAGNOSIS — Z12.31 VISIT FOR SCREENING MAMMOGRAM: Primary | ICD-10-CM

## 2024-02-08 DIAGNOSIS — C50.411 MALIGNANT NEOPLASM OF UPPER-OUTER QUADRANT OF RIGHT BREAST IN FEMALE, ESTROGEN RECEPTOR POSITIVE (HCC): Primary | ICD-10-CM

## 2024-02-08 PROCEDURE — G8417 CALC BMI ABV UP PARAM F/U: HCPCS | Performed by: INTERNAL MEDICINE

## 2024-02-08 PROCEDURE — 1036F TOBACCO NON-USER: CPT | Performed by: INTERNAL MEDICINE

## 2024-02-08 PROCEDURE — 3017F COLORECTAL CA SCREEN DOC REV: CPT | Performed by: INTERNAL MEDICINE

## 2024-02-08 PROCEDURE — G8484 FLU IMMUNIZE NO ADMIN: HCPCS | Performed by: INTERNAL MEDICINE

## 2024-02-08 PROCEDURE — 1090F PRES/ABSN URINE INCON ASSESS: CPT | Performed by: INTERNAL MEDICINE

## 2024-02-08 PROCEDURE — 99213 OFFICE O/P EST LOW 20 MIN: CPT | Performed by: INTERNAL MEDICINE

## 2024-02-08 PROCEDURE — G8427 DOCREV CUR MEDS BY ELIG CLIN: HCPCS | Performed by: INTERNAL MEDICINE

## 2024-02-08 PROCEDURE — G8399 PT W/DXA RESULTS DOCUMENT: HCPCS | Performed by: INTERNAL MEDICINE

## 2024-02-08 PROCEDURE — 1123F ACP DISCUSS/DSCN MKR DOCD: CPT | Performed by: INTERNAL MEDICINE

## 2024-02-08 PROCEDURE — 3077F SYST BP >= 140 MM HG: CPT | Performed by: INTERNAL MEDICINE

## 2024-02-08 PROCEDURE — 3078F DIAST BP <80 MM HG: CPT | Performed by: INTERNAL MEDICINE

## 2024-02-08 RX ORDER — ATORVASTATIN CALCIUM 20 MG/1
20 TABLET, FILM COATED ORAL DAILY
COMMUNITY
Start: 2023-12-07

## 2024-02-08 NOTE — PROGRESS NOTES
Cancer Only   at [facility]   [address], [facility] [address], [address]   [place] [address]   [phone]         Progress Note          Patient: Gloria Hernandez  MRN: [id number]   SSN: [id number]    YOB: 1950                Diagnosis:        1. Right breast carcinoma:   T2 N0 M0 (Stage IIA) infiltrating ductal carcinoma, Tumor size 3.8 cm, LN -ve, grade 2, %, [place] 95%, Her 2 -ve.       Mammaprint high risk luminal B   BCI - benefit from extended adjuvant AI          Treatment:        1. Adjuvant Letrozole   2. Completed adjuvant radiation - Dr. Gross - 10/2018   3. Adjuvant chemotherapy    Taxotere, Cyclophosphamide - s/p 2 cycles    Therapy d/c'd due to gr IV toxicity   4. Right sided lumpectomy on 03/08/2018          HPI:         Gloria Hernandez is a 72 y.o. female with breast cancer. She received 2 cycles of adjuvant chemotherapy. She received two cycles of adjuvant chemotherapy. She developed Gr IV side effects and was admitted  to the hospital with sepsis and C Diff diarrhea.      A diagnosis of breast cancer was made with help of an annual mammogram. Bopsies were performed on both the RIGHT and LEFT breast. The LEFT breast was benign and the RIGHT breast was positive for IDC.The patient denies any nipple inversion or discharge.  She was seen by Dr. Gonzalez. She underwent right sided lumpectomy on 03/08/2018. The tumor is 3.8 cm in size, LN -ve, %, [place] 95%, Her 2 -ve. Genomic profile by mammaprint reveals a high risk luminal B gene signature in the tumor. Adjuvant chemotherapy  was discontinued after 2 cycles d/t grade 4 toxicity leading to prolonged hospitalization. She completed adjuvant radiation and is taking letrozole. She is doing well.         Review of Systems:        Constitutional: negative   Eyes: negative   Ears, Nose, Mouth, Throat, and Face: negative   Respiratory: negative   Cardiovascular: negative

## 2024-04-30 DIAGNOSIS — Z17.0 MALIGNANT NEOPLASM OF UPPER-OUTER QUADRANT OF RIGHT BREAST IN FEMALE, ESTROGEN RECEPTOR POSITIVE (HCC): Primary | ICD-10-CM

## 2024-04-30 DIAGNOSIS — Z17.0 ESTROGEN RECEPTOR POSITIVE STATUS (ER+): ICD-10-CM

## 2024-04-30 DIAGNOSIS — C50.411 MALIGNANT NEOPLASM OF UPPER-OUTER QUADRANT OF RIGHT BREAST IN FEMALE, ESTROGEN RECEPTOR POSITIVE (HCC): Primary | ICD-10-CM

## 2024-04-30 RX ORDER — LETROZOLE 2.5 MG/1
2.5 TABLET, FILM COATED ORAL DAILY
Qty: 90 TABLET | Refills: 0 | Status: SHIPPED | OUTPATIENT
Start: 2024-04-30

## 2024-12-20 ENCOUNTER — HOSPITAL ENCOUNTER (OUTPATIENT)
Facility: HOSPITAL | Age: 74
Discharge: HOME | End: 2024-12-23
Attending: INTERNAL MEDICINE
Payer: MEDICARE

## 2024-12-20 DIAGNOSIS — Z12.31 VISIT FOR SCREENING MAMMOGRAM: ICD-10-CM

## 2024-12-20 PROCEDURE — 77063 BREAST TOMOSYNTHESIS BI: CPT

## 2024-12-23 ENCOUNTER — TELEPHONE (OUTPATIENT)
Age: 74
End: 2024-12-23

## 2024-12-23 DIAGNOSIS — Z17.0 MALIGNANT NEOPLASM OF UPPER-OUTER QUADRANT OF RIGHT BREAST IN FEMALE, ESTROGEN RECEPTOR POSITIVE (HCC): ICD-10-CM

## 2024-12-23 DIAGNOSIS — Z17.0 ESTROGEN RECEPTOR POSITIVE STATUS (ER+): ICD-10-CM

## 2024-12-23 DIAGNOSIS — C50.411 MALIGNANT NEOPLASM OF UPPER-OUTER QUADRANT OF RIGHT BREAST IN FEMALE, ESTROGEN RECEPTOR POSITIVE (HCC): ICD-10-CM

## 2024-12-23 RX ORDER — LETROZOLE 2.5 MG/1
2.5 TABLET, FILM COATED ORAL DAILY
Qty: 90 TABLET | Refills: 2 | Status: SHIPPED | OUTPATIENT
Start: 2024-12-23

## 2024-12-23 NOTE — TELEPHONE ENCOUNTER
Pt called and would like to know if she is supposed to still be taking Letrozole? Per 's last note in Feb, pt was to continue taking the medication and follow up in 12 months.     If pt is supposed to continue the medication, she is requesting a refill be sent to the [facility] in [place] on [address].

## 2024-12-23 NOTE — TELEPHONE ENCOUNTER
Called pt.  HIPAA verified by two patient identifiers.   She is aware she is to continue letrozole for total of 10 years.    Will send prescription over.    Approved per VORB from JENNIFER Florez    Requested Prescriptions     Pending Prescriptions Disp Refills    letrozole (FEMARA) 2.5 MG tablet 90 tablet 2     Sig: Take 1 tablet by mouth daily

## 2025-02-06 NOTE — PROGRESS NOTES
Gloria Hernandez is a 72 y.o. female here for one year follow up for right breast carcinoma.  She is taking Letrozole everyday.  Pt has boot on left foot due to stress fracture.   Medications reviewed.    1. Have you been to the ER, urgent care clinic since your last visit?  Hospitalized since your last visit? no    2. Have you seen or consulted any other health care providers outside of the [facility] since your last visit?  Include any pap smears or colon screening. [facility]

## 2025-02-10 ENCOUNTER — OFFICE VISIT (OUTPATIENT)
Age: 75
End: 2025-02-10
Payer: MEDICARE

## 2025-02-10 VITALS
HEIGHT: 62 IN | SYSTOLIC BLOOD PRESSURE: 172 MMHG | WEIGHT: 236 LBS | HEART RATE: 62 BPM | OXYGEN SATURATION: 97 % | TEMPERATURE: 98.1 F | DIASTOLIC BLOOD PRESSURE: 77 MMHG | BODY MASS INDEX: 43.43 KG/M2

## 2025-02-10 DIAGNOSIS — Z17.0 MALIGNANT NEOPLASM OF UPPER-OUTER QUADRANT OF RIGHT BREAST IN FEMALE, ESTROGEN RECEPTOR POSITIVE (HCC): Primary | ICD-10-CM

## 2025-02-10 DIAGNOSIS — Z51.81 ENCOUNTER FOR MONITORING ADJUVANT HORMONAL THERAPY: ICD-10-CM

## 2025-02-10 DIAGNOSIS — M85.80 OSTEOPENIA AFTER MENOPAUSE: ICD-10-CM

## 2025-02-10 DIAGNOSIS — Z79.899 ENCOUNTER FOR MONITORING ADJUVANT HORMONAL THERAPY: ICD-10-CM

## 2025-02-10 DIAGNOSIS — C50.411 MALIGNANT NEOPLASM OF UPPER-OUTER QUADRANT OF RIGHT BREAST IN FEMALE, ESTROGEN RECEPTOR POSITIVE (HCC): Primary | ICD-10-CM

## 2025-02-10 DIAGNOSIS — Z78.0 OSTEOPENIA AFTER MENOPAUSE: ICD-10-CM

## 2025-02-10 PROCEDURE — 99213 OFFICE O/P EST LOW 20 MIN: CPT | Performed by: INTERNAL MEDICINE

## 2025-02-10 PROCEDURE — 3017F COLORECTAL CA SCREEN DOC REV: CPT | Performed by: INTERNAL MEDICINE

## 2025-02-10 PROCEDURE — 1090F PRES/ABSN URINE INCON ASSESS: CPT | Performed by: INTERNAL MEDICINE

## 2025-02-10 PROCEDURE — 3077F SYST BP >= 140 MM HG: CPT | Performed by: INTERNAL MEDICINE

## 2025-02-10 PROCEDURE — 1126F AMNT PAIN NOTED NONE PRSNT: CPT | Performed by: INTERNAL MEDICINE

## 2025-02-10 PROCEDURE — 1123F ACP DISCUSS/DSCN MKR DOCD: CPT | Performed by: INTERNAL MEDICINE

## 2025-02-10 PROCEDURE — 1036F TOBACCO NON-USER: CPT | Performed by: INTERNAL MEDICINE

## 2025-02-10 PROCEDURE — G8399 PT W/DXA RESULTS DOCUMENT: HCPCS | Performed by: INTERNAL MEDICINE

## 2025-02-10 PROCEDURE — G8417 CALC BMI ABV UP PARAM F/U: HCPCS | Performed by: INTERNAL MEDICINE

## 2025-02-10 PROCEDURE — G8428 CUR MEDS NOT DOCUMENT: HCPCS | Performed by: INTERNAL MEDICINE

## 2025-02-10 PROCEDURE — 3078F DIAST BP <80 MM HG: CPT | Performed by: INTERNAL MEDICINE

## 2025-02-10 NOTE — PROGRESS NOTES
Cancer [facility]   at [facility]   [address], [facility], [address]   [place] [address]   [phone]         Progress Note          Patient: Gloria Hernandez  MRN: [id number]   SSN: [id number]    YOB: 1950                Diagnosis:        1. Right breast carcinoma:   T2 N0 M0 (Stage IIA) infiltrating ductal carcinoma, Tumor size 3.8 cm, LN -ve, grade 2, %, [place] 95%, Her 2 -ve.       Mammaprint high risk luminal B   BCI - benefit from extended adjuvant AI          Treatment:        1. Adjuvant Letrozole   2. Completed adjuvant radiation - Dr. Gross - 10/2018   3. Adjuvant chemotherapy    Taxotere, Cyclophosphamide - s/p 2 cycles    Therapy d/c'd due to gr IV toxicity   4. Right sided lumpectomy on 03/08/2018          HPI:         Gloria Hernandez is a 72 y.o. female with breast cancer. She received 2 cycles of adjuvant chemotherapy. She received two cycles of adjuvant chemotherapy. She developed Gr IV side effects and was admitted  to the hospital with sepsis and C Diff diarrhea.      A diagnosis of breast cancer was made with help of an annual mammogram. Bopsies were performed on both the RIGHT and LEFT breast. The LEFT breast was benign and the RIGHT breast was positive for IDC.The patient denies any nipple inversion or discharge.  She was seen by Dr. Gonzalez. She underwent right sided lumpectomy on 03/08/2018. The tumor is 3.8 cm in size, LN -ve, %, [place] 95%, Her 2 -ve. Genomic profile by mammaprint reveals a high risk luminal B gene signature in the tumor. Adjuvant chemotherapy  was discontinued after 2 cycles d/t grade 4 toxicity leading to prolonged hospitalization. She completed adjuvant radiation and is taking letrozole. She is doing well.         Review of Systems:        Constitutional: negative   Eyes: negative   Ears, Nose, Mouth, Throat, and Face: negative   Respiratory: negative   Cardiovascular: negative

## 2025-02-13 ENCOUNTER — TRANSCRIBE ORDERS (OUTPATIENT)
Facility: HOSPITAL | Age: 75
End: 2025-02-13

## 2025-02-13 DIAGNOSIS — Z12.31 ENCOUNTER FOR MAMMOGRAM TO ESTABLISH BASELINE MAMMOGRAM: Primary | ICD-10-CM

## 2025-03-26 ENCOUNTER — HOSPITAL ENCOUNTER (OUTPATIENT)
Facility: HOSPITAL | Age: 75
Discharge: HOME | End: 2025-03-29
Attending: INTERNAL MEDICINE
Payer: MEDICARE

## 2025-03-26 DIAGNOSIS — Z17.0 MALIGNANT NEOPLASM OF UPPER-OUTER QUADRANT OF RIGHT BREAST IN FEMALE, ESTROGEN RECEPTOR POSITIVE: ICD-10-CM

## 2025-03-26 DIAGNOSIS — C50.411 MALIGNANT NEOPLASM OF UPPER-OUTER QUADRANT OF RIGHT BREAST IN FEMALE, ESTROGEN RECEPTOR POSITIVE: ICD-10-CM

## 2025-03-26 DIAGNOSIS — M85.80 OSTEOPENIA AFTER MENOPAUSE: ICD-10-CM

## 2025-03-26 DIAGNOSIS — Z78.0 OSTEOPENIA AFTER MENOPAUSE: ICD-10-CM

## 2025-03-26 PROCEDURE — 77080 DXA BONE DENSITY AXIAL: CPT

## 2025-04-15 ENCOUNTER — TELEPHONE (OUTPATIENT)
Age: 75
End: 2025-04-15

## 2025-04-15 DIAGNOSIS — Z17.0 MALIGNANT NEOPLASM OF UPPER-OUTER QUADRANT OF RIGHT BREAST IN FEMALE, ESTROGEN RECEPTOR POSITIVE: Primary | ICD-10-CM

## 2025-04-15 DIAGNOSIS — C50.411 MALIGNANT NEOPLASM OF UPPER-OUTER QUADRANT OF RIGHT BREAST IN FEMALE, ESTROGEN RECEPTOR POSITIVE: Primary | ICD-10-CM

## 2025-04-15 DIAGNOSIS — M81.0 OSTEOPOROSIS, UNSPECIFIED OSTEOPOROSIS TYPE, UNSPECIFIED PATHOLOGICAL FRACTURE PRESENCE: ICD-10-CM

## 2025-04-17 DIAGNOSIS — M81.8 OSTEOPOROSIS DUE TO AROMATASE INHIBITOR: Primary | ICD-10-CM

## 2025-04-17 DIAGNOSIS — T38.6X5A OSTEOPOROSIS DUE TO AROMATASE INHIBITOR: Primary | ICD-10-CM

## 2025-04-17 RX ORDER — HYDROCORTISONE SODIUM SUCCINATE 100 MG/2ML
100 INJECTION INTRAMUSCULAR; INTRAVENOUS
OUTPATIENT
Start: 2025-04-24

## 2025-04-17 RX ORDER — EPINEPHRINE 1 MG/ML
0.3 INJECTION, SOLUTION, CONCENTRATE INTRAVENOUS PRN
OUTPATIENT
Start: 2025-04-24

## 2025-04-17 RX ORDER — SODIUM CHLORIDE 9 MG/ML
INJECTION, SOLUTION INTRAVENOUS CONTINUOUS
OUTPATIENT
Start: 2025-04-24

## 2025-04-17 RX ORDER — ACETAMINOPHEN 325 MG/1
650 TABLET ORAL
OUTPATIENT
Start: 2025-04-24

## 2025-04-17 RX ORDER — FAMOTIDINE 10 MG/ML
20 INJECTION, SOLUTION INTRAVENOUS
OUTPATIENT
Start: 2025-04-24

## 2025-04-17 RX ORDER — ALBUTEROL SULFATE 90 UG/1
4 INHALANT RESPIRATORY (INHALATION) PRN
OUTPATIENT
Start: 2025-04-24

## 2025-04-17 RX ORDER — ONDANSETRON 2 MG/ML
8 INJECTION INTRAMUSCULAR; INTRAVENOUS
OUTPATIENT
Start: 2025-04-24

## 2025-04-17 RX ORDER — CAL/D3/MAG11/ZINC/COP/MANG/BOR 600 MG-800
1 TABLET ORAL DAILY
Qty: 30 TABLET | Refills: 3 | Status: SHIPPED | OUTPATIENT
Start: 2025-04-17

## 2025-04-17 RX ORDER — DIPHENHYDRAMINE HYDROCHLORIDE 50 MG/ML
50 INJECTION, SOLUTION INTRAMUSCULAR; INTRAVENOUS
OUTPATIENT
Start: 2025-04-24

## 2025-04-17 NOTE — TELEPHONE ENCOUNTER
Called pt.  HIPAA verified by two patient identifiers.   She is aware of results and awaiting a call to schedule [facility] appointment and aware to  new prescription.    Approved per VORB from     Requested Prescriptions     Pending Prescriptions Disp Refills    Caltrate 600+D Plus Minerals (CALTRATE) 600-800 MG-UNIT TABS tablet 30 tablet 3     Sig: Take 1 tablet by mouth daily

## 2025-04-18 ENCOUNTER — CLINICAL DOCUMENTATION (OUTPATIENT)
Age: 75
End: 2025-04-18

## 2025-05-29 ENCOUNTER — HOSPITAL ENCOUNTER (OUTPATIENT)
Facility: HOSPITAL | Age: 75
Setting detail: INFUSION SERIES
Discharge: HOME | End: 2025-05-29
Payer: MEDICARE

## 2025-05-29 VITALS
DIASTOLIC BLOOD PRESSURE: 85 MMHG | OXYGEN SATURATION: 98 % | BODY MASS INDEX: 42.97 KG/M2 | WEIGHT: 233.5 LBS | HEART RATE: 60 BPM | SYSTOLIC BLOOD PRESSURE: 131 MMHG | TEMPERATURE: 98.2 F | HEIGHT: 62 IN

## 2025-05-29 DIAGNOSIS — M81.8 OSTEOPOROSIS DUE TO AROMATASE INHIBITOR: Primary | ICD-10-CM

## 2025-05-29 DIAGNOSIS — T38.6X5A OSTEOPOROSIS DUE TO AROMATASE INHIBITOR: Primary | ICD-10-CM

## 2025-05-29 LAB
ANION GAP BLD CALC-SCNC: 7.5 MMOL/L (ref 10–20)
CA-I BLD-MCNC: 1.28 MMOL/L (ref 1.15–1.33)
CHLORIDE BLD-SCNC: 112 MMOL/L (ref 98–107)
CO2 BLD-SCNC: 22.5 MMOL/L (ref 21–32)
CREAT BLD-MCNC: 0.89 MG/DL (ref 0.6–1.3)
GLUCOSE BLD-MCNC: 104 MG/DL (ref 74–99)
MAGNESIUM SERPL-MCNC: 1.8 MG/DL (ref 1.6–2.4)
PHOSPHATE SERPL-MCNC: 3.8 MG/DL (ref 2.6–4.7)
POTASSIUM BLD-SCNC: 4.7 MMOL/L (ref 3.5–5.1)
SERVICE CMNT-IMP: ABNORMAL
SODIUM BLD-SCNC: 142 MMOL/L (ref 136–145)

## 2025-05-29 PROCEDURE — 96372 THER/PROPH/DIAG INJ SC/IM: CPT

## 2025-05-29 PROCEDURE — 84100 ASSAY OF PHOSPHORUS: CPT

## 2025-05-29 PROCEDURE — 83735 ASSAY OF MAGNESIUM: CPT

## 2025-05-29 PROCEDURE — 36415 COLL VENOUS BLD VENIPUNCTURE: CPT

## 2025-05-29 PROCEDURE — 6360000002 HC RX W HCPCS: Performed by: INTERNAL MEDICINE

## 2025-05-29 PROCEDURE — 80047 BASIC METABLC PNL IONIZED CA: CPT

## 2025-05-29 RX ORDER — DIPHENHYDRAMINE HYDROCHLORIDE 50 MG/ML
50 INJECTION, SOLUTION INTRAMUSCULAR; INTRAVENOUS
OUTPATIENT
Start: 2025-11-23

## 2025-05-29 RX ORDER — ACETAMINOPHEN 325 MG/1
650 TABLET ORAL
OUTPATIENT
Start: 2025-11-23

## 2025-05-29 RX ORDER — ALBUTEROL SULFATE 90 UG/1
4 INHALANT RESPIRATORY (INHALATION) PRN
OUTPATIENT
Start: 2025-11-23

## 2025-05-29 RX ORDER — HYDROCORTISONE SODIUM SUCCINATE 100 MG/2ML
100 INJECTION INTRAMUSCULAR; INTRAVENOUS
OUTPATIENT
Start: 2025-11-23

## 2025-05-29 RX ORDER — EPINEPHRINE 1 MG/ML
0.3 INJECTION, SOLUTION INTRAMUSCULAR; SUBCUTANEOUS PRN
OUTPATIENT
Start: 2025-11-23

## 2025-05-29 RX ORDER — ONDANSETRON 2 MG/ML
8 INJECTION INTRAMUSCULAR; INTRAVENOUS
OUTPATIENT
Start: 2025-11-23

## 2025-05-29 RX ORDER — SODIUM CHLORIDE 9 MG/ML
INJECTION, SOLUTION INTRAVENOUS CONTINUOUS
OUTPATIENT
Start: 2025-11-23

## 2025-05-29 RX ADMIN — DENOSUMAB 60 MG: 60 INJECTION SUBCUTANEOUS at 16:00

## 2025-05-29 NOTE — PROGRESS NOTES
Pt arrived at [facility] ambulatory and in no acute distress for Prolia injection. Assessment complete, pt reports recent knee replacement. Denies any questions or concerns, also denies any recent dental work.     Discussed Prolia, purpose, administration, frequency, and possible side effects. Encouraged patient to voice any questions. Provided printed information.    Patient Vitals for the past 12 hrs:   Temp Pulse BP SpO2   05/29/25 1549 -- 60 131/85 --   05/29/25 1530 98.2 °F (36.8 °C) 53 (!) 181/67 98 %     Recent Results (from the past 12 hours)   POC CHEM 8    Collection Time: 05/29/25  3:43 PM   Result Value Ref Range    POC Ionized Calcium 1.28 1.15 - 1.33 mmol/L    POC Sodium 142 136 - 145 mmol/L    POC Potassium 4.7 3.5 - 5.1 mmol/L    POC Chloride 112 (H) 98 - 107 mmol/L    POC TCO2 22.5 21 - 32 mmol/L    Anion Gap, POC 7.5 (L) 10 - 20 mmol/L    POC Glucose 104 (H) 74 - 99 mg/dL    POC Creatinine 0.89 0.6 - 1.3 mg/dL    eGFR, POC 68 >60 ml/min/1.73m2    UA Comment Comment Not Indicated.         Medications Administered         denosumab (PROLIA) SC injection 60 mg Admin Date  05/29/2025 Action  Given Dose  60 mg Route  SubCUTAneous Documented By  Isamar Bolanos, RN          Pt tolerated injection well, no adverse reaction noted. D/Cd from [facility] ambulatory and in no acute distress. Pt aware of next appointment.     Future Appointments   Date Time Provider Department Center   11/13/2025  3:00 PM [facility] 16 [facility]   12/22/2025 11:00 AM [facility]   2/11/2026 11:00 AM Ino Hare MD [facility]

## (undated) DEVICE — SYR 10ML LUER LOK 1/5ML GRAD --

## (undated) DEVICE — TOWEL 4 PLY TISS 19X30 SUE WHT

## (undated) DEVICE — SUT SLK 2-0SH 30IN BLK --

## (undated) DEVICE — Device

## (undated) DEVICE — NEONATAL-ADULT SPO2 SENSOR: Brand: NELLCOR

## (undated) DEVICE — SET ADMIN 16ML TBNG L100IN 2 Y INJ SITE IV PIGGY BK DISP

## (undated) DEVICE — CONTAINER,SPECIMEN,OR STERILE,4OZ: Brand: MEDLINE

## (undated) DEVICE — Z DISCONTINUED PER MEDLINE LINE GAS SAMPLING O2/CO2 LNG AD 13 FT NSL W/ TBNG FILTERLINE

## (undated) DEVICE — DRAPE PRB US TRNSDCR 6X96IN --

## (undated) DEVICE — STERILE POLYISOPRENE POWDER-FREE SURGICAL GLOVES WITH EMOLLIENT COATING: Brand: PROTEXIS

## (undated) DEVICE — ELECTRODE,RADIOTRANSLUCENT,FOAM,5PK: Brand: MEDLINE

## (undated) DEVICE — CATH IV AUTOGRD BC PNK 20GA 25 -- INSYTE

## (undated) DEVICE — 1200 GUARD II KIT W/5MM TUBE W/O VAC TUBE: Brand: GUARDIAN

## (undated) DEVICE — SUTURE VCRL SZ 2-0 L27IN ABSRB UD L26MM SH 1/2 CIR J417H

## (undated) DEVICE — CONTAINER SPEC 20 ML LID NEUT BUFF FORMALIN 10 % POLYPR STS

## (undated) DEVICE — 1010 S-DRAPE TOWEL DRAPE 10/BX: Brand: STERI-DRAPE™

## (undated) DEVICE — BLOCK BITE ENDOSCP AD 21 MM W/ DIL BLU LF DISP

## (undated) DEVICE — NEEDLE HYPO 18GA L1.5IN PNK S STL HUB POLYPR SHLD REG BVL

## (undated) DEVICE — ROCKER SWITCH PENCIL BLADE ELECTRODE, HOLSTER: Brand: EDGE

## (undated) DEVICE — SYR 3ML LL TIP 1/10ML GRAD --

## (undated) DEVICE — COVER US PRB W4XL15IN GAM CRDLSS FLD

## (undated) DEVICE — SYRINGE 50ML E/T

## (undated) DEVICE — SUTURE MCRYL SZ 4-0 L27IN ABSRB UD L19MM PS-2 1/2 CIR PRIM Y426H

## (undated) DEVICE — SUTURE VCRL SZ 3-0 L27IN ABSRB UD L26MM SH 1/2 CIR J416H

## (undated) DEVICE — DERMABOND SKIN ADH 0.7ML -- DERMABOND ADVANCED 12/BX

## (undated) DEVICE — GAUZE SPONGES,12 PLY: Brand: CURITY

## (undated) DEVICE — TOWEL SURG W17XL27IN STD BLU COT NONFENESTRATED PREWASHED

## (undated) DEVICE — YANKAUER,TAPERED BULBOUS TIP,W/O VENT: Brand: MEDLINE

## (undated) DEVICE — INFECTION CONTROL KIT SYS

## (undated) DEVICE — BAG SPEC BIOHZRD 10 X 10 IN --

## (undated) DEVICE — FORCEPS BX L240CM JAW DIA2.8MM L CAP W/ NDL MIC MESH TOOTH

## (undated) DEVICE — SOLIDIFIER MEDC 1200ML -- CONVERT TO 356117

## (undated) DEVICE — KENDALL RADIOLUCENT FOAM MONITORING ELECTRODE RECTANGULAR SHAPE: Brand: KENDALL

## (undated) DEVICE — NEEDLE HYPO 25GA L1.5IN BVL ORIENTED ECLIPSE

## (undated) DEVICE — BASIN EMSIS 16OZ GRAPHITE PLAS KID SHP MOLD GRAD FOR ORAL

## (undated) DEVICE — (D)PREP SKN CHLRAPRP APPL 26ML -- CONVERT TO ITEM 371833

## (undated) DEVICE — INTENDED FOR TISSUE SEPARATION, AND OTHER PROCEDURES THAT REQUIRE A SHARP SURGICAL BLADE TO PUNCTURE OR CUT.: Brand: BARD-PARKER ® CARBON RIB-BACK BLADES

## (undated) DEVICE — INSULATED BLADE ELECTRODE: Brand: EDGE

## (undated) DEVICE — CHEST/BREAST-LF: Brand: MEDLINE INDUSTRIES, INC.

## (undated) DEVICE — LIGHT HANDLE: Brand: DEVON

## (undated) DEVICE — DRAPE,REIN 53X77,STERILE: Brand: MEDLINE

## (undated) DEVICE — PROBE DETECTION F/LUMPECTOMY -- ORDER IN MULTIPLES OF 5 EA ..MARGINPROBE

## (undated) DEVICE — CURVED, SMALL JAW, OPEN SEALER/DIVIDER: Brand: LIGASURE

## (undated) DEVICE — REM POLYHESIVE ADULT PATIENT RETURN ELECTRODE: Brand: VALLEYLAB